# Patient Record
Sex: MALE | Race: WHITE | Employment: FULL TIME | URBAN - METROPOLITAN AREA
[De-identification: names, ages, dates, MRNs, and addresses within clinical notes are randomized per-mention and may not be internally consistent; named-entity substitution may affect disease eponyms.]

---

## 2017-08-28 PROBLEM — I10 ESSENTIAL HYPERTENSION: Status: ACTIVE | Noted: 2017-08-28

## 2018-10-29 ENCOUNTER — HOSPITAL ENCOUNTER (OUTPATIENT)
Dept: PHYSICAL THERAPY | Age: 58
Discharge: HOME | End: 2018-10-29
Payer: COMMERCIAL

## 2018-10-29 ENCOUNTER — HOSPITAL ENCOUNTER (OUTPATIENT)
Dept: SURGERY | Age: 58
Discharge: HOME | End: 2018-10-29
Payer: COMMERCIAL

## 2018-10-29 VITALS
OXYGEN SATURATION: 95 % | SYSTOLIC BLOOD PRESSURE: 150 MMHG | BODY MASS INDEX: 30.63 KG/M2 | TEMPERATURE: 96 F | RESPIRATION RATE: 14 BRPM | HEART RATE: 78 BPM | DIASTOLIC BLOOD PRESSURE: 86 MMHG | HEIGHT: 68 IN | WEIGHT: 202.13 LBS

## 2018-10-29 LAB
ANION GAP SERPL CALC-SCNC: 8 MMOL/L
APPEARANCE UR: CLEAR
APTT PPP: 26.9 SEC (ref 23.2–35.3)
ATRIAL RATE: 77 BPM
BACTERIA SPEC CULT: NORMAL
BACTERIA URNS QL MICRO: 0 /HPF
BASOPHILS # BLD: 0.1 K/UL (ref 0–0.2)
BASOPHILS NFR BLD: 1 % (ref 0–2)
BILIRUB UR QL: NEGATIVE
BUN SERPL-MCNC: 13 MG/DL (ref 6–23)
CALCIUM SERPL-MCNC: 9.6 MG/DL (ref 8.3–10.4)
CALCULATED P AXIS, ECG09: 54 DEGREES
CALCULATED R AXIS, ECG10: 0 DEGREES
CALCULATED T AXIS, ECG11: 62 DEGREES
CHLORIDE SERPL-SCNC: 102 MMOL/L (ref 98–107)
CO2 SERPL-SCNC: 27 MMOL/L (ref 21–32)
COLOR UR: YELLOW
CREAT SERPL-MCNC: 0.93 MG/DL (ref 0.8–1.5)
DIAGNOSIS, 93000: NORMAL
DIFFERENTIAL METHOD BLD: ABNORMAL
EOSINOPHIL # BLD: 0.2 K/UL (ref 0–0.8)
EOSINOPHIL NFR BLD: 4 % (ref 0.5–7.8)
ERYTHROCYTE [DISTWIDTH] IN BLOOD BY AUTOMATED COUNT: 12.6 %
GLUCOSE SERPL-MCNC: 191 MG/DL (ref 65–100)
GLUCOSE UR STRIP.AUTO-MCNC: 100 MG/DL
HCT VFR BLD AUTO: 45.9 % (ref 41.1–50.3)
HGB BLD-MCNC: 16.3 G/DL (ref 13.6–17.2)
HGB UR QL STRIP: NEGATIVE
IMM GRANULOCYTES # BLD: 0.1 K/UL (ref 0–0.5)
IMM GRANULOCYTES NFR BLD AUTO: 1 % (ref 0–5)
INR PPP: 0.9
KETONES UR QL STRIP.AUTO: ABNORMAL MG/DL
LEUKOCYTE ESTERASE UR QL STRIP.AUTO: NEGATIVE
LYMPHOCYTES # BLD: 1.3 K/UL (ref 0.5–4.6)
LYMPHOCYTES NFR BLD: 20 % (ref 13–44)
MCH RBC QN AUTO: 31.2 PG (ref 26.1–32.9)
MCHC RBC AUTO-ENTMCNC: 35.5 G/DL (ref 31.4–35)
MCV RBC AUTO: 87.9 FL (ref 79.6–97.8)
MONOCYTES # BLD: 0.5 K/UL (ref 0.1–1.3)
MONOCYTES NFR BLD: 8 % (ref 4–12)
NEUTS SEG # BLD: 4.4 K/UL (ref 1.7–8.2)
NEUTS SEG NFR BLD: 67 % (ref 43–78)
NITRITE UR QL STRIP.AUTO: NEGATIVE
NRBC # BLD: 0 K/UL (ref 0–0.2)
P-R INTERVAL, ECG05: 166 MS
PH UR STRIP: 6.5 [PH] (ref 5–9)
PLATELET # BLD AUTO: 203 K/UL (ref 150–450)
PMV BLD AUTO: 10.3 FL (ref 9.4–12.3)
POTASSIUM SERPL-SCNC: 3.9 MMOL/L (ref 3.5–5.1)
PROT UR STRIP-MCNC: NEGATIVE MG/DL
PROTHROMBIN TIME: 12.8 SEC (ref 11.5–14.5)
Q-T INTERVAL, ECG07: 368 MS
QRS DURATION, ECG06: 90 MS
QTC CALCULATION (BEZET), ECG08: 416 MS
RBC # BLD AUTO: 5.22 M/UL (ref 4.23–5.6)
SERVICE CMNT-IMP: NORMAL
SODIUM SERPL-SCNC: 137 MMOL/L (ref 136–145)
SP GR UR REFRACTOMETRY: 1.02 (ref 1–1.02)
UROBILINOGEN UR QL STRIP.AUTO: 0.2 EU/DL (ref 0.2–1)
VENTRICULAR RATE, ECG03: 77 BPM
WBC # BLD AUTO: 6.6 K/UL (ref 4.3–11.1)

## 2018-10-29 PROCEDURE — 85025 COMPLETE CBC W/AUTO DIFF WBC: CPT

## 2018-10-29 PROCEDURE — 93005 ELECTROCARDIOGRAM TRACING: CPT | Performed by: ANESTHESIOLOGY

## 2018-10-29 PROCEDURE — 80048 BASIC METABOLIC PNL TOTAL CA: CPT

## 2018-10-29 PROCEDURE — 85610 PROTHROMBIN TIME: CPT

## 2018-10-29 PROCEDURE — 36415 COLL VENOUS BLD VENIPUNCTURE: CPT

## 2018-10-29 PROCEDURE — 81003 URINALYSIS AUTO W/O SCOPE: CPT

## 2018-10-29 PROCEDURE — 85730 THROMBOPLASTIN TIME PARTIAL: CPT

## 2018-10-29 PROCEDURE — 97161 PT EVAL LOW COMPLEX 20 MIN: CPT

## 2018-10-29 PROCEDURE — 77030027138 HC INCENT SPIROMETER -A

## 2018-10-29 PROCEDURE — 87641 MR-STAPH DNA AMP PROBE: CPT

## 2018-10-29 RX ORDER — CHOLECALCIFEROL (VITAMIN D3) 125 MCG
CAPSULE ORAL
COMMUNITY
End: 2018-11-16

## 2018-10-29 RX ORDER — GLUCOSAMINE/CHONDR SU A SOD 750-600 MG
TABLET ORAL DAILY
COMMUNITY
End: 2018-11-16

## 2018-10-29 RX ORDER — LANOLIN ALCOHOL/MO/W.PET/CERES
1000 CREAM (GRAM) TOPICAL DAILY
COMMUNITY
End: 2018-11-16

## 2018-10-29 RX ORDER — MULTIVITAMIN
TABLET ORAL DAILY
COMMUNITY
End: 2018-11-16

## 2018-10-29 NOTE — PERIOP NOTES
[facility] clinic, spoke with triage RN for Primary care services. Informed that pt is scheduled for Total joint surgery and has been without lisinopril and other meds for 2-3 weeks due to pt's inability to get a response from the clinic. Pt reports numerous messages have been left re:need for refills without response. Also requested fax number so that lab results can be faxed to facility. [facility]- Fax # [phone]

## 2018-10-29 NOTE — ADVANCED PRACTICE NURSE
Total Joint Surgery Preoperative Chart Review Patient ID: 
Cristina Cardona [id number] 
62 y.o. 
1960 Surgeon: Dr. Enrike Arteaga Date of Surgery: 11/15/2018 Procedure: Total Left Knee Arthroplasty Primary Care Physician: Lucian Crowe [phone] Specialty Physician(s):   
 
Subjective:  
Cristina Cardona is a 62 y.o. WHITE OR  male who presents for preoperative evaluation for Total Left Knee arthroplasty. This is a preoperative chart review note based on data collected by the nurse at the surgical Pre-Assessment visit. Past Medical History:  
Diagnosis Date  Arthritis  CAD (coronary artery disease)   
 heart cath times 3 - 2 total stents  Diabetes ([address] 2018  
 type 2, adverage glucose 150-200, symptomatic below 100  GERD (gastroesophageal reflux disease) PRN prilosec  - controlls  Hx of colonic polyp 2016  
 adenoma  Hypercholesterolemia  Hypertension Past Surgical History:  
Procedure Laterality Date  HX COLONOSCOPY  last 4/25/16 Moe--trans TA--3 year recall due to prep quality  HX CORONARY STENT PLACEMENT    
 heart cath times 3 - 2 total stents  HX KNEE ARTHROSCOPY Left  HX TONSIL AND ADENOIDECTOMY  HX UVULOPALATOPHARYNGOPLASTY Family History Problem Relation Age of Onset  Lung Disease Mother  Asthma Mother  Cancer Father  Colon Cancer Father  Cancer Maternal Uncle   
     colon  Cancer Maternal Uncle   
     colon Social History Tobacco Use  Smoking status: Never Smoker  Smokeless tobacco: Never Used Substance Use Topics  Alcohol use: Yes Alcohol/week: 1.2 oz Types: 2 Cans of beer per week Prior to Admission medications Medication Sig Start Date End Date Taking? Authorizing Provider VITAMIN E, DL,TOCOPHERYL ACET, (VITAMIN E ACETATE) 400 unit cap capsule Take  by mouth daily. Yes Provider, Historical  
ascorbic acid (VITAMIN C) 500 mg tablet Take 500 mg by mouth daily. Stop seven days prior to surgery per anesthesia protocol. Yes Provider, Historical  
NOVOFINE 32 32 gauge x 1/4\" ndle USE TO INJECT INSULIN ONCE DAILY 1/16/18   Michelle Cota MD  
insulin degludec (TRESIBA FLEXTOUCH U-200) 200 unit/mL (3 mL) inpn 46 Units by SubCUTAneous route daily. 12/20/17   Michelle Cota MD  
glucose blood VI test strips (CONTOUR NEXT STRIPS) strip Check sugars tid for uncontrolled type 2 DM 9/22/17   Kei CHAVARRIA PA-C  
glucose blood VI test strips (ONETOUCH ULTRA TEST) strip Use to check blood sugars TID DX E11.9 9/22/17   Michelle Cota MD  
Blood-Glucose Meter [facility] [place]) monitoring kit Use to check blood sugars TID DX E11.9 9/22/17   Michelle Cota MD  
lancets (ONE TOUCH DELICA) 33 gauge misc by Does Not Apply route three (3) times daily. Use to check blood sugars TID DX E11.9 9/22/17   Michelle Cota MD  
simvastatin (ZOCOR) 40 mg tablet Take 1 Tab by mouth nightly. 8/28/17   Michelle Cota MD  
traZODone (DESYREL) 100 mg tablet Take 1 Tab by mouth nightly. 8/28/17   Michelle Cota MD  
lisinopril (PRINIVIL, ZESTRIL) 10 mg tablet Take 1 Tab by mouth daily. 8/28/17   Michelle Cota MD  
fenofibrate micronized (LOFIBRA) 134 mg capsule Take 1 Cap by mouth every morning. 8/28/17   Michelle Cota MD  
metFORMIN (GLUCOPHAGE) 1,000 mg tablet Take 1 Tab by mouth two (2) times a day. 8/28/17   Michelle Cota MD  
zinc 15 mg tab Take  by mouth. Provider, Historical  
HYDROcodone-acetaminophen (NORCO) 5-325 mg per tablet Take 1 Tab by mouth every four (4) hours as needed for Pain. Max Daily Amount: 6 Tabs.  Indications: PAIN 11/28/16   Michelle Cota MD  
 melatonin tab tablet Take 10 mg by mouth nightly. Stop seven days prior to surgery per anesthesia protocol. Provider, Historical  
sodium-potassium-mag sulfate (SUPREP BOWEL PREP KIT) 17.5-3.13-1.6 gram solr oral solution Take 177 mL by mouth See Admin Instructions. 3/17/16   Shailesh Bueno MD  
aspirin delayed-release 81 mg tablet Take 81 mg by mouth every morning. Take day of surgery per anesthesia protocol. 3/7/16   Renetta Cota MD  
 
No Known Allergies Objective:  
 
Physical Exam:  
No data found. ECG:   
EKG Results Procedure 720 Value Units Date/Time EKG, 12 LEAD, INITIAL [[id number]] Collected:  10/29/18 1004 Order Status:  Completed Updated:  10/29/18 1237 Ventricular Rate 77 BPM   
  Atrial Rate 77 BPM   
  P-R Interval 166 ms   
  QRS Duration 90 ms Q-T Interval 368 ms QTC Calculation (Bezet) 416 ms Calculated P Axis 54 degrees Calculated R Axis 0 degrees Calculated T Axis 62 degrees Diagnosis --  
  Normal sinus rhythm Possible Left atrial enlargement Borderline ECG When compared with ECG of 11-JUN-2010 09:41, No significant change was found Confirmed by VERÓNICA LOPEZ (), Terrence Comer ([id number]) on 10/29/2018 12:37:46 PM 
  
  
 
 
Data Review:  
Labs:  
Recent Results (from the past 24 hour(s)) CBC WITH AUTOMATED DIFF Collection Time: 10/29/18  9:15 AM  
Result Value Ref Range WBC 6.6 4.3 - 11.1 K/uL  
 RBC 5.22 4.23 - 5.6 M/uL  
 HGB 16.3 13.6 - 17.2 g/dL HCT 45.9 41.1 - 50.3 % MCV 87.9 79.6 - 97.8 FL  
 MCH 31.2 26.1 - 32.9 PG  
 MCHC 35.5 (H) 31.4 - 35.0 g/dL  
 RDW 12.6 % PLATELET 374 211 - 170 K/uL MPV 10.3 9.4 - 12.3 FL ABSOLUTE NRBC 0.00 0.0 - 0.2 K/uL  
 DF AUTOMATED NEUTROPHILS 67 43 - 78 % LYMPHOCYTES 20 13 - 44 % MONOCYTES 8 4.0 - 12.0 % EOSINOPHILS 4 0.5 - 7.8 % BASOPHILS 1 0.0 - 2.0 % IMMATURE GRANULOCYTES 1 0.0 - 5.0 %  
 ABS. NEUTROPHILS 4.4 1.7 - 8.2 K/UL ABS. LYMPHOCYTES 1.3 0.5 - 4.6 K/UL  
 ABS. MONOCYTES 0.5 0.1 - 1.3 K/UL  
 ABS. EOSINOPHILS 0.2 0.0 - 0.8 K/UL  
 ABS. BASOPHILS 0.1 0.0 - 0.2 K/UL  
 ABS. IMM. GRANS. 0.1 0.0 - 0.5 K/UL METABOLIC PANEL, BASIC Collection Time: 10/29/18  9:15 AM  
Result Value Ref Range Sodium 137 136 - 145 mmol/L Potassium 3.9 3.5 - 5.1 mmol/L Chloride 102 98 - 107 mmol/L  
 CO2 27 21 - 32 mmol/L Anion gap 8 mmol/L Glucose 191 (H) 65 - 100 mg/dL BUN 13 6 - 23 MG/DL Creatinine 0.93 0.8 - 1.5 MG/DL  
 GFR est AA >60 >60 ml/min/1.73m2 GFR est non-AA >60 ml/min/1.73m2 Calcium 9.6 8.3 - 10.4 MG/DL PROTHROMBIN TIME + INR Collection Time: 10/29/18  9:15 AM  
Result Value Ref Range Prothrombin time 12.8 11.5 - 14.5 sec INR 0.9 PTT Collection Time: 10/29/18  9:15 AM  
Result Value Ref Range aPTT 26.9 23.2 - 35.3 SEC URINALYSIS W/ RFLX MICROSCOPIC Collection Time: 10/29/18  9:15 AM  
Result Value Ref Range Color YELLOW Appearance CLEAR Specific gravity 1.024 (H) 1.001 - 1.023    
 pH (UA) 6.5 5.0 - 9.0 Protein NEGATIVE  NEG mg/dL Glucose 100 (A) NEG mg/dL Ketone TRACE (A) NEG mg/dL Bilirubin NEGATIVE  NEG Blood NEGATIVE  NEG Urobilinogen 0.2 0.2 - 1.0 EU/dL Nitrites NEGATIVE  NEG Leukocyte Esterase NEGATIVE  NEG Bacteria 0 0 /hpf  
EKG, 12 LEAD, INITIAL Collection Time: 10/29/18 10:04 AM  
Result Value Ref Range Ventricular Rate 77 BPM  
 Atrial Rate 77 BPM  
 P-R Interval 166 ms  
 QRS Duration 90 ms Q-T Interval 368 ms QTC Calculation (Bezet) 416 ms Calculated P Axis 54 degrees Calculated R Axis 0 degrees Calculated T Axis 62 degrees Diagnosis Normal sinus rhythm Possible Left atrial enlargement Borderline ECG When compared with ECG of 11-JUN-2010 09:41, No significant change was found Confirmed by VERÓNICA LOPEZ (), Terrence Comer ([id number]) on 10/29/2018 12:37:46 PM 
  
 
 
 
Problem List: 
) Patient Active Problem List  
Diagnosis Code  Gout M10.9  Hyperlipidemia E78.5  Insomnia G47.00  Fatigue R53.83  Benign colon polyp K63.5  Chronic neck pain M54.2, G89.29  Type 2 diabetes mellitus without complication, without long-term current use of insulin (HCC) E11.9  
 Essential hypertension I10 Total Joint Surgery Pre-Assessment Recommendations:          
Recommend continuous saturation monitoring hours of sleep, during hospitalization. Signed By: REED Clements October 29, 2018

## 2018-10-29 NOTE — PERIOP NOTES
Lab results within limits per anesthesia protocol; OK for surgery. Recent Results (from the past 12 hour(s)) CBC WITH AUTOMATED DIFF Collection Time: 10/29/18  9:15 AM  
Result Value Ref Range WBC 6.6 4.3 - 11.1 K/uL  
 RBC 5.22 4.23 - 5.6 M/uL  
 HGB 16.3 13.6 - 17.2 g/dL HCT 45.9 41.1 - 50.3 % MCV 87.9 79.6 - 97.8 FL  
 MCH 31.2 26.1 - 32.9 PG  
 MCHC 35.5 (H) 31.4 - 35.0 g/dL  
 RDW 12.6 % PLATELET 049 970 - 566 K/uL MPV 10.3 9.4 - 12.3 FL ABSOLUTE NRBC 0.00 0.0 - 0.2 K/uL  
 DF AUTOMATED NEUTROPHILS 67 43 - 78 % LYMPHOCYTES 20 13 - 44 % MONOCYTES 8 4.0 - 12.0 % EOSINOPHILS 4 0.5 - 7.8 % BASOPHILS 1 0.0 - 2.0 % IMMATURE GRANULOCYTES 1 0.0 - 5.0 %  
 ABS. NEUTROPHILS 4.4 1.7 - 8.2 K/UL  
 ABS. LYMPHOCYTES 1.3 0.5 - 4.6 K/UL  
 ABS. MONOCYTES 0.5 0.1 - 1.3 K/UL  
 ABS. EOSINOPHILS 0.2 0.0 - 0.8 K/UL  
 ABS. BASOPHILS 0.1 0.0 - 0.2 K/UL  
 ABS. IMM. GRANS. 0.1 0.0 - 0.5 K/UL METABOLIC PANEL, BASIC Collection Time: 10/29/18  9:15 AM  
Result Value Ref Range Sodium 137 136 - 145 mmol/L Potassium 3.9 3.5 - 5.1 mmol/L Chloride 102 98 - 107 mmol/L  
 CO2 27 21 - 32 mmol/L Anion gap 8 mmol/L Glucose 191 (H) 65 - 100 mg/dL BUN 13 6 - 23 MG/DL Creatinine 0.93 0.8 - 1.5 MG/DL  
 GFR est AA >60 >60 ml/min/1.73m2 GFR est non-AA >60 ml/min/1.73m2 Calcium 9.6 8.3 - 10.4 MG/DL PROTHROMBIN TIME + INR Collection Time: 10/29/18  9:15 AM  
Result Value Ref Range Prothrombin time 12.8 11.5 - 14.5 sec INR 0.9 PTT Collection Time: 10/29/18  9:15 AM  
Result Value Ref Range aPTT 26.9 23.2 - 35.3 SEC URINALYSIS W/ RFLX MICROSCOPIC Collection Time: 10/29/18  9:15 AM  
Result Value Ref Range Color YELLOW Appearance CLEAR Specific gravity 1.024 (H) 1.001 - 1.023    
 pH (UA) 6.5 5.0 - 9.0 Protein NEGATIVE  NEG mg/dL Glucose 100 (A) NEG mg/dL Ketone TRACE (A) NEG mg/dL  Bilirubin NEGATIVE  NEG    
 Blood NEGATIVE  NEG Urobilinogen 0.2 0.2 - 1.0 EU/dL Nitrites NEGATIVE  NEG Leukocyte Esterase NEGATIVE  NEG Bacteria 0 0 /hpf  
EKG, 12 LEAD, INITIAL Collection Time: 10/29/18 10:04 AM  
Result Value Ref Range Ventricular Rate 77 BPM  
 Atrial Rate 77 BPM  
 P-R Interval 166 ms  
 QRS Duration 90 ms Q-T Interval 368 ms QTC Calculation (Bezet) 416 ms Calculated P Axis 54 degrees Calculated R Axis 0 degrees Calculated T Axis 62 degrees Diagnosis Normal sinus rhythm Possible Left atrial enlargement Borderline ECG When compared with ECG of 11-JUN-2010 09:41, No significant change was found Confirmed by VERÓNICA LOPEZ (), Mc Barragan ([id number]) on 10/29/2018 12:37:46 PM

## 2018-10-29 NOTE — PERIOP NOTES
Patient verified name and . Order for consent  found in EHR and matches case posting; patient verified. Type III surgery, walk in assessment complete. Labs per surgeon: cbc,bmp,PT/PTT and UA ; results within limits per anesthesia protocol Labs per anesthesia protocol: type & screen DOS 
EKG:performed per anesthesia protocol, results within protocol. Copy placed on chart. Hibiclens and instructions to return bottle on DOS given per hospital policy. Patient provided with handouts including Guide to Surgery, Pain Management, Hand Hygiene, Blood Transfusion Education, and [place] [facility] Brochure. Patient answered medical/surgical history questions at their best of ability. All prior to admission medications documented in [facility]. Original medication prescription bottle not visualized during patient appointment. Patient instructed to hold all vitamins 7 days prior to surgery and NSAIDS 5 days prior to surgery. Medications to be held: aleve, supplements Patient instructed to continue previous medications as prescribed prior to surgery and to take the following medications the day of surgery according to anesthesia guidelines with a small sip of water: aspirin, prilosec. Patient teach back successful and patient demonstrates knowledge of instruction.

## 2018-10-29 NOTE — PROGRESS NOTES
Rebecca De León  : (4 y.o.) Joint Alison Sas at [address], [facility].  [phone]       Physical Therapy Prehab Plan of Treatment and Evaluation Summary:10/29/2018    ICD-10: Treatment Diagnosis:   · Pain in Left Knee (M25.562)  · Stiffness of Left Knee, Not elsewhere classified (L58.758)  Precautions/Allergies:   Patient has no known allergies. MEDICAL/REFERRING DIAGNOSIS:  Unilateral primary osteoarthritis, left knee [M17.12]  REFERRING PHYSICIAN: Marielle Danielson,*  DATE OF SURGERY: 11/15/18   Assessment:   Comments:  Patient is scheduled for L TKA. Reports L knee pain averages an 8. States it is best in the morning and worse as the day goes on. Also reports L knee instability. Plans to go home at discharge with support of wife. PROBLEM LIST (Impacting functional limitations):  Mr. Josie Dominguez presents with the following left lower extremity(s) problems:  1. Strength  2. Range of Motion  3. Home Exercise Program  4. Pain   INTERVENTIONS PLANNED:  1. Home Exercise Program  2. Educational Discussion     TREATMENT PLAN: Effective Dates: 10/29/2018 TO 10/29/2018. Frequency/Duration: Patient to continue to perform home exercise program at least twice per day up until his surgery. GOALS: (Goals have been discussed and agreed upon with patient.)  Discharge Goals: Time Frame: 1 Day  1. Patient will demonstrate independence with a home exercise program designed to increase functional technique and pain control to minimize functional deficits and optimize patient for total joint replacement. Rehabilitation Potential For Stated Goals: Good  Regarding Rebecca De León therapy, I certify that the treatment plan above will be carried out by a therapist or under their direction.   Thank you for this referral,  Roberta Wharton PT               HISTORY:   Present Symptoms:  Pain Intensity 1: 5  Pain Location 1: Knee  Pain Orientation 1: Left History of Present Injury/Illness (Reason for Referral):  Medical/Referring Diagnosis: Unilateral primary osteoarthritis, left knee [M17.12]   Past Medical History/Comorbidities:   Mr. Vy Ku  has a past medical history of Arthritis, CAD (coronary artery disease), Diabetes ([facility] [address] GERD (gastroesophageal reflux disease), colonic polyp, Hypercholesterolemia, and Hypertension. Mr. Vy Ku  has a past surgical history that includes hx coronary stent placement; hx tonsil and adenoidectomy; hx uvulopalatopharyngoplasty; hx knee arthroscopy (Left); hx colonoscopy (last 4/25/16); COLONOSCOPY/ 26 (N/A, 4/25/2016); ENDOSCOPIC POLYPECTOMY (N/A, 4/25/2016); ESOPHAGOGASTRODUODENOSCOPY (EGD) (N/A, 4/25/2016); and ESOPHAGOGASTRODUODENAL (EGD) BIOPSY (N/A, 4/25/2016). Social History/Living Environment:   Home Environment: Private residence  # Steps to Enter: 2  Rails to Enter: No  One/Two Story Residence: Two story  # of Interior Steps: 12  Interior Rails: Left  Living Alone: No  Support Systems: Spouse/Significant Other/Partner  Patient Expects to be Discharged to[de-identified] Private residence  Current DME Used/Available at Home: None  Tub or Shower Type: Shower  Work/Activity:  Job requires significant travel by car and air  Dominant Side:  RIGHT  Current Medications:  See Pre-assessment nursing note   Number of Personal Factors/Comorbidities that affect the Plan of Care: 0: LOW COMPLEXITY   EXAMINATION:   ADLs (Current Functional Status):   Ambulation:  [x] Independent  [] Walk Indoors Only  [] Walk Outdoors  [] Use Assistive Device  [] Use Wheelchair Only Dressing:  [x] Independent  Requires Assistance from Someone for:  [] Sock/Shoes  [] Pants  [] Everything   Bathing/Showering:   [x] Independent  [] Requires Assistance from Someone  [] [address]:  [x] Routine house and yard work  [] Light Housework Only  [] None   Observation/Orthostatic Postural Assessment:     Forward head, Rounded shoulders, Genu valgus left  ROM/Flexibility:   AROM: Within functional limits(for R LE)                LLE AROM  L Knee Flexion: 108  L Knee Extension: 0          Strength:   Strength: Within functional limits(for R LE)       LLE Strength  L Hip Flexion: 3+  L Knee Flexion: 4-  L Knee Extension: 4-          Functional Mobility:         Stand to Sit: Independent  Sit to Stand: Independent  Distance (ft): 450 Feet (ft)  Ambulation - Level of Assistance: Independent  Stance: Left decreased  Gait Abnormalities: Antalgic          Balance:    Sitting: Intact  Standing: Intact   Body Structures Involved:  1. Bones  2. Joints  3. Muscles Body Functions Affected:  1. Neuromusculoskeletal  2. Movement Related Activities and Participation Affected:  1. General Tasks and Demands  2. Mobility  3. Self Care   Number of elements that affect the Plan of Care: 3: MODERATE COMPLEXITY   CLINICAL PRESENTATION:   Presentation: Stable and uncomplicated: LOW COMPLEXITY   CLINICAL DECISION MAKING:   Outcome Measure: Tool Used: Lower Extremity Functional Scale (LEFS)  Score:  Initial: 33/80 Most Recent: X/80 (Date: -- )   Interpretation of Score: 20 questions each scored on a 5 point scale with 0 representing \"extreme difficulty or unable to perform\" and 4 representing \"no difficulty\". The lower the score, the greater the functional disability. 80/80 represents no disability. Minimal detectable change is 9 points. Score 80 79-65 64-49 48-33 32-17 16-1 0   Modifier CH CI CJ CK CL CM CN     ? Mobility - Walking and Moving Around:     - CURRENT STATUS: CK - 40%-59% impaired, limited or restricted    - GOAL STATUS: CK - 40%-59% impaired, limited or restricted    - D/C STATUS:  CK - 40%-59% impaired, limited or restricted  Medical Necessity:   · Mr. Anthony Driscoll is expected to optimize his lower extremity strength and ROM in preparation for joint replacement surgery.   Reason for Services/Other Comments:  · Achieve baseline assesment of musculoskeletal system, functional mobility and home environment. , educate in PT HEP in preparation for surgery, educate in hospital plan of care. Use of outcome tool(s) and clinical judgement create a POC that gives a: Clear prediction of patient's progress: LOW COMPLEXITY   TREATMENT:   Treatment/Session Assessment:  Patient was instructed in PT- HEP to increase strength and ROM in LEs. Answered all questions. · Post session pain:  5  · Compliance with Program/Exercises: anticipate compliance.   Total Treatment Duration:  PT Patient Time In/Time Out  Time In: 0930  Time Out: [address], PT

## 2018-10-29 NOTE — PROGRESS NOTES
10/29/18 0900 Oxygen Therapy O2 Sat (%) 97 % Pulse via Oximetry 84 beats per minute O2 Device Room air Pre-Treatment Breath Sounds Bilateral Clear Pre FEV1 (liters) 2.6 liters % Predicted 76 Incentive Spirometry Treatment Actual Volume (ml) 2500 ml Initial respiratory Assessment completed with pt. Pt was interviewed and evaluated in Joint camp prior to surgery. Patient ID: 
Farida Dalton [id number] 
62 y.o. 
1960 Surgeon: Dr. Renaldo Rivera Date of Surgery: 11/15/2018 Procedure: Total Left Knee Arthroplasty Primary Care Physician: Naty Aly [phone] Specialists:  
                    
          Pt instructed in the use of Incentive Spirometry. Pt instructed to bring Incentive Spirometer back on date of surgery & to start using Is upon return to pt room. Pt taught proper cough technique History of smoking:   NONE Quit date:        
 
Secondhand smoke:PARENTS Past procedures with Oxygen desaturation:DENIES Past Medical History:  
Diagnosis Date  Arthritis   
 osteo  CAD (coronary artery disease) 1999  
 s/p 3 heart cath . 2 stents . No cardiologist,follow[facility] by [facility]  Diabetes ([facility] [address] 2000  
 type 2, adverage glucose 150-200, symptomatic below 100  Elevated serum cholesterol  GERD (gastroesophageal reflux disease) diet controlled. prilosec prn OTC  Hx of colonic polyp 2016  
 adenoma  Hypercholesterolemia  Hypertension Respiratory history:DENIES SOB Respiratory meds: FAMILY PRESENT:            SPOUSE, PAST SLEEP STUDY:                         
HX OF SITA:                        YES                   - PT HAD UPP 
                                
 
SITA assessment: SLEEPS ON SIDE     ,        BACK     &           STOMACH 
                                          
 
 
PHYSICAL EXAM Body mass index is 30.73 kg/m². Visit Vitals /86 (BP 1 Location: Left arm, BP Patient Position: Sitting) Pulse 78 Temp 96 °F (35.6 °C) Resp 14 Ht 5' 8\" (1.727 m) Wt 91.7 kg (202 lb 2 oz) SpO2 95% BMI 30.73 kg/m² Neck circumference: 44     cm Loud snoring:       GURGLES ACCORDING TO WIFE- USED TO SNORE LOUDLY Witnessed apnea or wakening gasping or choking:,             DENIES, Awakens with headaches:                                                  DENIES Morning or daytime tiredness/ sleepiness:                    DENIES Dry mouth or sore throat in morning: SOME Flaherty stage:  4 SACS score:25 STOP/BAN 
 
                         
CPAP:                       NONE 
                              
 
 
 
 
     CONT SAT HS Referrals: 
 
Pt. Phone Number:

## 2018-11-09 NOTE — H&P
[employer] History and Physical Exam 
 
Patient ID: 
Domi Lee [id number] 
 
62 y.o. 
1960 Today: November 9, 2018 Vitals Signs: Reviewed as noted in medical record. Allergies: No Known Allergies CC: Left knee pain HPI:  Pt complains of left knee pain and with difficulty ambulating. Relevant PMH:  
Past Medical History:  
Diagnosis Date  Arthritis   
 osteo  CAD (coronary artery disease) 1999  
 s/p 3 heart cath . 2 stents . No cardiologist,followed by [facility]  Diabetes ([address] 2000  
 type 2, adverage glucose 150-200, symptomatic below 100  Elevated serum cholesterol  GERD (gastroesophageal reflux disease) diet controlled. prilosec prn OTC  Hx of colonic polyp 2016  
 adenoma  Hypercholesterolemia  Hypertension Objective:  
                 HEENT: NC/AT Lungs:  clear Heart:   rrr Abdomen: soft Extremities:  Pain with rom of the left knee joint Radiographs: reveal osteoarthritis with loss of joint space and bone spurs. Assessment: Unilateral primary osteoarthritis, left knee [M17.12] Plan:  Proceed with scheduled Procedure(s) (LRB): LEFT KNEE ARTHROPLASTY TOTAL / OSKAR (Left) . The patient has failed conservative treatment including NSAIDS, and injections. Due to the amount of pain the patient is experiencing we will proceed with scheduled procedure. It is also felt that the patient is high risk for postoperative complications due to history of multiple chronic medical problems. Signed By: POONAM Martinez November 9, 2018

## 2018-11-14 ENCOUNTER — ANESTHESIA EVENT (OUTPATIENT)
Dept: SURGERY | Age: 58
DRG: 470 | End: 2018-11-14
Payer: COMMERCIAL

## 2018-11-15 ENCOUNTER — ANESTHESIA (OUTPATIENT)
Dept: SURGERY | Age: 58
DRG: 470 | End: 2018-11-15
Payer: COMMERCIAL

## 2018-11-15 ENCOUNTER — HOSPITAL ENCOUNTER (INPATIENT)
Age: 58
LOS: 1 days | Discharge: HOME HEALTH | DRG: 470 | End: 2018-11-16
Attending: ORTHOPAEDIC SURGERY | Admitting: ORTHOPAEDIC SURGERY
Payer: COMMERCIAL

## 2018-11-15 ENCOUNTER — HOME HEALTH ADMISSION (OUTPATIENT)
Dept: HOME HEALTH SERVICES | Facility: HOME HEALTH | Age: 58
End: 2018-11-15
Payer: COMMERCIAL

## 2018-11-15 DIAGNOSIS — M17.12 ARTHRITIS OF LEFT KNEE: Primary | ICD-10-CM

## 2018-11-15 LAB
ABO + RH BLD: NORMAL
BLOOD GROUP ANTIBODIES SERPL: NORMAL
GLUCOSE BLD STRIP.AUTO-MCNC: 151 MG/DL (ref 65–100)
GLUCOSE BLD STRIP.AUTO-MCNC: 196 MG/DL (ref 65–100)
GLUCOSE BLD STRIP.AUTO-MCNC: 249 MG/DL (ref 65–100)
HGB BLD-MCNC: 12.8 G/DL (ref 13.6–17.2)
SPECIMEN EXP DATE BLD: NORMAL

## 2018-11-15 PROCEDURE — 74011250636 HC RX REV CODE- 250/636

## 2018-11-15 PROCEDURE — 77010033678 HC OXYGEN DAILY

## 2018-11-15 PROCEDURE — 77030003665 HC NDL SPN BBMI -A: Performed by: ANESTHESIOLOGY

## 2018-11-15 PROCEDURE — 97110 THERAPEUTIC EXERCISES: CPT

## 2018-11-15 PROCEDURE — 77030006720 HC BLD PAT RMR ZIMM -B: Performed by: ORTHOPAEDIC SURGERY

## 2018-11-15 PROCEDURE — 97161 PT EVAL LOW COMPLEX 20 MIN: CPT

## 2018-11-15 PROCEDURE — 85018 HEMOGLOBIN: CPT

## 2018-11-15 PROCEDURE — 77030018836 HC SOL IRR NACL ICUM -A: Performed by: ORTHOPAEDIC SURGERY

## 2018-11-15 PROCEDURE — 77030013727 HC IRR FAN PULSVC ZIMM -B: Performed by: ORTHOPAEDIC SURGERY

## 2018-11-15 PROCEDURE — 77030007880 HC KT SPN EPDRL BBMI -B: Performed by: ANESTHESIOLOGY

## 2018-11-15 PROCEDURE — 77030020256 HC SOL INJ NACL 0.9%  500ML: Performed by: ORTHOPAEDIC SURGERY

## 2018-11-15 PROCEDURE — 77030003602 HC NDL NRV BLK BBMI -B: Performed by: ANESTHESIOLOGY

## 2018-11-15 PROCEDURE — 74011250636 HC RX REV CODE- 250/636: Performed by: ORTHOPAEDIC SURGERY

## 2018-11-15 PROCEDURE — 77030002933 HC SUT MCRYL J&J -A: Performed by: ORTHOPAEDIC SURGERY

## 2018-11-15 PROCEDURE — 74011636637 HC RX REV CODE- 636/637: Performed by: ANESTHESIOLOGY

## 2018-11-15 PROCEDURE — 76060000034 HC ANESTHESIA 1.5 TO 2 HR: Performed by: ORTHOPAEDIC SURGERY

## 2018-11-15 PROCEDURE — 74011250637 HC RX REV CODE- 250/637: Performed by: PHYSICIAN ASSISTANT

## 2018-11-15 PROCEDURE — 74011000250 HC RX REV CODE- 250

## 2018-11-15 PROCEDURE — 74011250636 HC RX REV CODE- 250/636: Performed by: PHYSICIAN ASSISTANT

## 2018-11-15 PROCEDURE — 76010010054 HC POST OP PAIN BLOCK: Performed by: ORTHOPAEDIC SURGERY

## 2018-11-15 PROCEDURE — 74011000302 HC RX REV CODE- 302: Performed by: ORTHOPAEDIC SURGERY

## 2018-11-15 PROCEDURE — 94762 N-INVAS EAR/PLS OXIMTRY CONT: CPT

## 2018-11-15 PROCEDURE — 77030006835 HC BLD SAW SAG STRY -B: Performed by: ORTHOPAEDIC SURGERY

## 2018-11-15 PROCEDURE — 74011250637 HC RX REV CODE- 250/637: Performed by: ORTHOPAEDIC SURGERY

## 2018-11-15 PROCEDURE — 82962 GLUCOSE BLOOD TEST: CPT

## 2018-11-15 PROCEDURE — C1776 JOINT DEVICE (IMPLANTABLE): HCPCS | Performed by: ORTHOPAEDIC SURGERY

## 2018-11-15 PROCEDURE — 97165 OT EVAL LOW COMPLEX 30 MIN: CPT

## 2018-11-15 PROCEDURE — 77030032490 HC SLV COMPR SCD KNE COVD -B

## 2018-11-15 PROCEDURE — 77030020782 HC GWN BAIR PAWS FLX 3M -B: Performed by: ANESTHESIOLOGY

## 2018-11-15 PROCEDURE — 36415 COLL VENOUS BLD VENIPUNCTURE: CPT

## 2018-11-15 PROCEDURE — 65270000029 HC RM PRIVATE

## 2018-11-15 PROCEDURE — 74011250636 HC RX REV CODE- 250/636: Performed by: ANESTHESIOLOGY

## 2018-11-15 PROCEDURE — 77030019557 HC ELECTRD VES SEAL MEDT -F: Performed by: ORTHOPAEDIC SURGERY

## 2018-11-15 PROCEDURE — 77030031139 HC SUT VCRL2 J&J -A: Performed by: ORTHOPAEDIC SURGERY

## 2018-11-15 PROCEDURE — 76942 ECHO GUIDE FOR BIOPSY: CPT | Performed by: ORTHOPAEDIC SURGERY

## 2018-11-15 PROCEDURE — 94760 N-INVAS EAR/PLS OXIMETRY 1: CPT

## 2018-11-15 PROCEDURE — 74011000250 HC RX REV CODE- 250: Performed by: ORTHOPAEDIC SURGERY

## 2018-11-15 PROCEDURE — 86901 BLOOD TYPING SEROLOGIC RH(D): CPT

## 2018-11-15 PROCEDURE — 76210000016 HC OR PH I REC 1 TO 1.5 HR: Performed by: ORTHOPAEDIC SURGERY

## 2018-11-15 PROCEDURE — 76010000162 HC OR TIME 1.5 TO 2 HR INTENSV-TIER 1: Performed by: ORTHOPAEDIC SURGERY

## 2018-11-15 PROCEDURE — 77030019940 HC BLNKT HYPOTHRM STRY -B: Performed by: ANESTHESIOLOGY

## 2018-11-15 PROCEDURE — 74011636637 HC RX REV CODE- 636/637: Performed by: ORTHOPAEDIC SURGERY

## 2018-11-15 PROCEDURE — 77030008467 HC STPLR SKN COVD -B: Performed by: ORTHOPAEDIC SURGERY

## 2018-11-15 PROCEDURE — 74011000258 HC RX REV CODE- 258: Performed by: ORTHOPAEDIC SURGERY

## 2018-11-15 PROCEDURE — 77030034849: Performed by: ORTHOPAEDIC SURGERY

## 2018-11-15 PROCEDURE — 74011250637 HC RX REV CODE- 250/637: Performed by: ANESTHESIOLOGY

## 2018-11-15 PROCEDURE — 86580 TB INTRADERMAL TEST: CPT | Performed by: ORTHOPAEDIC SURGERY

## 2018-11-15 PROCEDURE — 77030002966 HC SUT PDS J&J -A: Performed by: ORTHOPAEDIC SURGERY

## 2018-11-15 PROCEDURE — 77030012935 HC DRSG AQUACEL BMS -B: Performed by: ORTHOPAEDIC SURGERY

## 2018-11-15 PROCEDURE — 0SRD0J9 REPLACEMENT OF LEFT KNEE JOINT WITH SYNTHETIC SUBSTITUTE, CEMENTED, OPEN APPROACH: ICD-10-PCS | Performed by: ORTHOPAEDIC SURGERY

## 2018-11-15 DEVICE — BASEPLATE TIB SZ 6 AP52MM ML77MM KNEE TRITANIUM 4 CRUCFRM: Type: IMPLANTABLE DEVICE | Site: KNEE | Status: FUNCTIONAL

## 2018-11-15 DEVICE — INSERT TIB SZ 6 THK11MM UNIV KNEE CONVENTIONAL POLYETH POST: Type: IMPLANTABLE DEVICE | Site: KNEE | Status: FUNCTIONAL

## 2018-11-15 DEVICE — IMPLANTABLE DEVICE: Type: IMPLANTABLE DEVICE | Site: KNEE | Status: FUNCTIONAL

## 2018-11-15 DEVICE — COMPNT FEM PS TRIATHLN 5 L PA --: Type: IMPLANTABLE DEVICE | Site: KNEE | Status: FUNCTIONAL

## 2018-11-15 RX ORDER — TRAZODONE HYDROCHLORIDE 50 MG/1
100 TABLET ORAL
Status: DISCONTINUED | OUTPATIENT
Start: 2018-11-15 | End: 2018-11-16 | Stop reason: HOSPADM

## 2018-11-15 RX ORDER — ONDANSETRON 8 MG/1
4 TABLET, ORALLY DISINTEGRATING ORAL
Status: DISCONTINUED | OUTPATIENT
Start: 2018-11-15 | End: 2018-11-16 | Stop reason: HOSPADM

## 2018-11-15 RX ORDER — DIPHENHYDRAMINE HCL 25 MG
25 CAPSULE ORAL
Status: DISCONTINUED | OUTPATIENT
Start: 2018-11-15 | End: 2018-11-16 | Stop reason: HOSPADM

## 2018-11-15 RX ORDER — LIDOCAINE HYDROCHLORIDE 10 MG/ML
0.3 INJECTION INFILTRATION; PERINEURAL ONCE
Status: COMPLETED | OUTPATIENT
Start: 2018-11-15 | End: 2018-11-15

## 2018-11-15 RX ORDER — FENTANYL CITRATE 50 UG/ML
100 INJECTION, SOLUTION INTRAMUSCULAR; INTRAVENOUS ONCE
Status: COMPLETED | OUTPATIENT
Start: 2018-11-15 | End: 2018-11-15

## 2018-11-15 RX ORDER — HYDROMORPHONE HYDROCHLORIDE 2 MG/ML
1 INJECTION, SOLUTION INTRAMUSCULAR; INTRAVENOUS; SUBCUTANEOUS
Status: DISCONTINUED | OUTPATIENT
Start: 2018-11-15 | End: 2018-11-16 | Stop reason: HOSPADM

## 2018-11-15 RX ORDER — INSULIN GLARGINE 100 [IU]/ML
72 INJECTION, SOLUTION SUBCUTANEOUS
COMMUNITY
End: 2019-04-26

## 2018-11-15 RX ORDER — CEFAZOLIN SODIUM/WATER 2 G/20 ML
2 SYRINGE (ML) INTRAVENOUS ONCE
Status: COMPLETED | OUTPATIENT
Start: 2018-11-15 | End: 2018-11-15

## 2018-11-15 RX ORDER — OXYCODONE HYDROCHLORIDE 5 MG/1
10 TABLET ORAL
Status: DISCONTINUED | OUTPATIENT
Start: 2018-11-15 | End: 2018-11-16

## 2018-11-15 RX ORDER — AMOXICILLIN 250 MG
2 CAPSULE ORAL DAILY
Status: DISCONTINUED | OUTPATIENT
Start: 2018-11-16 | End: 2018-11-16 | Stop reason: HOSPADM

## 2018-11-15 RX ORDER — NEOMYCIN AND POLYMYXIN B SULFATES 40; 200000 MG/ML; [USP'U]/ML
SOLUTION IRRIGATION AS NEEDED
Status: DISCONTINUED | OUTPATIENT
Start: 2018-11-15 | End: 2018-11-15 | Stop reason: HOSPADM

## 2018-11-15 RX ORDER — FENTANYL CITRATE 50 UG/ML
INJECTION, SOLUTION INTRAMUSCULAR; INTRAVENOUS AS NEEDED
Status: DISCONTINUED | OUTPATIENT
Start: 2018-11-15 | End: 2018-11-15 | Stop reason: HOSPADM

## 2018-11-15 RX ORDER — SODIUM CHLORIDE 0.9 % (FLUSH) 0.9 %
5-10 SYRINGE (ML) INJECTION AS NEEDED
Status: DISCONTINUED | OUTPATIENT
Start: 2018-11-15 | End: 2018-11-16 | Stop reason: HOSPADM

## 2018-11-15 RX ORDER — METFORMIN HYDROCHLORIDE 500 MG/1
1000 TABLET ORAL 2 TIMES DAILY
Status: DISCONTINUED | OUTPATIENT
Start: 2018-11-15 | End: 2018-11-16 | Stop reason: HOSPADM

## 2018-11-15 RX ORDER — NALOXONE HYDROCHLORIDE 0.4 MG/ML
.2-.4 INJECTION, SOLUTION INTRAMUSCULAR; INTRAVENOUS; SUBCUTANEOUS
Status: DISCONTINUED | OUTPATIENT
Start: 2018-11-15 | End: 2018-11-16 | Stop reason: HOSPADM

## 2018-11-15 RX ORDER — HYDROMORPHONE HYDROCHLORIDE 2 MG/ML
0.5 INJECTION, SOLUTION INTRAMUSCULAR; INTRAVENOUS; SUBCUTANEOUS
Status: DISCONTINUED | OUTPATIENT
Start: 2018-11-15 | End: 2018-11-15 | Stop reason: HOSPADM

## 2018-11-15 RX ORDER — SODIUM CHLORIDE 0.9 % (FLUSH) 0.9 %
5-10 SYRINGE (ML) INJECTION AS NEEDED
Status: DISCONTINUED | OUTPATIENT
Start: 2018-11-15 | End: 2018-11-15 | Stop reason: HOSPADM

## 2018-11-15 RX ORDER — ACETAMINOPHEN 500 MG
1000 TABLET ORAL EVERY 6 HOURS
Status: DISCONTINUED | OUTPATIENT
Start: 2018-11-16 | End: 2018-11-16

## 2018-11-15 RX ORDER — SODIUM CHLORIDE, SODIUM LACTATE, POTASSIUM CHLORIDE, CALCIUM CHLORIDE 600; 310; 30; 20 MG/100ML; MG/100ML; MG/100ML; MG/100ML
100 INJECTION, SOLUTION INTRAVENOUS CONTINUOUS
Status: DISCONTINUED | OUTPATIENT
Start: 2018-11-15 | End: 2018-11-15 | Stop reason: HOSPADM

## 2018-11-15 RX ORDER — ASPIRIN 81 MG/1
81 TABLET ORAL EVERY 12 HOURS
Status: DISCONTINUED | OUTPATIENT
Start: 2018-11-15 | End: 2018-11-16 | Stop reason: HOSPADM

## 2018-11-15 RX ORDER — KETOROLAC TROMETHAMINE 30 MG/ML
INJECTION, SOLUTION INTRAMUSCULAR; INTRAVENOUS AS NEEDED
Status: DISCONTINUED | OUTPATIENT
Start: 2018-11-15 | End: 2018-11-15 | Stop reason: HOSPADM

## 2018-11-15 RX ORDER — SODIUM CHLORIDE 9 MG/ML
100 INJECTION, SOLUTION INTRAVENOUS CONTINUOUS
Status: DISCONTINUED | OUTPATIENT
Start: 2018-11-15 | End: 2018-11-16 | Stop reason: HOSPADM

## 2018-11-15 RX ORDER — MIDAZOLAM HYDROCHLORIDE 1 MG/ML
2 INJECTION, SOLUTION INTRAMUSCULAR; INTRAVENOUS
Status: COMPLETED | OUTPATIENT
Start: 2018-11-15 | End: 2018-11-15

## 2018-11-15 RX ORDER — INSULIN GLARGINE 100 [IU]/ML
60 INJECTION, SOLUTION SUBCUTANEOUS
Status: DISCONTINUED | OUTPATIENT
Start: 2018-11-15 | End: 2018-11-16 | Stop reason: HOSPADM

## 2018-11-15 RX ORDER — ROPIVACAINE HYDROCHLORIDE 2 MG/ML
INJECTION, SOLUTION EPIDURAL; INFILTRATION; PERINEURAL
Status: COMPLETED | OUTPATIENT
Start: 2018-11-15 | End: 2018-11-15

## 2018-11-15 RX ORDER — SODIUM CHLORIDE 0.9 % (FLUSH) 0.9 %
5-10 SYRINGE (ML) INJECTION EVERY 8 HOURS
Status: DISCONTINUED | OUTPATIENT
Start: 2018-11-15 | End: 2018-11-15 | Stop reason: HOSPADM

## 2018-11-15 RX ORDER — TRANEXAMIC ACID 100 MG/ML
INJECTION, SOLUTION INTRAVENOUS AS NEEDED
Status: DISCONTINUED | OUTPATIENT
Start: 2018-11-15 | End: 2018-11-15 | Stop reason: HOSPADM

## 2018-11-15 RX ORDER — OXYCODONE HYDROCHLORIDE 5 MG/1
10 TABLET ORAL
Status: DISCONTINUED | OUTPATIENT
Start: 2018-11-15 | End: 2018-11-15 | Stop reason: HOSPADM

## 2018-11-15 RX ORDER — CELECOXIB 200 MG/1
200 CAPSULE ORAL ONCE
Status: COMPLETED | OUTPATIENT
Start: 2018-11-15 | End: 2018-11-15

## 2018-11-15 RX ORDER — SODIUM CHLORIDE 0.9 % (FLUSH) 0.9 %
5-10 SYRINGE (ML) INJECTION EVERY 8 HOURS
Status: CANCELLED | OUTPATIENT
Start: 2018-11-15

## 2018-11-15 RX ORDER — ACETAMINOPHEN 500 MG
1000 TABLET ORAL ONCE
Status: DISCONTINUED | OUTPATIENT
Start: 2018-11-15 | End: 2018-11-15 | Stop reason: HOSPADM

## 2018-11-15 RX ORDER — VANCOMYCIN HYDROCHLORIDE 1 G/20ML
INJECTION, POWDER, LYOPHILIZED, FOR SOLUTION INTRAVENOUS AS NEEDED
Status: DISCONTINUED | OUTPATIENT
Start: 2018-11-15 | End: 2018-11-15 | Stop reason: HOSPADM

## 2018-11-15 RX ORDER — CEFAZOLIN SODIUM/WATER 2 G/20 ML
2 SYRINGE (ML) INTRAVENOUS EVERY 8 HOURS
Status: COMPLETED | OUTPATIENT
Start: 2018-11-15 | End: 2018-11-15

## 2018-11-15 RX ORDER — DEXAMETHASONE SODIUM PHOSPHATE 100 MG/10ML
10 INJECTION INTRAMUSCULAR; INTRAVENOUS ONCE
Status: DISCONTINUED | OUTPATIENT
Start: 2018-11-16 | End: 2018-11-16 | Stop reason: HOSPADM

## 2018-11-15 RX ORDER — LISINOPRIL 5 MG/1
10 TABLET ORAL DAILY
Status: DISCONTINUED | OUTPATIENT
Start: 2018-11-16 | End: 2018-11-16 | Stop reason: HOSPADM

## 2018-11-15 RX ORDER — PROPOFOL 10 MG/ML
INJECTION, EMULSION INTRAVENOUS
Status: DISCONTINUED | OUTPATIENT
Start: 2018-11-15 | End: 2018-11-15 | Stop reason: HOSPADM

## 2018-11-15 RX ORDER — ONDANSETRON 2 MG/ML
INJECTION INTRAMUSCULAR; INTRAVENOUS AS NEEDED
Status: DISCONTINUED | OUTPATIENT
Start: 2018-11-15 | End: 2018-11-15 | Stop reason: HOSPADM

## 2018-11-15 RX ORDER — ROPIVACAINE HYDROCHLORIDE 2 MG/ML
INJECTION, SOLUTION EPIDURAL; INFILTRATION; PERINEURAL AS NEEDED
Status: DISCONTINUED | OUTPATIENT
Start: 2018-11-15 | End: 2018-11-15 | Stop reason: HOSPADM

## 2018-11-15 RX ORDER — INSULIN LISPRO 100 [IU]/ML
8 INJECTION, SOLUTION INTRAVENOUS; SUBCUTANEOUS ONCE
Status: COMPLETED | OUTPATIENT
Start: 2018-11-15 | End: 2018-11-15

## 2018-11-15 RX ORDER — ACETAMINOPHEN 10 MG/ML
1000 INJECTION, SOLUTION INTRAVENOUS ONCE
Status: COMPLETED | OUTPATIENT
Start: 2018-11-15 | End: 2018-11-15

## 2018-11-15 RX ORDER — MIDAZOLAM HYDROCHLORIDE 1 MG/ML
INJECTION, SOLUTION INTRAMUSCULAR; INTRAVENOUS AS NEEDED
Status: DISCONTINUED | OUTPATIENT
Start: 2018-11-15 | End: 2018-11-15 | Stop reason: HOSPADM

## 2018-11-15 RX ORDER — CELECOXIB 200 MG/1
200 CAPSULE ORAL EVERY 12 HOURS
Status: DISCONTINUED | OUTPATIENT
Start: 2018-11-15 | End: 2018-11-16 | Stop reason: HOSPADM

## 2018-11-15 RX ADMIN — Medication 2 G: at 08:26

## 2018-11-15 RX ADMIN — Medication 2 G: at 17:18

## 2018-11-15 RX ADMIN — LIDOCAINE HYDROCHLORIDE 0.3 ML: 10 INJECTION, SOLUTION INFILTRATION; PERINEURAL at 06:58

## 2018-11-15 RX ADMIN — ACETAMINOPHEN 1000 MG: 325 TABLET, FILM COATED ORAL at 23:37

## 2018-11-15 RX ADMIN — TRANEXAMIC ACID 1000 MG: 100 INJECTION, SOLUTION INTRAVENOUS at 08:35

## 2018-11-15 RX ADMIN — INSULIN LISPRO 8 UNITS: 100 INJECTION, SOLUTION INTRAVENOUS; SUBCUTANEOUS at 07:24

## 2018-11-15 RX ADMIN — ONDANSETRON 4 MG: 2 INJECTION INTRAMUSCULAR; INTRAVENOUS at 08:57

## 2018-11-15 RX ADMIN — TRAZODONE HYDROCHLORIDE 100 MG: 50 TABLET ORAL at 21:10

## 2018-11-15 RX ADMIN — SODIUM CHLORIDE 100 ML/HR: 900 INJECTION, SOLUTION INTRAVENOUS at 21:18

## 2018-11-15 RX ADMIN — MIDAZOLAM 2 MG: 1 INJECTION INTRAMUSCULAR; INTRAVENOUS at 08:05

## 2018-11-15 RX ADMIN — Medication 3 AMPULE: at 06:54

## 2018-11-15 RX ADMIN — ASPIRIN 81 MG: 81 TABLET, DELAYED RELEASE ORAL at 21:11

## 2018-11-15 RX ADMIN — ACETAMINOPHEN 1000 MG: 10 INJECTION, SOLUTION INTRAVENOUS at 17:19

## 2018-11-15 RX ADMIN — SODIUM CHLORIDE, SODIUM LACTATE, POTASSIUM CHLORIDE, AND CALCIUM CHLORIDE: 600; 310; 30; 20 INJECTION, SOLUTION INTRAVENOUS at 08:19

## 2018-11-15 RX ADMIN — SODIUM CHLORIDE, SODIUM LACTATE, POTASSIUM CHLORIDE, AND CALCIUM CHLORIDE 100 ML/HR: 600; 310; 30; 20 INJECTION, SOLUTION INTRAVENOUS at 06:54

## 2018-11-15 RX ADMIN — PROPOFOL 50 MCG/KG/MIN: 10 INJECTION, EMULSION INTRAVENOUS at 08:34

## 2018-11-15 RX ADMIN — HYDROMORPHONE HYDROCHLORIDE 0.5 MG: 2 INJECTION, SOLUTION INTRAMUSCULAR; INTRAVENOUS; SUBCUTANEOUS at 10:28

## 2018-11-15 RX ADMIN — FENTANYL CITRATE 50 MCG: 50 INJECTION, SOLUTION INTRAMUSCULAR; INTRAVENOUS at 08:26

## 2018-11-15 RX ADMIN — Medication 2 G: at 23:37

## 2018-11-15 RX ADMIN — CELECOXIB 200 MG: 200 CAPSULE ORAL at 21:11

## 2018-11-15 RX ADMIN — TUBERCULIN PURIFIED PROTEIN DERIVATIVE 5 UNITS: 5 INJECTION, SOLUTION INTRADERMAL at 06:54

## 2018-11-15 RX ADMIN — CELECOXIB 200 MG: 200 CAPSULE ORAL at 06:54

## 2018-11-15 RX ADMIN — SODIUM CHLORIDE, SODIUM LACTATE, POTASSIUM CHLORIDE, AND CALCIUM CHLORIDE: 600; 310; 30; 20 INJECTION, SOLUTION INTRAVENOUS at 08:46

## 2018-11-15 RX ADMIN — HYDROMORPHONE HYDROCHLORIDE 1 MG: 2 INJECTION, SOLUTION INTRAMUSCULAR; INTRAVENOUS; SUBCUTANEOUS at 21:25

## 2018-11-15 RX ADMIN — PROPOFOL: 10 INJECTION, EMULSION INTRAVENOUS at 09:09

## 2018-11-15 RX ADMIN — METFORMIN HYDROCHLORIDE 1000 MG: 500 TABLET, FILM COATED ORAL at 17:18

## 2018-11-15 RX ADMIN — MIDAZOLAM HYDROCHLORIDE 2 MG: 1 INJECTION, SOLUTION INTRAMUSCULAR; INTRAVENOUS at 08:21

## 2018-11-15 RX ADMIN — SODIUM CHLORIDE 100 ML/HR: 900 INJECTION, SOLUTION INTRAVENOUS at 11:31

## 2018-11-15 RX ADMIN — ROPIVACAINE HYDROCHLORIDE 40 MG: 2 INJECTION, SOLUTION EPIDURAL; INFILTRATION; PERINEURAL at 08:08

## 2018-11-15 RX ADMIN — HYDROMORPHONE HYDROCHLORIDE 1 MG: 2 INJECTION, SOLUTION INTRAMUSCULAR; INTRAVENOUS; SUBCUTANEOUS at 11:29

## 2018-11-15 RX ADMIN — Medication 1 AMPULE: at 21:11

## 2018-11-15 RX ADMIN — INSULIN GLARGINE 60 UNITS: 100 INJECTION, SOLUTION SUBCUTANEOUS at 21:26

## 2018-11-15 RX ADMIN — FENTANYL CITRATE 50 MCG: 50 INJECTION, SOLUTION INTRAMUSCULAR; INTRAVENOUS at 08:23

## 2018-11-15 RX ADMIN — FENTANYL CITRATE 100 MCG: 50 INJECTION INTRAMUSCULAR; INTRAVENOUS at 08:05

## 2018-11-15 NOTE — PERIOP NOTES
Teach back method used in review of Hibiclens usage preop/postop, TB screening, pain management goals, falls precautions and use of Nozin for prevention of staph infections. Incentive spirometer reviewed and pt reached  2500  in preop holding.

## 2018-11-15 NOTE — ANESTHESIA PROCEDURE NOTES
Adductor canal block with ultrasound Start time: 11/15/2018 8:05 AM 
End time: 11/15/2018 8:08 AM 
Performed by: Peter Vargas MD 
Authorized by: Peter Vargas MD  
 
 
Pre-procedure: Indications: at surgeon's request and post-op pain management Preanesthetic Checklist: patient identified, risks and benefits discussed, site marked, timeout performed, anesthesia consent given and patient being monitored Timeout Time: 08:05 Block Type:  
Block Type: Adductor canal 
Laterality:  Left Monitoring:  Continuous pulse ox, frequent vital sign checks, heart rate, oxygen and responsive to questions Injection Technique:  Single shot Procedures: ultrasound guided Patient Position: supine Prep: chlorhexidine Location:  Mid thigh Needle Gauge:  20 G Needle Localization:  Ultrasound guidance Assessment: 
Number of attempts:  1 Injection Assessment:  Incremental injection every 5 mL, local visualized surrounding nerve on ultrasound, negative aspiration for blood, no intravascular symptoms, no paresthesia and ultrasound image on chart Patient tolerance:  Patient tolerated the procedure well with no immediate complications

## 2018-11-15 NOTE — PROGRESS NOTES
TRANSFER - IN REPORT: 
 
Verbal report received from Carlos Patient on [address]  being received from PACU for routine post - op Report consisted of patients Situation, Background, Assessment and  
Recommendations(SBAR). Information from the following report(s) SBAR was reviewed with the receiving nurse. Opportunity for questions and clarification was provided. Assessment completed upon patients arrival to unit and care assumed.

## 2018-11-15 NOTE — PERIOP NOTES
TRANSFER - OUT REPORT: 
 
Verbal report given to Loki López RN (name) on George Burdick  being transferred to room 334 (unit) for routine post - op Report consisted of patients Situation, Background, Assessment and  
Recommendations(SBAR). Information from the following report(s) SBAR, Kardex, OR Summary, Intake/Output and MAR was reviewed with the receiving nurse. Lines:  
Peripheral IV 41/27/62 Left Cephalic (Active) Site Assessment Clean, dry, & intact 11/15/2018 10:13 AM  
Phlebitis Assessment 0 11/15/2018 10:13 AM  
Infiltration Assessment 0 11/15/2018 10:13 AM  
Dressing Status Clean, dry, & intact 11/15/2018 10:13 AM  
Dressing Type Tape;Transparent 11/15/2018 10:13 AM  
Hub Color/Line Status Infusing;Patent 11/15/2018 10:13 AM  
Action Taken Blood drawn 11/15/2018  6:44 AM  
  
 
Opportunity for questions and clarification was provided. Patient transported with: 
 [employer]

## 2018-11-15 NOTE — PROGRESS NOTES
Problem: Self Care Deficits Care Plan (Adult) Goal: *Acute Goals and Plan of Care (Insert Text) GOALS:  
DISCHARGE GOALS (in preparation for going home/rehab):  3 days 1. Mr. Kaycee Abraham will perform one lower body dressing activity with minimal assistance required to demonstrate improved functional mobility and safety. 2.  Mr. Kaycee Abraham will perform one lower body bathing activity with minimal assistance required to demonstrate improved functional mobility and safety. 3.  Mr. Kaycee Abraham will perform toileting/toilet transfer with contact guard assistance to demonstrate improved functional mobility and safety. 4.  Mr. Kaycee Abraham will perform shower transfer with contact guard assistance to demonstrate improved functional mobility and safety. JOINT CAMP OCCUPATIONAL THERAPY TKA: Initial Assessment 11/15/2018INPATIENT: Hospital Day: 1 Payor: [address] [place] [address] / [place] / Product Type: PPO /  
  
NAME/AGE/GENDER: Lilibeth Ramirez is a 62 y.o. male PRIMARY DIAGNOSIS:  Unilateral primary osteoarthritis, left knee [M17.12] Procedure(s) and Anesthesia Type: 
   * LEFT KNEE ARTHROPLASTY TOTAL / OSKAR - Spinal (Left) ICD-10: Treatment Diagnosis:  
 · Pain in Left Knee (M25.562) · Stiffness of Left Knee, Not elsewhere classified (M25.662) ASSESSMENT:  
Mr. Kaycee Abraham is s/p L TKA and presents with decreased weight bearing on L LE and decreased independence with functional mobility and activities of daily living as compared to baseline level of function and safety. Patient would benefit from skilled Occupational Therapy to maximize independence and safety with self-care task and functional mobility. Pt would also benefit from education on adaptive equipment and safety precautions in preparation for going home or for recommendations for post-hospital rehab program.  Patient plans for further rehab at home with home health services and good family support.   OT reviewed therapy schedule and plan of care with patient. Patient was able to transfer and preform self care skills as charted below. Patient instructed to call for assistance when needing to get up from the bed and all needs in reach. Patient verbalized understanding of call light. This section established at most recent assessment PROBLEM LIST (Impairments causing functional limitations): 1. Decreased Strength 2. Decreased ADL/Functional Activities 3. Decreased Transfer Abilities 4. Increased Pain 5. Increased Fatigue 6. Decreased Flexibility/Joint Mobility 7. Decreased Knowledge of Precautions INTERVENTIONS PLANNED: (Benefits and precautions of occupational therapy have been discussed with the patient.) 1. Activities of daily living training 2. Adaptive equipment training 3. Balance training 4. Clothing management 5. Donning&doffing training 6. Theraputic activity TREATMENT PLAN: Frequency/Duration: Follow patient qd to address above goals. Rehabilitation Potential For Stated Goals: Good RECOMMENDED REHABILITATION/EQUIPMENT: (at time of discharge pending progress): Continue Skilled Therapy and Home Health: Physical Therapy. OCCUPATIONAL PROFILE AND HISTORY:  
History of Present Injury/Illness (Reason for Referral): Pt presents this date s/p (L) TKA. Past Medical History/Comorbidities:  
Mr. Zondra Boxer  has a past medical history of Arthritis, CAD (coronary artery disease), Diabetes ([facility] [address] Elevated serum cholesterol, GERD (gastroesophageal reflux disease), colonic polyp, Hypercholesterolemia, and Hypertension.   Mr. Zondra Boxer  has a past surgical history that includes hx coronary stent placement (6771,2220); hx tonsil and adenoidectomy (1999); hx uvulopalatopharyngoplasty; hx knee arthroscopy (Left, 1985); hx colonoscopy (last 4/25/16); COLONOSCOPY/ 26 (N/A, 4/25/2016); ENDOSCOPIC POLYPECTOMY (N/A, 4/25/2016); ESOPHAGOGASTRODUODENOSCOPY (EGD) (N/A, 4/25/2016); and ESOPHAGOGASTRODUODENAL (EGD) BIOPSY (N/A, 4/25/2016). Social History/Living Environment:  
Patient Expects to be Discharged to[de-identified] Other (comment)(to OR) Prior Level of Function/Work/Activity: 
independent Number of Personal Factors/Comorbidities that affect the Plan of Care: Brief history (0):  LOW COMPLEXITY ASSESSMENT OF OCCUPATIONAL PERFORMANCE[de-identified]  
Most Recent Physical Functioning:  
Balance Sitting: Intact Standing: Pull to stand; With support Gross Assessment: Yes Gross Assessment AROM: Within functional limits(BUE) Strength: Within functional limits(BUE) Coordination: Within functional limits(BUE) Tone: Normal 
Sensation: Intact Vision Acuity: Within Defined Limits Mental Status Neurologic State: Alert Orientation Level: Oriented X4 Cognition: Follows commands Perception: Appears intact Perseveration: No perseveration noted Safety/Judgement: Awareness of environment; Fall prevention Basic ADLs (From Assessment) Complex ADLs (From Assessment) Basic ADL Feeding: Setup Oral Facial Hygiene/Grooming: Setup Bathing: Moderate assistance Upper Body Dressing: Setup Lower Body Dressing: Moderate assistance Toileting: Maximum assistance Grooming/Bathing/Dressing Activities of Daily Living Cognitive Retraining Safety/Judgement: Awareness of environment; Fall prevention Functional Transfers Bathroom Mobility: Minimum assistance(x 2 ) Toilet Transfer : Minimum assistance;Assist x2 Tub Transfer: Minimum assistance;Assist x2 Shower Transfer: Minimum assistance;Assist x2 Bed/Mat Mobility Supine to Sit: Contact guard assistance Sit to Stand: Minimum assistance;Assist x2 Bed to Chair: Minimum assistance;Assist x2 Physical Skills Involved: 
1. Balance 2. Strength 3.  Activity Tolerance Cognitive Skills Affected (resulting in the inability to perform in a timely and safe manner): 
 1. none Psychosocial Skills Affected: 1. none Number of elements that affect the Plan of Care: 1-3:  LOW COMPLEXITY CLINICAL DECISION MAKING:  
[facility] AM-PAC 6 Clicks Daily Activity Inpatient Short Form How much help from another person does the patient currently need. .. Total A Lot A Little None 1. Putting on and taking off regular lower body clothing? [] 1   [x] 2   [] 3   [] 4  
2. Bathing (including washing, rinsing, drying)? [] 1   [x] 2   [] 3   [] 4  
3. Toileting, which includes using toilet, bedpan or urinal?   [] 1   [x] 2   [] 3   [] 4  
4. Putting on and taking off regular upper body clothing? [] 1   [] 2   [] 3   [x] 4  
5. Taking care of personal grooming such as brushing teeth? [] 1   [] 2   [] 3   [x] 4  
6. Eating meals? [] 1   [] 2   [] 3   [x] 4  
© 2007, Trustees of [facility] [employer], under license to [employer]. All rights reserved Score:  Initial: 18 Most Recent: X (Date: -- ) Interpretation of Tool:  Represents activities that are increasingly more difficult (i.e. Bed mobility, Transfers, Gait). Score 24 23 22-20 19-15 14-10 9-7 6 Modifier CH CI CJ CK CL CM CN   
 
? Self Care:  
  - CURRENT STATUS: CK - 40%-59% impaired, limited or restricted  - GOAL STATUS: CJ - 20%-39% impaired, limited or restricted  - D/C STATUS:  ---------------To be determined--------------- Payor: [address] [place] [address] / [place] / Product Type: PPO /   
 
Medical Necessity:    
· Patient is expected to demonstrate progress in strength, balance, coordination and functional technique to increase independence with self care and mobility. Reason for Services/Other Comments: 
· Patient continues to require skilled intervention due to decrease self care and mobility.   
Use of outcome tool(s) and clinical judgement create a POC that gives a: LOW COMPLEXITY  
  
 
 
 
TREATMENT:  
 (In addition to Assessment/Re-Assessment sessions the following treatments were rendered) Pre-treatment Symptoms/Complaints: Tolerated sitting in chair Pain: Initial:  
Pain Intensity 1: (complained of nausea)  Post Session:  1 Assessment/Reassessment only, no treatment provided today Treatment/Session Assessment:   
 Response to Treatment:  Tolerated well. Education: 
[] Home Exercises [x] Fall Precautions [] Hip Precautions [] Going Home Video [x] Knee/Hip Prosthesis Review [x] Walker Management/Safety [x] Adaptive Equipment as Needed Interdisciplinary Collaboration:  
o Physical Therapist 
o Occupational Therapist 
o Registered Nurse After treatment position/precautions:  
o Up in chair 
o Bed/Chair-wheels locked 
o Caregiver at bedside 
o Call light within reach 
o RN notified 
o LEs reclined Compliance with Program/Exercises: Compliant all of the time. Recommendations/Intent for next treatment session:  Treatment next visit will focus on increasing Mr. Lake Reining independence with bed mobility, transfers, self care, functional mobility, modalities for pain, and patient education. Total Treatment Duration: OT Patient Time In/Time Out Time In: 1300 Time Out: 1310 Jacques Rodgers OT

## 2018-11-15 NOTE — PROGRESS NOTES
11/15/18 1409 Oxygen Therapy O2 Sat (%) 95 % Pulse via Oximetry 84 beats per minute O2 Device Room air O2 Flow Rate (L/min) 0 l/min Patient achieved   2000   Ml/sec on IS. Patient encouraged to do every hour while awake-patient agreed and demonstrated. No shortness of breath or distress noted.   
[facility] Camp notes reviewed- continuous sat # 8 ordered HS

## 2018-11-15 NOTE — H&P
The patient has end stage arthritis of the left knee. The patient was seen and examined and there are no changes to the patient's orthopedic condition. They have tried conservative treatment for this condition; including antiinflammatories and lifestyle modifications and have failed. The necessity for the joint replacement is still present, and the H&P from the office is still current. The patient will be admitted today forProcedure(s) (LRB): LEFT KNEE ARTHROPLASTY TOTAL / OSKAR (Left) .

## 2018-11-15 NOTE — PROGRESS NOTES
Admission assessment complete. Pt resting in bed. Call light within reach. Pt oriented to room and menu. Pt is is able to dorsi/plantar flex bilaterally with +2 pedal pulses. Pt has no complaints of pain at this time. IS at bedside. Pt verbally understands to call for pain medication.

## 2018-11-15 NOTE — ANESTHESIA PROCEDURE NOTES
Spinal Block Start time: 11/15/2018 8:23 AM 
End time: 11/15/2018 8:27 AM 
Performed by: Amaya Thakur MD 
Authorized by: Amaya Thakur MD  
 
Pre-procedure: Indications: primary anesthetic  Preanesthetic Checklist: patient identified, risks and benefits discussed, anesthesia consent, site marked, patient being monitored and timeout performed Timeout Time: 08:23 Spinal Block:  
Patient Position:  Seated Prep Region:  Lumbar Location:  L3-4 Technique:  Single shot Needle:  
Needle Type:  Quincke Needle Gauge:  25 G Attempts:  1 Events: CSF confirmed, no blood with aspiration and no paresthesia Assessment: 
Insertion:  Uncomplicated Patient tolerance:  Patient tolerated the procedure well with no immediate complications

## 2018-11-15 NOTE — OP NOTES
[id number] [facility]  Total Knee Arthroplasty  Patient:Joe Conroy   : 1960  Medical Record [id number]      Pre-operative Diagnosis:  Unilateral primary osteoarthritis, left knee [M17.12]  Post-operative Diagnosis: Unilateral primary osteoarthritis, left knee [M17.12]  Location: [place] [address]    Date of Procedure: 11/15/2018  Surgeon: Yue Landno MD  Assistant: POONAM Rodriguez    Anesthesia: Spinal and nerve block    Procedure:  Left Posterior Stabilized Cementless Total Knee Arthroplasty     Tourniquet Time: none    EBL:  250 cc     The complexity of the total joint surgery requires the use of a first assistant for positioning, retraction and assistance in closure. Sharon Padilla was brought to the operating room, positioned on the operating room table, and after appropriate identification  was anethestized. IV antibiotics were administered, along with IV tranexamic acid. The limb was prepped and draped in the usual sterile fashion. Prior to the incision being made a timeout was called identifying the patient, procedure ,operative side and surgeon. An anterior longitudinal incision was accomplished just medial to the tibial tubercle and extending approximal 6 centimeters proximal to the superior pole of the patella. A medial parapatellar capsular incision was performed. The medial capsular flap was elevated around to the insertion of the semimembranous tendon. The patella was everted and the knee flexed and externally rotated. The articular surface revealed cartilage loss with exposed bone and bone spurs throughout all three compartments. The medial and lateral menisci were excised. The lateral half of the fat pad excised and the patella femoral ligament was released. The anterior cruciate ligament and the posterior cruciate ligament were resected.   Using extramedullary instrumentation, the tibial cut was accomplished with appropriate posterior slope. Approximately 6 mm of bone was removed from the high side of the tibia. The distal femur was addressed next. A drill hole was made above the intracondylar notch. Using appropriate intramedullary instrumentation, an appropriate valgus distal cut was accomplished. The femur was sized to a 5 component. The anterior and posterior cuts and anterior and posterior chamfer cuts were then made about the distal femur. Osteophytes were removed from the tibial and femoral surfaces. The appropriate cutting blocks was then utilized to perform the notch cut, with appropriate lateral translation accomplished for the patellofemoral groove. The tibia was sized to a 6 component. The tibial base plate was pinned into place with  appropriate external rotation and the stem site prepared. A preliminary range of motion was accomplished with the above size trial components. A 11 millimeter polyethylene insert allowed the patient to obtain full extension as well as appropriate flexion. Additional surgical releases were none. The patient's ligaments were stable in flexion and extension to medial and lateral stressing and alignment was through the appropriate mechanical axis. The patella was then everted. The bone was resected to accomodate a size 33 patella button. A trial reduction revealed appropriate tracking through the patellofemoral groove with no lateral retinacular release accomplished. All trial components were removed and the surfaces were prepared for implantation with irrigation and debridement of the bone interstices. 10 cc of tranexamic acid was place in the femoral canal and the site sealed with a bone plug. The posterior capsule, periosteum and soft tissues were injected for postop pain management. The femoral and tibial components were pressurized in full extension as well as 70 degrees of flexion. The patella component was pressurized using the patella clamp.       A lavage of diluted betadine solution of 17.5 ml Betadine in 500 of 0.9% Normal Saline was allowed to soak in the wound for 3 minutes after implanting of the prosthesis. The wound was irrigated with Saline again before closureThe joint and skin areas were sprinkled with 1 gm of vancomycin powder. . Prior to the final skin closure, full strength betadine was applied to the skin surrounding the skin incision. Lang Ali knee was placed through a range of motion and noted to be stable as mentioned above with the trial components. The operative knee was injected prior to closure for post op pain management. The capsular layer was closed using a #1 PDS suture, while the subcutaneous layers were closed using a 2-0 Monocryl interrupted suture. The skin was closed using staples and a sterile bandage was applied. A cryo pad was applied on the operative leg. The sponge count and needle counts were correct. Implants:   Implant Name Type Inv.  Item Serial No.  Lot No. LRB No. Used   PAT SYMM MTL-BK 9MM SZ 33MM -- TRIATHLON - [id number]  PAT SYMM MTL-BK 9MM SZ 33MM -- TRIATHLON [id number] [employer] [facility] [id number] Left 1   COMPNT FEM PS TRIATHLN 5 L PA --  - [id number]  COMPNT FEM PS TRIATHLN 5 L PA --  [id number] [employer] [facility] [id number] Left 1   BASEPLT TIB PC TRITNM SZ 6 -- TRIATHLON - [id number]  BASEPLT TIB PC TRITNM SZ 6 -- TRIATHLON [id number] [employer] [facility] [id number] Left 1   INSERT TIB PS TRIATHLN 6 11MM --  - [id number]  INSERT TIB PS TRIATHLN 6 11MM --  [id number] [employer] [facility] [id number] Left 1     Signed By: Steve Bowling MD

## 2018-11-15 NOTE — PROGRESS NOTES
Problem: Mobility Impaired (Adult and Pediatric) Goal: *Acute Goals and Plan of Care (Insert Text) GOALS (1-4 days): 
(1.)Mr. Josie Dominguez will move from supine to sit and sit to supine  in bed with SUPERVISION. (2.)Mr. Josie Dominguez will transfer from bed to chair and chair to bed with STAND BY ASSIST using the least restrictive device. (3.)Mr. Josie Dominguez will ambulate with STAND BY ASSIST for 300 feet with the least restrictive device. (4.)Mr. Josie Dominguez will ambulate up/down 3 steps with bilateral  railing with CONTACT GUARD ASSIST with no device. (5.)Mr. Josie Dominguez will increase left knee ROM to 5°-80°. 
________________________________________________________________________________________________ PHYSICAL THERAPY [facility] tKa: Initial Assessment, Treatment Day: Day of Assessment, PM 11/15/2018INPATIENT: Hospital Day: 1 Payor: [address] [place] [address] / Plan: [facility] STATE / Product Type: PPO /  
  
NAME/AGE/GENDER: Rebecca De León is a 62 y.o. male PRIMARY DIAGNOSIS:  Unilateral primary osteoarthritis, left knee [M17.12] Procedure(s) and Anesthesia Type: 
   * LEFT KNEE ARTHROPLASTY TOTAL / OSKAR - Spinal (Left) ICD-10: Treatment Diagnosis:  
 · Pain in Left Knee (M25.562) · Stiffness of Left Knee, Not elsewhere classified (M25.662) · Difficulty in walking, Not elsewhere classified (R26.2) ASSESSMENT:  
Mr. Josie Dominguez presents with decreased strength and range of motion left lower extremity and with decreased independence with functional mobility s/p left TKA. Pt will benefit from skilled PT interventions to maximize independence with functional mobility and TKA exercises. Pt did well with assessment and walked around the bed. In chair worked on TKA exercises with verbal cues. Pt stayed up in chair with ice in place and needs in reach. Hope to progress at next therapy session. This section established at most recent assessment PROBLEM LIST (Impairments causing functional limitations): 
 1. Decreased Strength 2. Decreased ADL/Functional Activities 3. Decreased Transfer Abilities 4. Decreased Ambulation Ability/Technique 5. Decreased Flexibility/Joint Mobility 6. Decreased [place] with Home Exercise Program 
 INTERVENTIONS PLANNED: (Benefits and precautions of physical therapy have been discussed with the patient.) 1. Bed Mobility 2. Cold 3. Gait Training 4. Home Exercise Program (HEP) 5. Therapeutic Exercise/Strengthening 6. Transfer Training 7. Range of Motion: active/assisted/passive 8. Therapeutic Activities 9. Group Therapy TREATMENT PLAN: Frequency/Duration: Follow patient BID for duration of hospital stay to address above goals. Rehabilitation Potential For Stated Goals: Good RECOMMENDED REHABILITATION/EQUIPMENT: (at time of discharge pending progress): Continue Skilled Therapy, Home Health: Physical Therapy and Outpatient: Physical Therapy. HISTORY:  
History of Present Injury/Illness (Reason for Referral): 
Pt s/p left TKA on 11/15/18 Past Medical History/Comorbidities:  
Mr. Magdalena Estrada  has a past medical history of Arthritis, CAD (coronary artery disease), Diabetes ([facility] [address] Elevated serum cholesterol, GERD (gastroesophageal reflux disease), colonic polyp, Hypercholesterolemia, and Hypertension. Mr. Magdalena Estrada  has a past surgical history that includes hx coronary stent placement (0132,2986); hx tonsil and adenoidectomy (1999); hx uvulopalatopharyngoplasty; hx knee arthroscopy (Left, 1985); hx colonoscopy (last 4/25/16); COLONOSCOPY/ 26 (N/A, 4/25/2016); ENDOSCOPIC POLYPECTOMY (N/A, 4/25/2016); ESOPHAGOGASTRODUODENOSCOPY (EGD) (N/A, 4/25/2016); and ESOPHAGOGASTRODUODENAL (EGD) BIOPSY (N/A, 4/25/2016). Social History/Living Environment:  
Home Environment: Private residence # Steps to Enter: 2 Rails to Enter: No 
One/Two Story Residence: Two story # of Interior Steps: 12 Interior Rails: Left Living Alone: No 
 Support Systems: Spouse/Significant Other/Partner Patient Expects to be Discharged to[de-identified] Private residence Current DME Used/Available at Home: None Tub or Shower Type: Shower Prior Level of Function/Work/Activity: 
Pt living at home, independent with gait and ADLs without assistive devices Number of Personal Factors/Comorbidities that affect the Plan of Care: 0: LOW COMPLEXITY EXAMINATION:  
Most Recent Physical Functioning:  
Gross Assessment: Yes Gross Assessment AROM: Within functional limits(except left lower extremity, s/p TKA) Strength: Within functional limits(except left lower extremity, s/p TKA) Coordination: Within functional limits(BUE) Tone: Normal 
Sensation: Intact Bed Mobility Supine to Sit: Contact guard assistance Transfers Sit to Stand: Minimum assistance;Assist x2 Stand to Sit: Minimum assistance;Assist x2 Bed to Chair: Minimum assistance;Assist x2 Balance Sitting: Intact Standing: Pull to stand; With support    Posture Posture (WDL): Within defined limits Weight Bearing Status Left Side Weight Bearing: As tolerated Distance (ft): 15 Feet (ft) Ambulation - Level of Assistance: Minimal assistance;Assist x2 Assistive Device: Walker, rolling Speed/Grace: Pace decreased (<100 feet/min) Step Length: Right shortened Stance: Left decreased Gait Abnormalities: Antalgic;Decreased step clearance Braces/Orthotics: none Left Knee Cold Type: Cryocuff Patient Position: Sitting Body Structures Involved: 1. Joints 2. Muscles Body Functions Affected: 1. Movement Related Activities and Participation Affected: 1. Mobility 2. Self Care Number of elements that affect the Plan of Care: 4+: HIGH COMPLEXITY CLINICAL PRESENTATION:  
Presentation: Stable and uncomplicated: LOW COMPLEXITY CLINICAL DECISION [facility] [place] [address] [address] AM-PAC 6 Clicks Basic Mobility Inpatient Short Form How much difficulty does the patient currently have. .. Unable A Lot A Little None 1. Turning over in bed (including adjusting bedclothes, sheets and blankets)? [] 1   [] 2   [] 3   [x] 4  
2. Sitting down on and standing up from a chair with arms ( e.g., wheelchair, bedside commode, etc.)   [] 1   [] 2   [x] 3   [] 4  
3. Moving from lying on back to sitting on the side of the bed? [] 1   [] 2   [x] 3   [] 4 How much help from another person does the patient currently need. .. Total A Lot A Little None 4. Moving to and from a bed to a chair (including a wheelchair)? [] 1   [] 2   [x] 3   [] 4  
5. Need to walk in hospital room? [] 1   [] 2   [x] 3   [] 4  
6. Climbing 3-5 steps with a railing? [] 1   [] 2   [x] 3   [] 4  
© 2007, Trustees of [facility] [employer], under license to [employer]. All rights reserved Score:  Initial: 19 Most Recent: X (Date: -- ) Interpretation of Tool:  Represents activities that are increasingly more difficult (i.e. Bed mobility, Transfers, Gait). Score 24 23 22-20 19-15 14-10 9-7 6 Modifier CH CI CJ CK CL CM CN   
 
? Mobility - Walking and Moving Around:  
  - CURRENT STATUS: CK - 40%-59% impaired, limited or restricted  - GOAL STATUS: CI - 1%-19% impaired, limited or restricted  - D/C STATUS:  ---------------To be determined--------------- Payor: [address] [place] [address] / Plan: [place] [employer] STATE / Product Type: PPO /   
 
Medical Necessity:    
· Patient is expected to demonstrate progress in strength, range of motion and functional technique to decrease assistance required with functional mobility and TKA Exercises. Reason for Services/Other Comments: 
· Patient continues to require skilled intervention due to inability to complete functional mobility and TKA exercises independently. Use of outcome tool(s) and clinical judgement create a POC that gives a: Clear prediction of patient's progress: LOW COMPLEXITY TREATMENT:  
(In addition to Assessment/Re-Assessment sessions the following treatments were rendered) Pre-treatment Symptoms/Complaints: nausea Pain Initial: no reports of pain Post Session:  No reports of pain Therapeutic Exercise: (10 Minutes):  Exercises per grid below to improve mobility and strength. Required minimal verbal cues to promote proper body alignment and promote proper body posture. Progressed repetitions as indicated. Date: 
11/15/18 Date: 
 Date: 
  
ACTIVITY/EXERCISE AM PM AM PM AM PM  
GROUP THERAPY  []  []  []  []  []  [] Ankle Pumps  10 Quad Sets  10 Gluteal Sets  10 Hip ABd/ADduction  10 Straight Leg Raises  10 Knee Slides  10 Short Arc The [address] Chair Slides B = bilateral; AA = active assistive; A = active; P = passive Treatment/Session Assessment:   
 Response to Treatment:  Tolerated well. Education: 
[] Home Exercises [x] Fall Precautions [x] no pillow under knee [] D/C Instruction Review 
[] Knee Prosthesis Review 
[] Walker Management/Safety [] Adaptive Equipment as Needed Interdisciplinary Collaboration:  
o Occupational Therapist 
o Registered Nurse After treatment position/precautions:  
o Up in chair 
o Bed/Chair-wheels locked 
o Call light within reach 
o Family at bedside Compliance with Program/Exercises: Compliant all of the time. Recommendations/Intent for next treatment session:  Treatment next visit will focus on increasing Mr. Leyla Choi independence with bed mobility, transfers, gait training, strength/ROM exercises, modalities for pain, and patient education. Total Treatment Duration: PT Patient Time In/Time Out Time In: 5510 Time Out: 0840 Nikolay Wood PT

## 2018-11-15 NOTE — ANESTHESIA PREPROCEDURE EVALUATION
Anesthetic History No history of anesthetic complications Review of Systems / Medical History Patient summary reviewed and pertinent labs reviewed Pulmonary Sleep apnea (s/p UPPP and weight loss with resolution of his symptoms) Neuro/Psych Within defined limits Cardiovascular Hypertension CAD and cardiac stents (2010 most recent, 3 stents) Exercise tolerance: >4 METS Comments: Denies active cardiac conditions GI/Hepatic/Renal 
  
 
 
 
 
 
 Endo/Other Diabetes (a bit labile, usually well controlled): type 2, using insulin Other Findings Physical Exam 
 
Airway Mallampati: II 
TM Distance: 4 - 6 cm Neck ROM: normal range of motion Mouth opening: Normal 
 
 Cardiovascular Rhythm: regular Rate: normal 
 
 
 
 Dental 
No notable dental hx Pulmonary Breath sounds clear to auscultation Abdominal 
 
 
 
 Other Findings Anesthetic Plan ASA: 3 Anesthesia type: spinal 
 
 
Post-op pain plan if not by surgeon: peripheral nerve block single Anesthetic plan and risks discussed with: Patient

## 2018-11-15 NOTE — PERIOP NOTES
TRANSFER - OUT REPORT: 
 
Verbal report given to [address] on [facility]  being transferred to [facility] [place] [facility] for routine progression of care Report consisted of patients Situation, Background, Assessment and  
Recommendations(SBAR). Information from the following report(s) SBAR, MAR and Recent Results was reviewed with the receiving nurse. Lines:  
Peripheral IV 69/70/72 Left Cephalic (Active) Site Assessment Clean, dry, & intact 11/15/2018  6:44 AM  
Phlebitis Assessment 0 11/15/2018  6:44 AM  
Infiltration Assessment 0 11/15/2018  6:44 AM  
Dressing Status Clean, dry, & intact 11/15/2018  6:44 AM  
Dressing Type Transparent;Tape 11/15/2018  6:44 AM  
Hub Color/Line Status Green; Infusing 11/15/2018  6:44 AM  
Action Taken Blood drawn 11/15/2018  6:44 AM  
  
 
Opportunity for questions and clarification was provided. Patient transported with: 
 Pansieve Orders received per Dr Blanca Adorno for Lispro to cover BS of 249. Will give prior to transport to [facility].

## 2018-11-15 NOTE — ANESTHESIA POSTPROCEDURE EVALUATION
Procedure(s): LEFT KNEE ARTHROPLASTY TOTAL / OSKAR. Anesthesia Post Evaluation Multimodal analgesia: multimodal analgesia used between 6 hours prior to anesthesia start to PACU discharge Patient location during evaluation: PACU Patient participation: complete - patient participated Level of consciousness: awake and alert Pain management: adequate Airway patency: patent Anesthetic complications: no 
Cardiovascular status: acceptable Respiratory status: acceptable Hydration status: acceptable Comments: Patient with significant apnea with sedation in the operating room. Caution with postop respiratory depressant medications Post anesthesia nausea and vomiting:  none Visit Vitals /90 (BP 1 Location: Right arm, BP Patient Position: Sitting) Pulse 78 Temp 36.3 °C (97.4 °F) Resp 16 Ht 5' 8\" (1.727 m) Wt 91.7 kg (202 lb 2 oz) SpO2 95% BMI 30.73 kg/m²

## 2018-11-15 NOTE — PERIOP NOTES
TRANSFER - IN REPORT: 
 
Verbal report received from Lizette Meckel, [facility] (name) on Chris Doran  being received from [facility] (unit) for routine progression of care Report consisted of patients Situation, Background, Assessment and  
Recommendations(SBAR). Information from the following report(s) SBAR, Kardex, Intake/Output, MAR, Recent Results and Med Rec Status was reviewed with the receiving nurse. Opportunity for questions and clarification was provided.

## 2018-11-15 NOTE — PERIOP NOTES
Betadine lavage:  17.5cc of betadine lot #[id number]  , exp. Date 01/20  , 
in 500cc of . 9NS Lot # [id number] , exp. Date : 09/21

## 2018-11-16 VITALS
HEIGHT: 68 IN | HEART RATE: 81 BPM | BODY MASS INDEX: 30.63 KG/M2 | DIASTOLIC BLOOD PRESSURE: 90 MMHG | SYSTOLIC BLOOD PRESSURE: 179 MMHG | TEMPERATURE: 97.7 F | RESPIRATION RATE: 16 BRPM | OXYGEN SATURATION: 95 % | WEIGHT: 202.13 LBS

## 2018-11-16 LAB
EST. AVERAGE GLUCOSE BLD GHB EST-MCNC: 186 MG/DL
HBA1C MFR BLD: 8.1 %
HGB BLD-MCNC: 11.6 G/DL (ref 13.6–17.2)

## 2018-11-16 PROCEDURE — 36415 COLL VENOUS BLD VENIPUNCTURE: CPT

## 2018-11-16 PROCEDURE — 94760 N-INVAS EAR/PLS OXIMETRY 1: CPT

## 2018-11-16 PROCEDURE — 97150 GROUP THERAPEUTIC PROCEDURES: CPT

## 2018-11-16 PROCEDURE — 97116 GAIT TRAINING THERAPY: CPT

## 2018-11-16 PROCEDURE — 97110 THERAPEUTIC EXERCISES: CPT

## 2018-11-16 PROCEDURE — 74011250637 HC RX REV CODE- 250/637: Performed by: PHYSICIAN ASSISTANT

## 2018-11-16 PROCEDURE — 74011250637 HC RX REV CODE- 250/637: Performed by: ORTHOPAEDIC SURGERY

## 2018-11-16 PROCEDURE — 97535 SELF CARE MNGMENT TRAINING: CPT

## 2018-11-16 PROCEDURE — 83036 HEMOGLOBIN GLYCOSYLATED A1C: CPT

## 2018-11-16 PROCEDURE — 85018 HEMOGLOBIN: CPT

## 2018-11-16 RX ORDER — ASPIRIN 81 MG/1
81 TABLET ORAL EVERY 12 HOURS
Qty: 60 TAB | Refills: 0 | Status: SHIPPED | OUTPATIENT
Start: 2018-11-16

## 2018-11-16 RX ORDER — OXYCODONE HYDROCHLORIDE 10 MG/1
10 TABLET ORAL
Qty: 50 TAB | Refills: 0 | Status: SHIPPED | OUTPATIENT
Start: 2018-11-16 | End: 2019-04-26

## 2018-11-16 RX ORDER — HYDROCODONE BITARTRATE AND ACETAMINOPHEN 7.5; 325 MG/1; MG/1
2 TABLET ORAL
Status: DISCONTINUED | OUTPATIENT
Start: 2018-11-16 | End: 2018-11-16 | Stop reason: HOSPADM

## 2018-11-16 RX ADMIN — CELECOXIB 200 MG: 200 CAPSULE ORAL at 08:08

## 2018-11-16 RX ADMIN — Medication 10 ML: at 06:16

## 2018-11-16 RX ADMIN — Medication 1 AMPULE: at 08:06

## 2018-11-16 RX ADMIN — OXYCODONE HYDROCHLORIDE 10 MG: 5 TABLET ORAL at 08:54

## 2018-11-16 RX ADMIN — SENNOSIDES AND DOCUSATE SODIUM 2 TABLET: 8.6; 5 TABLET ORAL at 08:08

## 2018-11-16 RX ADMIN — OXYCODONE HYDROCHLORIDE 10 MG: 5 TABLET ORAL at 06:14

## 2018-11-16 RX ADMIN — HYDROCODONE BITARTRATE AND ACETAMINOPHEN 2 TABLET: 7.5; 325 TABLET ORAL at 11:46

## 2018-11-16 RX ADMIN — LISINOPRIL 10 MG: 5 TABLET ORAL at 08:07

## 2018-11-16 RX ADMIN — METFORMIN HYDROCHLORIDE 1000 MG: 500 TABLET, FILM COATED ORAL at 08:08

## 2018-11-16 RX ADMIN — ACETAMINOPHEN 1000 MG: 325 TABLET, FILM COATED ORAL at 06:11

## 2018-11-16 RX ADMIN — ASPIRIN 81 MG: 81 TABLET, DELAYED RELEASE ORAL at 08:07

## 2018-11-16 NOTE — PROGRESS NOTES
2018 Post Op day: 1 Day Post-Op Admit Diagnosis: Unilateral primary osteoarthritis, left knee [M17.12] LAB:   
Recent Results (from the past 24 hour(s)) GLUCOSE, POC Collection Time: 11/15/18  8:15 AM  
Result Value Ref Range Glucose (POC) 196 (H) 65 - 100 mg/dL GLUCOSE, POC Collection Time: 11/15/18 10:15 AM  
Result Value Ref Range Glucose (POC) 151 (H) 65 - 100 mg/dL HEMOGLOBIN Collection Time: 11/15/18  7:49 PM  
Result Value Ref Range HGB 12.8 (L) 13.6 - 17.2 g/dL HEMOGLOBIN Collection Time: 18  4:46 AM  
Result Value Ref Range HGB 11.6 (L) 13.6 - 17.2 g/dL HEMOGLOBIN A1C WITH EAG Collection Time: 18  4:46 AM  
Result Value Ref Range Hemoglobin A1c 8.1 % Est. average glucose 186 mg/dL Vital Signs:   
Patient Vitals for the past 8 hrs: 
 BP Temp Pulse Resp SpO2  
18 0754 179/90 97.7 °F (36.5 °C) 81 16 95 % 18 0325 146/75 97.5 °F (36.4 °C) 76 16 98 % Temp (24hrs), Av.5 °F (36.4 °C), Min:97.4 °F (36.3 °C), Max:97.8 °F (36.6 °C) Pain Control:  
Pain Assessment Pain Scale 1: Numeric (0 - 10) Pain Intensity 1: 7 Pain Onset 1: post op Pain Location 1: Knee Pain Orientation 1: Left Pain Description 1: Aching, Constant, Sharp, Dull Pain Intervention(s) 1: Medication (see MAR) Subjective: Doing well, pain is well controlled, no complaints Objective:  No Acute Distress, Alert and Oriented, left knee dressing is C/D/I. Calves are soft, NT. Neurovascular exam is normal 
  
 
Assessment:  
Patient Active Problem List  
Diagnosis Code  Gout M10.9  Hyperlipidemia E78.5  Insomnia G47.00  Fatigue R53.83  Benign colon polyp K63.5  Chronic neck pain M54.2, G89.29  Type 2 diabetes mellitus without complication, without long-term current use of insulin (HCC) E11.9  
 Essential hypertension I10  
 Arthritis of left knee M17.12 Status Post Procedure(s) (LRB): 
 LEFT KNEE ARTHROPLASTY TOTAL / Arron Kirby (Left) Plan: Continue Physical Therapy, Monitor Hgb. ASA/SCDs for DVT prophylaxis. Plan for d/c to home today vs tomorrow.   
Signed By: POONAM Davenport

## 2018-11-16 NOTE — PROGRESS NOTES
Problem: Self Care Deficits Care Plan (Adult) Goal: *Acute Goals and Plan of Care (Insert Text) GOALS:  
DISCHARGE GOALS (in preparation for going home/rehab):  3 days 1. Mr. Rick Cook will perform one lower body dressing activity with minimal assistance required to demonstrate improved functional mobility and safety. GOAL MET 11/16/2018 2. Mr. Rick Cook will perform one lower body bathing activity with minimal assistance required to demonstrate improved functional mobility and safety. GOAL MET 11/16/2018 3. Mr. Rick Cook will perform toileting/toilet transfer with contact guard assistance to demonstrate improved functional mobility and safety. GOAL MET 11/16/2018 4. Mr. Rick Cook will perform shower transfer with contact guard assistance to demonstrate improved functional mobility and safety. GOAL MET 11/16/2018 JOINT CAMP OCCUPATIONAL THERAPY TKA: Daily Note, Discharge and AM 11/16/2018INPATIENT: Hospital Day: 2 Payor: [address] [place] [address] / Plan: [facility] CROSS STATE / Product Type: PPO /  
  
NAME/AGE/GENDER: Chris Doran is a 62 y.o. male PRIMARY DIAGNOSIS:  Unilateral primary osteoarthritis, left knee [M17.12] Procedure(s) and Anesthesia Type: 
   * LEFT KNEE ARTHROPLASTY TOTAL / OSKAR - Spinal (Left) ICD-10: Treatment Diagnosis:  
 · Pain in Left Knee (M25.562) · Stiffness of Left Knee, Not elsewhere classified (M25.662) ASSESSMENT:  
  Mr. Rick oCok is s/p left TKA and presents with decreased weight bearing on left LE and decreased independence with functional mobility and activities of daily living. Patient completed shower and dressing as charter below in ADL grid and is ambulating with rolling walker and stand by assist.  Patient has met 4/4 goals and plans to return home with good family support. Family able to provide patient with appropriate level of assistance at this time.   OT reviewed safety precautions throughout session and therapy schedule for the remainder of today. Patient instructed to call for assistance when needing to get up from recliner and all needs in reach. Patient verbalized understanding of call light. This section established at most recent assessment PROBLEM LIST (Impairments causing functional limitations): 1. Decreased Strength 2. Decreased ADL/Functional Activities 3. Decreased Transfer Abilities 4. Increased Pain 5. Increased Fatigue 6. Decreased Flexibility/Joint Mobility 7. Decreased Knowledge of Precautions INTERVENTIONS PLANNED: (Benefits and precautions of occupational therapy have been discussed with the patient.) 1. Activities of daily living training 2. Adaptive equipment training 3. Balance training 4. Clothing management 5. Donning&doffing training 6. Theraputic activity TREATMENT PLAN: Frequency/Duration: Follow patient qd to address above goals. Rehabilitation Potential For Stated Goals: Good RECOMMENDED REHABILITATION/EQUIPMENT: (at time of discharge pending progress): Continue Skilled Therapy and Home Health: Physical Therapy. OCCUPATIONAL PROFILE AND HISTORY:  
History of Present Injury/Illness (Reason for Referral): Pt presents this date s/p (L) TKA. Past Medical History/Comorbidities:  
Mr. Karen Shipman  has a past medical history of Arthritis, CAD (coronary artery disease), Diabetes ([facility] [address] Elevated serum cholesterol, GERD (gastroesophageal reflux disease), colonic polyp, Hypercholesterolemia, and Hypertension. Mr. Karen Shipman  has a past surgical history that includes hx coronary stent placement (3549,8326); hx tonsil and adenoidectomy (1999); hx uvulopalatopharyngoplasty; hx knee arthroscopy (Left, 1985); hx colonoscopy (last 4/25/16); COLONOSCOPY/ 26 (N/A, 4/25/2016); ENDOSCOPIC POLYPECTOMY (N/A, 4/25/2016); ESOPHAGOGASTRODUODENOSCOPY (EGD) (N/A, 4/25/2016); and ESOPHAGOGASTRODUODENAL (EGD) BIOPSY (N/A, 4/25/2016). Social History/Living Environment:  
Home Environment: Private residence # Steps to Enter: 2 Rails to Enter: No 
One/Two Story Residence: Two story # of Interior Steps: 12 Interior Rails: Left Living Alone: No 
Support Systems: Spouse/Significant Other/Partner Patient Expects to be Discharged to[de-identified] Private residence Current DME Used/Available at Home: None Tub or Shower Type: Shower Prior Level of Function/Work/Activity: 
independent Number of Personal Factors/Comorbidities that affect the Plan of Care: Brief history (0):  LOW COMPLEXITY ASSESSMENT OF OCCUPATIONAL PERFORMANCE[de-identified]  
Most Recent Physical Functioning:  
Balance Sitting: Intact Standing: Without support Mental Status Neurologic State: Alert; Appropriate for age Orientation Level: Appropriate for age Cognition: Appropriate decision making; Appropriate for age attention/concentration; Appropriate safety awareness; Follows commands Perception: Appears intact Perseveration: No perseveration noted Safety/Judgement: Awareness of environment; Fall prevention Basic ADLs (From Assessment) Complex ADLs (From Assessment) Basic ADL Feeding: Setup Oral Facial Hygiene/Grooming: Setup Bathing: Moderate assistance Type of Bath: Chlorhexidine (CHG), Shower Upper Body Dressing: Setup Lower Body Dressing: Moderate assistance Toileting: Maximum assistance Grooming/Bathing/Dressing Activities of Daily Living Grooming Grooming Assistance: Supervision/set up Washing Face: Supervision/set-up Washing Hands: Supervision/set-up Brushing Teeth: Supervision/set-up Brushing/Combing Hair: Supervision/set-up Cognitive Retraining Safety/Judgement: Awareness of environment; Fall prevention Upper Body Bathing Bathing Assistance: Supervision/set-up Position Performed: Seated in chair;Standing Adaptive Equipment: Grab bar;Tub bench Lower Body Bathing Bathing Assistance: Stand-by assistance Perineal  : Stand-by assistance Position Performed: Standing Lower Body : Stand-by assistance Position Performed: Seated in chair;Standing Adaptive Equipment: Grab bar;Tub bench Upper Body Dressing Assistance Dressing Assistance: Supervision/set-up Hospital Gown: Supervision/ set-up Pullover Shirt: Supervision/set-up Functional Transfers Bathroom Mobility: Stand-by assistance Toilet Transfer : Stand-by assistance Shower Transfer: Stand-by assistance Adaptive Equipment: Tub transfer bench Lower Body Dressing Assistance Dressing Assistance: Minimum assistance Underpants: Stand-by assistance Pants With Elastic Waist: Stand-by assistance Socks: Minimum assistance Bed/Mat Mobility Supine to Sit: Stand-by assistance Sit to Supine: (stayed up in chair) Sit to Stand: Stand-by assistance;Contact guard assistance Physical Skills Involved: 
1. Balance 2. Strength 3. Activity Tolerance Cognitive Skills Affected (resulting in the inability to perform in a timely and safe manner): 1. none Psychosocial Skills Affected: 1. none Number of elements that affect the Plan of Care: 1-3:  LOW COMPLEXITY CLINICAL DECISION MAKIN[address] [place] [id number] AM-PAC 6 Clicks Daily Activity Inpatient Short Form How much help from another person does the patient currently need. .. Total A Lot A Little None 1. Putting on and taking off regular lower body clothing? [] 1   [] 2   [x] 3   [] 4  
2. Bathing (including washing, rinsing, drying)? [] 1   [x] 2   [] 3   [] 4  
3. Toileting, which includes using toilet, bedpan or urinal?   [] 1   [] 2   [x] 3   [] 4  
4. Putting on and taking off regular upper body clothing? [] 1   [] 2   [] 3   [x] 4  
5. Taking care of personal grooming such as brushing teeth? [] 1   [] 2   [] 3   [x] 4  
6. Eating meals? [] 1   [] 2   [] 3   [x] 4  
© , Trustees of [address] [place] [id number], under license to [employer]. All rights reserved Score:  Initial: 18 Most [id number] discharge 11/16/18 Interpretation of Tool:  Represents activities that are increasingly more difficult (i.e. Bed mobility, Transfers, Gait). Score 24 23 22-20 19-15 14-10 9-7 6 Modifier CH CI CJ CK CL CM CN   
 
? Self Care:  
  - CURRENT STATUS: CK - 40%-59% impaired, limited or restricted  - GOAL STATUS: CJ - 20%-39% impaired, limited or restricted  - D/C STATUS:  CJ - 20%-39% impaired, limited or restricted Payor: [address] [place] [address] / Plan[place] / Product Type: PPO /   
 
· Use of outcome tool(s) and clinical judgement create a POC that gives a: LOW COMPLEXITY  
  
 
 
 
TREATMENT:  
(In addition to Assessment/Re-Assessment sessions the following treatments were rendered) Pre-treatment Symptoms/Complaints: Tolerated sitting in chair Pain: Initial:  
Pain Intensity 1: 9 Pain Location 1: Knee Pain Orientation 1: Left Pain Intervention(s) 1: Shower  Post Session:  9/10 iceman in place, nursing notified Self Care: (25): Procedure(s) (per grid) utilized to improve and/or restore self-care/home management as related to dressing, bathing, toileting and grooming. Required minimal visual, verbal and tactile cueing to facilitate activities of daily living skills and compensatory activities. Treatment/Session Assessment:   
 Response to Treatment:  Tolerated well, plans to go home today Education: 
[] Home Exercises [x] Fall Precautions [] Hip Precautions [] Going Home Video [x] Knee/Hip Prosthesis Review [x] Walker Management/Safety [x] Adaptive Equipment as Needed Interdisciplinary Collaboration:  
o Occupational Therapist 
o Registered Nurse After treatment position/precautions:  
o Up in chair 
o Bed/Chair-wheels locked 
o Call light within reach 
o RN notified Compliance with Program/Exercises: Compliant all of the time.   
 Recommendations/Intent for next treatment session:   Pt doing well all goals met and will do well at home with support from spouse. Patient will be discharged home with home health PT. No further Occupation Therapy warranted, will discharge Occupational Therapy services. Total Treatment Duration:25 OT Patient Time In/Time Out Time In: 0935 Time Out: 1000 Khang Vega, OT

## 2018-11-16 NOTE — PROGRESS NOTES
11/15/18 215 Oxygen Therapy O2 Sat (%) 95 % Pulse via Oximetry 67 beats per minute O2 Device Room air O2 Flow Rate (L/min) 0 l/min Patient placed on continuous sat monitor # 8. Monitor history deleted prior to placing on patient. Patient working on IS.

## 2018-11-16 NOTE — PROGRESS NOTES
Spiritual Care Visit, initial visit. Visited with patient at bedside. Prayed for patient's healing and health. Visit by Joel Humphreys, Staff .  Jorge Alberto., Josué.B., B.A.

## 2018-11-16 NOTE — PROGRESS NOTES
[facility] - INPATIENT Face to Face Encounter Patients Name: Lissette Aguilar    YOB: 1960 Ordering Physician:  Jaz Capps Primary Diagnosis: Unilateral primary osteoarthritis, left knee [M17.12]  S/p left tKA Date of Face to Face:   11-15-18 Face to Face Encounter findings are related to primary reason for home care:   yes. 1. I certify that the patient needs intermittent care as follows: physical therapy: gait/stair training 2. I certify that this patient is homebound, that is: 1) patient requires the use of a walker device, special transportation, or assistance of another to leave the home; or 2) patient's condition makes leaving the home medically contraindicated; and 3) patient has a normal inability to leave the home and leaving the home requires considerable and taxing effort. Patient may leave the home for infrequent and short duration for medical reasons, and occasional absences for non-medical reasons. Homebound status is due to the following functional limitations: Patient's ambulation limited secondary to severe pain and requires the use of an assistive device and the assistance of a caregiver for safe completion. Patient with strength and ROM deficits limiting ambulation endurance requiring the use of an assistive device and the assistance of a caregiver. Patient deemed temporarily homebound secondary to increased risk for infection when leaving home and going out into the community. 3. I certify that this patient is under my care and that I, or a nurse practitioner or  [id number], or clinical nurse specialist, or certified nurse midwife, working with me, had a Face-to-Face Encounter that meets the physician Face-to-Face Encounter requirements. The following are the clinical findings from the [address] [place] [address] encounter that support the need for skilled services and is a summary of the encounter: see hospital chart Aditi Hunter, CHRISTY 
11/16/2018 THE FOLLOWING TO BE COMPLETED BY THE COMMUNITY PHYSICIAN: 
 
I concur with the findings described above from the F2F encounter that this patient is homebound and in need of a skilled service. Certifying Physician: _____________________________________ Printed Certifying Physician Name: _____________________________________ Date: _________________

## 2018-11-16 NOTE — PROGRESS NOTES
Problem: Mobility Impaired (Adult and Pediatric) Goal: *Acute Goals and Plan of Care (Insert Text) GOALS (1-4 days): 
(1.)Mr. Hieu Gomez will move from supine to sit and sit to supine  in bed with SUPERVISION. (2.)Mr. Hieu Gomez will transfer from bed to chair and chair to bed with STAND BY ASSIST using the least restrictive device. (3.)Mr. Hieu Gomez will ambulate with STAND BY ASSIST for 300 feet with the least restrictive device. (4.)Mr. Hieu Gomez will ambulate up/down 3 steps with bilateral  railing with CONTACT GUARD ASSIST with no device. (5.)Mr. Hieu Gomez will increase left knee ROM to 5°-80°. 
________________________________________________________________________________________________ PHYSICAL THERAPY Joint camp tKa: Daily Note, Treatment Day: 1st, AM 11/16/2018INPATIENT: Hospital Day: 2 Payor: [address] [place] [address] / Plan: [facility] STATE / Product Type: PPO /  
  
NAME/AGE/GENDER: Dev Moreno is a 62 y.o. male PRIMARY DIAGNOSIS:  Unilateral primary osteoarthritis, left knee [M17.12] Procedure(s) and Anesthesia Type: 
   * LEFT KNEE ARTHROPLASTY TOTAL / OSKAR - Spinal (Left) ICD-10: Treatment Diagnosis:  
 · Pain in Left Knee (M25.562) · Stiffness of Left Knee, Not elsewhere classified (M25.662) · Difficulty in walking, Not elsewhere classified (R26.2) ASSESSMENT:  
Mr. Hieu Gomez presents supine on contact and agreeable to therapy this morning. Did not sleep well last night but nausea better than yesterday. Worked on TKA exercises with verbal cues progressing with repetitions. Then supine to sit to stand and then gait training in the pan with verbal cues progressing with distance from yesterday. Pt stayed up in chair with ice in place and needs in reach. Hope to further progress therapy this afternoon. This section established at most recent assessment PROBLEM LIST (Impairments causing functional limitations): 1. Decreased Strength 2. Decreased ADL/Functional Activities 3. Decreased Transfer Abilities 4. Decreased Ambulation Ability/Technique 5. Decreased Flexibility/Joint Mobility 6. Decreased Gratz with Home Exercise Program 
 INTERVENTIONS PLANNED: (Benefits and precautions of physical therapy have been discussed with the patient.) 1. Bed Mobility 2. Cold 3. Gait Training 4. Home Exercise Program (HEP) 5. Therapeutic Exercise/Strengthening 6. Transfer Training 7. Range of Motion: active/assisted/passive 8. Therapeutic Activities 9. Group Therapy TREATMENT PLAN: Frequency/Duration: Follow patient BID for duration of hospital stay to address above goals. Rehabilitation Potential For Stated Goals: Good RECOMMENDED REHABILITATION/EQUIPMENT: (at time of discharge pending progress): Continue Skilled Therapy, Home Health: Physical Therapy and Outpatient: Physical Therapy. HISTORY:  
History of Present Injury/Illness (Reason for Referral): 
Pt s/p left TKA on 11/15/18 Past Medical History/Comorbidities:  
Mr. Marjorie Michele  has a past medical history of Arthritis, CAD (coronary artery disease), Diabetes ([address] Elevated serum cholesterol, GERD (gastroesophageal reflux disease), colonic polyp, Hypercholesterolemia, and Hypertension. Mr. Marjorie Michele  has a past surgical history that includes hx coronary stent placement (0532,5158); hx tonsil and adenoidectomy (1999); hx uvulopalatopharyngoplasty; hx knee arthroscopy (Left, 1985); hx colonoscopy (last 4/25/16); COLONOSCOPY/ 26 (N/A, 4/25/2016); ENDOSCOPIC POLYPECTOMY (N/A, 4/25/2016); ESOPHAGOGASTRODUODENOSCOPY (EGD) (N/A, 4/25/2016); and ESOPHAGOGASTRODUODENAL (EGD) BIOPSY (N/A, 4/25/2016). Social History/Living Environment:  
Home Environment: Private residence # Steps to Enter: 2 Rails to Enter: No 
One/Two Story Residence: Two story # of Interior Steps: 12 Interior Rails: Left Living Alone: No 
Support Systems: Spouse/Significant Other/Partner Patient Expects to be Discharged to[de-identified] Private residence Current DME Used/Available at Home: None Tub or Shower Type: Shower Prior Level of Function/Work/Activity: 
Pt living at home, independent with gait and ADLs without assistive devices Number of Personal Factors/Comorbidities that affect the Plan of Care: 0: LOW COMPLEXITY EXAMINATION:  
Most Recent Physical Functioning:  
  
  
 
         
 
  
 
Bed Mobility Supine to Sit: Stand-by assistance Sit to Supine: (stayed up in chair) Transfers Sit to Stand: Stand-by assistance;Contact guard assistance Stand to Sit: Stand-by assistance;Contact guard assistance Balance Sitting: Intact Standing: Intact; With support Gait Training: Yes 
 
Weight Bearing Status Left Side Weight Bearing: As tolerated Distance (ft): 100 Feet (ft) Ambulation - Level of Assistance: Stand-by assistance;Contact guard assistance Assistive Device: Walker, rolling Speed/Grace: Delayed Step Length: Right shortened Stance: Left decreased Gait Abnormalities: Antalgic Interventions: Safety awareness training;Verbal cues Braces/Orthotics: none Left Knee Cold Type: Cryocuff Patient Position: Sitting Body Structures Involved: 1. Joints 2. Muscles Body Functions Affected: 1. Movement Related Activities and Participation Affected: 1. Mobility 2. Self Care Number of elements that affect the Plan of Care: 4+: HIGH COMPLEXITY CLINICAL PRESENTATION:  
Presentation: Stable and uncomplicated: LOW COMPLEXITY CLINICAL DECISION MAKING:  
[employer] AM-PAC 6 Clicks Basic Mobility Inpatient Short Form How much difficulty does the patient currently have. .. Unable A Lot A Little None 1. Turning over in bed (including adjusting bedclothes, sheets and blankets)? [] 1   [] 2   [] 3   [x] 4  
2. Sitting down on and standing up from a chair with arms ( e.g., wheelchair, bedside commode, etc.)   [] 1   [] 2   [x] 3   [] 4  
3. Moving from lying on back to sitting on the side of the bed?    [] 1 [] 2   [x] 3   [] 4 How much help from another person does the patient currently need. .. Total A Lot A Little None 4. Moving to and from a bed to a chair (including a wheelchair)? [] 1   [] 2   [x] 3   [] 4  
5. Need to walk in hospital room? [] 1   [] 2   [x] 3   [] 4  
6. Climbing 3-5 steps with a railing? [] 1   [] 2   [x] 3   [] 4  
© 2007, Trustees of [facility] [employer], under license to [employer]. All rights reserved Score:  Initial: 19 Most Recent: X (Date: -- ) Interpretation of Tool:  Represents activities that are increasingly more difficult (i.e. Bed mobility, Transfers, Gait). Score 24 23 22-20 19-15 14-10 9-7 6 Modifier CH CI CJ CK CL CM CN   
 
? Mobility - Walking and Moving Around:  
  - CURRENT STATUS: CK - 40%-59% impaired, limited or restricted  - GOAL STATUS: CI - 1%-19% impaired, limited or restricted  - D/C STATUS:  ---------------To be determined--------------- Payor: [address] [place] [address] / Plan[place] / Product Type: PPO /   
 
Medical Necessity:    
· Patient is expected to demonstrate progress in strength, range of motion and functional technique to decrease assistance required with functional mobility and TKA Exercises. Reason for Services/Other Comments: 
· Patient continues to require skilled intervention due to inability to complete functional mobility and TKA exercises independently. Use of outcome tool(s) and clinical judgement create a POC that gives a: Clear prediction of patient's progress: LOW COMPLEXITY  
  
 
 
 
TREATMENT:  
(In addition to Assessment/Re-Assessment sessions the following treatments were rendered) Pre-treatment Symptoms/Complaints: nausea Pain Initial: no reports of pain Pain Intensity 1: 4  Post Session:  No reports of pain Therapeutic Exercise: (15 Minutes):  Exercises per grid below to improve mobility and strength.   Required minimal verbal cues to promote proper body alignment and promote proper body posture. Progressed repetitions as indicated. Gait Training (10 Minutes):  Gait training to improve and/or restore physical functioning as related to mobility and strength. Ambulated 100 Feet (ft) with Stand-by assistance;Contact guard assistance using a Walker, rolling and minimal Safety awareness training;Verbal cues related to their stance phase and stride length to promote proper body alignment and promote proper body posture. Instruction in performance of walker use and gait sequencing to correct stance phase and stride length. Date: 
11/15/18 Date: 
11/16/18 Date: 
  
ACTIVITY/EXERCISE AM PM AM PM AM PM  
GROUP THERAPY  []  []  []  []  []  [] Ankle Pumps  10 12 Quad Sets  10 12 Gluteal Sets  10 12 Hip ABd/ADduction  10 12 Straight Leg Raises  10 12 Knee Slides  10 12 Short Arc The [address] Chair Slides B = bilateral; AA = active assistive; A = active; P = passive Treatment/Session Assessment:   
 Response to Treatment:  Tolerated well, progressing nicely Education: 
[x] Home Exercises [x] Fall Precautions [x] no pillow under knee [] D/C Instruction Review 
[] Knee Prosthesis Review 
[] Walker Management/Safety [] Adaptive Equipment as Needed Interdisciplinary Collaboration:  
o Registered Nurse After treatment position/precautions:  
o Up in chair 
o Bed/Chair-wheels locked 
o Call light within reach 
o Family at bedside Compliance with Program/Exercises: Compliant all of the time. Recommendations/Intent for next treatment session:  Treatment next visit will focus on increasing Mr. Veena Vásquez independence with bed mobility, transfers, gait training, strength/ROM exercises, modalities for pain, and patient education. Total Treatment Duration: PT Patient Time In/Time Out Time In: 0825 Time Out: 9304 Princess Núñez PT

## 2018-11-16 NOTE — PROGRESS NOTES
Problem: Mobility Impaired (Adult and Pediatric) Goal: *Acute Goals and Plan of Care (Insert Text) GOALS (1-4 days): 
(1.)Mr. Korey Gupta will move from supine to sit and sit to supine  in bed with SUPERVISION. (2.)Mr. Korey Gupta will transfer from bed to chair and chair to bed with STAND BY ASSIST using the least restrictive device. (3.)Mr. Korey Gupta will ambulate with STAND BY ASSIST for 300 feet with the least restrictive device. (4.)Mr. Korey Gupta will ambulate up/down 3 steps with bilateral  railing with CONTACT GUARD ASSIST with no device. MET 11/16/18 
(5.)Mr. Korey Gupta will increase left knee ROM to 5°-80°. 
________________________________________________________________________________________________ PHYSICAL THERAPY [facility]: Daily Note, Treatment Day: 1st, PM 11/16/2018INPATIENT: Hospital Day: 2 Payor: [address] [place] [address] / [place] / Product Type: PPO /  
  
NAME/AGE/GENDER: Michael [place] is a 62 y.o. male PRIMARY DIAGNOSIS:  Unilateral primary osteoarthritis, left knee [M17.12] Procedure(s) and Anesthesia Type: 
   * LEFT KNEE ARTHROPLASTY TOTAL / OSKAR - Spinal (Left) ICD-10: Treatment Diagnosis:  
 · Pain in Left Knee (M25.562) · Stiffness of Left Knee, Not elsewhere classified (M25.662) · Difficulty in walking, Not elsewhere classified (R26.2) ASSESSMENT:  
Mr. Korey Gupta presents up in chair on contact and agreeable to therapy. Worked on gait training in the pan with verbal cues. In gym worked on TKA exercises with verbal cues progressing with repetitions. Reviewed HEP along with frequency and use of ice. Practiced stair training with left rail up and down 10 steps. Pt plans to go home today with HHPT to follow up. Mr. Barb Wesley functional progress occurred more rapidly than expected as evidenced by goal attainment. He will be discharged to his home environment with a PT HEP, assistive device(s), and Home Health PT services. This section established at most recent assessment PROBLEM LIST (Impairments causing functional limitations): 1. Decreased Strength 2. Decreased ADL/Functional Activities 3. Decreased Transfer Abilities 4. Decreased Ambulation Ability/Technique 5. Decreased Flexibility/Joint Mobility 6. Decreased [place] with Home Exercise Program 
 INTERVENTIONS PLANNED: (Benefits and precautions of physical therapy have been discussed with the patient.) 1. Bed Mobility 2. Cold 3. Gait Training 4. Home Exercise Program (HEP) 5. Therapeutic Exercise/Strengthening 6. Transfer Training 7. Range of Motion: active/assisted/passive 8. Therapeutic Activities 9. Group Therapy TREATMENT PLAN: Frequency/Duration: Follow patient BID for duration of hospital stay to address above goals. Rehabilitation Potential For Stated Goals: Good RECOMMENDED REHABILITATION/EQUIPMENT: (at time of discharge pending progress): Continue Skilled Therapy, Home Health: Physical Therapy and Outpatient: Physical Therapy. HISTORY:  
History of Present Injury/Illness (Reason for Referral): 
Pt s/p left TKA on 11/15/18 Past Medical History/Comorbidities:  
Mr. Theodore Muse  has a past medical history of Arthritis, CAD (coronary artery disease), Diabetes ([facility] [address] Elevated serum cholesterol, GERD (gastroesophageal reflux disease), colonic polyp, Hypercholesterolemia, and Hypertension. Mr. Theodore Muse  has a past surgical history that includes hx coronary stent placement (1195,6087); hx tonsil and adenoidectomy (1999); hx uvulopalatopharyngoplasty; hx knee arthroscopy (Left, 1985); hx colonoscopy (last 4/25/16); COLONOSCOPY/ 26 (N/A, 4/25/2016); ENDOSCOPIC POLYPECTOMY (N/A, 4/25/2016); ESOPHAGOGASTRODUODENOSCOPY (EGD) (N/A, 4/25/2016); and ESOPHAGOGASTRODUODENAL (EGD) BIOPSY (N/A, 4/25/2016). Social History/Living Environment:  
Home Environment: Private residence # Steps to Enter: 2 Rails to Enter: No 
 One/Two Story Residence: Two story # of Interior Steps: 12 Interior Rails: Left Living Alone: No 
Support Systems: Spouse/Significant Other/Partner Patient Expects to be Discharged to[de-identified] Private residence Current DME Used/Available at Home: None Tub or Shower Type: Shower Prior Level of Function/Work/Activity: 
Pt living at home, independent with gait and ADLs without assistive devices Number of Personal Factors/Comorbidities that affect the Plan of Care: 0: LOW COMPLEXITY EXAMINATION:  
Most Recent Physical Functioning: LLE AROM 
L Knee Flexion: 90 L Knee Extension: 8 Bed Mobility Supine to Sit: Stand-by assistance Sit to Supine: Stand-by assistance Transfers Sit to Stand: Stand-by assistance Stand to Sit: Stand-by assistance Balance Sitting: Intact Standing: Intact; With support Gait Training: Yes 
 
Weight Bearing Status Left Side Weight Bearing: As tolerated Distance (ft): 108 Feet (ft)(and another 108 feet) Ambulation - Level of Assistance: Supervision;Stand-by assistance Assistive Device: Walker, rolling Speed/Grace: Delayed Step Length: Right shortened Stance: Left decreased Gait Abnormalities: Antalgic Number of Stairs Trained: 10 Stairs - Level of Assistance: Contact guard assistance Rail Use: Left Interventions: Safety awareness training;Verbal cues Braces/Orthotics: none Left Knee Cold Type: Cryocuff Patient Position: Supine Body Structures Involved: 1. Joints 2. Muscles Body Functions Affected: 1. Movement Related Activities and Participation Affected: 1. Mobility 2. Self Care Number of elements that affect the Plan of Care: 4+: HIGH COMPLEXITY CLINICAL PRESENTATION:  
Presentation: Stable and uncomplicated: LOW COMPLEXITY CLINICAL DECISION MAKIN \A Chronology of [place] [facility]\"" Box [address] AM-PAC 6 Clicks Basic Mobility Inpatient Short Form How much difficulty does the patient currently have. .. Unable A Lot A Little None 1.  Turning over in bed (including adjusting bedclothes, sheets and blankets)? [] 1   [] 2   [] 3   [x] 4  
2. Sitting down on and standing up from a chair with arms ( e.g., wheelchair, bedside commode, etc.)   [] 1   [] 2   [x] 3   [] 4  
3. Moving from lying on back to sitting on the side of the bed? [] 1   [] 2   [x] 3   [] 4 How much help from another person does the patient currently need. .. Total A Lot A Little None 4. Moving to and from a bed to a chair (including a wheelchair)? [] 1   [] 2   [x] 3   [] 4  
5. Need to walk in hospital room? [] 1   [] 2   [x] 3   [] 4  
6. Climbing 3-5 steps with a railing? [] 1   [] 2   [x] 3   [] 4  
© 2007, Trustees of [facility] [employer], under license to [employer]. All rights reserved Score:  Initial: 19 Most Recent: X (Date: -- ) Interpretation of Tool:  Represents activities that are increasingly more difficult (i.e. Bed mobility, Transfers, Gait). Score 24 23 22-20 19-15 14-10 9-7 6 Modifier CH CI CJ CK CL CM CN   
 
? Mobility - Walking and Moving Around:  
  - CURRENT STATUS: CK - 40%-59% impaired, limited or restricted  - GOAL STATUS: CI - 1%-19% impaired, limited or restricted  - D/C STATUS:  ---------------To be determined--------------- Payor: [address] [place] [address] / [place] / Product Type: PPO /   
 
Medical Necessity:    
· Patient is expected to demonstrate progress in strength, range of motion and functional technique to decrease assistance required with functional mobility and TKA Exercises. Reason for Services/Other Comments: 
· Patient continues to require skilled intervention due to inability to complete functional mobility and TKA exercises independently.   
Use of outcome tool(s) and clinical judgement create a POC that gives a: Clear prediction of patient's progress: LOW COMPLEXITY  
  
 
 
 
TREATMENT:  
(In addition to Assessment/Re-Assessment sessions the following treatments were rendered) Pre-treatment Symptoms/Complaints: none Pain Initial: no reports of pain Pain Intensity 1: 4  Post Session:  Sore after therapy Therapeutic Exercise: (45 Minutes(group)):  Exercises per grid below to improve mobility and strength. Required minimal verbal cues to promote proper body alignment and promote proper body posture. Progressed repetitions as indicated. Gait Training (15 Minutes):  Gait training to improve and/or restore physical functioning as related to mobility and strength. Ambulated 108 Feet (ft)(and another 108 feet) with Supervision;Stand-by assistance using a Walker, rolling and minimal Safety awareness training;Verbal cues related to their stance phase and stride length to promote proper body alignment and promote proper body posture. Instruction in performance of walker use and gait sequencing to correct stance phase and stride length. Date: 
11/15/18 Date: 
11/16/18 Date: 
  
ACTIVITY/EXERCISE AM PM AM PM AM PM  
GROUP THERAPY  []  []  []  [x]  []  [] Ankle Pumps  10 12 15 Quad Sets  10 12 15 Gluteal Sets  10 12 15 Hip ABd/ADduction  10 12 15 Straight Leg Raises  10 12 15 Knee Slides  10 12 15 Short Arc Quads    15 [facility] Chair Slides    15    
        
B = bilateral; AA = active assistive; A = active; P = passive Treatment/Session Assessment:   
 Response to Treatment:  Tolerated well, continues with nice progress Education: 
[x] Home Exercises [x] Fall Precautions [x] no pillow under knee [x] D/C Instruction Review [x] Knee Prosthesis Review [x] Walker Management/Safety [] Adaptive Equipment as Needed Interdisciplinary Collaboration:  
o Physical Therapy Assistant 
o Registered Nurse 
o Rehabilitation Attendant After treatment position/precautions:  
o Supine in bed 
o Bed/Chair-wheels locked 
o Bed in low position 
o Call light within reach 
o Family at bedside Compliance with Program/Exercises: Compliant all of the time. Recommendations/Intent for next treatment session:  Pt plans to discharge home today with HHPT to follow up Total Treatment Duration: PT Patient Time In/Time Out Time In: 1300 Time Out: 1400 Nikolay Wood, PT

## 2018-11-16 NOTE — PROGRESS NOTES
Shift Assessment Complete. Pt is post op day 1 from [address]. Pt is A&Ox 3.  +2 pedal pulses with purposeful movement in all four extremities. Dressing is clean, dry and intact. PIV capped. Bed low and locked. Side rails x3. Call light with in reach. Pt verbalizes understanding of call light.

## 2018-11-16 NOTE — PROGRESS NOTES
Care Management Interventions Mode of Transport at Discharge: Self Transition of Care Consult (CM Consult): Home Health [address]: Yes Discharge Durable Medical Equipment: Yes Physical Therapy Consult: Yes Occupational Therapy Consult: Yes Current Support Network: Lives with Spouse Confirm Follow Up Transport: Family Plan discussed with Pt/Family/Caregiver: Yes Freedom of Choice Offered: Yes Discharge Location Discharge Placement: Home with home health Patient is a 62y.o. year old male admitted for Left TKA . Patient lives with His spouse and plans to return home on discharge. Order received to arrange home health. Patient without preference towards agency. Referral sent to [facility]. Patient requesting we arrange a walker and bedside commode. Referral sent to [facility] who will deliver to the hospital room prior to discharge. Will follow until discharge.

## 2018-11-17 ENCOUNTER — HOME CARE VISIT (OUTPATIENT)
Dept: SCHEDULING | Facility: HOME HEALTH | Age: 58
End: 2018-11-17
Payer: COMMERCIAL

## 2018-11-17 PROCEDURE — G0151 HHCP-SERV OF PT,EA 15 MIN: HCPCS

## 2018-11-17 PROCEDURE — 400013 HH SOC

## 2018-11-18 VITALS
BODY MASS INDEX: 30.16 KG/M2 | HEIGHT: 68 IN | DIASTOLIC BLOOD PRESSURE: 88 MMHG | HEART RATE: 96 BPM | RESPIRATION RATE: 18 BRPM | SYSTOLIC BLOOD PRESSURE: 158 MMHG | WEIGHT: 199 LBS | TEMPERATURE: 98.6 F

## 2018-11-19 ENCOUNTER — HOME CARE VISIT (OUTPATIENT)
Dept: SCHEDULING | Facility: HOME HEALTH | Age: 58
End: 2018-11-19
Payer: COMMERCIAL

## 2018-11-19 VITALS — RESPIRATION RATE: 17 BRPM | DIASTOLIC BLOOD PRESSURE: 88 MMHG | SYSTOLIC BLOOD PRESSURE: 144 MMHG | HEART RATE: 78 BPM

## 2018-11-19 PROCEDURE — G0151 HHCP-SERV OF PT,EA 15 MIN: HCPCS

## 2018-11-21 ENCOUNTER — HOME CARE VISIT (OUTPATIENT)
Dept: SCHEDULING | Facility: HOME HEALTH | Age: 58
End: 2018-11-21
Payer: COMMERCIAL

## 2018-11-21 VITALS
SYSTOLIC BLOOD PRESSURE: 142 MMHG | TEMPERATURE: 97.8 F | HEART RATE: 106 BPM | RESPIRATION RATE: 16 BRPM | DIASTOLIC BLOOD PRESSURE: 78 MMHG

## 2018-11-21 PROCEDURE — G0157 HHC PT ASSISTANT EA 15: HCPCS

## 2018-11-21 PROCEDURE — A6199 ALGINATE DRSG WOUND FILLER: HCPCS

## 2018-11-23 ENCOUNTER — HOME CARE VISIT (OUTPATIENT)
Dept: SCHEDULING | Facility: HOME HEALTH | Age: 58
End: 2018-11-23
Payer: COMMERCIAL

## 2018-11-23 VITALS
HEART RATE: 80 BPM | TEMPERATURE: 97.8 F | DIASTOLIC BLOOD PRESSURE: 92 MMHG | SYSTOLIC BLOOD PRESSURE: 170 MMHG | RESPIRATION RATE: 16 BRPM

## 2018-11-23 PROCEDURE — G0157 HHC PT ASSISTANT EA 15: HCPCS

## 2018-11-26 ENCOUNTER — HOME CARE VISIT (OUTPATIENT)
Dept: SCHEDULING | Facility: HOME HEALTH | Age: 58
End: 2018-11-26
Payer: COMMERCIAL

## 2018-11-26 VITALS
RESPIRATION RATE: 16 BRPM | SYSTOLIC BLOOD PRESSURE: 134 MMHG | DIASTOLIC BLOOD PRESSURE: 82 MMHG | HEART RATE: 67 BPM | TEMPERATURE: 97.4 F

## 2018-11-26 PROCEDURE — G0157 HHC PT ASSISTANT EA 15: HCPCS

## 2018-11-27 ENCOUNTER — HOME CARE VISIT (OUTPATIENT)
Dept: HOME HEALTH SERVICES | Facility: HOME HEALTH | Age: 58
End: 2018-11-27
Payer: COMMERCIAL

## 2018-11-28 ENCOUNTER — HOME CARE VISIT (OUTPATIENT)
Dept: HOME HEALTH SERVICES | Facility: HOME HEALTH | Age: 58
End: 2018-11-28
Payer: COMMERCIAL

## 2018-12-03 PROCEDURE — A4649 SURGICAL SUPPLIES: HCPCS

## 2018-12-04 ENCOUNTER — HOME CARE VISIT (OUTPATIENT)
Dept: SCHEDULING | Facility: HOME HEALTH | Age: 58
End: 2018-12-04
Payer: COMMERCIAL

## 2018-12-04 VITALS
DIASTOLIC BLOOD PRESSURE: 94 MMHG | RESPIRATION RATE: 16 BRPM | SYSTOLIC BLOOD PRESSURE: 146 MMHG | HEART RATE: 110 BPM | TEMPERATURE: 97.7 F

## 2018-12-04 PROCEDURE — G0157 HHC PT ASSISTANT EA 15: HCPCS

## 2018-12-05 ENCOUNTER — HOME CARE VISIT (OUTPATIENT)
Dept: SCHEDULING | Facility: HOME HEALTH | Age: 58
End: 2018-12-05
Payer: COMMERCIAL

## 2018-12-06 ENCOUNTER — HOME CARE VISIT (OUTPATIENT)
Dept: SCHEDULING | Facility: HOME HEALTH | Age: 58
End: 2018-12-06
Payer: COMMERCIAL

## 2018-12-06 VITALS
HEART RATE: 87 BPM | RESPIRATION RATE: 17 BRPM | SYSTOLIC BLOOD PRESSURE: 136 MMHG | TEMPERATURE: 98.8 F | DIASTOLIC BLOOD PRESSURE: 84 MMHG

## 2018-12-06 PROCEDURE — G0151 HHCP-SERV OF PT,EA 15 MIN: HCPCS

## 2018-12-10 ENCOUNTER — APPOINTMENT (OUTPATIENT)
Dept: CT IMAGING | Age: 58
End: 2018-12-10
Attending: EMERGENCY MEDICINE
Payer: COMMERCIAL

## 2018-12-10 ENCOUNTER — HOSPITAL ENCOUNTER (EMERGENCY)
Age: 58
Discharge: HOME | End: 2018-12-10
Attending: EMERGENCY MEDICINE
Payer: COMMERCIAL

## 2018-12-10 ENCOUNTER — HOSPITAL ENCOUNTER (OUTPATIENT)
Dept: PHYSICAL THERAPY | Age: 58
End: 2018-12-10

## 2018-12-10 VITALS
OXYGEN SATURATION: 99 % | DIASTOLIC BLOOD PRESSURE: 60 MMHG | RESPIRATION RATE: 18 BRPM | BODY MASS INDEX: 30.16 KG/M2 | HEART RATE: 84 BPM | TEMPERATURE: 98.2 F | HEIGHT: 68 IN | SYSTOLIC BLOOD PRESSURE: 120 MMHG | WEIGHT: 199 LBS

## 2018-12-10 DIAGNOSIS — R51.9 ACUTE NONINTRACTABLE HEADACHE, UNSPECIFIED HEADACHE TYPE: Primary | ICD-10-CM

## 2018-12-10 LAB
ANION GAP SERPL CALC-SCNC: 11 MMOL/L
APPEARANCE FLD: CLEAR
APPEARANCE FLD: CLEAR
BASOPHILS # BLD: 0.1 K/UL (ref 0–0.2)
BASOPHILS NFR BLD: 1 % (ref 0–2)
BUN SERPL-MCNC: 13 MG/DL (ref 6–23)
CALCIUM SERPL-MCNC: 9.4 MG/DL (ref 8.3–10.4)
CHLORIDE SERPL-SCNC: 103 MMOL/L (ref 98–107)
CO2 SERPL-SCNC: 25 MMOL/L (ref 21–32)
COLOR FLD: COLORLESS
COLOR FLD: COLORLESS
CREAT SERPL-MCNC: 1.03 MG/DL (ref 0.8–1.5)
DIFFERENTIAL METHOD BLD: ABNORMAL
EOSINOPHIL # BLD: 0.2 K/UL (ref 0–0.8)
EOSINOPHIL NFR BLD: 3 % (ref 0.5–7.8)
ERYTHROCYTE [DISTWIDTH] IN BLOOD BY AUTOMATED COUNT: 12.8 % (ref 11.9–14.6)
GLUCOSE BLD-MCNC: 256 MG/DL (ref 65–100)
GLUCOSE CSF-MCNC: 98 MG/DL (ref 40–70)
GLUCOSE SERPL-MCNC: 260 MG/DL (ref 65–100)
HCT VFR BLD AUTO: 37.9 % (ref 41.1–50.3)
HGB BLD-MCNC: 12.9 G/DL (ref 13.6–17.2)
IMM GRANULOCYTES # BLD: 0.1 K/UL (ref 0–0.5)
IMM GRANULOCYTES NFR BLD AUTO: 1 % (ref 0–5)
INR PPP: 1.1
LACTATE BLD-SCNC: 1.92 MMOL/L (ref 0.5–1.9)
LYMPHOCYTES # BLD: 1.2 K/UL (ref 0.5–4.6)
LYMPHOCYTES NFR BLD: 14 % (ref 13–44)
LYMPHOCYTES NFR FLD: 39 %
MAGNESIUM SERPL-MCNC: 1.6 MG/DL (ref 1.8–2.4)
MCH RBC QN AUTO: 29.6 PG (ref 26.1–32.9)
MCHC RBC AUTO-ENTMCNC: 34 G/DL (ref 31.4–35)
MCV RBC AUTO: 86.9 FL (ref 79.6–97.8)
MONOCYTES # BLD: 0.6 K/UL (ref 0.1–1.3)
MONOCYTES NFR BLD: 8 % (ref 4–12)
MONOS+MACROS NFR FLD: 52 %
NEUTROPHILS NFR FLD: 9 %
NEUTS SEG # BLD: 6.3 K/UL (ref 1.7–8.2)
NEUTS SEG NFR BLD: 74 % (ref 43–78)
NRBC # BLD: 0 K/UL (ref 0–0.2)
NUC CELL # FLD: 18 /CU MM
NUC CELL # FLD: 3 /CU MM
PLATELET # BLD AUTO: 356 K/UL (ref 150–450)
PMV BLD AUTO: 9.1 FL (ref 9.4–12.3)
POTASSIUM SERPL-SCNC: 3.9 MMOL/L (ref 3.5–5.1)
PROT CSF-MCNC: 50 MG/DL
PROTHROMBIN TIME: 13.9 SEC (ref 11.7–14.5)
RBC # BLD AUTO: 4.36 M/UL (ref 4.23–5.6)
RBC # FLD: 0 /CU MM
RBC # FLD: 81 /CU MM
SODIUM SERPL-SCNC: 139 MMOL/L (ref 136–145)
SPECIMEN SOURCE FLD: NORMAL
SPECIMEN SOURCE FLD: NORMAL
TUBE # CSF: 2
TUBE # CSF: 2
WBC # BLD AUTO: 8.4 K/UL (ref 4.3–11.1)

## 2018-12-10 PROCEDURE — 70450 CT HEAD/BRAIN W/O DYE: CPT

## 2018-12-10 PROCEDURE — 74011250636 HC RX REV CODE- 250/636: Performed by: EMERGENCY MEDICINE

## 2018-12-10 PROCEDURE — 75810000133 HC LUMBAR PUNCTURE: Performed by: EMERGENCY MEDICINE

## 2018-12-10 PROCEDURE — 99285 EMERGENCY DEPT VISIT HI MDM: CPT | Performed by: EMERGENCY MEDICINE

## 2018-12-10 PROCEDURE — 77030014143 HC TY PUNC LUMBR BD -A

## 2018-12-10 PROCEDURE — 85025 COMPLETE CBC W/AUTO DIFF WBC: CPT

## 2018-12-10 PROCEDURE — 83735 ASSAY OF MAGNESIUM: CPT

## 2018-12-10 PROCEDURE — 85610 PROTHROMBIN TIME: CPT

## 2018-12-10 PROCEDURE — 80048 BASIC METABOLIC PNL TOTAL CA: CPT

## 2018-12-10 PROCEDURE — 96375 TX/PRO/DX INJ NEW DRUG ADDON: CPT | Performed by: EMERGENCY MEDICINE

## 2018-12-10 PROCEDURE — 84157 ASSAY OF PROTEIN OTHER: CPT

## 2018-12-10 PROCEDURE — 82945 GLUCOSE OTHER FLUID: CPT

## 2018-12-10 PROCEDURE — 83605 ASSAY OF LACTIC ACID: CPT

## 2018-12-10 PROCEDURE — 89050 BODY FLUID CELL COUNT: CPT

## 2018-12-10 PROCEDURE — 93005 ELECTROCARDIOGRAM TRACING: CPT | Performed by: EMERGENCY MEDICINE

## 2018-12-10 PROCEDURE — 96374 THER/PROPH/DIAG INJ IV PUSH: CPT | Performed by: EMERGENCY MEDICINE

## 2018-12-10 PROCEDURE — 87205 SMEAR GRAM STAIN: CPT

## 2018-12-10 PROCEDURE — 82962 GLUCOSE BLOOD TEST: CPT

## 2018-12-10 RX ORDER — LIDOCAINE HYDROCHLORIDE 10 MG/ML
10 INJECTION INFILTRATION; PERINEURAL
Status: DISCONTINUED | OUTPATIENT
Start: 2018-12-10 | End: 2018-12-11 | Stop reason: HOSPADM

## 2018-12-10 RX ORDER — PROCHLORPERAZINE MALEATE 10 MG
10 TABLET ORAL
Qty: 8 TAB | Refills: 1 | Status: SHIPPED | OUTPATIENT
Start: 2018-12-10 | End: 2019-04-26

## 2018-12-10 RX ORDER — LORAZEPAM 2 MG/ML
1 INJECTION INTRAMUSCULAR
Status: DISCONTINUED | OUTPATIENT
Start: 2018-12-10 | End: 2018-12-11 | Stop reason: HOSPADM

## 2018-12-10 RX ORDER — BUTALBITAL, ACETAMINOPHEN AND CAFFEINE 300; 40; 50 MG/1; MG/1; MG/1
1-2 CAPSULE ORAL
Qty: 20 CAP | Refills: 0 | Status: SHIPPED | OUTPATIENT
Start: 2018-12-10 | End: 2019-04-26

## 2018-12-10 RX ORDER — PROCHLORPERAZINE EDISYLATE 5 MG/ML
10 INJECTION INTRAMUSCULAR; INTRAVENOUS
Status: COMPLETED | OUTPATIENT
Start: 2018-12-10 | End: 2018-12-10

## 2018-12-10 RX ORDER — HYDROMORPHONE HYDROCHLORIDE 2 MG/ML
1 INJECTION, SOLUTION INTRAMUSCULAR; INTRAVENOUS; SUBCUTANEOUS
Status: COMPLETED | OUTPATIENT
Start: 2018-12-10 | End: 2018-12-10

## 2018-12-10 RX ORDER — LIDOCAINE HYDROCHLORIDE 10 MG/ML
INJECTION INFILTRATION; PERINEURAL
Status: DISCONTINUED
Start: 2018-12-10 | End: 2018-12-11 | Stop reason: HOSPADM

## 2018-12-10 RX ADMIN — SODIUM CHLORIDE 1000 ML: 900 INJECTION, SOLUTION INTRAVENOUS at 17:33

## 2018-12-10 RX ADMIN — LORAZEPAM 1 MG: 2 INJECTION, SOLUTION INTRAMUSCULAR; INTRAVENOUS at 18:48

## 2018-12-10 RX ADMIN — PROCHLORPERAZINE EDISYLATE 10 MG: 5 INJECTION INTRAMUSCULAR; INTRAVENOUS at 17:33

## 2018-12-10 RX ADMIN — HYDROMORPHONE HYDROCHLORIDE 1 MG: 2 INJECTION, SOLUTION INTRAMUSCULAR; INTRAVENOUS; SUBCUTANEOUS at 17:34

## 2018-12-11 LAB
ATRIAL RATE: 92 BPM
CALCULATED P AXIS, ECG09: 70 DEGREES
CALCULATED R AXIS, ECG10: 29 DEGREES
CALCULATED T AXIS, ECG11: 73 DEGREES
DIAGNOSIS, 93000: NORMAL
P-R INTERVAL, ECG05: 168 MS
Q-T INTERVAL, ECG07: 336 MS
QRS DURATION, ECG06: 88 MS
QTC CALCULATION (BEZET), ECG08: 415 MS
VENTRICULAR RATE, ECG03: 92 BPM

## 2018-12-11 NOTE — ED PROVIDER NOTES
HPI  
 
Past Medical History:  
Diagnosis Date  Arthritis   
 osteo  CAD (coronary artery disease) 1999  
 s/p 3 heart cath . 2 stents . No cardiologist,followed [address]  Diabetes ([facility] [address] 2000  
 type 2, adverage glucose 150-200, symptomatic below 100  Elevated serum cholesterol  GERD (gastroesophageal reflux disease) diet controlled. prilosec prn OTC  Hx of colonic polyp 2016  
 adenoma  Hypercholesterolemia  Hypertension Past Surgical History:  
Procedure Laterality Date  HX COLONOSCOPY  last 4/25/16 Moe--trans TA--3 year recall due to prep quality  HX CORONARY STENT PLACEMENT  [id number]  
 heart cath times 3 - 2 total stents  HX KNEE ARTHROSCOPY Left 1985 [address]  HX UVULOPALATOPHARYNGOPLASTY Family History:  
Problem Relation Age of Onset  Lung Disease Mother  Asthma Mother  Cancer Father  Colon Cancer Father  Cancer Maternal Uncle   
     colon  Cancer Maternal Uncle   
     colon Social History Socioeconomic History  Marital status:  Spouse name: Not on file  Number of children: Not on file  Years of education: Not on file  Highest education level: Not on file Social Needs  Financial resource strain: Not on file  Food insecurity - worry: Not on file  Food insecurity - inability: Not on file  Transportation needs - medical: Not on file  Transportation needs - non-medical: Not on file Occupational History  Not on file Tobacco Use  Smoking status: Never Smoker  Smokeless tobacco: Never Used Substance and Sexual Activity  Alcohol use: Yes Alcohol/week: 1.2 oz Types: 2 Cans of beer per week  Drug use:  No  
  Sexual activity: Yes  
  Partners: Female Other Topics Concern  Not on file Social History Narrative  Not on file ALLERGIES: Patient has no known allergies. Review of Systems Vitals:  
 12/10/18 1613 12/10/18 1642 BP: 177/82 119/69 Pulse: (!) 101 (!) 104 Resp: 20 Temp: 98.1 °F (36.7 °C) SpO2: 100% 100% Weight: 90.3 kg (199 lb) Height: 5' 8\" (1.727 m) Physical Exam  
 
MDM Lumbar Puncture Date/Time: 12/10/2018 7:18 PM 
Performed by: Kane Soni MD 
Authorized by: Kane Snoi MD  
 
Consent:  
  Consent obtained:  Written Consent given by:  Patient and spouse Risks discussed:  Bleeding, headache, infection and pain Alternatives discussed:  No treatment Pre-procedure details:  
  Procedure purpose:  Diagnostic Preparation: Patient was prepped and draped in usual sterile fashion Sedation:  
  Sedation type: Anxiolysis Anesthesia (see MAR for exact dosages): Anesthesia method:  Local infiltration Local anesthetic:  Lidocaine 1% w/o epi Procedure details:  
  Lumbar space:  L4-L5 interspace Patient position:  Sitting Needle gauge:  20 Needle length (in):  3.5 Ultrasound guidance: no   
  Number of attempts:  2 Fluid appearance:  Clear Tubes of fluid:  4 Total volume (ml):  4 Post-procedure:  
  Puncture site:  Adhesive bandage applied Patient tolerance of procedure: Tolerated well, no immediate complications

## 2018-12-11 NOTE — ED NOTES
I have reviewed discharge instructions with the patient. The patient verbalized understanding. Patient left ED via Discharge Method: ambulatory to Home with (wife). Opportunity for questions and clarification provided. Patient given 2 scripts. To continue your aftercare when you leave the hospital, you may receive an automated call from our care team to check in on how you are doing. This is a free service and part of our promise to provide the best care and service to meet your aftercare needs.  If you have questions, or wish to unsubscribe from this service please call [phone]. Thank you for Choosing our [facility] Emergency Department.

## 2018-12-11 NOTE — DISCHARGE INSTRUCTIONS
Try and reduce oxycodone and hydrocodone as discussed  Follow-up with Dr. Dolores Adam as needed for headache, Compazine as needed for nausea  Return to the ER if worse in any way         Headache: Care Instructions  Your Care Instructions    Headaches have many possible causes. Most headaches aren't a sign of a more serious problem, and they will get better on their own. Home treatment may help you feel better faster. The doctor has checked you carefully, but problems can develop later. If you notice any problems or new symptoms, get medical treatment right away. Follow-up care is a key part of your treatment and safety. Be sure to make and go to all appointments, and call your doctor if you are having problems. It's also a good idea to know your test results and keep a list of the medicines you take. How can you care for yourself at home? · Do not drive if you have taken a prescription pain medicine. · Rest in a quiet, dark room until your headache is gone. Close your eyes and try to relax or go to sleep. Don't watch TV or read. · Put a cold, moist cloth or cold pack on the painful area for 10 to 20 minutes at a time. Put a thin cloth between the cold pack and your skin. · Use a warm, moist towel or a heating pad set on low to relax tight shoulder and neck muscles. · Have someone gently massage your neck and shoulders. · Take pain medicines exactly as directed. ? If the doctor gave you a prescription medicine for pain, take it as prescribed. ? If you are not taking a prescription pain medicine, ask your doctor if you can take an over-the-counter medicine. · Be careful not to take pain medicine more often than the instructions allow, because you may get worse or more frequent headaches when the medicine wears off. · Do not ignore new symptoms that occur with a headache, such as a fever, weakness or numbness, vision changes, or confusion. These may be signs of a more serious problem.   To prevent headaches  · Keep a headache diary so you can figure out what triggers your headaches. Avoiding triggers may help you prevent headaches. Record when each headache began, how long it lasted, and what the pain was like (throbbing, aching, stabbing, or dull). Write down any other symptoms you had with the headache, such as nausea, flashing lights or dark spots, or sensitivity to bright light or loud noise. Note if the headache occurred near your period. List anything that might have triggered the headache, such as certain foods (chocolate, cheese, wine) or odors, smoke, bright light, stress, or lack of sleep. · Find healthy ways to deal with stress. Headaches are most common during or right after stressful times. Take time to relax before and after you do something that has caused a headache in the past.  · Try to keep your muscles relaxed by keeping good posture. Check your jaw, face, neck, and shoulder muscles for tension, and try relaxing them. When sitting at a desk, change positions often, and stretch for 30 seconds each hour. · Get plenty of sleep and exercise. · Eat regularly and well. Long periods without food can trigger a headache. · Treat yourself to a massage. Some people find that regular massages are very helpful in relieving tension. · Limit caffeine by not drinking too much coffee, tea, or soda. But don't quit caffeine suddenly, because that can also give you headaches. · Reduce eyestrain from computers by blinking frequently and looking away from the computer screen every so often. Make sure you have proper eyewear and that your monitor is set up properly, about an arm's length away. · Seek help if you have depression or anxiety. Your headaches may be linked to these conditions. Treatment can both prevent headaches and help with symptoms of anxiety or depression. When should you call for help? Call 911 anytime you think you may need emergency care.  For example, call if:    · You have signs of a stroke. These may include:  ? Sudden numbness, paralysis, or weakness in your face, arm, or leg, especially on only one side of your body. ? Sudden vision changes. ? Sudden trouble speaking. ? Sudden confusion or trouble understanding simple statements. ? Sudden problems with walking or balance. ? A sudden, severe headache that is different from past headaches.    Call your doctor now or seek immediate medical care if:    · You have a new or worse headache.     · Your headache gets much worse. Where can you learn more? Go to http://césar-jeffrey.info/. Enter M271 in the search box to learn more about \"Headache: Care Instructions. \"  Current as of: June 4, 2018  Content Version: 11.8  © 8619-2330 [employer]. Care instructions adapted under license by [employer] (which disclaims liability or warranty for this information). If you have questions about a medical condition or this instruction, always ask your healthcare professional. [place] [address] any warranty or liability for your use of this information.

## 2018-12-13 ENCOUNTER — HOSPITAL ENCOUNTER (OUTPATIENT)
Dept: PHYSICAL THERAPY | Age: 58
Discharge: HOME | End: 2018-12-13
Payer: COMMERCIAL

## 2018-12-13 PROCEDURE — 97161 PT EVAL LOW COMPLEX 20 MIN: CPT

## 2018-12-13 PROCEDURE — 97110 THERAPEUTIC EXERCISES: CPT

## 2018-12-13 NOTE — THERAPY EVALUATION
Lissette Aguilar  : 1960  Primary: [facility]  Secondary:  Therapy Center at [address], [address] [place] [address] Street  [phone]   [phone]         OUTPATIENT PHYSICAL THERAPY:Initial Assessment 2018    ICD-10: Treatment Diagnosis 1: left knee osteoarthritis (M17.12)  Treatment Diagnosis 2: left knee pain (M25.562)  Treatment Diagnosis 3: gait dysfunction (R26.89)  Precautions: Type II Diabetes  Allergies:   Patient has no known allergies. MD Orders: Evaluate and Treat MEDICAL/REFERRING DIAGNOSIS:  Presence of left artificial knee joint [Z96.652]  Encounter for follow-up examination after completed treatment for conditions other than malignant neoplasm [Z09]  Unilateral primary osteoarthritis, left knee [M17.12]  DATE OF ONSET: left knee osteoarthritis, s/p left total knee arthroplasty 11/15/2018   REFERRING PHYSICIAN: Kathryn Lal,*  RETURN PHYSICIAN APPOINTMENT: 2018     INITIAL ASSESSMENT:  Mr. Theodore Muse is a 62 y.o. male presenting s/p left total knee arthroplasty performed on 11/15/2018 after many years of left knee osteoarthritis and pain. He reported no complications with surgery, was inpatient for 1 night, and has undergone home therapy since. He has been performing his HEP daily and has reported significant pain and swelling in the knee. He admits to overdoing activity and his exercises at times, and has been trying to work at a more appropriate intensity. 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 He is a good candidate for skilled intervention with services to include manual therapy, modalities as needed, therapeutic exercises, gait/stair/transfer/balance training, and activity modification. PROBLEM LIST (Impacting functional limitations):  1. Decreased Strength  2.  Decreased ADL/Functional Activities  3. Decreased Ambulation Ability/Technique  4. Decreased Balance  5. Increased Pain  6. Decreased Activity Tolerance  7. Increased Fatigue  8. Increased Shortness of Breath  9. Decreased Flexibility/Joint Mobility  10. Edema/Girth INTERVENTIONS PLANNED:  1. Balance Exercise  2. Cold  3. Cryotherapy  4. Electrical Stimulation  5. Gait Training  6. Heat  7. Home Exercise Program (HEP)  8. Manual Therapy  9. Neuromuscular Re-education/Strengthening  10. Range of Motion (ROM)  11. Therapeutic Exercise/Strengthening  12. Transfer Training   TREATMENT PLAN:  Effective Dates: 12/13/2018 TO 1/24/2019. Frequency/Duration: 2 times a week for 6 weeks  GOALS: (Goals have been discussed and agreed upon with patient.)  Short-Term Functional Goals: Time Frame: 12/13/2018 to 1/3/2018  1. Patient demonstrates independence with home exercise program without verbal cueing provided by therapist.  2. Improve knee extension to under 5 degree knee flexion contracture, and knee flexion to at least 120.  3. Improve flexibility of gastrocnemius and hamstrings to at least 80 degree SLR/  Discharge Goals: Time Frame: 12/13/2018 to 1/24/2019  1. Improve pain to 2/10 at the most with standing, walking, stairs, and transfers. 2. Improve strength to at least 4+/5 in order for patient to tolerate returning to work full time and travelling most weeks of the year. 3. Improve ROM to 0-120 degrees in order to normalize gait, transfers, and stairs. 4. Improve stairs with reciprocal ascend and descend with use of hand rail. 5. Improve transfers with use of hands into and out of standard height chair less than 50% of the time. 6. Improve Lower Extremity Functional Index score to 40/80 from 21/80. Rehabilitation Potential For Stated Goals: Good  Regarding [id number] [place] [address], I certify that the treatment plan above will be carried out by a therapist or under their direction.   Thank you for this referral,  Sandra Hernandez, PT, DPT, OCS, SCS  Referring Physician Signature: Berna Cornelius,*              Date                    The information in this section was collected on 12/13/2018 (except where otherwise noted). HISTORY:   History of Present Injury/Illness (Reason for Referral):  Mr. Zhang Richards is a 62 y.o. male presenting s/p left total knee arthroplasty performed on 11/15/2018 after many years of left knee osteoarthritis and pain. He reported no complications with surgery, was inpatient for 1 night, and has undergone home therapy since. He has been performing his HEP daily and has reported significant pain and swelling in the knee. He admits to overdoing activity and his exercises at times, and has been trying to work at a more appropriate intensity. 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 Past Medical History/Comorbidities:   Mr. Zhang Richards  has a past medical history of Arthritis, CAD (coronary artery disease), Diabetes ([facility] [address] Elevated serum cholesterol, GERD (gastroesophageal reflux disease), colonic polyp, Hypercholesterolemia, and Hypertension. Mr. Zhang Richards  has a past surgical history that includes hx coronary stent placement ([id number],6214); hx tonsil and adenoidectomy (1999); hx uvulopalatopharyngoplasty; hx knee arthroscopy (Left, 1985); hx colonoscopy (last 4/25/16); LEFT KNEE ARTHROPLASTY TOTAL / OSKAR (Left, 11/15/2018); COLONOSCOPY/ 26 (N/A, 4/25/2016); ENDOSCOPIC POLYPECTOMY (N/A, 4/25/2016); ESOPHAGOGASTRODUODENOSCOPY (EGD) (N/A, 4/25/2016); and ESOPHAGOGASTRODUODENAL (EGD) BIOPSY (N/A, 4/25/2016). Social History/Living Environment:    Patient lives at home with spouse in a two story home and reports assistance from her with any painful activities. Prior Level of Function/Work/Activity:  Independent without dysfunction. 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   Patient is very active with exercising for health and household chores and has been unable to due to post-operative status. Dominant Side:         RIGHT     Ambulatory/Rehab Services H2 Model Falls Risk Assessment    Risk Factors:       (1)  Gender [Male]       (1)  Any administered benzodiazepines Ability to Rise from Chair:       (1)  Pushes up, successful in one attempt    Falls Prevention Plan:       No modifications necessary   Total: (5 or greater = High Risk): 3    ©2010 [facility]. All Rights Reserved. [facility] Patent #[id number]. Federal Law prohibits the replication, distribution or use without written permission from [facility]     Current Medications:       Current Outpatient Medications:     butalbital-acetaminophen-caff (FIORICET) -40 mg per capsule, Take 1-2 Caps by mouth every six (6) hours as needed for Pain., Disp: 20 Cap, Rfl: 0    Xanax for sleep as needed    motrin and ibuprofen as needed for pain and inflammation    simvastatin (ZOCOR) 40 mg tablet, Take 1 Tab by mouth nightly., Disp: 30 Tab, Rfl: 5    lisinopril (PRINIVIL, ZESTRIL) 10 mg tablet, Take 1 Tab by mouth daily. , Disp: 30 Tab, Rfl: 5    metFORMIN (GLUCOPHAGE) 1,000 mg tablet, Take 1 Tab by mouth two (2) times a day., Disp: 60 Tab, Rfl: 5    ascorbic acid (VITAMIN C) 500 mg tablet, Take 500 mg by mouth daily. , Disp: , Rfl:     melatonin tab tablet, Take 10 mg by mouth nightly., Disp: , Rfl:    Date Last Reviewed:  12/13/2018   Number of Personal Factors/Comorbidities that affect the Plan of Care: 0: LOW COMPLEXITY   EXAMINATION:     Observation/Postural and Gait Assessment: Patient ambulates with significant antalgia, bent knee with stance on the left, antalgia, and use of straight cane safely and independently. Visual inspection of the knee reveals swelling but incision is completely approximated with scar tissue and no scabbing.   No signs or symptoms of infection or deep vein thrombosis are noted at this time. Anthropometric measurements (cm) Left Right   Knee joint line 38 35     Palpation:  Gross tenderness to palpation of the knee. Flexibility severely limited of gastrocnemius and hamstrings with SLR to 60 degrees. ROM:     AROM(PROM) Left Right   Knee flexion 113° 145°   Knee extension 6° knee flexion contracture 10° hyperextension     Passive Accessory Mobility Testing:  Gross limitations in all planes of patellofemoral and tibiofemoral joint. Strength:     Manual Muscle Test (out of 5) Left Right   Knee extension 3, 0-5 degree lag with SLR, good quad set 5   Knee flexion 4 5   Hip flexion 4 5   Hip extension 3 4   Hip abduction 3 4   Ankle DF 4 5   Ankle PF 4 5     Special Tests:  No special tests at this time due to acuity of surgery. No signs or symptoms of infection or deep vein thrombosis. Neurological Screen:  Myotomes: Key muscle strength testing for bilateral LE is WNL. Dermatomes: Sensation testing through bilateral LE for light touch is WNL. Functional Mobility:  Patient requires assistance with heavy lifting and carrying but is grossly safe and independent with gait with use of straight cane with some dysfunction. Transfers with use of hands 100% of the time into and out of standard height chair. Stairs performed non-reciprocally per patient report with right leg as dominant leg and use of hand rail. Body Structures Involved:  1. Joints  2. Muscles  3. Ligaments Body Functions Affected:  1. Sensory/Pain  2. Neuromusculoskeletal Activities and Participation Affected:  1. General Tasks and Demands  2. Self Care  3. Domestic Life  4. Community, Social and [place]  5. Ability to work and travel for work   Number of elements (examined above) that affect the Plan of Care: 3: MODERATE COMPLEXITY   CLINICAL PRESENTATION:   Presentation: Stable and uncomplicated: LOW COMPLEXITY   CLINICAL DECISION MAKING:   Outcome Measure:    Tool Used: Lower Extremity Functional Scale (LEFS)  Score  Initial: 21/80 Most Recent: /80   Interpretation of Score: 20 questions each scored on a 5 point scale with 0 representing \"extreme difficulty or unable to perform\" and 4 representing \"no difficulty\". The lower the score, the greater the functional disability. 80/80 represents no disability. Minimal detectable change is 9 points. Medical Necessity:   · Patient is expected to demonstrate progress in strength, range of motion, balance and coordination to improve tolerance to standing, walking stairs, transfers, and work. · Skilled intervention continues to be required due to weakness, decreased ROM, decreased flexibility, pain, swelling, and functional limitations. Reason for Services/Other Comments:  · Patient continues to require skilled intervention due to increasing complexity of exercises. Use of outcome tool(s) and clinical judgement create a POC that gives a: Clear prediction of patient's progress: LOW COMPLEXITY            TREATMENT:   (In addition to Assessment/Re-Assessment sessions the following treatments were rendered)    Pre-treatment Symptoms/Complaints:  Patient reported severe pain and is eager to reduce his symptoms in order to sleep. Patient was advised multiple times throughout session regarding proper exercise intensity and icing of the knee at home. Pain: Initial:   Pain Intensity 1: 7  Pain Location 1: Knee  Pain Orientation 1: Left  Post Session:  2/10      Therapeutic Exercise: (30 Minutes):  Exercises per grid below to improve mobility, strength, balance and coordination. Required minimal visual, verbal and manual cues to promote proper body alignment, promote proper body posture and promote proper body mechanics. Progressed resistance, range, repetitions and complexity of movement as indicated.     (used abbreviations: BET-back education training) Date:  12/13/2018 Date:   Date:     Activity/Exercise Parameters Parameters Parameters   Heel prop 8 minutes     Calf stretch 3 x 30 sec strap     Hamstring stretch 3 x 30 sec strap     Short arc quad 2 x 10     Straight leg raise 2 x 10     Long arc quad 2 x 10     Standing heel raise 2 x 10     Heel slides 10 sec x 10                           Manual Therapy (     ): improve joint and soft tissue mobility  · None today  (Used abbreviations: MET - muscle energy technique; PNF - proprioceptive neuromuscular facilitation; NMR - neuromuscular re-education; a/p - anterior to posterior; p/a - posterior to anterior; NT - not tested)    Therapeutic Modalities: for edema and pain                            Left Knee Cold  Type: Cold/ice pack  Duration : 10 minutes  Patient Position: Supine                                                                  HEP: As above; handouts given to patient for all exercises. Treatment/Session Assessment:    · Response to Treatment:  Patient tolerated exercises well and reported some pain. He was advised to watch for proper exercise intensity at home. He reported no severe complaints with exercises and reported a good understanding of HEP. · Compliance with Program/Exercises: compliant most of the time. · Recommendations/Intent for next treatment session: \"Next visit will focus on advancements to more challenging activities\".     Total Treatment Duration: 70 minutes; 30 minutes evaluation, 30 minutes exercise, 10 ice pack  PT Patient Time In/Time Out  Time In: 1305  Time Out: 86 Sarah Cunha, PT

## 2018-12-16 LAB
BACTERIA SPEC CULT: NORMAL
GRAM STN SPEC: NORMAL
GRAM STN SPEC: NORMAL
SERVICE CMNT-IMP: NORMAL

## 2018-12-19 ENCOUNTER — HOSPITAL ENCOUNTER (OUTPATIENT)
Dept: PHYSICAL THERAPY | Age: 58
Discharge: HOME | End: 2018-12-19
Payer: COMMERCIAL

## 2018-12-19 PROCEDURE — 97140 MANUAL THERAPY 1/> REGIONS: CPT

## 2018-12-19 PROCEDURE — 97110 THERAPEUTIC EXERCISES: CPT

## 2018-12-19 NOTE — PROGRESS NOTES
Bonnie Youngblood  : 1960  Primary: Linda Abdullahi [facility]  Secondary:  Therapy Center at [address], [place], [address] [place] [address]  [phone]   [phone]         OUTPATIENT PHYSICAL THERAPY:Daily Note 2018    ICD-10: Treatment Diagnosis 1: left knee osteoarthritis (M17.12)  Treatment Diagnosis 2: left knee pain (M25.562)  Treatment Diagnosis 3: gait dysfunction (R26.89)  Precautions: Type II Diabetes  Allergies:   Patient has no known allergies. MD Orders: Evaluate and Treat MEDICAL/REFERRING DIAGNOSIS:  Presence of left artificial knee joint [Z96.652]  Encounter for follow-up examination after completed treatment for conditions other than malignant neoplasm [Z09]  Unilateral primary osteoarthritis, left knee [M17.12]  DATE OF ONSET: left knee osteoarthritis, s/p left total knee arthroplasty 11/15/2018   REFERRING PHYSICIAN: Lit Hoyt,*  RETURN PHYSICIAN APPOINTMENT: 2018     INITIAL ASSESSMENT:  Mr. Brenda Hassan is a 62 y.o. male presenting s/p left total knee arthroplasty performed on 11/15/2018 after many years of left knee osteoarthritis and pain. He reported no complications with surgery, was inpatient for 1 night, and has undergone home therapy since. He has been performing his HEP daily and has reported significant pain and swelling in the knee. He admits to overdoing activity and his exercises at times, and has been trying to work at a more appropriate intensity. 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 He is a good candidate for skilled intervention with services to include manual therapy, modalities as needed, therapeutic exercises, gait/stair/transfer/balance training, and activity modification. PROBLEM LIST (Impacting functional limitations):  1. Decreased Strength  2.  Decreased ADL/Functional Activities  3. Decreased Ambulation Ability/Technique  4. Decreased Balance  5. Increased Pain  6. Decreased Activity Tolerance  7. Increased Fatigue  8. Increased Shortness of Breath  9. Decreased Flexibility/Joint Mobility  10. Edema/Girth INTERVENTIONS PLANNED:  1. Balance Exercise  2. Cold  3. Cryotherapy  4. Electrical Stimulation  5. Gait Training  6. Heat  7. Home Exercise Program (HEP)  8. Manual Therapy  9. Neuromuscular Re-education/Strengthening  10. Range of Motion (ROM)  11. Therapeutic Exercise/Strengthening  12. Transfer Training   TREATMENT PLAN:  Effective Dates: 12/13/2018 TO 1/24/2019. Frequency/Duration: 2 times a week for 6 weeks  GOALS: (Goals have been discussed and agreed upon with patient.)  Short-Term Functional Goals: Time Frame: 12/13/2018 to 1/3/2018  1. Patient demonstrates independence with home exercise program without verbal cueing provided by therapist.  2. Improve knee extension to under 5 degree knee flexion contracture, and knee flexion to at least 120.  3. Improve flexibility of gastrocnemius and hamstrings to at least 80 degree SLR/  Discharge Goals: Time Frame: 12/13/2018 to 1/24/2019  1. Improve pain to 2/10 at the most with standing, walking, stairs, and transfers. 2. Improve strength to at least 4+/5 in order for patient to tolerate returning to work full time and travelling most weeks of the year. 3. Improve ROM to 0-120 degrees in order to normalize gait, transfers, and stairs. 4. Improve stairs with reciprocal ascend and descend with use of hand rail. 5. Improve transfers with use of hands into and out of standard height chair less than 50% of the time. 6. Improve Lower Extremity Functional Index score to 40/80 from 21/80. Rehabilitation Potential For Stated Goals: Good  Regarding [id number] [place] [address], I certify that the treatment plan above will be carried out by a therapist or under their direction.   Thank you for this referral,  Hernan Ying, PT, DPT, OCS, SCS              The information in this section was collected on 12/13/2018 (except where otherwise noted). HISTORY:   History of Present Injury/Illness (Reason for Referral):  Mr. Marina Mccollum is a 62 y.o. male presenting s/p left total knee arthroplasty performed on 11/15/2018 after many years of left knee osteoarthritis and pain. He reported no complications with surgery, was inpatient for 1 night, and has undergone home therapy since. He has been performing his HEP daily and has reported significant pain and swelling in the knee. He admits to overdoing activity and his exercises at times, and has been trying to work at a more appropriate intensity. 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 Past Medical History/Comorbidities:   Mr. Marina Mccollum  has a past medical history of Arthritis, CAD (coronary artery disease), Diabetes ([address] Elevated serum cholesterol, GERD (gastroesophageal reflux disease), colonic polyp, Hypercholesterolemia, and Hypertension. Mr. Marina Mccollum  has a past surgical history that includes hx coronary stent placement ([id number],[id number]); hx tonsil and adenoidectomy (1999); hx uvulopalatopharyngoplasty; hx knee arthroscopy (Left, 1985); hx colonoscopy (last 4/25/16); LEFT KNEE ARTHROPLASTY TOTAL / OSKAR (Left, 11/15/2018); COLONOSCOPY/ 26 (N/A, 4/25/2016); ENDOSCOPIC POLYPECTOMY (N/A, 4/25/2016); ESOPHAGOGASTRODUODENOSCOPY (EGD) (N/A, 4/25/2016); and ESOPHAGOGASTRODUODENAL (EGD) BIOPSY (N/A, 4/25/2016). Social History/Living Environment:    Patient lives at home with spouse in a two story home and reports assistance from her with any painful activities. Prior Level of Function/Work/Activity:  Independent without dysfunction. 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   Patient is very active with exercising for health and household chores and has been unable to due to post-operative status. Dominant Side:         RIGHT     Ambulatory/Rehab Services H2 Model Falls Risk Assessment    Risk Factors:       (1)  Gender [Male]       (1)  Any administered benzodiazepines Ability to Rise from Chair:       (1)  Pushes up, successful in one attempt    Falls Prevention Plan:       No modifications necessary   Total: (5 or greater = High Risk): 3    ©2010 [facility]. All Rights Reserved. [facility] Patent #[id number]. Federal Law prohibits the replication, distribution or use without written permission from [facility]     Current Medications:       Current Outpatient Medications:     butalbital-acetaminophen-caff (FIORICET) -40 mg per capsule, Take 1-2 Caps by mouth every six (6) hours as needed for Pain., Disp: 20 Cap, Rfl: 0    Xanax for sleep as needed    motrin and ibuprofen as needed for pain and inflammation    simvastatin (ZOCOR) 40 mg tablet, Take 1 Tab by mouth nightly., Disp: 30 Tab, Rfl: 5    lisinopril (PRINIVIL, ZESTRIL) 10 mg tablet, Take 1 Tab by mouth daily. , Disp: 30 Tab, Rfl: 5    metFORMIN (GLUCOPHAGE) 1,000 mg tablet, Take 1 Tab by mouth two (2) times a day., Disp: 60 Tab, Rfl: 5    ascorbic acid (VITAMIN C) 500 mg tablet, Take 500 mg by mouth daily. , Disp: , Rfl:     melatonin tab tablet, Take 10 mg by mouth nightly., Disp: , Rfl:    Date Last Reviewed:  12/19/2018   Number of Personal Factors/Comorbidities that affect the Plan of Care: 0: LOW COMPLEXITY   EXAMINATION:     Observation/Postural and Gait Assessment: Patient ambulates with significant antalgia, bent knee with stance on the left, antalgia, and use of straight cane safely and independently. Visual inspection of the knee reveals swelling but incision is completely approximated with scar tissue and no scabbing. No signs or symptoms of infection or deep vein thrombosis are noted at this time.     Anthropometric measurements (cm) Left Right   Knee joint line 38 35     Palpation:  Gross tenderness to palpation of the knee. Flexibility severely limited of gastrocnemius and hamstrings with SLR to 60 degrees. ROM:     AROM(PROM) Left Right   Knee flexion 123 (from 113°) 145°   Knee extension 6° knee flexion contracture 10° hyperextension     Passive Accessory Mobility Testing:  Gross limitations in all planes of patellofemoral and tibiofemoral joint. Strength:     Manual Muscle Test (out of 5) Left Right   Knee extension 3, 0-5 degree lag with SLR, good quad set 5   Knee flexion 4 5   Hip flexion 4 5   Hip extension 3 4   Hip abduction 3 4   Ankle DF 4 5   Ankle PF 4 5     Special Tests:  No special tests at this time due to acuity of surgery. No signs or symptoms of infection or deep vein thrombosis. Neurological Screen:  Myotomes: Key muscle strength testing for bilateral LE is WNL. Dermatomes: Sensation testing through bilateral LE for light touch is WNL. Functional Mobility:  Patient requires assistance with heavy lifting and carrying but is grossly safe and independent with gait with use of straight cane with some dysfunction. Transfers with use of hands 100% of the time into and out of standard height chair. Stairs performed non-reciprocally per patient report with right leg as dominant leg and use of hand rail. Body Structures Involved:  1. Joints  2. Muscles  3. Ligaments Body Functions Affected:  1. Sensory/Pain  2. Neuromusculoskeletal Activities and Participation Affected:  1. General Tasks and Demands  2. Self Care  3. Domestic Life  4. Community, Social and [place]  5. Ability to work and travel for work   Number of elements (examined above) that affect the Plan of Care: 3: MODERATE COMPLEXITY   CLINICAL PRESENTATION:   Presentation: Stable and uncomplicated: LOW COMPLEXITY   CLINICAL DECISION MAKING:   Outcome Measure:    Tool Used: Lower Extremity Functional Scale (LEFS)  Score  Initial: 21/80 Most Recent: /80   Interpretation of Score: 20 questions each scored on a 5 point scale with 0 representing \"extreme difficulty or unable to perform\" and 4 representing \"no difficulty\". The lower the score, the greater the functional disability. 80/80 represents no disability. Minimal detectable change is 9 points. Medical Necessity:   · Patient is expected to demonstrate progress in strength, range of motion, balance and coordination to improve tolerance to standing, walking stairs, transfers, and work. · Skilled intervention continues to be required due to weakness, decreased ROM, decreased flexibility, pain, swelling, and functional limitations. Reason for Services/Other Comments:  · Patient continues to require skilled intervention due to increasing complexity of exercises. Use of outcome tool(s) and clinical judgement create a POC that gives a: Clear prediction of patient's progress: LOW COMPLEXITY            TREATMENT:   (In addition to Assessment/Re-Assessment sessions the following treatments were rendered)    Pre-treatment Symptoms/Complaints:  Patient reported over doing it over the weekend and then 3 days of no exercise. Pain: Initial:   Pain Intensity 1: 6  Pain Location 1: Knee  Pain Orientation 1: Left  Post Session:  2/10      Therapeutic Exercise: (40 Minutes):  Exercises per grid below to improve mobility, strength, balance and coordination. Required minimal visual, verbal and manual cues to promote proper body alignment, promote proper body posture and promote proper body mechanics. Progressed resistance, range, repetitions and complexity of movement as indicated.     (used abbreviations: BET-back education training) Date:  12/13/2018 Date:  12/19/2018 Date:     Activity/Exercise Parameters Parameters Parameters   Nustep --- 5 minutes    Calf stretch --- 3 x 30 sec angle board    Heel prop 8 minutes 6 minutes    Angle board --- Angle board 3 x 30 sec    Calf stretch 3 x 30 sec strap 3 x 30 sec strap    Hamstring stretch 3 x 30 sec strap 3 x 30 sec strap    Short arc quad 2 x 10 2 lbs 2 x 10    Straight leg raise 2 x 10 2 lbs 2 x 10    Long arc quad 2 x 10 2 lbs 2 x 10    Standing heel raise 2 x 10 3 x 10    Heel slides 10 sec x 10 10 sec x 20                                Manual Therapy (    Soft Tissue Mobilization Duration  Duration: 15 Minutes): improve joint and soft tissue mobility  · Supine deep tissue mobilization of anterior and posterior knee as well as incision  · Supine stretching of knee into extension, stretching of gastrocnemius and hamstrings  (Used abbreviations: MET - muscle energy technique; PNF - proprioceptive neuromuscular facilitation; NMR - neuromuscular re-education; a/p - anterior to posterior; p/a - posterior to anterior; NT - not tested)    Therapeutic Modalities: for edema and pain             Left Knee Heat  Type: Moist pack  Duration : 15 minutes  Patient Position: Supine                                                                                 HEP: As above; handouts given to patient for all exercises. Treatment/Session Assessment:    · Response to Treatment:  Patient tolerated exercises well and reported some pain. He was advised again to watch for proper exercise intensity. · Compliance with Program/Exercises: compliant most of the time. · Recommendations/Intent for next treatment session: \"Next visit will focus on advancements to more challenging activities\".     Total Treatment Duration: 75 minutes  PT Patient Time In/Time Out  Time In: 4015  Time Out: Andrés [address], PT

## 2018-12-21 ENCOUNTER — HOSPITAL ENCOUNTER (OUTPATIENT)
Dept: PHYSICAL THERAPY | Age: 58
Discharge: HOME | End: 2018-12-21
Payer: COMMERCIAL

## 2018-12-27 ENCOUNTER — HOSPITAL ENCOUNTER (OUTPATIENT)
Dept: PHYSICAL THERAPY | Age: 58
Discharge: HOME | End: 2018-12-27
Payer: COMMERCIAL

## 2018-12-31 ENCOUNTER — HOSPITAL ENCOUNTER (OUTPATIENT)
Dept: PHYSICAL THERAPY | Age: 58
Discharge: HOME | End: 2018-12-31
Payer: COMMERCIAL

## 2018-12-31 PROCEDURE — 97140 MANUAL THERAPY 1/> REGIONS: CPT

## 2018-12-31 PROCEDURE — 97110 THERAPEUTIC EXERCISES: CPT

## 2018-12-31 NOTE — PROGRESS NOTES
Ashia Navarrete : 1960 Primary: [address] Secondary:  Therapy Center at [address], [place], [address] [place] [address] Street [phone]   [phone] OUTPATIENT PHYSICAL THERAPY:Daily Note 2018 ICD-10: Treatment Diagnosis 1: left knee osteoarthritis (M17.12) Treatment Diagnosis 2: left knee pain (M25.562) Treatment Diagnosis 3: gait dysfunction (R26.89) Precautions: Type II Diabetes Allergies:  
Patient has no known allergies. MD Orders: Evaluate and Treat MEDICAL/REFERRING DIAGNOSIS: 
Presence of left artificial knee joint [Z96.652] Encounter for follow-up examination after completed treatment for conditions other than malignant neoplasm [Z09] Unilateral primary osteoarthritis, left knee [M17.12] DATE OF ONSET: left knee osteoarthritis, s/p left total knee arthroplasty 11/15/2018 REFERRING PHYSICIAN: Imelda Abebe,* 
RETURN PHYSICIAN APPOINTMENT: 2018 INITIAL ASSESSMENT:  Mr. Corby Perez is a 62 y.o. male presenting s/p left total knee arthroplasty performed on 11/15/2018 after many years of left knee osteoarthritis and pain. He reported no complications with surgery, was inpatient for 1 night, and has undergone home therapy since. He has been performing his HEP daily and has reported significant pain and swelling in the knee. He admits to overdoing activity and his exercises at times, and has been trying to work at a more appropriate intensity. 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 He is a good candidate for skilled intervention with services to include manual therapy, modalities as needed, therapeutic exercises, gait/stair/transfer/balance training, and activity modification. PROBLEM LIST (Impacting functional limitations): 1. Decreased Strength 2. Decreased ADL/Functional Activities 3. Decreased Ambulation Ability/Technique 4. Decreased Balance 5. Increased Pain 6. Decreased Activity Tolerance 7. Increased Fatigue 8. Increased Shortness of Breath 9. Decreased Flexibility/Joint Mobility 10. Edema/Girth INTERVENTIONS PLANNED: 
1. Balance Exercise 2. Cold 3. Cryotherapy 4. Electrical Stimulation 5. Gait Training 6. Heat 7. Home Exercise Program (HEP) 8. Manual Therapy 9. Neuromuscular Re-education/Strengthening 10. Range of Motion (ROM) 11. Therapeutic Exercise/Strengthening 12. Transfer Training TREATMENT PLAN: 
Effective Dates: 12/13/2018 TO 1/24/2019. Frequency/Duration: 2 times a week for 6 weeks GOALS: (Goals have been discussed and agreed upon with patient.) Short-Term Functional Goals: Time Frame: 12/13/2018 to 1/3/2018 1. Patient demonstrates independence with home exercise program without verbal cueing provided by therapist. 
2. Improve knee extension to under 5 degree knee flexion contracture, and knee flexion to at least 120. 
3. Improve flexibility of gastrocnemius and hamstrings to at least 80 degree SLR/ Discharge Goals: Time Frame: 12/13/2018 to 1/24/2019 1. Improve pain to 2/10 at the most with standing, walking, stairs, and transfers. 2. Improve strength to at least 4+/5 in order for patient to tolerate returning to work full time and travelling most weeks of the year. 3. Improve ROM to 0-120 degrees in order to normalize gait, transfers, and stairs. 4. Improve stairs with reciprocal ascend and descend with use of hand rail. 5. Improve transfers with use of hands into and out of standard height chair less than 50% of the time. 6. Improve Lower Extremity Functional Index score to 40/80 from 21/80. Rehabilitation Potential For Stated Goals: Good Regarding [facility]  [facility]el's therapy, I certify that the treatment plan above will be carried out by a therapist or under their direction. Thank you for this referral, Haylee Paeg, PT, DPT, OCS, SCS 
  
 The information in this section was collected on 12/13/2018 (except where otherwise noted). HISTORY:  
History of Present Injury/Illness (Reason for Referral):  Mr. Enriqueta Liao is a 62 y.o. male presenting s/p left total knee arthroplasty performed on 11/15/2018 after many years of left knee osteoarthritis and pain. He reported no complications with surgery, was inpatient for 1 night, and has undergone home therapy since. He has been performing his HEP daily and has reported significant pain and swelling in the knee. He admits to overdoing activity and his exercises at times, and has been trying to work at a more appropriate intensity. 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 Past Medical History/Comorbidities:  
Mr. Enriqueta Liao  has a past medical history of Arthritis, CAD (coronary artery disease), Diabetes ([facility] [address] Elevated serum cholesterol, GERD (gastroesophageal reflux disease), colonic polyp, Hypercholesterolemia, and Hypertension. Mr. Enriqueta Liao  has a past surgical history that includes hx coronary stent placement (3185,6708); hx tonsil and adenoidectomy (1999); hx uvulopalatopharyngoplasty; hx knee arthroscopy (Left, 1985); hx colonoscopy (last 4/25/16); LEFT KNEE ARTHROPLASTY TOTAL / OSKAR (Left, 11/15/2018); COLONOSCOPY/ 26 (N/A, 4/25/2016); ENDOSCOPIC POLYPECTOMY (N/A, 4/25/2016); ESOPHAGOGASTRODUODENOSCOPY (EGD) (N/A, 4/25/2016); and ESOPHAGOGASTRODUODENAL (EGD) BIOPSY (N/A, 4/25/2016). Social History/Living Environment:  
 Patient lives at home with spouse in a two story home and reports assistance from her with any painful activities. Prior Level of Function/Work/Activity: 
Independent without dysfunction. 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   Patient is very active with exercising for health and household chores and has been unable to due to post-operative status. Dominant Side:  
      RIGHT Ambulatory/Rehab Services H2 Model Falls Risk Assessment Risk Factors: 
     (1)  Gender [Male] (1)  Any administered benzodiazepines Ability to Rise from Chair: 
     (1)  Pushes up, successful in one attempt Falls Prevention Plan: No modifications necessary Total: (5 or greater = High Risk): 3  
 ©2010 [facility] [address] [place] [address] Patent #[id number]. Federal Law prohibits the replication, distribution or use without written permission from [facility] [email or web address] Current Medications:   
  
Current Outpatient Medications:  
  butalbital-acetaminophen-caff (FIORICET) -40 mg per capsule, Take 1-2 Caps by mouth every six (6) hours as needed for Pain., Disp: 20 Cap, Rfl: 0 
  Xanax for sleep as needed   motrin and ibuprofen as needed for pain and inflammation   simvastatin (ZOCOR) 40 mg tablet, Take 1 Tab by mouth nightly., Disp: 30 Tab, Rfl: 5 
  lisinopril (PRINIVIL, ZESTRIL) 10 mg tablet, Take 1 Tab by mouth daily. , Disp: 30 Tab, Rfl: 5 
  metFORMIN (GLUCOPHAGE) 1,000 mg tablet, Take 1 Tab by mouth two (2) times a day., Disp: 60 Tab, Rfl: 5 
  ascorbic acid (VITAMIN C) 500 mg tablet, Take 500 mg by mouth daily. , Disp: , Rfl:  
  melatonin tab tablet, Take 10 mg by mouth nightly., Disp: , Rfl:   
Date Last Reviewed:  12/31/2018 Number of Personal Factors/Comorbidities that affect the Plan of Care: 0: LOW COMPLEXITY EXAMINATION:  
 
Observation/Postural and Gait Assessment: Patient ambulates with significant antalgia, bent knee with stance on the left, antalgia, and use of straight cane safely and independently. Visual inspection of the knee reveals swelling but incision is completely approximated with scar tissue and no scabbing. No signs or symptoms of infection or deep vein thrombosis are noted at this time. Anthropometric measurements (cm) Left Right Knee joint line 38 35 Palpation:  Gross tenderness to palpation of the knee. Flexibility severely limited of gastrocnemius and hamstrings with SLR to 60 degrees. ROM:    
AROM(PROM) Left Right Knee flexion 123 (from 113°) 145° Knee extension 6° knee flexion contracture 10° hyperextension Passive Accessory Mobility Testing:  Gross limitations in all planes of patellofemoral and tibiofemoral joint. Strength:    
Manual Muscle Test (out of 5) Left Right Knee extension 3, 0-5 degree lag with SLR, good quad set 5 Knee flexion 4 5 Hip flexion 4 5 Hip extension 3 4 Hip abduction 3 4 Ankle DF 4 5 Ankle PF 4 5 Special Tests:  No special tests at this time due to acuity of surgery. No signs or symptoms of infection or deep vein thrombosis. Neurological Screen: Myotomes: Key muscle strength testing for bilateral LE is WNL. Dermatomes: Sensation testing through bilateral LE for light touch is WNL. Functional Mobility:  Patient requires assistance with heavy lifting and carrying but is grossly safe and independent with gait with use of straight cane with some dysfunction. Transfers with use of hands 100% of the time into and out of standard height chair. Stairs performed non-reciprocally per patient report with right leg as dominant leg and use of hand rail. Body Structures Involved: 1. Joints 2. Muscles 3. Ligaments Body Functions Affected: 1. Sensory/Pain 2. Neuromusculoskeletal Activities and Participation Affected: 1. General Tasks and Demands 2. Self Care 3. Domestic Life 4. Community, Social and [place] 5. Ability to work and travel for work Number of elements (examined above) that affect the Plan of Care: 3: MODERATE COMPLEXITY CLINICAL PRESENTATION:  
Presentation: Stable and uncomplicated: LOW COMPLEXITY CLINICAL DECISION MAKING:  
Outcome Measure: Tool Used: Lower Extremity Functional Scale (LEFS) Score  Initial: 21/80 Most Recent: /80 Interpretation of Score: 20 questions each scored on a 5 point scale with 0 representing \"extreme difficulty or unable to perform\" and 4 representing \"no difficulty\". The lower the score, the greater the functional disability. 80/80 represents no disability. Minimal detectable change is 9 points. Medical Necessity:  
· Patient is expected to demonstrate progress in strength, range of motion, balance and coordination to improve tolerance to standing, walking stairs, transfers, and work. · Skilled intervention continues to be required due to weakness, decreased ROM, decreased flexibility, pain, swelling, and functional limitations. Reason for Services/Other Comments: 
· Patient continues to require skilled intervention due to increasing complexity of exercises. Use of outcome tool(s) and clinical judgement create a POC that gives a: Clear prediction of patient's progress: LOW COMPLEXITY  
  
 
 
 
TREATMENT:  
(In addition to Assessment/Re-Assessment sessions the following treatments were rendered) Pre-treatment Symptoms/Complaints:  Patient reported some soreness and swelling today in his left knee. Pain: Initial:  
Pain Intensity 1: 5 Pain Location 1: Knee Pain Orientation 1: Left  Post Session:  2/10 decreased pain after session. Therapeutic Exercise: (40 Minutes):  Exercises per grid below to improve mobility, strength, balance and coordination. Required minimal visual, verbal and manual cues to promote proper body alignment, promote proper body posture and promote proper body mechanics. Progressed resistance, range, repetitions and complexity of movement as indicated. (used abbreviations: BET-back education training) Date: 
12/13/2018 Date: 
12/19/2018 Date: 
12/31/18 Activity/Exercise Parameters Parameters Parameters Nustep --- 5 minutes X 10 mins level 4 Calf stretch --- 3 x 30 sec angle board 4x30 sec hold on incline Heel prop 8 minutes 6 minutes X 8 mins Angle board --- Angle board 3 x 30 sec 4x30 sec hold Calf stretch 3 x 30 sec strap 3 x 30 sec strap 4x30 sec hold strap Hamstring stretch 3 x 30 sec strap 3 x 30 sec strap 4x30 sec hold strap Short arc quad 2 x 10 2 lbs 2 x 10 2 lb wt.s 2x10 Straight leg raise 2 x 10 2 lbs 2 x 10 2 lb wts 2x10 reps Long arc quad 2 x 10 2 lbs 2 x 10 2 lb wt. s x 20 reps Standing heel raise 2 x 10 3 x 10 3x10 BLE's x 20 LLE only Heel slides 10 sec x 10 10 sec x 20 X 20 reps x 10 sec hold Mini squats   X 10 reps at doorway Chair slides    X 10 reps x 10 sec hold each Manual Therapy (    Soft Tissue Mobilization Duration Duration: 15 Minutes): improve joint and soft tissue mobility · Supine deep tissue mobilization of anterior and posterior knee as well as incision using small suction cup on scar. Cross friction massage to scar to desensitize. · Supine stretching of knee into extension, stretching of gastrocnemius and hamstrings 
(Used abbreviations: MET - muscle energy technique; PNF - proprioceptive neuromuscular facilitation; NMR - neuromuscular re-education; a/p - anterior to posterior; p/a - posterior to anterior; NT - not tested) Therapeutic Modalities: for edema and pain  ( no charge for vaso pneumatic compression) Left Knee Cold Type: (vaso pneumatic compression device) Duration : 10 minutes Patient Position: Supine HEP: As above; handouts given to patient for all exercises. Treatment/Session Assessment:   
· Response to Treatment:  Patient tolerated exercises well and reported some pain. He was advised again to watch for proper exercise intensity. · Compliance with Program/Exercises: compliant most of the time. · Recommendations/Intent for next treatment session: \"Next visit will focus on advancements to more challenging activities\". Total Treatment Duration: 65 minutes PT Patient Time In/Time Out Time In: 1000 Time Out: 1105 Jordan Reece, PTA

## 2019-01-04 ENCOUNTER — HOSPITAL ENCOUNTER (OUTPATIENT)
Dept: PHYSICAL THERAPY | Age: 59
Discharge: HOME | End: 2019-01-04
Payer: COMMERCIAL

## 2019-01-04 NOTE — PROGRESS NOTES
Only one visit scheduled this week due to [facility] holiday.  
 
Kenyatta Kelly DPT

## 2019-01-07 ENCOUNTER — HOSPITAL ENCOUNTER (OUTPATIENT)
Dept: PHYSICAL THERAPY | Age: 59
Discharge: HOME | End: 2019-01-07
Payer: COMMERCIAL

## 2019-01-07 PROCEDURE — 97140 MANUAL THERAPY 1/> REGIONS: CPT

## 2019-01-07 PROCEDURE — 97110 THERAPEUTIC EXERCISES: CPT

## 2019-01-07 NOTE — PROGRESS NOTES
Celso Weaver : 1960 Primary: [address] Secondary:  Therapy Center at [address], [place], [address] [place] [address] [phone]   [phone] OUTPATIENT PHYSICAL THERAPY:Daily Note 2019 ICD-10: Treatment Diagnosis 1: left knee osteoarthritis (M17.12) Treatment Diagnosis 2: left knee pain (M25.562) Treatment Diagnosis 3: gait dysfunction (R26.89) Precautions: Type II Diabetes Allergies:  
Patient has no known allergies. MD Orders: Evaluate and Treat MEDICAL/REFERRING DIAGNOSIS: 
Presence of left artificial knee joint [Z96.652] Encounter for follow-up examination after completed treatment for conditions other than malignant neoplasm [Z09] Unilateral primary osteoarthritis, left knee [M17.12] DATE OF ONSET: left knee osteoarthritis, s/p left total knee arthroplasty 11/15/2018 REFERRING PHYSICIAN: Ganesh Stephens,* 
RETURN PHYSICIAN APPOINTMENT: 2018 INITIAL ASSESSMENT:  Mr. Deepthi George is a 62 y.o. male presenting s/p left total knee arthroplasty performed on 11/15/2018 after many years of left knee osteoarthritis and pain. He reported no complications with surgery, was inpatient for 1 night, and has undergone home therapy since. He has been performing his HEP daily and has reported significant pain and swelling in the knee. He admits to overdoing activity and his exercises at times, and has been trying to work at a more appropriate intensity. 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 He is a good candidate for skilled intervention with services to include manual therapy, modalities as needed, therapeutic exercises, gait/stair/transfer/balance training, and activity modification. PROBLEM LIST (Impacting functional limitations): 1. Decreased Strength 2. Decreased ADL/Functional Activities 3. Decreased Ambulation Ability/Technique 4. Decreased Balance 5. Increased Pain 6. Decreased Activity Tolerance 7. Increased Fatigue 8. Increased Shortness of Breath 9. Decreased Flexibility/Joint Mobility 10. Edema/Girth INTERVENTIONS PLANNED: 
1. Balance Exercise 2. Cold 3. Cryotherapy 4. Electrical Stimulation 5. Gait Training 6. Heat 7. Home Exercise Program (HEP) 8. Manual Therapy 9. Neuromuscular Re-education/Strengthening 10. Range of Motion (ROM) 11. Therapeutic Exercise/Strengthening 12. Transfer Training TREATMENT PLAN: 
Effective Dates: 12/13/2018 TO 1/24/2019. Frequency/Duration: 2 times a week for 6 weeks GOALS: (Goals have been discussed and agreed upon with patient.) Short-Term Functional Goals: Time Frame: 12/13/2018 to 1/3/2018 1. Patient demonstrates independence with home exercise program without verbal cueing provided by therapist. 
2. Improve knee extension to under 5 degree knee flexion contracture, and knee flexion to at least 120. 
3. Improve flexibility of gastrocnemius and hamstrings to at least 80 degree SLR/ Discharge Goals: Time Frame: 12/13/2018 to 1/24/2019 1. Improve pain to 2/10 at the most with standing, walking, stairs, and transfers. 2. Improve strength to at least 4+/5 in order for patient to tolerate returning to work full time and travelling most weeks of the year. 3. Improve ROM to 0-120 degrees in order to normalize gait, transfers, and stairs. 4. Improve stairs with reciprocal ascend and descend with use of hand rail. 5. Improve transfers with use of hands into and out of standard height chair less than 50% of the time. 6. Improve Lower Extremity Functional Index score to 40/80 from 21/80. Rehabilitation Potential For Stated Goals: Good Regarding Dewayne Marreroar's therapy, I certify that the treatment plan above will be carried out by a therapist or under their direction. Thank you for this referral, Nitish Samayoa, PT, DPT, OCS, SCS 
  
 The information in this section was collected on 12/13/2018 (except where otherwise noted). HISTORY:  
History of Present Injury/Illness (Reason for Referral):  Mr. Elsa Isbell is a 62 y.o. male presenting s/p left total knee arthroplasty performed on 11/15/2018 after many years of left knee osteoarthritis and pain. He reported no complications with surgery, was inpatient for 1 night, and has undergone home therapy since. He has been performing his HEP daily and has reported significant pain and swelling in the knee. He admits to overdoing activity and his exercises at times, and has been trying to work at a more appropriate intensity. 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 Past Medical History/Comorbidities:  
Mr. Elsa Isbell  has a past medical history of Arthritis, CAD (coronary artery disease), Diabetes ([address] Elevated serum cholesterol, GERD (gastroesophageal reflux disease), colonic polyp, Hypercholesterolemia, and Hypertension. Mr. Elsa Isbell  has a past surgical history that includes hx coronary stent placement ([id number]); hx tonsil and adenoidectomy (1999); hx uvulopalatopharyngoplasty; hx knee arthroscopy (Left, 1985); hx colonoscopy (last 4/25/16); LEFT KNEE ARTHROPLASTY TOTAL / OSKAR (Left, 11/15/2018); COLONOSCOPY/ 26 (N/A, 4/25/2016); ENDOSCOPIC POLYPECTOMY (N/A, 4/25/2016); ESOPHAGOGASTRODUODENOSCOPY (EGD) (N/A, 4/25/2016); and ESOPHAGOGASTRODUODENAL (EGD) BIOPSY (N/A, 4/25/2016). Social History/Living Environment:  
 Patient lives at home with spouse in a two story home and reports assistance from her with any painful activities. Prior Level of Function/Work/Activity: 
Independent without dysfunction. 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   Patient is very active with exercising for health and household chores and has been unable to due to post-operative status. Dominant Side:  
      RIGHT Amb[facility]/Rehab Services H2 Model Falls Risk Assessment Risk Factors: 
     (1)  Gender [Male] (1)  Any administered benzodiazepines Ability to Rise from Chair: 
     (1)  Pushes up, successful in one attempt Falls Prevention Plan: No modifications necessary Total: (5 or greater = High Risk): 3  
 ©2010 [facility] [address] [place] [address] Patent #[id number]. Federal Law prohibits the replication, distribution or use without written permission from [facility] [employer] Current Medications:   
  
Current Outpatient Medications:  
  butalbital-acetaminophen-caff (FIORICET) -40 mg per capsule, Take 1-2 Caps by mouth every six (6) hours as needed for Pain., Disp: 20 Cap, Rfl: 0 
  Xanax for sleep as needed   motrin and ibuprofen as needed for pain and inflammation   simvastatin (ZOCOR) 40 mg tablet, Take 1 Tab by mouth nightly., Disp: 30 Tab, Rfl: 5 
  lisinopril (PRINIVIL, ZESTRIL) 10 mg tablet, Take 1 Tab by mouth daily. , Disp: 30 Tab, Rfl: 5 
  metFORMIN (GLUCOPHAGE) 1,000 mg tablet, Take 1 Tab by mouth two (2) times a day., Disp: 60 Tab, Rfl: 5 
  ascorbic acid (VITAMIN C) 500 mg tablet, Take 500 mg by mouth daily. , Disp: , Rfl:  
  melatonin tab tablet, Take 10 mg by mouth nightly., Disp: , Rfl:   
Date Last Reviewed:  1/7/2019 Number of Personal Factors/Comorbidities that affect the Plan of Care: 0: LOW COMPLEXITY EXAMINATION:  
 
Observation/Postural and Gait Assessment: Patient ambulates with significant antalgia, bent knee with stance on the left, antalgia, and use of straight cane safely and independently. Visual inspection of the knee reveals swelling but incision is completely approximated with scar tissue and no scabbing. No signs or symptoms of infection or deep vein thrombosis are noted at this time. Anthropometric measurements (cm) Left Right Knee joint line 38 35 Palpation:  Gross tenderness to palpation of the knee. Flexibility severely limited of gastrocnemius and hamstrings with SLR to 60 degrees. ROM:    
AROM(PROM) Left Right Knee flexion 123 (from 113°) 145° Knee extension 6° knee flexion contracture 10° hyperextension Passive Accessory Mobility Testing:  Gross limitations in all planes of patellofemoral and tibiofemoral joint. Strength:    
Manual Muscle Test (out of 5) Left Right Knee extension 3, 0-5 degree lag with SLR, good quad set 5 Knee flexion 4 5 Hip flexion 4 5 Hip extension 3 4 Hip abduction 3 4 Ankle DF 4 5 Ankle PF 4 5 Special Tests:  No special tests at this time due to acuity of surgery. No signs or symptoms of infection or deep vein thrombosis. Neurological Screen: Myotomes: Key muscle strength testing for bilateral LE is WNL. Dermatomes: Sensation testing through bilateral LE for light touch is WNL. Functional Mobility:  Patient requires assistance with heavy lifting and carrying but is grossly safe and independent with gait with use of straight cane with some dysfunction. Transfers with use of hands 100% of the time into and out of standard height chair. Stairs performed non-reciprocally per patient report with right leg as dominant leg and use of hand rail. Body Structures Involved: 1. Joints 2. Muscles 3. Ligaments Body Functions Affected: 1. Sensory/Pain 2. Neuromusculoskeletal Activities and Participation Affected: 1. General Tasks and Demands 2. Self Care 3. Domestic Life 4. Community, Social and [place] 5. Ability to work and travel for work Number of elements (examined above) that affect the Plan of Care: 3: MODERATE COMPLEXITY CLINICAL PRESENTATION:  
Presentation: Stable and uncomplicated: LOW COMPLEXITY CLINICAL DECISION MAKING:  
Outcome Measure: Tool Used: Lower Extremity Functional Scale (LEFS) Score  Initial: 21/80 Most Recent: /80 Interpretation of Score: 20 questions each scored on a 5 point scale with 0 representing \"extreme difficulty or unable to perform\" and 4 representing \"no difficulty\". The lower the score, the greater the functional disability. 80/80 represents no disability. Minimal detectable change is 9 points. Medical Necessity:  
· Patient is expected to demonstrate progress in strength, range of motion, balance and coordination to improve tolerance to standing, walking stairs, transfers, and work. · Skilled intervention continues to be required due to weakness, decreased ROM, decreased flexibility, pain, swelling, and functional limitations. Reason for Services/Other Comments: 
· Patient continues to require skilled intervention due to increasing complexity of exercises. Use of outcome tool(s) and clinical judgement create a POC that gives a: Clear prediction of patient's progress: LOW COMPLEXITY  
  
 
 
 
TREATMENT:  
(In addition to Assessment/Re-Assessment sessions the following treatments were rendered) Pre-treatment Symptoms/Complaints:  \"I feel brianne it's getting worse not better. \" Patient reports significant pain and tenderness medial and lateral knee. Also C/O popping. Pain: Initial:  
Pain Intensity 1: 8 Pain Location 1: Knee Pain Orientation 1: Left  Post Session:  4/10 decreased pain after session. Therapeutic Exercise: (40 Minutes):  Exercises per grid below to improve mobility, strength, balance and coordination. Required minimal visual, verbal and manual cues to promote proper body alignment, promote proper body posture and promote proper body mechanics. Progressed resistance, range, repetitions and complexity of movement as indicated. (used abbreviations: BET-back education training) Date: 
1-7-19 Date: 
12/19/2018 Date: 
12/31/18 Activity/Exercise Parameters Parameters Parameters Nustep 10 minutes level 4 5 minutes X 10 mins level 4 Calf stretch Incline 4 x 30 sec 3 x 30 sec angle board 4x30 sec hold on incline Heel prop 5 minutes 6 minutes X 8 mins Hamstring stretch Strap 4 x 30 sec 3 x 30 sec strap 4x30 sec hold strap Short arc quad  2 lbs 2 x 10 2 lb wt.s 2x10 Straight leg raise 3 x 10 2 lbs 2 x 10 2 lb wts 2x10 reps Long arc quad 2.5 lbs 2 x 10 2 lbs 2 x 10 2 lb wt. s x 20 reps Standing heel raise 2 x 10 3 x 10 3x10 BLE's x 20 LLE only Heel slides Active 10 sec x 10 Strap 1 x 5 10 sec x 20 X 20 reps x 10 sec hold Mini squats   X 10 reps at doorway Chair slides  3 x 20 sec  X 10 reps x 10 sec hold each Sit to stand 2 x 10 airex Hamstring  curls Green 2 x 10 Manual Therapy (    Soft Tissue Mobilization Duration Duration: 15 Minutes): improve joint and soft tissue mobility · Supine deep tissue mobilization of anterior and posterior knee as well as incision using small suction cup on scar. Cross friction massage to scar to desensitize. · Supine stretching of knee into extension, 
(Used abbreviations: MET - muscle energy technique; PNF - proprioceptive neuromuscular facilitation; NMR - neuromuscular re-education; a/p - anterior to posterior; p/a - posterior to anterior; NT - not tested) Therapeutic Modalities: for edema and pain  ( no charge for vaso pneumatic compression) Left Knee Heat Type: Moist pack Duration : 10 minutes Patient Position: Supine HEP: As above; handouts given to patient for all exercises. Treatment/Session Assessment:   
· Response to Treatment:  Decreased pain and improved ROM after session. Knee flexion 116 deg and knee extension 0 deg · Compliance with Program/Exercises: compliant most of the time. · Recommendations/Intent for next treatment session: \"Next visit will focus on advancements to more challenging activities\". Total Treatment Duration: 65 minutes PT Patient Time In/Time Out Time In: 0450 Time Out: 0900 Marzena Corea PTA

## 2019-01-11 ENCOUNTER — HOSPITAL ENCOUNTER (OUTPATIENT)
Dept: PHYSICAL THERAPY | Age: 59
Discharge: HOME | End: 2019-01-11
Payer: COMMERCIAL

## 2019-01-11 NOTE — PROGRESS NOTES
Patient called and cancelled appointment. Going to see his MD as he is concerned about his knee replacement.  
 
 
 
 
Amarilis Kirk, PTA

## 2019-01-14 ENCOUNTER — HOSPITAL ENCOUNTER (OUTPATIENT)
Dept: PHYSICAL THERAPY | Age: 59
Discharge: HOME | End: 2019-01-14
Payer: COMMERCIAL

## 2019-01-18 ENCOUNTER — HOSPITAL ENCOUNTER (OUTPATIENT)
Dept: PHYSICAL THERAPY | Age: 59
Discharge: HOME | End: 2019-01-18
Payer: COMMERCIAL

## 2019-01-18 NOTE — PROGRESS NOTES
Patient reported he needed to cancel all of his appointments due to work. He was called and a message was left regarding discontinuing therapy and whether patient would like to be discharged.  
 
ARMANDO BrennanT

## 2019-01-21 ENCOUNTER — APPOINTMENT (OUTPATIENT)
Dept: PHYSICAL THERAPY | Age: 59
End: 2019-01-21
Payer: COMMERCIAL

## 2019-01-25 ENCOUNTER — APPOINTMENT (OUTPATIENT)
Dept: PHYSICAL THERAPY | Age: 59
End: 2019-01-25
Payer: COMMERCIAL

## 2019-02-12 NOTE — PROGRESS NOTES
Candace Radha : 1960 Primary: [address] Secondary:  Therapy Center at [facility], [address] [phone]   [phone] OUTPATIENT PHYSICAL THERAPY:Discontinuation Summary 2019 ICD-10: Treatment Diagnosis 1: left knee osteoarthritis (M17.12) Treatment Diagnosis 2: left knee pain (M25.562) Treatment Diagnosis 3: gait dysfunction (R26.89) Precautions: Type II Diabetes Allergies:  
Patient has no known allergies. MD Orders: Evaluate and Treat MEDICAL/REFERRING DIAGNOSIS: 
Presence of left artificial knee joint [Z96.652] Encounter for follow-up examination after completed treatment for conditions other than malignant neoplasm [Z09] Unilateral primary osteoarthritis, left knee [M17.12] DATE OF ONSET: left knee osteoarthritis, s/p left total knee arthroplasty 11/15/2018 REFERRING PHYSICIAN: Kvng Yanez,* 
RETURN PHYSICIAN APPOINTMENT: 2018 INITIAL ASSESSMENT:  Mr. Tanner Limon is a 62 y.o. male presenting s/p left total knee arthroplasty performed on 11/15/2018 after many years of left knee osteoarthritis and pain. He reported no complications with surgery, was inpatient for 1 night, and has undergone home therapy since. He has been performing his HEP daily and has reported significant pain and swelling in the knee. He admits to overdoing activity and his exercises at times, and has been trying to work at a more appropriate intensity. 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 Patient was seen for 4 sessions of therapy from 2019 to 2019 with some success. He reported improvements overall during last appointment but cancelled his scheduled visits from 2019 to 2019 due to work and other issues. He has not met preset goals and is discharged at this time. PROBLEM LIST (Impacting functional limitations): 1. Decreased Strength 2. Decreased ADL/Functional Activities 3. Decreased Ambulation Ability/Technique 4. Decreased Balance 5. Increased Pain 6. Decreased Activity Tolerance 7. Increased Fatigue 8. Increased Shortness of Breath 9. Decreased Flexibility/Joint Mobility 10. Edema/Girth INTERVENTIONS PLANNED: 
1. Balance Exercise 2. Cold 3. Cryotherapy 4. Electrical Stimulation 5. Gait Training 6. Heat 7. Home Exercise Program (HEP) 8. Manual Therapy 9. Neuromuscular Re-education/Strengthening 10. Range of Motion (ROM) 11. Therapeutic Exercise/Strengthening 12. Transfer Training TREATMENT PLAN: 
Effective Dates: 12/13/2018 TO 1/24/2019. Frequency/Duration: Patient was seen for 4 sessions of therapy from 12/13/2019 to 1/7/2019 with some success. He reported improvements overall during last appointment but cancelled his scheduled visits from 1/11/2019 to 1/18/2019 due to work and other issues. He has not met preset goals and is discharged at this time. GOALS: (Goals have been discussed and agreed upon with patient.) Short-Term Functional Goals: Time Frame: 12/13/2018 to 1/3/2018 1. Patient demonstrates independence with home exercise program without verbal cueing provided by therapist. - GOAL MET 2. Improve knee extension to under 5 degree knee flexion contracture, and knee flexion to at least 120. - GOAL MET 3. Improve flexibility of gastrocnemius and hamstrings to at least 80 degree SLR. - GOAL MET Discharge Goals: Time Frame: 12/13/2018 to 1/24/2019 1. Improve pain to 2/10 at the most with standing, walking, stairs, and transfers. -  NOT MET 2. Improve strength to at least 4+/5 in order for patient to tolerate returning to work full time and travelling most weeks of the year. - NOT MET 3.  Improve ROM to 0-120 degrees in order to normalize gait, transfers, and stairs. - GOAL MET 
 4. Improve stairs with reciprocal ascend and descend with use of hand rail. - GOAL MET 5. Improve transfers with use of hands into and out of standard height chair less than 50% of the time. - NOT MET 6. Improve Lower Extremity Functional Index score to 40/80 from 21/80. - NOT MET Rehabilitation Potential For Stated Goals: Good Regarding Isabelle Tam's therapy, I certify that the treatment plan above will be carried out by a therapist or under their direction. Thank you for this referral, Zuleika Springer, PT, DPT, OCS, SCS

## 2019-04-29 ENCOUNTER — HOSPITAL ENCOUNTER (OUTPATIENT)
Dept: GENERAL RADIOLOGY | Age: 59
Discharge: HOME | End: 2019-04-29
Payer: COMMERCIAL

## 2019-04-29 DIAGNOSIS — M25.511 ACUTE PAIN OF RIGHT SHOULDER: ICD-10-CM

## 2019-04-29 DIAGNOSIS — M54.12 CERVICAL RADICULOPATHY: ICD-10-CM

## 2019-04-29 PROCEDURE — 73030 X-RAY EXAM OF SHOULDER: CPT

## 2019-04-29 PROCEDURE — 72040 X-RAY EXAM NECK SPINE 2-3 VW: CPT

## 2019-04-30 NOTE — PROGRESS NOTES
Please let him know his shoulder and neck Xrays are neg. When he gets back from traveling, have him call and I will schedule an MRI of his shoulder, that is what I really need. I had to get the Xray first for insurance purposes.

## 2019-06-24 ENCOUNTER — HOSPITAL ENCOUNTER (OUTPATIENT)
Dept: MRI IMAGING | Age: 59
Discharge: HOME | End: 2019-06-24
Attending: FAMILY MEDICINE
Payer: COMMERCIAL

## 2019-06-24 DIAGNOSIS — M54.12 CERVICAL RADICULOPATHY: ICD-10-CM

## 2019-06-24 DIAGNOSIS — G89.29 CHRONIC RIGHT SHOULDER PAIN: ICD-10-CM

## 2019-06-24 DIAGNOSIS — M25.511 CHRONIC RIGHT SHOULDER PAIN: ICD-10-CM

## 2019-06-24 PROCEDURE — 72141 MRI NECK SPINE W/O DYE: CPT

## 2019-06-24 PROCEDURE — 73221 MRI JOINT UPR EXTREM W/O DYE: CPT

## 2019-06-26 NOTE — PROGRESS NOTES
Patient notified. He said he still don't understand why he has the headaches? Do you think that the tear in the bicep tendon and labrum is causing them? He said he needs more explanation.

## 2019-06-26 NOTE — PROGRESS NOTES
His biceps tendon and biceps labrum is torn. I am sending him to Ortho. He will get a call from them soon.

## 2019-10-14 ENCOUNTER — HOSPITAL ENCOUNTER (OUTPATIENT)
Dept: LAB | Age: 59
Discharge: HOME | End: 2019-10-14
Payer: COMMERCIAL

## 2019-10-14 DIAGNOSIS — I10 ESSENTIAL HYPERTENSION: ICD-10-CM

## 2019-10-14 DIAGNOSIS — E78.5 HYPERLIPIDEMIA, UNSPECIFIED HYPERLIPIDEMIA TYPE: ICD-10-CM

## 2019-10-14 DIAGNOSIS — I20.0 UNSTABLE ANGINA PECTORIS (HCC): ICD-10-CM

## 2019-10-14 LAB
ALBUMIN SERPL-MCNC: 4.1 G/DL (ref 3.5–5)
ALBUMIN/GLOB SERPL: 1.3 {RATIO} (ref 1.2–3.5)
ALP SERPL-CCNC: 57 U/L (ref 50–136)
ALT SERPL-CCNC: 29 U/L (ref 12–65)
ANION GAP SERPL CALC-SCNC: 9 MMOL/L (ref 7–16)
AST SERPL-CCNC: 14 U/L (ref 15–37)
BASOPHILS # BLD: 0.1 K/UL (ref 0–0.2)
BASOPHILS NFR BLD: 1 % (ref 0–2)
BILIRUB SERPL-MCNC: 1 MG/DL (ref 0.2–1.1)
BUN SERPL-MCNC: 10 MG/DL (ref 6–23)
CALCIUM SERPL-MCNC: 9.2 MG/DL (ref 8.3–10.4)
CHLORIDE SERPL-SCNC: 104 MMOL/L (ref 98–107)
CHOLEST SERPL-MCNC: 115 MG/DL
CO2 SERPL-SCNC: 28 MMOL/L (ref 21–32)
CREAT SERPL-MCNC: 0.9 MG/DL (ref 0.8–1.5)
DIFFERENTIAL METHOD BLD: ABNORMAL
EOSINOPHIL # BLD: 0.1 K/UL (ref 0–0.8)
EOSINOPHIL NFR BLD: 1 % (ref 0.5–7.8)
ERYTHROCYTE [DISTWIDTH] IN BLOOD BY AUTOMATED COUNT: 13.1 % (ref 11.9–14.6)
EST. AVERAGE GLUCOSE BLD GHB EST-MCNC: 171 MG/DL
GLOBULIN SER CALC-MCNC: 3.1 G/DL (ref 2.3–3.5)
GLUCOSE SERPL-MCNC: 125 MG/DL (ref 65–100)
HBA1C MFR BLD: 7.6 % (ref 4.8–6)
HCT VFR BLD AUTO: 43.4 % (ref 41.1–50.3)
HDLC SERPL-MCNC: 38 MG/DL (ref 40–60)
HDLC SERPL: 3 {RATIO}
HGB BLD-MCNC: 15.5 G/DL (ref 13.6–17.2)
IMM GRANULOCYTES # BLD AUTO: 0 K/UL (ref 0–0.5)
IMM GRANULOCYTES NFR BLD AUTO: 0 % (ref 0–5)
LDLC SERPL CALC-MCNC: 50.4 MG/DL
LIPID PROFILE,FLP: ABNORMAL
LYMPHOCYTES # BLD: 0.8 K/UL (ref 0.5–4.6)
LYMPHOCYTES NFR BLD: 10 % (ref 13–44)
MCH RBC QN AUTO: 31.3 PG (ref 26.1–32.9)
MCHC RBC AUTO-ENTMCNC: 35.7 G/DL (ref 31.4–35)
MCV RBC AUTO: 87.7 FL (ref 79.6–97.8)
MONOCYTES # BLD: 0.6 K/UL (ref 0.1–1.3)
MONOCYTES NFR BLD: 7 % (ref 4–12)
NEUTS SEG # BLD: 6.3 K/UL (ref 1.7–8.2)
NEUTS SEG NFR BLD: 80 % (ref 43–78)
NRBC # BLD: 0 K/UL (ref 0–0.2)
PLATELET # BLD AUTO: 198 K/UL (ref 150–450)
PMV BLD AUTO: 9.9 FL (ref 9.4–12.3)
POTASSIUM SERPL-SCNC: 4.2 MMOL/L (ref 3.5–5.1)
PROT SERPL-MCNC: 7.2 G/DL (ref 6.3–8.2)
RBC # BLD AUTO: 4.95 M/UL (ref 4.23–5.6)
SODIUM SERPL-SCNC: 141 MMOL/L (ref 136–145)
TRIGL SERPL-MCNC: 133 MG/DL (ref 35–150)
VLDLC SERPL CALC-MCNC: 26.6 MG/DL (ref 6–23)
WBC # BLD AUTO: 7.9 K/UL (ref 4.3–11.1)

## 2019-10-14 PROCEDURE — 36415 COLL VENOUS BLD VENIPUNCTURE: CPT

## 2019-10-14 PROCEDURE — 83036 HEMOGLOBIN GLYCOSYLATED A1C: CPT

## 2019-10-14 PROCEDURE — 80053 COMPREHEN METABOLIC PANEL: CPT

## 2019-10-14 PROCEDURE — 85025 COMPLETE CBC W/AUTO DIFF WBC: CPT

## 2019-10-14 PROCEDURE — 80061 LIPID PANEL: CPT

## 2019-10-14 NOTE — PROGRESS NOTES
Patient pre-assessment complete for [facility] scheduled for [address], arrival time 1030 am. Patient verified using . Patient instructed to bring all home medications in labeled bottles on the day of procedure. NPO status reinforced. Patient informed to take a full dose aspirin 325mg  or 81 mg x 4 on the day of procedure. Patient instructed to HOLD metformin. Instructed they can take all other medications excluding vitamins & supplements. Patient verbalizes understanding of all instructions & denies any questions at this time.

## 2019-10-15 ENCOUNTER — HOSPITAL ENCOUNTER (OUTPATIENT)
Dept: CARDIAC CATH/INVASIVE PROCEDURES | Age: 59
Discharge: HOME | End: 2019-10-15
Attending: INTERNAL MEDICINE | Admitting: INTERNAL MEDICINE
Payer: COMMERCIAL

## 2019-10-15 VITALS
OXYGEN SATURATION: 96 % | SYSTOLIC BLOOD PRESSURE: 119 MMHG | HEART RATE: 67 BPM | HEIGHT: 68 IN | RESPIRATION RATE: 22 BRPM | DIASTOLIC BLOOD PRESSURE: 74 MMHG | WEIGHT: 199 LBS | BODY MASS INDEX: 30.16 KG/M2

## 2019-10-15 LAB
ACT BLD: 576 SECS (ref 70–128)
GLUCOSE BLD STRIP.AUTO-MCNC: 189 MG/DL (ref 65–100)

## 2019-10-15 PROCEDURE — 74011000258 HC RX REV CODE- 258: Performed by: INTERNAL MEDICINE

## 2019-10-15 PROCEDURE — 93458 L HRT ARTERY/VENTRICLE ANGIO: CPT

## 2019-10-15 PROCEDURE — C1894 INTRO/SHEATH, NON-LASER: HCPCS

## 2019-10-15 PROCEDURE — 77030019569 HC BND COMPR RAD TERU -B

## 2019-10-15 PROCEDURE — C1769 GUIDE WIRE: HCPCS

## 2019-10-15 PROCEDURE — 74011250636 HC RX REV CODE- 250/636: Performed by: INTERNAL MEDICINE

## 2019-10-15 PROCEDURE — 74011636320 HC RX REV CODE- 636/320: Performed by: INTERNAL MEDICINE

## 2019-10-15 PROCEDURE — 93005 ELECTROCARDIOGRAM TRACING: CPT | Performed by: INTERNAL MEDICINE

## 2019-10-15 PROCEDURE — 99152 MOD SED SAME PHYS/QHP 5/>YRS: CPT

## 2019-10-15 PROCEDURE — C1874 STENT, COATED/COV W/DEL SYS: HCPCS

## 2019-10-15 PROCEDURE — 92928 PRQ TCAT PLMT NTRAC ST 1 LES: CPT

## 2019-10-15 PROCEDURE — 93571 IV DOP VEL&/PRESS C FLO 1ST: CPT

## 2019-10-15 PROCEDURE — 82962 GLUCOSE BLOOD TEST: CPT

## 2019-10-15 PROCEDURE — 77030004534 HC CATH ANGI DX INFN CARD -A

## 2019-10-15 PROCEDURE — 74011000250 HC RX REV CODE- 250: Performed by: INTERNAL MEDICINE

## 2019-10-15 PROCEDURE — C1725 CATH, TRANSLUMIN NON-LASER: HCPCS

## 2019-10-15 PROCEDURE — 85347 COAGULATION TIME ACTIVATED: CPT

## 2019-10-15 PROCEDURE — 99153 MOD SED SAME PHYS/QHP EA: CPT

## 2019-10-15 PROCEDURE — C1887 CATHETER, GUIDING: HCPCS

## 2019-10-15 PROCEDURE — 74011250637 HC RX REV CODE- 250/637: Performed by: INTERNAL MEDICINE

## 2019-10-15 PROCEDURE — 77030015766

## 2019-10-15 RX ORDER — METOPROLOL SUCCINATE 25 MG/1
25 TABLET, EXTENDED RELEASE ORAL DAILY
Qty: 30 TAB | Refills: 11 | Status: SHIPPED | OUTPATIENT
Start: 2019-10-15 | End: 2021-02-18

## 2019-10-15 RX ORDER — PRASUGREL 10 MG/1
60 TABLET, FILM COATED ORAL ONCE
Status: COMPLETED | OUTPATIENT
Start: 2019-10-15 | End: 2019-10-15

## 2019-10-15 RX ORDER — NITROGLYCERIN 0.4 MG/1
0.4 TABLET SUBLINGUAL
Qty: 1 BOTTLE | Refills: 5 | Status: SHIPPED | OUTPATIENT
Start: 2019-10-15

## 2019-10-15 RX ORDER — SODIUM CHLORIDE 0.9 % (FLUSH) 0.9 %
5 SYRINGE (ML) INJECTION AS NEEDED
Status: DISCONTINUED | OUTPATIENT
Start: 2019-10-15 | End: 2019-10-15 | Stop reason: HOSPADM

## 2019-10-15 RX ORDER — HEPARIN SODIUM 200 [USP'U]/100ML
3 INJECTION, SOLUTION INTRAVENOUS CONTINUOUS
Status: DISCONTINUED | OUTPATIENT
Start: 2019-10-15 | End: 2019-10-15 | Stop reason: HOSPADM

## 2019-10-15 RX ORDER — SODIUM CHLORIDE 9 MG/ML
75 INJECTION, SOLUTION INTRAVENOUS CONTINUOUS
Status: DISCONTINUED | OUTPATIENT
Start: 2019-10-15 | End: 2019-10-15 | Stop reason: HOSPADM

## 2019-10-15 RX ORDER — LIDOCAINE HYDROCHLORIDE 10 MG/ML
5-40 INJECTION INFILTRATION; PERINEURAL
Status: DISCONTINUED | OUTPATIENT
Start: 2019-10-15 | End: 2019-10-15 | Stop reason: HOSPADM

## 2019-10-15 RX ORDER — PRASUGREL 10 MG/1
10 TABLET, FILM COATED ORAL DAILY
Qty: 30 TAB | Refills: 11 | Status: SHIPPED | OUTPATIENT
Start: 2019-10-15 | End: 2019-11-05

## 2019-10-15 RX ORDER — GUAIFENESIN 100 MG/5ML
324 LIQUID (ML) ORAL ONCE
Status: DISCONTINUED | OUTPATIENT
Start: 2019-10-15 | End: 2019-10-15 | Stop reason: HOSPADM

## 2019-10-15 RX ORDER — MIDAZOLAM HYDROCHLORIDE 1 MG/ML
.5-5 INJECTION, SOLUTION INTRAMUSCULAR; INTRAVENOUS
Status: DISCONTINUED | OUTPATIENT
Start: 2019-10-15 | End: 2019-10-15 | Stop reason: HOSPADM

## 2019-10-15 RX ORDER — FENTANYL CITRATE 50 UG/ML
25-100 INJECTION, SOLUTION INTRAMUSCULAR; INTRAVENOUS
Status: DISCONTINUED | OUTPATIENT
Start: 2019-10-15 | End: 2019-10-15 | Stop reason: HOSPADM

## 2019-10-15 RX ADMIN — PRASUGREL HYDROCHLORIDE 60 MG: 10 TABLET, FILM COATED ORAL at 12:29

## 2019-10-15 RX ADMIN — MIDAZOLAM 2 MG: 1 INJECTION INTRAMUSCULAR; INTRAVENOUS at 11:58

## 2019-10-15 RX ADMIN — HEPARIN SODIUM 3 ML/HR: 200 INJECTION, SOLUTION INTRAVENOUS at 12:16

## 2019-10-15 RX ADMIN — FENTANYL CITRATE 50 MCG: 50 INJECTION INTRAMUSCULAR; INTRAVENOUS at 11:55

## 2019-10-15 RX ADMIN — MIDAZOLAM 2 MG: 1 INJECTION INTRAMUSCULAR; INTRAVENOUS at 11:55

## 2019-10-15 RX ADMIN — LIDOCAINE HYDROCHLORIDE 3 ML: 10 INJECTION, SOLUTION INFILTRATION; PERINEURAL at 11:00

## 2019-10-15 RX ADMIN — IOPAMIDOL 150 ML: 755 INJECTION, SOLUTION INTRAVENOUS at 12:30

## 2019-10-15 RX ADMIN — BIVALIRUDIN 1.75 MG/KG/HR: 250 INJECTION, POWDER, LYOPHILIZED, FOR SOLUTION INTRAVENOUS at 12:05

## 2019-10-15 RX ADMIN — VERAPAMIL HYDROCHLORIDE 2 ML: 2.5 INJECTION INTRAVENOUS at 12:15

## 2019-10-15 RX ADMIN — SODIUM CHLORIDE 75 ML/HR: 900 INJECTION, SOLUTION INTRAVENOUS at 10:37

## 2019-10-15 NOTE — PROCEDURES
[facility]  CARDIAC CATH    Name:  Kirby Monzon  MR#:  [id number]  :  1960  ACCOUNT #:  [de-identified]  DATE OF SERVICE:  10/15/2019    PROCEDURES PERFORMED:  1. Left heart catheterization, selective coronary arteriography, and left ventriculogram via the right radial approach. 2.  iFR of left circumflex. 3.  PCI and stenting of proximal LAD. PREOPERATIVE DIAGNOSES:  New-onset angina in a patient with known coronary artery disease. The patient with prior percutaneous coronary intervention and stenting of left anterior descending and left circumflex. POSTOPERATIVE DIAGNOSES:  Obstructive coronary artery disease requiring percutaneous coronary intervention and stenting of left anterior descending. SURGEON:  eYsy Sanz MD    ASSISTANT:  None. ESTIMATED BLOOD LOSS:  Less than 5 mL. SPECIMENS REMOVED:  None. COMPLICATIONS:  None    IMPLANTS:  Resolute Dm drug-eluting stent. ANESTHESIA:  The patient received moderate supervised conscious sedation with a total of 4 mg Versed and 50 mcg fentanyl. Sedation start time was 11:51 with a procedure completion time of 12:35. Sedation was administered by April Marino RN, under my supervision. FINDINGS:  As below. TECHNICAL FACTORS:  After informed consent was obtained, the patient was brought to the cardiac catheterization lab. The right radial artery was prepped and draped in usual sterile fashion. Utilizing modified Seldinger technique and micropuncture needle, the right radial artery was entered. A 6-Serbian Terumo slender sheath was placed without difficulty. A radial cocktail consisting of 2000 units of heparin, 2 mg of verapamil, and 200 mcg of nitroglycerin was administered. A 5-Serbian Tiger 4.0 catheter was used to select and engage the ostium of the left main coronary artery and right coronary artery, respectively. Selective injection verification performed.   A pigtail catheter was used to cross the aortic valve and the left ventricle. Hemodynamic measurements and left ventriculogram were obtained. Left ventricular aortic pressure gradient was obtained by pullback technique. At the conclusion of the diagnostic procedure, the patient was referred for hemodynamic assessment of the left circumflex and later PCI of the LAD. Please see procedure note for details. CONTRAST:  Isovue 150. HEMODYNAMIC RESULTS:  1. Aortic pressure 124/84 with a mean of 103.  2.  Left ventricular end-diastolic pressure 22.  3.  No significant gradient across the aortic valve. ANGIOGRAPHIC RESULTS:  1. Left main coronary artery:  Large-caliber vessel. Angiographically normal.  2.  LAD:  Contains a previous stent in the proximal segment with high-grade 80% in-stent restenosis. Diffuse 60% stenosis in the remainder of the stent. The distal vessel contains 30% stenosis. There is an intramyocardial segment. 3.  First diagonal artery:  Small-caliber vessel. Patent. 4.  Second diagonal artery:  Small-caliber vessel. A 30% ostial stenosis. 5.  Left circumflex:  Medium-caliber, codominant vessel. Contains 50% proximal stenosis. There is a stent in the mid segment which is widely patent. 6.  First obtuse marginal artery: It has been jailed by the previously implanted stent. There is 30% ostial stenosis. 7.  Second obtuse marginal.  Medium-caliber vessel. Patent. 8.  Third obtuse marginal artery:  Contains a 40% to 50% eccentric stenosis at the ostium but is a small-caliber vessel. 9.  Right coronary artery is a medium-caliber vessel proximally but becomes small caliber in the distal segment. It is a dominant vessel with a small PDA and a small right posterolateral branch. There is diffuse 50% to 60% mid stenosis. Distal vessel contains an 80% stenosis but this is a very small-caliber vessel approximately 1.5-2 mm. 10.  Right PDA:  Small-caliber 1-1.5-mm vessel. Patent.   11.  Right posterolateral branch: Small-caliber 1.5-2-mm vessel. Patent. 12.  Left ventriculogram performed in the OBRIEN projection shows normal left ventricular systolic function. EF 60% to 65%. No focal segmental wall motion abnormalities. Aortic root is nondilated. CONCLUSION:  1. Obstructive multivessel coronary artery disease. 2.  Normal left ventricular systolic function. PLAN:  I will proceed with iFR assessment of left circumflex and PCI of the LAD if this is not significant. PERCUTANEOUS CORONARY INTERVENTION NOTE:  Lesion:  Proximal circumflex. PROCEDURE:  iFR. Pre and post stenosis 50%. TECHNICAL FACTORS:  The patient was given intravenous Angiomax. ACT was greater than 500. An XB3.0 guide was used to select and engage the ostium of the left main coronary artery. A Verrata wire was placed in the ostium of the left main coronary artery and appropriately normalized. A 200 mcg of intracoronary nitroglycerin was given. An iFR wire was placed in the distal circumflex. iFR was measured 0.94 indicating hemodynamically insignificant stenosis. Based on this and a small-caliber distal PDA, this was not felt to be a target for revascularization with bypass surgery. It is felt the patient has single-vessel disease that would require revascularization of the LAD and PCI would be appropriate. PERCUTANEOUS CORONARY INTERVENTION NOTE:  Lesion:  Proximal LAD. Pre-stenosis 80%, post stenosis 0%. Of note, this is in-stent. TECHNICAL FACTORS:  A Prowater wire was positioned distal to the stenosis. The stenosis was predilated with a 2.25 x 15 NC Euphora balloon up to 20 atmospheres. Following this, a Resolute [place] 2.5 x 30-mm drug-eluting stent was positioned and deployed to 16 atmospheres. The stent was then post dilated with a 2.75 x 12-mm noncompliant balloon up to 24 atmospheres. Following post dilatation, there is excellent angiographic result with no residual stenosis.   The wire was removed and final orthogonal projections were obtained. CONCLUSION:  Successful PCI and stenting of the proximal LAD with a Resolute Thornton 2.5 x 30-mm drug-eluting stent. This was post dilated to 2.75 mm with a noncompliant balloon. PLAN:  Monitor the patient closely in the postprocedure setting.       Mary Tilley MD      [id number]  D:  10/15/2019 12:45  T:  10/15/2019 13:21  JOB #:  [id number]  CC:  Hannah Baldwin MD       [facility] Cardiology

## 2019-10-15 NOTE — PROCEDURES
Brief Cardiac Procedure Note    Patient: Emerald Bales. MRN: [id number]  SSN: [id number]    YOB: 1960  Age: 61 y.o. Sex: male      Date of Procedure: 10/15/2019     Pre-procedure Diagnosis: Typical Angina    Post-procedure Diagnosis: Coronary Artery Disease    Procedure: Left Heart Catheterization with Percutaneous Coronary Intervention    Brief Description of Procedure: As above    Performed By: Odilon Nicole MD     Assistants: None    Anesthesia: Moderate Sedation    Estimated Blood Loss: Less than 10 mL      Specimens: None    Implants: None    Findings: 50% pLCx. IFR 0.94.  80% pLAD treated with Mathews 2.5 x 30 mm NICOLASA. Post dilated to 2.75. Small RCA with 80% distal stenosis. 1.5 to 2 mm vessel. Complications: None    Recommendations: Continue medical therapy.     Signed By: Odilon Nicole MD     October 15, 2019

## 2019-10-15 NOTE — PROGRESS NOTES
Discharge Same Day as PCI Teaching    Discharge teaching completed. Patient instructed on right radial site care, including symptoms to report to MD, medication changes & Follow up Care to include:    1- Patient is being treated with 2 separate anti-platelet medications including aspirin 81mg & Effient 10mg. It is very important that these medications are filled today started tonight. Patient instructed on the importance of these medications & that these medications must not be stopped for any reason or without talking to the doctor first.  2-  Patient is also being discharged on a medication for cholesterol management (ie-statin) Lipitor and instructed on the importance of continuing this medication as well. 3- Patient received a referral for Cardiac Rehab II, contact information was faxed to Cardiac rehab & patient given telephone number to contact ([phone]) Cardiac Rehab if they have not heard from them within 10 days.

## 2019-10-15 NOTE — PROGRESS NOTES
Patient received to [address] [place] [address] room # 10  Ambulatory from [facility]. Patient scheduled for [facility] today with Dr Salvador Baumann. Procedure reviewed & questions answered, voiced good understanding consent obtained & placed on chart. All medications and medical history reviewed. Will prep patient per orders. Patient & family updated on plan of care. The patient has a fraility score of 3-MANAGING WELL, based on ability to perform ADLS by self.

## 2019-10-15 NOTE — DISCHARGE INSTRUCTIONS
POST PCI/STENT DISCHARGE INSTRUCTIONS    1. Check puncture site frequently for swelling or bleeding. If there is any bleeding, lie down and apply pressure over the area with a clean towel or washcloth and call 911. Notify your doctor for any redness, swelling, drainage, or oozing from the puncture site. Notify your doctor for any fever or chills. 2. If the extremity becomes cold, numb, or painful call [facility] Cardiology at [phone].     3. Activity should be limited for the next 48-72 hours. No heavy lifting (anything over 10 pounds) for 3 days. No pushing or pulling with right arm for the next three days. 4.  You may resume your usual diet. Drink more fluids than usual.    5.  Have a responsible person drive you home and stay with you for at least 24 hours after your heart catheterization/angiography. No driving for 24 hours. 6. You may remove bandage from your right arm in 24 hours. You may shower in 24 hours. No tub baths, hot tubs, or swimming for 1 week. Do not place any lotions, creams, powders, or ointments over puncture site for 1 week. You may place a clean band-aid over the puncture site each day for 5 days. Change daily. YOU ARE BEING DISCHARGED ON TWO ANTI-PLATELET MEDICATIONS    1- Aspirin 81mg    2- Effient 10mg    THESE MEDICATIONS  ARE VERY IMPORTANT TO YOUR RECOVERY AND  MUST BE TAKEN EXACTLY AS PRESCRIBED & NOT STOPPED FOR ANY REASON. THESE MAY ONLY BE STOPPED WITH AN ORDER FROM YOUR CARDIOLOGIST. PLEASE HAVE THESE FILLED IMMEDIATELY AND START TAKING Effient TONIGHT and Aspirin in the morning. No Metformin until Thursday. YOU ARE ALSO BEING DISCHARGED ON A MEDICATION FOR CHOLESTEROL MANAGEMENT. 1- Lipitor      You have also been referred to [facility]. Someone from Cardiac Rehab will be calling you to set up a follow up appointment. If they have not called you within a week,  please call [phone].               Percutaneous Coronary Intervention: What to Expect at [facility]     Percutaneous coronary intervention (PCI) is the name for procedures that are used to open a narrowed or blocked coronary artery. The two most common PCI procedures are coronary angioplasty and coronary stent placement. Your groin or arm may have a bruise and feel sore for a day or two after a percutaneous coronary intervention (PCI). You can do light activities around the house, but nothing strenuous for several days. This care sheet gives you a general idea about how long it will take for you to recover. But each person recovers at a different pace. Follow the steps below to get better as quickly as possible. How can you care for yourself at home? Activity  · Do not do strenuous exercise and do not lift, pull, or push anything heavy until your doctor says it is okay. This may be for a day or two. You can walk around the house and do light activity, such as cooking. · You may shower 24 to 48 hours after the procedure, if your doctor okays it. Pat the incision dry. Do not take a bath for 1 week, or until your doctor tells you it is okay. · If the catheter was placed in your groin, try not to walk up stairs for the first couple of days. · If the catheter was placed in your arm near your wrist, do not bend your wrist deeply for the first couple of days. Be careful using your hand to get into and out of a chair or bed. · If your doctor recommends it, get more exercise. Walking is a good choice. Bit by bit, increase the amount you walk every day. Try for at least 30 minutes on most days of the week. Diet  · Drink plenty of fluids to help your body flush out the dye. If you have kidney, heart, or liver disease and have to limit fluids, talk with your doctor before you increase the amount of fluids you drink. · Keep eating a heart-healthy diet that has lots of fruits, vegetables, and whole grains. If you have not been eating this way, talk to your doctor. You also may want to talk to a dietitian. This expert can help you to learn about healthy foods and plan meals. Medicines  · Your doctor will tell you if and when you can restart your medicines. He or she will also give you instructions about taking any new medicines. · If you take blood thinners, such as warfarin (Coumadin), clopidogrel (Plavix), or aspirin, be sure to talk to your doctor. He or she will tell you if and when to start taking those medicines again. Make sure that you understand exactly what your doctor wants you to do. · Your doctor will prescribe blood-thinning medicines. You will likely take aspirin plus another antiplatelet, such as clopidogrel (Plavix). It is very important that you take these medicines exactly as directed. These medicines help keep the coronary artery open and reduce your risk of a heart attack. · Call your doctor if you think you are having a problem with your medicine. Care of the catheter site  · For 1 or 2 days, keep a bandage over the spot where the catheter was inserted. The bandage probably will fall off in this time. · Put ice or a cold pack on the area for 10 to 20 minutes at a time to help with soreness or swelling. Put a thin cloth between the ice and your skin. Follow-up care is a key part of your treatment and safety. Be sure to make and go to all appointments, and call your doctor if you are having problems. It's also a good idea to know your test results and keep a list of the medicines you take. When should you call for help? Call 911 anytime you think you may need emergency care. For example, call if:  · You passed out (lost consciousness). · You have severe trouble breathing. · You have sudden chest pain and shortness of breath, or you cough up blood. · You have symptoms of a heart attack, such as:  ¨ Chest pain or pressure. ¨ Sweating. ¨ Shortness of breath. ¨ Nausea or vomiting.   ¨ Pain that spreads from the chest to the neck, jaw, or one or both shoulders or arms. ¨ Dizziness or lightheadedness. ¨ A fast or uneven pulse. After calling 911, chew 1 adult-strength aspirin. Wait for an ambulance. Do not try to drive yourself. · You have been diagnosed with angina, and you have angina symptoms that do not go away with rest or are not getting better within 5 minutes after you take one dose of nitroglycerin. Call your doctor now or seek immediate medical care if:  · You are bleeding from the area where the catheter was put in your artery. · You have a fast-growing, painful lump at the catheter site. · You have signs of infection, such as:  ¨ Increased pain, swelling, warmth, or redness. ¨ Red streaks leading from the catheter site. ¨ Pus draining from the catheter site. ¨ A fever. · Your leg or arm looks blue or feels cold, numb, or tingly. Watch closely for changes in your health, and be sure to contact your doctor if you have any problems. Where can you learn more? Go to Vino Volo.be  Enter V711 in the search box to learn more about \"Percutaneous Coronary Intervention: What to Expect at Home. \"   © 8261-0184 Healthwise, Incorporated. Care instructions adapted under license by [employer] (which disclaims liability or warranty for this information). This care instruction is for use with your licensed healthcare professional. If you have questions about a medical condition or this instruction, always ask your healthcare professional. [place] [address] any warranty or liability for your use of this information. Content Version: 34.6.106550; Current as of: May 22, 2015         Cardiac Rehabilitation: Care Instructions  Your Care Instructions    Cardiac rehabilitation is a program for people who have a heart problem, such as a heart attack, heart failure, or a heart valve disease. The program includes exercise, lifestyle changes, education, and emotional support.  Cardiac rehab can help you improve the quality of your life through better overall health. It can help you lose weight and feel better about yourself. On your cardiac rehab team, you may have your doctor, a nurse specialist, a dietitian, and a physical therapist. They will design your cardiac rehab program specifically for you. You will learn how to reduce your risk for heart problems, how to manage stress, and how to eat a heart-healthy diet. By the end of the program, you will be ready to maintain a healthier lifestyle on your own. Follow-up care is a key part of your treatment and safety. Be sure to make and go to all appointments, and call your doctor if you are having problems. It's also a good idea to know your test results and keep a list of the medicines you take. How can you care for yourself at home? · Take your medicines exactly as prescribed. Call your doctor if you think you are having a problem with your medicine. You will get more details on the specific medicines your doctor prescribes. · Weigh yourself every day if your doctor tells you to. Watch for sudden weight gain. Weigh yourself on the same scale with the same amount of clothing at the same time of day. · Plan your meals so that you are eating heart-healthy foods. ¨ Eat a variety of foods daily. Fresh fruits and vegetables and whole-grains are good choices. ¨ Limit your fat intake, especially saturated and trans fat. ¨ Limit salt (sodium). ¨ Increase fiber in your diet. ¨ Limit alcohol. · Learn how to take your pulse so that you can track your heart rate during exercise. · Always check with your doctor before you begin a new exercise program.  · Warm up before you exercise and cool down afterward for at least 15 minutes each. This will help your heart gradually prepare for and recover from exercise and avoid pushing your heart too hard. · Stop exercising if you have any unusual discomfort, such as chest pain. · Do not smoke. Smoking can make heart problems worse.  If you need help quitting, talk to your doctor about stop-smoking programs and medicines. These can increase your chances of quitting for good. When should you call for help? Call 911 anytime you think you may need emergency care. For example, call if:  · You have severe trouble breathing. · You cough up pink, foamy mucus and you have trouble breathing. · You have symptoms of a heart attack. These may include:  ¨ Chest pain or pressure, or a strange feeling in the chest.  ¨ Sweating. ¨ Shortness of breath. ¨ Nausea or vomiting. ¨ Pain, pressure, or a strange feeling in the back, neck, jaw, or upper belly or in one or both shoulders or arms. ¨ Lightheadedness or sudden weakness. ¨ A fast or irregular heartbeat. After you call 911, the  may tell you to chew 1 adult-strength or 2 to 4 low-dose aspirin. Wait for an ambulance. Do not try to drive yourself. · You have angina symptoms (such as chest pain or pressure) that do not go away with rest or are not getting better within 5 minutes after you take a dose of nitroglycerin. · You have symptoms of a stroke. These may include:  ¨ Sudden numbness, tingling, weakness, or loss of movement in your face, arm, or leg, especially on only one side of your body. ¨ Sudden vision changes. ¨ Sudden trouble speaking. ¨ Sudden confusion or trouble understanding simple statements. ¨ Sudden problems with walking or balance. ¨ A sudden, severe headache that is different from past headaches. · You passed out (lost consciousness). Call your doctor now or seek immediate medical care if:  · You have new or increased shortness of breath. · You are dizzy or lightheaded, or you feel like you may faint. · You gain weight suddenly, such as 3 pounds or more in 2 to 3 days. (Your doctor may suggest a different range of weight gain.)  · You have increased swelling in your legs, ankles, or feet.   Watch closely for changes in your health, and be sure to contact your doctor if you have any problems. Where can you learn more? Go to http://césar-jeffrey.info/. Enter X556 in the search box to learn more about \"Cardiac Rehabilitation: Care Instructions. \"  Current as of: May 5, 2016  Content Version: 11.1  © 5010-8784 [employer]. Care instructions adapted under license by [employer] (which disclaims liability or warranty for this information). If you have questions about a medical condition or this instruction, always ask your healthcare professional. [address] any warranty or liability for your use of this information. AetherPal Activation    Thank you for requesting access to AetherPal. Please follow the instructions below to securely access and download your online medical record. AetherPal allows you to send messages to your doctor, view your test results, renew your prescriptions, schedule appointments, and more. How Do I Sign Up? 1. In your internet browser, go to [email or web address]. 2. Click on the First Time User? Click Here link in the Sign In box. You will see the New Member Sign Up page. 3. Enter your AetherPal Access Code exactly as it appears below. You will not need to use this code after youve completed the sign-up process. If you do not sign up before the expiration date, you must request a new code. AetherPal Access Code: [email or web address]  Expires: 2019  2:47 PM (This is the date your AetherPal access code will )    4. Enter the last four digits of your Social Security Number (xxxx) and Date of Birth (mm/dd/yyyy) as indicated and click Submit. You will be taken to the next sign-up page. 5. Create a AetherPal ID. This will be your AetherPal login ID and cannot be changed, so think of one that is secure and easy to remember. 6. Create a AetherPal password. You can change your password at any time. 7. Enter your Password Reset Question and Answer.  This can be used at a later time if you forget your password. 8. Enter your e-mail address. You will receive e-mail notification when new information is available in [address] 9. Click Sign Up. You can now view and download portions of your medical record. 10. Click the Download Summary menu link to download a portable copy of your medical information. Additional Information    If you have questions, please visit the Frequently Asked Questions section of the [employer] website at [email or web address] Remember, [employer] is NOT to be used for urgent needs. For medical emergencies, dial 911.

## 2019-10-15 NOTE — PROGRESS NOTES
TRANSFER - OUT REPORT:    Verbal report given to Dae Pearson RN(name[facility]  being transferred to cp[facility](unit) for routine progression of care       Report consisted of patients Situation, Background, Assessment and   Recommendations(SBAR). Information from the following report(s) SBAR was reviewed with the receiving nurse.    has No Known Allergies. Opportunity for questions and clarification was provided. Procedure Summary:Pt had LHC with 1 stent placed in LAD via R wrist. Site sealed with R band using 11 ml at 1231 hrs. Med Administration    Versed:  4 mg  Fentanyl: 50 mcg    Angiomax Stop Time: 8670    Visit Vitals  /68 (BP 1 Location: Left arm, BP Patient Position: Supine)   Pulse 72   Resp 15   Ht 5' 8\" (1.727 m)   Wt 90.3 kg (199 lb)   SpO2 97%   BMI 30.26 kg/m²     Past Medical History:   Diagnosis Date    Arthritis     osteo    CAD (coronary artery disease) 1999    s/p 3 heart cath . 2 stents . No cardiologist,[facility]    Diabetes ([facility] [address] 2000    type 2, adverage glucose 150-200, symptomatic below 100    Elevated serum cholesterol     GERD (gastroesophageal reflux disease)     diet controlled.  prilosec prn OTC    Hx of colonic polyp 2016    adenoma    Hypercholesterolemia     Hypertension            Peripheral IV 10/15/19 Right Antecubital (Active)       Peripheral IV 10/15/19 Left Antecubital (Active)

## 2019-10-16 LAB
ATRIAL RATE: 69 BPM
CALCULATED P AXIS, ECG09: 39 DEGREES
CALCULATED R AXIS, ECG10: 1 DEGREES
CALCULATED T AXIS, ECG11: 61 DEGREES
DIAGNOSIS, 93000: NORMAL
P-R INTERVAL, ECG05: 192 MS
Q-T INTERVAL, ECG07: 388 MS
QRS DURATION, ECG06: 92 MS
QTC CALCULATION (BEZET), ECG08: 415 MS
VENTRICULAR RATE, ECG03: 69 BPM

## 2019-10-17 NOTE — PROGRESS NOTES
SAME DAY DISCHARGE FOLLOW UP PHONE CALL           NAME : Emily Cruz           : 1960                    DATE/TIME:   10/15/19                           MRN# [id number]        1. Ensure that the patient has had their new prescriptions filled & ask if they have taken their anti-platelet & aspirin that day. Pt has taken asa & effient today as ordered, Reinforced the importance of these 2 medications & to not stop for any reason without talking to cardiologist first, Voice good understanding    2. Ask about access site. Have the patient assess for any signs of a hematoma. Ask about any pain at access site. Right radial without bleeding hematoma, bruising, swelling, pain, numbness or tingling. 3. Ask the patient if they had experienced any chest pain or shortness of breath. Reports has felt good today, denies any chest pain or shortness of breath.

## 2019-10-28 PROBLEM — G56.03 BILATERAL CARPAL TUNNEL SYNDROME: Status: ACTIVE | Noted: 2019-10-28

## 2019-10-28 PROBLEM — G56.23 ENTRAPMENT OF BOTH ULNAR NERVES AT ELBOW: Status: ACTIVE | Noted: 2019-10-28

## 2020-01-24 PROBLEM — G58.9: Status: ACTIVE | Noted: 2020-01-24

## 2020-01-24 PROBLEM — Z79.899 ENCOUNTER FOR MEDICATION MANAGEMENT: Status: ACTIVE | Noted: 2020-01-24

## 2020-01-24 PROBLEM — E11.42 DIABETIC POLYNEUROPATHY ASSOCIATED WITH TYPE 2 DIABETES MELLITUS (HCC): Status: ACTIVE | Noted: 2020-01-24

## 2020-01-24 PROBLEM — G62.9 POLYNEUROPATHY: Status: ACTIVE | Noted: 2020-01-24

## 2020-03-02 ENCOUNTER — HOSPITAL ENCOUNTER (OUTPATIENT)
Dept: LAB | Age: 60
Discharge: HOME | End: 2020-03-02
Payer: COMMERCIAL

## 2020-03-02 LAB
APPEARANCE FLD: NORMAL
COLOR FLD: NORMAL
EOSINOPHIL NFR BRONCH MANUAL: 2 %
LYMPHOCYTES NFR BRONCH MANUAL: 43 %
MACROPHAGES NFR BRONCH MANUAL: 19 %
NEUTROPHILS NFR BRONCH MANUAL: 36 %
NUC CELL # FLD: NORMAL /CU MM
SPECIMEN SOURCE FLD: NORMAL

## 2020-03-02 PROCEDURE — 87205 SMEAR GRAM STAIN: CPT

## 2020-03-02 PROCEDURE — 89060 EXAM SYNOVIAL FLUID CRYSTALS: CPT

## 2020-03-02 PROCEDURE — 89050 BODY FLUID CELL COUNT: CPT

## 2020-03-02 PROCEDURE — 87075 CULTR BACTERIA EXCEPT BLOOD: CPT

## 2020-03-04 LAB
BODY FLD TYPE: NORMAL
CRYSTALS FLD MICRO: NORMAL

## 2020-03-05 LAB
BACTERIA SPEC CULT: NORMAL
GRAM STN SPEC: NORMAL
GRAM STN SPEC: NORMAL
SERVICE CMNT-IMP: NORMAL

## 2020-03-09 LAB
BACTERIA SPEC CULT: NORMAL
SERVICE CMNT-IMP: NORMAL

## 2020-04-20 PROBLEM — G44.83 PRIMARY COUGH HEADACHE: Status: ACTIVE | Noted: 2020-04-20

## 2020-04-20 PROBLEM — I25.10 CORONARY ARTERY DISEASE INVOLVING NATIVE CORONARY ARTERY OF NATIVE HEART WITHOUT ANGINA PECTORIS: Status: ACTIVE | Noted: 2020-04-20

## 2020-05-20 ENCOUNTER — HOSPITAL ENCOUNTER (OUTPATIENT)
Dept: MRI IMAGING | Age: 60
Discharge: HOME | End: 2020-05-20
Attending: FAMILY MEDICINE
Payer: COMMERCIAL

## 2020-05-20 DIAGNOSIS — G44.83 PRIMARY COUGH HEADACHE: ICD-10-CM

## 2020-05-20 DIAGNOSIS — G44.83 COUGH HEADACHE: ICD-10-CM

## 2020-05-20 DIAGNOSIS — H93.A9 PULSATILE TINNITUS: ICD-10-CM

## 2020-05-20 PROCEDURE — 70551 MRI BRAIN STEM W/O DYE: CPT

## 2020-05-20 NOTE — PROGRESS NOTES
Please let Mr. Pebbles Selby know that his MRI was normal, but they are recommending MRA to rule out aneurysm now. Said that they couldn't fully assess it with this image. I will order that for him.

## 2020-06-11 ENCOUNTER — HOSPITAL ENCOUNTER (OUTPATIENT)
Dept: MRI IMAGING | Age: 60
Discharge: HOME | End: 2020-06-11
Attending: FAMILY MEDICINE
Payer: COMMERCIAL

## 2020-06-11 DIAGNOSIS — G44.83 PRIMARY COUGH HEADACHE: ICD-10-CM

## 2020-06-11 PROCEDURE — 70544 MR ANGIOGRAPHY HEAD W/O DYE: CPT

## 2020-06-11 NOTE — PROGRESS NOTES
Please let Mr. Abbie Akhtar know that his MRA was completely normal.  He does not have any aneurysms or masses. He may need to speak with neurology about how to manage his headaches.

## 2020-06-15 NOTE — PROGRESS NOTES
1)educated about diagnosis and treatment and watch closely and if not improved can start azithromycin  2)educated about reasons to see doctor earlier  3)follow-up with Dr. Rowe if not improved/resolved   Pt notified

## 2020-06-22 ENCOUNTER — HOSPITAL ENCOUNTER (OUTPATIENT)
Dept: SURGERY | Age: 60
Discharge: HOME | End: 2020-06-22

## 2021-08-30 PROBLEM — E11.42 DIABETIC POLYNEUROPATHY ASSOCIATED WITH TYPE 2 DIABETES MELLITUS (HCC): Status: RESOLVED | Noted: 2020-01-24 | Resolved: 2021-08-30

## 2022-02-14 PROBLEM — E11.40 TYPE 2 DIABETES MELLITUS WITH DIABETIC NEUROPATHY (HCC): Status: ACTIVE | Noted: 2022-02-14

## 2022-03-18 PROBLEM — Z79.899 ENCOUNTER FOR MEDICATION MANAGEMENT: Status: ACTIVE | Noted: 2020-01-24

## 2022-03-18 PROBLEM — E11.40 TYPE 2 DIABETES MELLITUS WITH DIABETIC NEUROPATHY (HCC): Status: ACTIVE | Noted: 2022-02-14

## 2022-03-18 PROBLEM — G56.23 ENTRAPMENT OF BOTH ULNAR NERVES AT ELBOW: Status: ACTIVE | Noted: 2019-10-28

## 2022-03-18 PROBLEM — G62.9 POLYNEUROPATHY: Status: ACTIVE | Noted: 2020-01-24

## 2022-03-19 PROBLEM — G44.83 PRIMARY COUGH HEADACHE: Status: ACTIVE | Noted: 2020-04-20

## 2022-03-19 PROBLEM — M17.12 ARTHRITIS OF LEFT KNEE: Status: ACTIVE | Noted: 2018-11-15

## 2022-03-19 PROBLEM — I25.10 CORONARY ARTERY DISEASE INVOLVING NATIVE CORONARY ARTERY OF NATIVE HEART WITHOUT ANGINA PECTORIS: Status: ACTIVE | Noted: 2020-04-20

## 2022-03-19 PROBLEM — G56.03 BILATERAL CARPAL TUNNEL SYNDROME: Status: ACTIVE | Noted: 2019-10-28

## 2022-03-19 PROBLEM — G58.9: Status: ACTIVE | Noted: 2020-01-24

## 2022-03-19 PROBLEM — I10 ESSENTIAL HYPERTENSION: Status: ACTIVE | Noted: 2017-08-28

## 2022-05-13 ENCOUNTER — HOSPITAL ENCOUNTER (OUTPATIENT)
Dept: ULTRASOUND IMAGING | Age: 62
Discharge: HOME | End: 2022-05-13
Attending: STUDENT IN AN ORGANIZED HEALTH CARE EDUCATION/TRAINING PROGRAM

## 2022-05-13 DIAGNOSIS — R19.09 LUMP IN THE GROIN: ICD-10-CM

## 2022-05-18 ENCOUNTER — HOSPITAL ENCOUNTER (OUTPATIENT)
Dept: GENERAL RADIOLOGY | Age: 62
Discharge: HOME | End: 2022-05-18

## 2022-05-18 DIAGNOSIS — R59.0 INGUINAL LYMPHADENOPATHY: ICD-10-CM

## 2022-05-20 DIAGNOSIS — R59.0 INGUINAL LYMPHADENOPATHY: Primary | ICD-10-CM

## 2022-05-23 ENCOUNTER — TELEPHONE (OUTPATIENT)
Dept: FAMILY MEDICINE CLINIC | Facility: CLINIC | Age: 62
End: 2022-05-23

## 2022-05-23 NOTE — TELEPHONE ENCOUNTER
Chacorta Newell (Quiñonez: [id number]) - [id number]  Dexcom G6 Transmitter       Status: PA Response - Approved    Created: May 19th, 2022 [address] [facility]    Sent: May 23rd, 2022

## 2022-06-02 ENCOUNTER — OFFICE VISIT (OUTPATIENT)
Dept: ONCOLOGY | Age: 62
End: 2022-06-02
Payer: COMMERCIAL

## 2022-06-02 VITALS
HEIGHT: 68 IN | HEART RATE: 77 BPM | TEMPERATURE: 97.7 F | BODY MASS INDEX: 29.3 KG/M2 | OXYGEN SATURATION: 95 % | WEIGHT: 193.3 LBS | RESPIRATION RATE: 16 BRPM | SYSTOLIC BLOOD PRESSURE: 132 MMHG | DIASTOLIC BLOOD PRESSURE: 75 MMHG

## 2022-06-02 DIAGNOSIS — R59.0 INGUINAL LYMPHADENOPATHY: Primary | ICD-10-CM

## 2022-06-02 PROCEDURE — 99204 OFFICE O/P NEW MOD 45 MIN: CPT | Performed by: INTERNAL MEDICINE

## 2022-06-02 RX ORDER — INSULIN DEGLUDEC 200 U/ML
68 INJECTION, SOLUTION SUBCUTANEOUS DAILY
COMMUNITY
Start: 2022-05-19 | End: 2022-08-19 | Stop reason: SDUPTHER

## 2022-06-02 ASSESSMENT — PATIENT HEALTH QUESTIONNAIRE - PHQ9
SUM OF ALL RESPONSES TO PHQ9 QUESTIONS 1 & 2: 0
SUM OF ALL RESPONSES TO PHQ QUESTIONS 1-9: 0
2. FEELING DOWN, DEPRESSED OR HOPELESS: 0
1. LITTLE INTEREST OR PLEASURE IN DOING THINGS: 0

## 2022-06-02 NOTE — PROGRESS NOTES
New Patient Abstract    Reason for Referral: Inguinal lymphadenopathy    Referring Provider: Mohini Patino MD    Primary Care Provider: Mohini Patino MD    Family History of Cancer/Hematologic Disorders: Family history is significant for father and 7 maternal uncles with colon cancer. Presenting Symptoms: Enlarging lump in his left groin x 2 months    Narrative with recent with Results/Procedures/Biopsies and Dates completed: Mr. Sara Quinteros is a 80-year-old white male with a history of HTN, hypercholesterolemia, colon polyps, GERD, DM2, CAD s/p heart cath x 2 with 3 cardiac stents, arthritis, uvulopalatopharyngoplasty, tonsillectomy, adenoidectomy, and several orthopedic surgeries. He presented to his PCP on 4/29/22 reporting an enlarging lump in his left groin x 2 months. Physical exam confirmed a mildly tender lump in the patients left inguinal region. The right inguinal region was normal, and no inguinal hernia was noted. Ultrasound of the left groin was performed on 5/13/22 revealing enlarged, abnormal appearing left inguinal lymph nodes measuring up to 3.0 x 3.2 x 1.9 cm. Ultrasound directed FNA biopsy was suggested for further assessment as metastatic disease is possible. Labs drawn on 5/16/22 were significant for abs eosinophils 0.9. CRP and PSA were within normal limits and HIV and RPR were negative. Chest x-ray on 5/18/22 was negative for acute abnormality. Patient is now referred to [facility], per primary care, for heme/onc evaluation and treatment of inguinal lymphadenopathy. US EXT NONVAS LT LTD 5/13/2022   FINDINGS: Ultrasound evaluation in the area of patient's palpable left groin lump is performed. Palpable abnormality corresponds to abnormal appearing left inguinal lymph nodes measuring up to 3.0 x 3.2 x 1.9 cm. Right inguinal region shows no evidence of enlarged or abnormal appearing lymph nodes. IMPRESSION: Enlarged abnormal appearing left inguinal lymph nodes.  Ultrasound directed FNA biopsy suggested for further assessment. Metastatic disease is possible. CHEST X-RAY, 2 VIEWS 5/18/22  FINDINGS:   Lungs: are clear. Costophrenic angles: are sharp. Heart size: is normal.   Pulmonary vasculature: is unremarkable. Aorta: Arch calcifications. Included portion of the upper abdomen: is unremarkable. Bones: Thoracic scoliosis with convexity to the right. Other: None. IMPRESSION: Negative for acute abnormality.  Aortic atherosclerosis and scoliosis       3/2/2020 16:07 2/2/2021 11:47 8/4/2021 09:12 2/14/2022 11:09 5/16/2022 14:05   WBC  7.1 7.0 6.4 8.1   RBC  5.19 4.95 5.07 4.68   Hemoglobin Quant  16.0 15.7 15.8 14.8   Hematocrit  45.2 47.0 45.8 42.7   MCV  87 95 90 91   MCH  30.8 31.7 31.2 31.6   MCHC  35.4 33.4 34.5 34.7   RDW  12.7 12.8 12.5 13.3   Platelet Count  501 789 210 194   Neutrophils %  67 66 68 63   Lymphocyte %  18 19 18 15   Monocytes %  9 9 9 8   Eosinophils %  4 4 3 12   Basophils %  1 1 1 1   Neutrophils Absolute  4.8 4.7 4.4 5.2   Lymphocytes Absolute  1.3 1.3 1.1 1.2   Monocytes Absolute  0.6 0.6 0.6 0.6   Eosinophils Absolute  0.3 0.3 0.2 0.9 (H)   Basophils Absolute  0.1 0.1 0.1 0.1   GRANULOCYTE ABSOLUTE COUNT  0.0 0.1 0.0 0.0   Lymphocytes 43       Immature Granulocytes  1 1 1 1      10/15/2019 10:40 2/2/2021 11:47 8/4/2021 09:12 2/14/2022 11:09 5/16/2022 14:05   Sodium  140 137 139    Potassium  4.1 4.7 4.9    Chloride  99 98 98    CO2  25 26 23    BUN,BUNPL  10 14 11    Creatinine  0.86 0.92 0.91    Bun/Cre Ratio  12 15 12    EGFR IF NonAfrican American  94 90 91    GFR   109 104 105    GLUCOSE, FASTING,GF  212 (H) 205 (H) 214 (H)    POC Glucose 189 (H)       CALCIUM, SERUM, 913685  9.8 9.6 9.8    Total Protein  7.0 6.6 6.6    CRP     1   CHOLESTEROL, TOTAL, 795631  125 136 126    HDL Cholesterol  39 (L) 35 (L) 35 (L)    LDL Calculated  63 56 64    Triglycerides  128 286 (H) 159 (H)    VLDL  23 45 (H) 27    Albumin  4.6 4.7 4.5    Albumin/Globulin Ratio 1.9 2.5 (H) 2.1    Alk Phos  77 74 71    ALT  22 24 29    AST  14 17 20    Bilirubin  0.9 0.6 0.7    Hemoglobin A1C  8.8 (H) 8.6 (H) 8.8 (H) 7.5 (H)   eAG (mg/dL)  206 200 206 169   TSH  1.520 2.800 3.040       5/16/2022 14:05   HIV Screen 4th Generation wRfx Non Reactive     Notes from Referring Provider: Patient with left inguinal bulge, ultrasound showed enlarged abnormal appearing left inguinal lymph nodes.     Other Pertinent Information:   11/26/2021 (COVID-19, Radha Ok, Primary or Immunocompromised, PF, 100mcg/0.5mL)  12/24/2021 (COVID-19, Moderna, Primary or Immunocompromised, PF, 100mcg/0.5mL)    Presented at Tumor Board:  N/A

## 2022-06-02 NOTE — PROGRESS NOTES
Negative. Psychiatric/Behavioral: Negative. All other systems reviewed and are negative. Not on File  Past Medical History:   Diagnosis Date    Arthritis     osteo    Bilateral carpal tunnel syndrome 10/28/2019    CAD (coronary artery disease) 1999    s/p 3 heart cath . 2 stents . 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  prilosec prn OTC    Hx of colonic polyp 2016    adenoma    Hypercholesterolemia     Hypertension      Past Surgical History:   Procedure Laterality Date    COLONOSCOPY  last 4/25/16    Leandra--trans TA--3 year recall due to prep quality    CORONARY ANGIOPLASTY WITH STENT PLACEMENT  3001,5840    heart cath times 3 - 2 total stents     KNEE ARTHROSCOPY Left 1985    LA CARDIAC SURG PROCEDURE UNLIST  2019    3 rd stent -- cardiac placed    TONSILLECTOMY AND ADENOIDECTOMY  1999    TOTAL KNEE ARTHROPLASTY Left 11/15/2018    UVULOPALATOPHARYGOPLASTY       Family History   Problem Relation Age of Onset    Coronary Art Dis Neg Hx     Lung Disease Mother     Cancer Maternal Uncle         colon    Cancer Maternal Uncle         colon    Cancer Maternal Uncle         colon    Cancer Maternal Uncle         colon    Cancer Maternal Uncle         colon    Cancer Maternal Uncle         colon    Cancer Maternal Uncle         colon    Colon Cancer Father     Cancer Father     Asthma Mother      Social History     Socioeconomic History    Marital status:      Spouse name: Not on file    Number of children: Not on file    Years of education: Not on file    Highest education level: Not on file   Occupational History    Not on file   Tobacco Use    Smoking status: Never Smoker    Smokeless tobacco: Never Used   Substance and Sexual Activity  Alcohol use: Yes     Alcohol/week: 2.0 standard drinks    Drug use: No    Sexual activity: Not on file   Other Topics Concern    Not on file   Social History Narrative    Not on file     Social Determinants of Health     Financial Resource Strain:     Difficulty of Paying Living Expenses: Not on file   Food Insecurity:     Worried About Running Out of Food in the Last Year: Not on file    Hortensia of Food in the Last Year: Not on file   Transportation Needs:     Lack of Transportation (Medical): Not on file    Lack of Transportation (Non-Medical): Not on file   Physical Activity:     Days of Exercise per Week: Not on file    Minutes of Exercise per Session: Not on file   Stress:     Feeling of Stress : Not on file   Social Connections:     Frequency of Communication with Friends and Family: Not on file    Frequency of Social Gatherings with Friends and Family: Not on file    Attends Gnosticism Services: Not on file    Active Member of [address] [place] [address] or Organizations: Not on file    Attends Club or Organization Meetings: Not on file    Marital Status: Not on file   Intimate Partner Violence:     Fear of Current or Ex-Partner: Not on file    Emotionally Abused: Not on file    Physically Abused: Not on file    Sexually Abused: Not on file   Housing Stability:     Unable to Pay for Housing in the Last Year: Not on file    Number of [place] in the Last Year: Not on file    Unstable Housing in the Last Year: Not on file     Current Outpatient Medications   Medication Sig Dispense Refill    BIOTIN PO Take by mouth daily      ZINC PO Take by mouth daily      ascorbic acid (VITAMIN C) 500 MG tablet Take 500 mg by mouth daily      aspirin 81 MG EC tablet Take 81 mg by mouth every 12 hours      atorvastatin (LIPITOR) 40 MG tablet Take 20 mg by mouth daily      eszopiclone (LUNESTA) 3 MG TABS Take 3 mg by mouth.       ibuprofen (ADVIL;MOTRIN) 800 MG tablet TAKE ONE TABLET BY MOUTH EVERY 8 HOURS AS NEEDED FOR PAIN      lisinopril (PRINIVIL;ZESTRIL) 10 MG tablet Take 10 mg by mouth daily      melatonin 5 MG TABS tablet Take 10 mg by mouth      metFORMIN (GLUCOPHAGE) 1000 MG tablet Take 1,000 mg by mouth 2 times daily      nitroGLYCERIN (NITROSTAT) 0.4 MG SL tablet Place 0.4 mg under the tongue      Semaglutide,0.25 or 0.5MG/DOS, (OZEMPIC, 0.25 OR 0.5 MG/DOSE,) 2 MG/1.5ML SOPN Inject 0.25 mg into the skin every 7 days      traZODone (DESYREL) 100 MG tablet Take 100 mg by mouth       No current facility-administered medications for this visit. OBJECTIVE:  Ht 5' 8\" (1.727 m)   Wt 193 lb 4.8 oz (87.7 kg)   BMI 29.39 kg/m²     Physical Exam:  Constitutional: Well developed, well nourished male in no acute distress, sitting comfortably on the examination table. HEENT: Normocephalic and atraumatic. Sclerae anicteric. Neck supple without JVD. No thyromegaly present. Lymph node   No palpable submandibular, cervical, supraclavicular, axillary lymph nodes. Palpable 3 cm left inguinal node. Skin Warm and dry. No bruising and no rash noted. No erythema. No pallor. Respiratory Lungs are clear to auscultation bilaterally without wheezes, rales or rhonchi, normal air exchange without accessory muscle use. CVS Normal rate, regular rhythm and normal S1 and S2. No murmurs, gallops, or rubs. Abdomen Soft, nontender and nondistended, normoactive bowel sounds. No palpable mass. No hepatosplenomegaly. Neuro Grossly nonfocal with no obvious sensory or motor deficits. MSK Normal range of motion in general.  No edema and no tenderness. Psych Appropriate mood and affect. Labs:  No results found for this or any previous visit (from the past 24 hour(s)). Imaging:  XR CHEST (2 VW)    Result Date: 5/18/2022  Negative for acute abnormality.  Aortic atherosclerosis and scoliosis     US EXTREMITY LEFT NON VASC LIMITED    Result Date: 5/13/2022  Enlarged abnormal appearing left inguinal lymph nodes. Ultrasound directed FNA biopsy suggested for further assessment. Metastatic disease is possible. ASSESSMENT:   Diagnosis Orders   1. Inguinal lymphadenopathy  [facility]           PLAN:  Lab studies were personally reviewed. Pertinent old records were reviewed from PCP    Lymphadenopathy: left groin, 3.2 cm on ultrasound, gradually enlarging, no cross-sectional imaging available. Ultrasound raised suspicion for possible malignancy - infectious or inflammatory also in the differential.  Biopsy has been recommended, but because this is palpable and lymphoproliferative disorder is in the differential, I would prefer excisional biopsy rather than needle biopsy. Further recommendations including imaging would be based on biopsy results. They understand and agree to proceed. All questions were asked and answered to the best of my ability. F/u after biopsy for plan of care.             Ade Hernandez MD, MD  [facility]  [address] [place] [address]  Office : [phone]  Fax : [phone]

## 2022-06-02 NOTE — PATIENT INSTRUCTIONS
Patient Instructions from Today's Visit    Reason for Visit:  New Patient Visit - Inguinal Lymphadenopathy      Plan:  - Your Ultrasound indicated need for biopsy of the lymph node. Follow Up:  - about 2 weeks after biopsy      Treatment Summary has been discussed and given to patient: n/a        -------------------------------------------------------------------------------------------------------------------  Please call our office at [phone] if you have any  of the following symptoms:   · Fever of 100.5 or greater  · Chills  · Shortness of breath  · Swelling or pain in one leg    After office hours an answering service is available and will contact a provider for emergencies or if you are experiencing any of the above symptoms.  Patient did express an interest in My Chart. My Chart log in information explained on the after visit summary printout at the [facility] [address] [address] desk.     Suzanne Alfredo RN      [id number]  Expires: 7/1/2022  3:37 PM

## 2022-06-07 ENCOUNTER — OFFICE VISIT (OUTPATIENT)
Dept: SURGERY | Age: 62
End: 2022-06-07
Payer: COMMERCIAL

## 2022-06-07 VITALS
WEIGHT: 191 LBS | HEART RATE: 86 BPM | DIASTOLIC BLOOD PRESSURE: 95 MMHG | SYSTOLIC BLOOD PRESSURE: 148 MMHG | HEIGHT: 68 IN | BODY MASS INDEX: 28.95 KG/M2

## 2022-06-07 DIAGNOSIS — R59.0 INGUINAL LYMPHADENOPATHY: Primary | ICD-10-CM

## 2022-06-07 PROCEDURE — 99203 OFFICE O/P NEW LOW 30 MIN: CPT | Performed by: SURGERY

## 2022-06-07 ASSESSMENT — ENCOUNTER SYMPTOMS
ALLERGIC/IMMUNOLOGIC NEGATIVE: 1
GASTROINTESTINAL NEGATIVE: 1
RESPIRATORY NEGATIVE: 1

## 2022-06-07 NOTE — PROGRESS NOTES
Sussy Alcazar MD, [facility], [address] [place] [address]  Farshad Mclaughlin  Phone [phone]   Fax [phone]      Date of visit: 6/21/2022     Primary/Requesting provider: Carlin Lal MD    Chief Complaint   Patient presents with    New Patient     inguinal lymphadenopathy        Patient is a 64 y.o. male who presents for  evaluation regarding inguinal lymphadenopathy, with his wife. He presented to his PCP several weeks ago due to a lump in his left groin for the past 2 months. He confirmed a mildly tender lump in the patients left inguinal region not c/w hernia thus he sent for imaging. He denies any other active symptoms, no recent weight loss, no fevers or chills, no pruritis, night sweats, abd pain and no other notable persistent masses. Of note, he had a knee replacement two years ago on the left and has had difficulty with the hardware ever since but has no active infection on that side. In addition, he denies any worrisome/suspicious skin lesions on the left leg or any recent skin/nail infection. Medications:   Current Outpatient Medications on File Prior to Visit   Medication Sig Dispense Refill    Insulin Degludec (TRESIBA FLEXTOUCH) 200 UNIT/ML SOPN Inject 68 Units into the skin daily      ZINC PO Take by mouth daily      ascorbic acid (VITAMIN C) 500 MG tablet Take 500 mg by mouth daily      aspirin 81 MG EC tablet Take 81 mg by mouth daily       atorvastatin (LIPITOR) 40 MG tablet Take 20 mg by mouth daily      ibuprofen (ADVIL;MOTRIN) 800 MG tablet TAKE ONE TABLET BY MOUTH EVERY 8 HOURS AS NEEDED FOR PAIN      melatonin 5 MG TABS tablet Take 10 mg by mouth      metFORMIN (GLUCOPHAGE) 1000 MG tablet Take 1,000 mg by mouth 2 times daily      traZODone (DESYREL) 100 MG tablet Take 100 mg by mouth        No current facility-administered medications on file prior to visit.         Allergies: No Known Allergies     Past History:  Past Medical History:   Diagnosis Date    Arthritis     osteo    Bilateral carpal tunnel syndrome 10/28/2019    CAD (coronary artery disease) 1999    3 stents--last one placed 2019, pt on daily 81 mg ASA.  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    GERD (gastroesophageal reflux disease)     diet controlled    Hx of colonic polyp 2016    adenoma    Hypercholesterolemia     Hypertension     managed with medication      Past Surgical History:   Procedure Laterality Date    COLONOSCOPY  last 4/25/16    Leandra--trans TA--3 year recall due to prep quality    CORONARY ANGIOPLASTY WITH STENT PLACEMENT  2913,6864, 2019    heart cath times 3 - 3 total stents    KNEE ARTHROSCOPY Left 1985    LYMPH NODE BIOPSY Left 6/10/2022    LEFT INGUINAL BIOPSY performed by Dulce Hastings MD at [facility]  2019    3 rd stent -- cardiac placed    SKIN BIOPSY N/A 6/10/2022    EXCISION BACK NEVUS WL PER ERICK/REQUEST TO FOLLOW performed by Dulce Hastings MD at [address] [place] [address] Left 11/15/2018   [facility]          Family and Social History:  Family History   Problem Relation Age of Onset    Coronary Art Dis Neg Hx     Lung Disease Mother     Cancer Maternal Uncle         colon    Cancer Maternal Uncle         colon    Cancer Maternal Uncle         colon    Cancer Maternal Uncle         colon    Cancer Maternal Uncle         colon    Cancer Maternal Uncle         colon    Cancer Maternal Uncle         colon    Colon Cancer Father     Cancer Father     Asthma Mother      Social History     Socioeconomic History    Marital status:      Spouse name: Not on file    Number of children: Not on file    Years of education: Not on file    Highest education level: Not on file   Occupational History    Not on file   Tobacco Use    Smoking status: Never Smoker    Smokeless tobacco: Never Used   Substance and Sexual Activity    Alcohol use: Yes     Alcohol/week: 2.0 standard drinks    Drug use: No    Sexual activity: Not on file   Other Topics Concern    Not on file   Social History Narrative    Not on file     Social Determinants of Health     Financial Resource Strain:     Difficulty of Paying Living Expenses: Not on file   Food Insecurity:     Worried About Running Out of Food in the Last Year: Not on file    Hortensia of Food in the Last Year: Not on file   Transportation Needs:     Lack of Transportation (Medical): Not on file    Lack of Transportation (Non-Medical): Not on file   Physical Activity:     Days of Exercise per Week: Not on file    Minutes of Exercise per Session: Not on file   Stress:     Feeling of Stress : Not on file   Social Connections:     Frequency of Communication with Friends and Family: Not on file    Frequency of Social Gatherings with Friends and Family: Not on file    Attends Church Services: Not on file    Active Member of [address] [place] [address] or Organizations: Not on file    Attends Club or Organization Meetings: Not on file    Marital Status: Not on file   Intimate Partner Violence:     Fear of Current or Ex-Partner: Not on file    Emotionally Abused: Not on file    Physically Abused: Not on file    Sexually Abused: Not on file   Housing Stability:     Unable to Pay for Housing in the Last Year: Not on file    Number of [place] in the Last Year: Not on file    Unstable Housing in the Last Year: Not on file             Review of Systems   Constitutional: Negative. HENT: Negative. Eyes:        Glasses   Respiratory: Negative. Cardiovascular: Negative. Gastrointestinal: Negative. Endocrine: Negative. Genitourinary: Negative. Musculoskeletal: Negative. Allergic/Immunologic: Negative. Neurological: Negative. Hematological: Negative. Psychiatric/Behavioral: Negative. Physical Exam  Vitals and nursing note reviewed. HENT:      Head: Normocephalic and atraumatic. Eyes:      General: No scleral icterus. Pupils: Pupils are equal, round, and reactive to light. Cardiovascular:      Rate and Rhythm: Normal rate. Pulmonary:      Effort: Pulmonary effort is normal. No respiratory distress. Breath sounds: No stridor. Chest:   Breasts:      Right: No axillary adenopathy. Left: No axillary adenopathy. Lymphadenopathy:      Cervical: No cervical adenopathy. Upper Body:      Right upper body: No axillary adenopathy. Left upper body: No axillary adenopathy. Lower Body: No right inguinal adenopathy. Left inguinal adenopathy (moderately enlarged, readily palpable, mildly tender nodes) present. Neurological:      General: No focal deficit present. Mental Status: He is alert and oriented to person, place, and time. Cranial Nerves: No cranial nerve deficit. Psychiatric:         Mood and Affect: Mood normal.         Behavior: Behavior normal.         Thought Content: Thought content normal.         Judgment: Judgment normal.       Lab Results   Component Value Date    WBC 8.1 05/16/2022    HGB 14.8 05/16/2022    HCT 42.7 05/16/2022    MCV 91 05/16/2022     05/16/2022       Xray Result (most recent):  XR CHEST STANDARD TWO VW 05/18/2022    Narrative  CHEST X-RAY, 2 views. INDICATION:  Cough and chest tightness for one year. July 2019    TECHNIQUE: PA and lateral views. COMPARISON: None. FINDINGS:  Lungs: are clear. Costophrenic angles: are sharp. Heart size: is normal.  Pulmonary vasculature: is unremarkable. Aorta: Arch calcifications. Included portion of the upper abdomen: is unremarkable. Bones: Thoracic scoliosis with convexity to the right. Other: None.     Impression  Negative for acute abnormality. Aortic atherosclerosis and scoliosis       1. Inguinal lymphadenopathy           Previous visit with Dr Jie Marrero referenced. Excisional biopsy recommended/requested to allow adequate tissue for subtyping should lymphoma be diagnosed. Also in differential would be chronic hardware or acute soft tissue infection, neither of which are clinically present, or neoplasm of the leg such as melanoma, which is not apparent. Will arrange excisional biopsy of the left inguinal node(s). Procedure reviewed. Risks reviewed to include bleeding, infection, scarring, recurrence, and low risk of lymphedema. Pt/wife counseled regarding my limited ability to assist them with information regarding implications and treatment of various lymphoma subtypes. Trisha Pal

## 2022-06-10 ENCOUNTER — ANESTHESIA (OUTPATIENT)
Dept: SURGERY | Age: 62
End: 2022-06-10
Payer: COMMERCIAL

## 2022-06-10 ENCOUNTER — HOSPITAL ENCOUNTER (OUTPATIENT)
Age: 62
Setting detail: OUTPATIENT SURGERY
Discharge: HOME | End: 2022-06-10
Attending: SURGERY | Admitting: SURGERY
Payer: COMMERCIAL

## 2022-06-10 ENCOUNTER — ANESTHESIA EVENT (OUTPATIENT)
Dept: SURGERY | Age: 62
End: 2022-06-10
Payer: COMMERCIAL

## 2022-06-10 VITALS
TEMPERATURE: 98.6 F | WEIGHT: 193 LBS | RESPIRATION RATE: 16 BRPM | SYSTOLIC BLOOD PRESSURE: 150 MMHG | BODY MASS INDEX: 29.25 KG/M2 | OXYGEN SATURATION: 97 % | HEART RATE: 78 BPM | DIASTOLIC BLOOD PRESSURE: 80 MMHG | HEIGHT: 68 IN

## 2022-06-10 DIAGNOSIS — R59.0 LYMPHADENOPATHY, INGUINAL: Primary | ICD-10-CM

## 2022-06-10 LAB
GLUCOSE BLD STRIP.AUTO-MCNC: 192 MG/DL (ref 65–100)
SERVICE CMNT-IMP: ABNORMAL

## 2022-06-10 PROCEDURE — 7100000001 HC PACU RECOVERY - ADDTL 15 MIN: Performed by: SURGERY

## 2022-06-10 PROCEDURE — 88305 TISSUE EXAM BY PATHOLOGIST: CPT

## 2022-06-10 PROCEDURE — 2709999900 HC NON-CHARGEABLE SUPPLY: Performed by: SURGERY

## 2022-06-10 PROCEDURE — 3600000002 HC SURGERY LEVEL 2 BASE: Performed by: SURGERY

## 2022-06-10 PROCEDURE — 3700000001 HC ADD 15 MINUTES (ANESTHESIA): Performed by: SURGERY

## 2022-06-10 PROCEDURE — 6360000002 HC RX W HCPCS: Performed by: ANESTHESIOLOGY

## 2022-06-10 PROCEDURE — 2500000003 HC RX 250 WO HCPCS: Performed by: NURSE ANESTHETIST, CERTIFIED REGISTERED

## 2022-06-10 PROCEDURE — 3700000000 HC ANESTHESIA ATTENDED CARE: Performed by: SURGERY

## 2022-06-10 PROCEDURE — 38531 OPEN BX/EXC INGUINOFEM NODES: CPT | Performed by: SURGERY

## 2022-06-10 PROCEDURE — 2580000003 HC RX 258: Performed by: ANESTHESIOLOGY

## 2022-06-10 PROCEDURE — 11301 SHAVE SKIN LESION 0.6-1.0 CM: CPT | Performed by: SURGERY

## 2022-06-10 PROCEDURE — 6360000002 HC RX W HCPCS: Performed by: NURSE ANESTHETIST, CERTIFIED REGISTERED

## 2022-06-10 PROCEDURE — 82962 GLUCOSE BLOOD TEST: CPT

## 2022-06-10 PROCEDURE — 7100000000 HC PACU RECOVERY - FIRST 15 MIN: Performed by: SURGERY

## 2022-06-10 PROCEDURE — 2500000003 HC RX 250 WO HCPCS: Performed by: SURGERY

## 2022-06-10 PROCEDURE — 6370000000 HC RX 637 (ALT 250 FOR IP): Performed by: ANESTHESIOLOGY

## 2022-06-10 PROCEDURE — 3600000012 HC SURGERY LEVEL 2 ADDTL 15MIN: Performed by: SURGERY

## 2022-06-10 RX ORDER — SODIUM CHLORIDE 0.9 % (FLUSH) 0.9 %
5-40 SYRINGE (ML) INJECTION EVERY 12 HOURS SCHEDULED
Status: DISCONTINUED | OUTPATIENT
Start: 2022-06-10 | End: 2022-06-10 | Stop reason: HOSPADM

## 2022-06-10 RX ORDER — ONDANSETRON 2 MG/ML
4 INJECTION INTRAMUSCULAR; INTRAVENOUS
Status: DISCONTINUED | OUTPATIENT
Start: 2022-06-10 | End: 2022-06-10 | Stop reason: HOSPADM

## 2022-06-10 RX ORDER — LABETALOL HYDROCHLORIDE 5 MG/ML
10 INJECTION, SOLUTION INTRAVENOUS
Status: DISCONTINUED | OUTPATIENT
Start: 2022-06-10 | End: 2022-06-10 | Stop reason: HOSPADM

## 2022-06-10 RX ORDER — SODIUM CHLORIDE 0.9 % (FLUSH) 0.9 %
5-40 SYRINGE (ML) INJECTION PRN
Status: DISCONTINUED | OUTPATIENT
Start: 2022-06-10 | End: 2022-06-10 | Stop reason: HOSPADM

## 2022-06-10 RX ORDER — HYDROMORPHONE HYDROCHLORIDE 1 MG/ML
0.5 INJECTION, SOLUTION INTRAMUSCULAR; INTRAVENOUS; SUBCUTANEOUS EVERY 5 MIN PRN
Status: DISCONTINUED | OUTPATIENT
Start: 2022-06-10 | End: 2022-06-10 | Stop reason: HOSPADM

## 2022-06-10 RX ORDER — DEXTROSE MONOHYDRATE 50 MG/ML
100 INJECTION, SOLUTION INTRAVENOUS PRN
Status: DISCONTINUED | OUTPATIENT
Start: 2022-06-10 | End: 2022-06-10 | Stop reason: HOSPADM

## 2022-06-10 RX ORDER — FENTANYL CITRATE 50 UG/ML
INJECTION, SOLUTION INTRAMUSCULAR; INTRAVENOUS PRN
Status: DISCONTINUED | OUTPATIENT
Start: 2022-06-10 | End: 2022-06-10 | Stop reason: SDUPTHER

## 2022-06-10 RX ORDER — ACETAMINOPHEN 500 MG
1000 TABLET ORAL ONCE
Status: DISCONTINUED | OUTPATIENT
Start: 2022-06-10 | End: 2022-06-10 | Stop reason: HOSPADM

## 2022-06-10 RX ORDER — GLUCAGON 1 MG/ML
1 KIT INJECTION PRN
Status: DISCONTINUED | OUTPATIENT
Start: 2022-06-10 | End: 2022-06-10 | Stop reason: HOSPADM

## 2022-06-10 RX ORDER — MIDAZOLAM HYDROCHLORIDE 2 MG/2ML
2 INJECTION, SOLUTION INTRAMUSCULAR; INTRAVENOUS
Status: COMPLETED | OUTPATIENT
Start: 2022-06-10 | End: 2022-06-10

## 2022-06-10 RX ORDER — HYDROCODONE BITARTRATE AND ACETAMINOPHEN 5; 325 MG/1; MG/1
1 TABLET ORAL EVERY 4 HOURS PRN
Qty: 10 TABLET | Refills: 0 | Status: SHIPPED | OUTPATIENT
Start: 2022-06-10 | End: 2022-06-15

## 2022-06-10 RX ORDER — HYDROMORPHONE HYDROCHLORIDE 1 MG/ML
0.25 INJECTION, SOLUTION INTRAMUSCULAR; INTRAVENOUS; SUBCUTANEOUS EVERY 5 MIN PRN
Status: DISCONTINUED | OUTPATIENT
Start: 2022-06-10 | End: 2022-06-10 | Stop reason: HOSPADM

## 2022-06-10 RX ORDER — HYDROMORPHONE HYDROCHLORIDE 1 MG/ML
0.25 INJECTION, SOLUTION INTRAMUSCULAR; INTRAVENOUS; SUBCUTANEOUS
Status: DISCONTINUED | OUTPATIENT
Start: 2022-06-10 | End: 2022-06-10 | Stop reason: HOSPADM

## 2022-06-10 RX ORDER — OXYCODONE HYDROCHLORIDE 5 MG/1
5 TABLET ORAL PRN
Status: COMPLETED | OUTPATIENT
Start: 2022-06-10 | End: 2022-06-10

## 2022-06-10 RX ORDER — BUPIVACAINE HYDROCHLORIDE AND EPINEPHRINE 5; 5 MG/ML; UG/ML
INJECTION, SOLUTION EPIDURAL; INTRACAUDAL; PERINEURAL PRN
Status: DISCONTINUED | OUTPATIENT
Start: 2022-06-10 | End: 2022-06-10 | Stop reason: ALTCHOICE

## 2022-06-10 RX ORDER — HYDROMORPHONE HYDROCHLORIDE 1 MG/ML
0.5 INJECTION, SOLUTION INTRAMUSCULAR; INTRAVENOUS; SUBCUTANEOUS
Status: DISCONTINUED | OUTPATIENT
Start: 2022-06-10 | End: 2022-06-10 | Stop reason: HOSPADM

## 2022-06-10 RX ORDER — PROPOFOL 10 MG/ML
INJECTION, EMULSION INTRAVENOUS PRN
Status: DISCONTINUED | OUTPATIENT
Start: 2022-06-10 | End: 2022-06-10 | Stop reason: SDUPTHER

## 2022-06-10 RX ORDER — MIDAZOLAM HYDROCHLORIDE 1 MG/ML
INJECTION INTRAMUSCULAR; INTRAVENOUS PRN
Status: DISCONTINUED | OUTPATIENT
Start: 2022-06-10 | End: 2022-06-10 | Stop reason: SDUPTHER

## 2022-06-10 RX ORDER — SODIUM CHLORIDE, SODIUM LACTATE, POTASSIUM CHLORIDE, CALCIUM CHLORIDE 600; 310; 30; 20 MG/100ML; MG/100ML; MG/100ML; MG/100ML
INJECTION, SOLUTION INTRAVENOUS CONTINUOUS
Status: DISCONTINUED | OUTPATIENT
Start: 2022-06-10 | End: 2022-06-10 | Stop reason: HOSPADM

## 2022-06-10 RX ORDER — LIDOCAINE HYDROCHLORIDE 10 MG/ML
1 INJECTION, SOLUTION INFILTRATION; PERINEURAL
Status: DISCONTINUED | OUTPATIENT
Start: 2022-06-10 | End: 2022-06-10 | Stop reason: HOSPADM

## 2022-06-10 RX ORDER — OXYCODONE HYDROCHLORIDE 5 MG/1
10 TABLET ORAL PRN
Status: COMPLETED | OUTPATIENT
Start: 2022-06-10 | End: 2022-06-10

## 2022-06-10 RX ORDER — ONDANSETRON 2 MG/ML
INJECTION INTRAMUSCULAR; INTRAVENOUS PRN
Status: DISCONTINUED | OUTPATIENT
Start: 2022-06-10 | End: 2022-06-10 | Stop reason: SDUPTHER

## 2022-06-10 RX ORDER — LIDOCAINE HYDROCHLORIDE 20 MG/ML
INJECTION, SOLUTION EPIDURAL; INFILTRATION; INTRACAUDAL; PERINEURAL PRN
Status: DISCONTINUED | OUTPATIENT
Start: 2022-06-10 | End: 2022-06-10 | Stop reason: SDUPTHER

## 2022-06-10 RX ORDER — SODIUM CHLORIDE 9 MG/ML
INJECTION, SOLUTION INTRAVENOUS PRN
Status: DISCONTINUED | OUTPATIENT
Start: 2022-06-10 | End: 2022-06-10 | Stop reason: HOSPADM

## 2022-06-10 RX ADMIN — LIDOCAINE HYDROCHLORIDE 100 MG: 20 INJECTION, SOLUTION EPIDURAL; INFILTRATION; INTRACAUDAL; PERINEURAL at 16:29

## 2022-06-10 RX ADMIN — PHENYLEPHRINE HYDROCHLORIDE 100 MCG: 0.1 INJECTION, SOLUTION INTRAVENOUS at 16:55

## 2022-06-10 RX ADMIN — MIDAZOLAM HYDROCHLORIDE 2 MG: 2 INJECTION, SOLUTION INTRAMUSCULAR; INTRAVENOUS at 14:46

## 2022-06-10 RX ADMIN — ONDANSETRON 4 MG: 2 INJECTION INTRAMUSCULAR; INTRAVENOUS at 17:11

## 2022-06-10 RX ADMIN — SODIUM CHLORIDE, SODIUM LACTATE, POTASSIUM CHLORIDE, AND CALCIUM CHLORIDE: 600; 310; 30; 20 INJECTION, SOLUTION INTRAVENOUS at 16:38

## 2022-06-10 RX ADMIN — PROPOFOL 200 MG: 10 INJECTION, EMULSION INTRAVENOUS at 16:29

## 2022-06-10 RX ADMIN — MIDAZOLAM 2 MG: 1 INJECTION INTRAMUSCULAR; INTRAVENOUS at 16:22

## 2022-06-10 RX ADMIN — FENTANYL CITRATE 100 MCG: 50 INJECTION INTRAMUSCULAR; INTRAVENOUS at 16:29

## 2022-06-10 RX ADMIN — SODIUM CHLORIDE, SODIUM LACTATE, POTASSIUM CHLORIDE, AND CALCIUM CHLORIDE: 600; 310; 30; 20 INJECTION, SOLUTION INTRAVENOUS at 12:49

## 2022-06-10 RX ADMIN — OXYCODONE 10 MG: 5 TABLET ORAL at 17:29

## 2022-06-10 ASSESSMENT — PAIN SCALES - GENERAL
PAINLEVEL_OUTOF10: 0

## 2022-06-10 ASSESSMENT — PAIN - FUNCTIONAL ASSESSMENT: PAIN_FUNCTIONAL_ASSESSMENT: NONE - DENIES PAIN

## 2022-06-10 NOTE — H&P
Years of education: None    Highest education level: None   Occupational History    None   Tobacco Use    Smoking status: Never Smoker    Smokeless tobacco: Never Used   Substance and Sexual Activity    Alcohol use: Yes     Alcohol/week: 2.0 standard drinks    Drug use: No    Sexual activity: None   Other Topics Concern    None   Social History Narrative    None     Social Determinants of Health     Financial Resource Strain:     Difficulty of Paying Living Expenses: Not on file   Food Insecurity:     Worried About Running Out of Food in the Last Year: Not on file    Hortensia of Food in the Last Year: Not on file   Transportation Needs:     Lack of Transportation (Medical): Not on file    Lack of Transportation (Non-Medical):  Not on file   Physical Activity:     Days of Exercise per Week: Not on file    Minutes of Exercise per Session: Not on file   Stress:     Feeling of Stress : Not on file   Social Connections:     Frequency of Communication with Friends and Family: Not on file    Frequency of Social Gatherings with Friends and Family: Not on file    Attends Hoahaoism Services: Not on file    Active Member of [address] [place] [address] or Organizations: Not on file    Attends Club or Organization Meetings: Not on file    Marital Status: Not on file   Intimate Partner Violence:     Fear of Current or Ex-Partner: Not on file    Emotionally Abused: Not on file    Physically Abused: Not on file    Sexually Abused: Not on file   Housing Stability:     Unable to Pay for Housing in the Last Year: Not on file    Number of [place] in the Last Year: Not on file    Unstable Housing in the Last Year: Not on file        Current Facility-Administered Medications   Medication Dose Route Frequency    lidocaine 1 % injection 1 mL  1 mL IntraDERmal Once PRN    acetaminophen (TYLENOL) tablet 1,000 mg  1,000 mg Oral Once    HYDROmorphone HCl PF (DILAUDID) injection 0.25 mg  0.25 mg IntraVENous Once PRN    Or    HYDROmorphone HCl PF (DILAUDID) injection 0.5 mg  0.5 mg IntraVENous Once PRN    lactated ringers infusion   IntraVENous Continuous    sodium chloride flush 0.9 % injection 5-40 mL  5-40 mL IntraVENous 2 times per day    sodium chloride flush 0.9 % injection 5-40 mL  5-40 mL IntraVENous PRN    0.9 % sodium chloride infusion   IntraVENous PRN    midazolam PF (VERSED) injection 2 mg  2 mg IntraVENous Once PRN        No Known Allergies    Review of Systems:  n/a    Objective:        Patient Vitals for the past 8 hrs:   BP Temp Temp src Pulse Resp SpO2 Height Weight   06/10/22 1142 (!) 140/77 97.2 °F (36.2 °C) Temporal 77 18 97 % 5' 8\" (1.727 m) 193 lb (87.5 kg)       Temp (24hrs), Av.2 °F (36.2 °C), Min:97.2 °F (36.2 °C), Max:97.2 °F (36.2 °C)      Physical Exam:  Lung CTA  CV-RRR  abd-soft  (+)left inguinal enlarged nodes      Labs:   Recent Results (from the past 24 hour(s))   POCT Glucose    Collection Time: 06/10/22 12:55 PM   Result Value Ref Range    POC Glucose 192 (H) 65 - 100 mg/dL    Performed by: Brandan Holloway        Data Review     Assessment:     Left inguinal lymphadenopathy    Plan:     Excisional bx.     Signed By: Jose Melendrez MD     Ana Maria 10, 2022

## 2022-06-10 NOTE — ANESTHESIA PRE PROCEDURE
HYDROmorphone HCl PF (DILAUDID) injection 0.5 mg  0.5 mg IntraVENous Once PRN Michael Osorio MD        lactated ringers infusion   IntraVENous Continuous Michael Osorio  mL/hr at 06/10/22 1249 New Bag at 06/10/22 1249    sodium chloride flush 0.9 % injection 5-40 mL  5-40 mL IntraVENous 2 times per day Michael Osorio MD        sodium chloride flush 0.9 % injection 5-40 mL  5-40 mL IntraVENous PRN Michael Osorio MD        0.9 % sodium chloride infusion   IntraVENous PRN Michael Osorio MD           Allergies:  No Known Allergies    Problem List:    Patient Active Problem List   Diagnosis Code    Polyneuropathy G62.9    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    Type 2 diabetes mellitus with diabetic neuropathy ([facility] E11.40    Encounter for medication management Z79.899    Primary cough headache G44.83    Type 2 diabetes mellitus without complication, without long-term current use of insulin ([facility] E11.9    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 10/28/2019    GERD (gastroesophageal reflux disease)     diet controlled    Hx of colonic polyp 2016    adenoma    Hypercholesterolemia  Hypertension     managed with medication       Past Surgical History:        Procedure Laterality Date    COLONOSCOPY  last 4/25/16    Leandra--trans TA--3 year recall due to prep quality    CORONARY ANGIOPLASTY WITH STENT PLACEMENT  7329,2711, 2019    heart cath times 3 - 3 total stents    KNEE ARTHROSCOPY Left 1985    CA CARDIAC SURG PROCEDURE UNLIST  2019    3 rd stent -- cardiac placed   [place]    TOTAL KNEE ARTHROPLASTY Left 11/15/2018    UVULOPALATOPHARYGOPLASTY         Social History:    Social History     Tobacco Use    Smoking status: Never Smoker    Smokeless tobacco: Never Used   Substance Use Topics    Alcohol use: Yes     Alcohol/week: 2.0 standard drinks                                Counseling given: Not Answered      Vital Signs (Current):   Vitals:    06/09/22 0826 06/10/22 1142   BP:  (!) 140/77   Pulse:  77   Resp:  18   Temp:  97.2 °F (36.2 °C)   TempSrc:  Temporal   SpO2:  97%   Weight: 191 lb (86.6 kg) 193 lb (87.5 kg)   Height: 5' 8\" (1.727 m) 5' 8\" (1.727 m)                                              BP Readings from Last 3 Encounters:   06/10/22 (!) 140/77   06/07/22 (!) 148/95   06/02/22 132/75       NPO Status: Time of last liquid consumption: 2100                        Time of last solid consumption: 2100                        Date of last liquid consumption: 06/09/22                        Date of last solid food consumption: 06/09/22    BMI:   Wt Readings from Last 3 Encounters:   06/10/22 193 lb (87.5 kg)   06/07/22 191 lb (86.6 kg)   06/02/22 193 lb 4.8 oz (87.7 kg)     Body mass index is 29.35 kg/m².     CBC:   Lab Results   Component Value Date    WBC 8.1 05/16/2022    RBC 4.68 05/16/2022    HGB 14.8 05/16/2022    HCT 42.7 05/16/2022    MCV 91 05/16/2022    RDW 13.3 05/16/2022     05/16/2022       CMP:   Lab Results   Component Value Date     02/14/2022    K 4.9 02/14/2022    CL 98 02/14/2022    CO2 23 02/14/2022    BUN 11 02/14/2022    CREATININE 0.91 02/14/2022    GFRAA 105 02/14/2022    AGRATIO 2.1 02/14/2022    GLUCOSE 214 02/14/2022    PROT 6.6 02/14/2022    CALCIUM 9.8 02/14/2022    BILITOT 0.7 02/14/2022    ALKPHOS 71 02/14/2022    AST 20 02/14/2022    ALT 29 02/14/2022       POC Tests:   Recent Labs     06/10/22  1255   POCGLU 192*       Coags: No results found for: PROTIME, INR, APTT    HCG (If Applicable): No results found for: PREGTESTUR, PREGSERUM, HCG, HCGQUANT     ABGs: No results found for: PHART, PO2ART, MBU2ICH, BNB6LMS, BEART, F5XQBZJN     Type & Screen (If Applicable):  No results found for: LABABO, LABRH    Drug/Infectious Status (If Applicable):  No results found for: HIV, HEPCAB    COVID-19 Screening (If Applicable): No results found for: COVID19        Anesthesia Evaluation  Patient summary reviewed and Nursing notes reviewed  Airway: Mallampati: II  TM distance: >3 FB   Neck ROM: full     Dental:          Pulmonary:Negative Pulmonary ROS and normal exam                               Cardiovascular:  Exercise tolerance: good (>4 METS),   (+) hypertension:,         Rhythm: regular  Rate: normal                    Neuro/Psych:   Negative Neuro/Psych ROS              GI/Hepatic/Renal: Neg GI/Hepatic/Renal ROS  (+) GERD: well controlled,           Endo/Other: Negative Endo/Other ROS   (+) DiabetesType II DM, , .          Pt had no PAT visit       Abdominal:             Vascular: negative vascular ROS. Other Findings:           Anesthesia Plan      general     ASA 2       Induction: intravenous. Anesthetic plan and risks discussed with patient.       Plan discussed with surgical team.                    Kim James MD   6/10/2022

## 2022-06-10 NOTE — OP NOTE
Operative Report    Patient: Orin Pete MRN: [id number]     YOB: 1960  Age: 64 y.o. Sex: male       Date of Surgery: 6/10/2022     Preoperative Diagnosis: Inguinal lymphadenopathy [R59.0]  Nevus [D22.9]     Postoperative Diagnosis: same    Procedure: left inguinal lymph node excisional biopsy  Shave nevus of back    Surgeon(s) and Role:     * Bharati Lay MD - Primary    Complications:  none    EBL: minimal      Procedure Details   Informed consent was obtained previously, and the site of surgery was properly noted/marked. The patient was placed supine and  anesthetized; a Time Out was held. The left groin was prepped and draped in standard fashion. An incision was made over the area of interest and cautery was used to dissect through the subcutaneous tissue and through the deep subcutaneous fascia. Sharp and blunt dissection was performed with removal of the enlarged lymph node from the surrounding adipose tissue. Small  lymphatics and vessels were clipped or cauterized and divided. The specimen was submitted to pathology. The wound was irrigated and the incision was closed in layers with 3-0 vicryl to reapproximate the deep subcutaneous fascia and 4-0 Vicryl suture used for the subcuticular closure. Steri-Strips, telfa, and tegaderm were applied. He was then rolled and held securely in a right lateral position. The nevus of the right paramedian upper back was pepped with chloraprep and anesthetized. Deep dermal shave excision was done. The base was cauterized and a bandaid applied. The patient tolerated the procedure well and was taken to recovery in satisfactory condition    Instrument, sponge, and needle counts were correct at closure and at the conclusion of the case.      Specimens:   ID Type Source Tests Collected by Time Destination   A : left inguinal lymph node Tissue Lymph Node SURGICAL PATHOLOGY Bharati Lay MD 6/10/2022 8413    B : nevus of back Tissue Lesion SURGICAL PATHOLOGY Marcie Pineda MD 6/10/2022 170              Signed By:  Marcell Arias MD     Ana Maria 10, 2022

## 2022-06-11 NOTE — ANESTHESIA POSTPROCEDURE EVALUATION
Department of Anesthesiology  Postprocedure Note    Patient: Elke Corrigan MRN: [id number]  YOB: 1960  Date of evaluation: 6/10/2022  Time:  8:55 PM     Procedure Summary     Date: 06/10/22 Room / Location: [facility] MAIN OR  / [facility] MAIN OR    Anesthesia Start: 7636 Anesthesia Stop: 8320    Procedures:       LEFT INGUINAL BIOPSY (Left )      EXCISION BACK NEVUS WL PER ERICK/REQUEST TO FOLLOW (N/A ) Diagnosis:       Inguinal lymphadenopathy      Nevus      (Inguinal lymphadenopathy [R59.0])      (Nevus [D22.9])    Surgeons: Jeramie Flores MD Responsible Provider: Fátima Berry MD    Anesthesia Type: general ASA Status: 2          Anesthesia Type: No value filed. Freddie Phase I: Freddie Score: 5    Freddie Phase II: Freddie Score: 10    Last vitals: Reviewed and per EMR flowsheets. Anesthesia Post Evaluation    Patient location during evaluation: PACU  Airway patency: patent  Nausea & Vomiting: no nausea and no vomiting  Complications: no  Cardiovascular status: hemodynamically stable  Respiratory status: nonlabored ventilation and spontaneous ventilation  Hydration status: euvolemic  Comments: BP (!) 150/80   Pulse 78   Temp 98.6 °F (37 °C)   Resp 16   Ht 5' 8\" (1.727 m)   Wt 193 lb (87.5 kg)   SpO2 97%   BMI 29.35 kg/m²   Based on chart review. Not called for sign out.   Multimodal analgesia pain management approach

## 2022-06-13 RX ORDER — LISINOPRIL 10 MG/1
10 TABLET ORAL DAILY
Qty: 90 TABLET | Refills: 0 | Status: SHIPPED | OUTPATIENT
Start: 2022-06-13 | End: 2022-08-19 | Stop reason: SDUPTHER

## 2022-06-14 ENCOUNTER — TELEPHONE (OUTPATIENT)
Dept: ONCOLOGY | Age: 62
End: 2022-06-14

## 2022-06-14 RX ORDER — FLUTICASONE PROPIONATE 50 MCG
SPRAY, SUSPENSION (ML) NASAL
Qty: 1 EACH | Refills: 5 | Status: SHIPPED | OUTPATIENT
Start: 2022-06-14 | End: 2022-06-29 | Stop reason: ALTCHOICE

## 2022-06-14 NOTE — TELEPHONE ENCOUNTER
PT wife calling on behalf of PT/wanting to know if biopsy results are ready to go over/sates PT is leaving for 5 days to go out of town and would like to get the results before he leaves/

## 2022-06-14 NOTE — TELEPHONE ENCOUNTER
Call to Meng Tomas who is on the [facility] and the bx take 7-10 days. She asked that they get called next Tuesday after the patient returns from [facility]. Then she states just call when it is available.  She Verbalizes understanding of instructions

## 2022-06-20 ENCOUNTER — TELEPHONE (OUTPATIENT)
Dept: ONCOLOGY | Age: 62
End: 2022-06-20

## 2022-06-20 NOTE — TELEPHONE ENCOUNTER
Pt calling to get biopsy results from 6/10 pt is very upset labs are taking so long would like a call

## 2022-06-20 NOTE — TELEPHONE ENCOUNTER
Pt called in regards to biopsy from 6/10/22//following up if results are available and if so would like to review them

## 2022-06-20 NOTE — TELEPHONE ENCOUNTER
PT wife called on behalf of the PT/state has some questions regarding the previous call with the PT/wanting to know when hafsa was wanting to see PT/confirmed appt would be to review biopsy results/gave her [facility] phone number

## 2022-06-21 ENCOUNTER — TELEPHONE (OUTPATIENT)
Dept: FAMILY MEDICINE CLINIC | Facility: CLINIC | Age: 62
End: 2022-06-21

## 2022-06-21 ENCOUNTER — TELEPHONE (OUTPATIENT)
Dept: ONCOLOGY | Age: 62
End: 2022-06-21

## 2022-06-21 NOTE — TELEPHONE ENCOUNTER
Pt calling for biopsy results. He states that no one will tell him his results. He is scheduled to see oncology 6-.

## 2022-06-21 NOTE — TELEPHONE ENCOUNTER
PT wife called on behalf of PT regarding an appt for PT to review biopsy results/per Tj Cano, offered pt 3/22/22 @ 345 for an appt/pt wife accepted appt time

## 2022-06-22 ENCOUNTER — TELEPHONE (OUTPATIENT)
Dept: ONCOLOGY | Facility: CLINIC | Age: 62
End: 2022-06-22

## 2022-06-22 ENCOUNTER — HOSPITAL ENCOUNTER (OUTPATIENT)
Dept: LAB | Age: 62
Discharge: HOME | End: 2022-06-25
Payer: COMMERCIAL

## 2022-06-22 ENCOUNTER — OFFICE VISIT (OUTPATIENT)
Dept: ONCOLOGY | Age: 62
End: 2022-06-22
Payer: COMMERCIAL

## 2022-06-22 ENCOUNTER — CLINICAL DOCUMENTATION (OUTPATIENT)
Dept: CASE MANAGEMENT | Age: 62
End: 2022-06-22

## 2022-06-22 VITALS
DIASTOLIC BLOOD PRESSURE: 70 MMHG | WEIGHT: 193.7 LBS | OXYGEN SATURATION: 97 % | HEIGHT: 68 IN | RESPIRATION RATE: 18 BRPM | HEART RATE: 99 BPM | TEMPERATURE: 98.1 F | SYSTOLIC BLOOD PRESSURE: 127 MMHG | BODY MASS INDEX: 29.36 KG/M2

## 2022-06-22 DIAGNOSIS — C83.35 DIFFUSE LARGE B-CELL LYMPHOMA OF LYMPH NODES OF INGUINAL REGION (HCC): Primary | ICD-10-CM

## 2022-06-22 DIAGNOSIS — R59.0 INGUINAL LYMPHADENOPATHY: ICD-10-CM

## 2022-06-22 DIAGNOSIS — C83.35 DIFFUSE LARGE B-CELL LYMPHOMA OF LYMPH NODES OF INGUINAL REGION: Primary | ICD-10-CM

## 2022-06-22 LAB
ALBUMIN SERPL-MCNC: 4.1 G/DL (ref 3.2–4.6)
ALBUMIN/GLOB SERPL: 1.5 {RATIO} (ref 1.2–3.5)
ALP SERPL-CCNC: 77 U/L (ref 50–136)
ALT SERPL-CCNC: 32 U/L (ref 12–65)
ANION GAP SERPL CALC-SCNC: 7 MMOL/L (ref 7–16)
AST SERPL-CCNC: 14 U/L (ref 15–37)
BASOPHILS # BLD: 0.1 K/UL (ref 0–0.2)
BASOPHILS NFR BLD: 1 % (ref 0–2)
BILIRUB SERPL-MCNC: 0.6 MG/DL (ref 0.2–1.1)
BUN SERPL-MCNC: 12 MG/DL (ref 8–23)
CALCIUM SERPL-MCNC: 9.4 MG/DL (ref 8.3–10.4)
CHLORIDE SERPL-SCNC: 102 MMOL/L (ref 98–107)
CO2 SERPL-SCNC: 28 MMOL/L (ref 21–32)
CREAT SERPL-MCNC: 1 MG/DL (ref 0.8–1.5)
DIFFERENTIAL METHOD BLD: ABNORMAL
EOSINOPHIL # BLD: 0.4 K/UL (ref 0–0.8)
EOSINOPHIL NFR BLD: 5 % (ref 0.5–7.8)
ERYTHROCYTE [DISTWIDTH] IN BLOOD BY AUTOMATED COUNT: 12.9 % (ref 11.9–14.6)
GLOBULIN SER CALC-MCNC: 2.8 G/DL (ref 2.3–3.5)
GLUCOSE SERPL-MCNC: 208 MG/DL (ref 65–100)
HBV SURFACE AG SER QL: NONREACTIVE
HCT VFR BLD AUTO: 42.3 %
HCV AB SER QL: NONREACTIVE
HGB BLD-MCNC: 14.9 G/DL (ref 13.6–17.2)
IMM GRANULOCYTES # BLD AUTO: 0 K/UL (ref 0–0.5)
IMM GRANULOCYTES NFR BLD AUTO: 1 % (ref 0–5)
LDH SERPL L TO P-CCNC: 150 U/L (ref 110–210)
LYMPHOCYTES # BLD: 0.9 K/UL (ref 0.5–4.6)
LYMPHOCYTES NFR BLD: 13 % (ref 13–44)
MAGNESIUM SERPL-MCNC: 1.2 MG/DL (ref 1.8–2.4)
MCH RBC QN AUTO: 31 PG (ref 26.1–32.9)
MCHC RBC AUTO-ENTMCNC: 35.2 G/DL (ref 31.4–35)
MCV RBC AUTO: 87.9 FL (ref 79.6–97.8)
MONOCYTES # BLD: 0.6 K/UL (ref 0.1–1.3)
MONOCYTES NFR BLD: 9 % (ref 4–12)
NEUTS SEG # BLD: 5.1 K/UL (ref 1.7–8.2)
NEUTS SEG NFR BLD: 71 % (ref 43–78)
NRBC # BLD: 0 K/UL (ref 0–0.2)
PLATELET # BLD AUTO: 222 K/UL (ref 150–450)
PMV BLD AUTO: 10.1 FL (ref 9.4–12.3)
POTASSIUM SERPL-SCNC: 4 MMOL/L (ref 3.5–5.1)
PROT SERPL-MCNC: 6.9 G/DL (ref 6.3–8.2)
RBC # BLD AUTO: 4.81 M/UL (ref 4.23–5.6)
SODIUM SERPL-SCNC: 137 MMOL/L (ref 136–145)
URATE SERPL-MCNC: 6.5 MG/DL (ref 2.6–6)
WBC # BLD AUTO: 7 K/UL (ref 4.3–11.1)

## 2022-06-22 PROCEDURE — 84550 ASSAY OF BLOOD/URIC ACID: CPT

## 2022-06-22 PROCEDURE — 87340 HEPATITIS B SURFACE AG IA: CPT

## 2022-06-22 PROCEDURE — 82784 ASSAY IGA/IGD/IGG/IGM EACH: CPT

## 2022-06-22 PROCEDURE — 83615 LACTATE (LD) (LDH) ENZYME: CPT

## 2022-06-22 PROCEDURE — 87389 HIV-1 AG W/HIV-1&-2 AB AG IA: CPT

## 2022-06-22 PROCEDURE — 36415 COLL VENOUS BLD VENIPUNCTURE: CPT

## 2022-06-22 PROCEDURE — 86803 HEPATITIS C AB TEST: CPT

## 2022-06-22 PROCEDURE — 99215 OFFICE O/P EST HI 40 MIN: CPT | Performed by: INTERNAL MEDICINE

## 2022-06-22 PROCEDURE — 85025 COMPLETE CBC W/AUTO DIFF WBC: CPT

## 2022-06-22 PROCEDURE — 80074 ACUTE HEPATITIS PANEL: CPT

## 2022-06-22 PROCEDURE — 86706 HEP B SURFACE ANTIBODY: CPT

## 2022-06-22 PROCEDURE — 83735 ASSAY OF MAGNESIUM: CPT

## 2022-06-22 PROCEDURE — 86704 HEP B CORE ANTIBODY TOTAL: CPT

## 2022-06-22 ASSESSMENT — PATIENT HEALTH QUESTIONNAIRE - PHQ9
1. LITTLE INTEREST OR PLEASURE IN DOING THINGS: 0
2. FEELING DOWN, DEPRESSED OR HOPELESS: 0
SUM OF ALL RESPONSES TO PHQ QUESTIONS 1-9: 0
SUM OF ALL RESPONSES TO PHQ9 QUESTIONS 1 & 2: 0

## 2022-06-22 NOTE — PROGRESS NOTES
Per Dr. Sumner, patient wishing to come here for evaluation and treatment as he has family in the area.  Orders entered for PET, bone marrow and port placement.

## 2022-06-22 NOTE — PATIENT INSTRUCTIONS
Patient Instructions from Today's Visit    Reason for Visit:  Follow up visit    Diagnosis Information:  https://www.[employer]/. net/about-us/asco-answers-patient-education-materials/jyec-nodhgtp-svjs-sheets  Patient was educated and given handouts published by ASCO entitled ASCO Answers Fact Sheets about their diagnosis of ASCO ANSWERS is a collection of oncologist-approved patient education materials developed by the [facility] of Clinical Oncology (ASCO) for people with cancer and their caregivers. Non-Hodgkin Lymphoma    What is non-Hodgkin lymphoma? Non-Hodgkin lymphoma (NHL) is a group of cancers of the lymphatic system in which B cells, T cells, or NK cells in the lymphatic system change and grow out of control, sometimes forming a tumor. B-cell lymphoma is the most common type of NHL. T-cell lymphoma is less common, and NK-cell lymphoma is relatively rare. Because of the many types and subtypes of NHL, it is important to know the exact diagnosis. What is the function of the lymphatic system? The lymphatic system is made up of thin tubes and groups of tiny, bean-shaped organs called lymph nodes that are located throughout the body. The lymphatic system carries lymph, a clear fluid containing a type of white blood cell called lymphocytes that help fight infection. Because lymphatic tissue is found in most parts of the body, NHL can start almost anywhere. What does stage mean? Staging is a way of describing where the cancer is located, if or where it has spread, and whether it is affecting other parts of the body. There are 4 stages for NHL: stages I through IV (1 through 4). NHL is also described by how quickly the cancer is growing: indolent (slower growing) or aggressive (faster growing). A scale called the International Prognostic Index (IPI) is used to help predict the success of treatment for aggressive lymphomas. Find more information about NHL at www.cancer. net/nhl.      How is non-Hodgkin lymphoma treated? The treatment of NHL depends on the type, subtype, and stage of NHL; possible side effects; and the persons overall health. Watchful waiting may be an option for some patients with slow-growing or indolent lymphoma who are otherwise healthy and do not have any symptoms. Chemotherapy is the main treatment for NHL. Radiation therapy is sometimes given after or during chemotherapy, depending on the subtype. Treatment may include immunotherapy/targeted therapy, such as monoclonal antibodies with or without chemotherapy, antibody drug conjugates, or radioimmunotherapy. Stem cell/bone marrow transplantation and surgery may also be considered. Talk with your doctor about all treatment options. The side effects of NHL treatment can often be prevented or managed with the help of your health care team. This is called palliative care and is an important part of the overall treatment plan. How can I cope with non-Hodgkin lymphoma? Absorbing the news of a cancer diagnosis and communicating with your health care team are key parts of the coping process. Seeking support, organizing your health information, making sure all of your questions are answered, and participating in the decision-making process are other steps. Talk with your health care team about any concerns. Understanding your emotions and those of people close to you can be helpful in managing the diagnosis, treatment, and healing process. [address] [facility],[address]. © 2004 AMERICAN SOCIETY OF CLINICAL ONCOLOGY. MADE AVAILABLE [facility] OF CLINICAL ONCOLOGY   [place], [address] [facility], [address] [facility]  Toll Free: [phone]  Phone: [phone] [email or web address]  [email or web address] © 2017 American Society of Clinical Oncology.  For permissions information, contact [email or web address]     Questions to ask the doctor   Regular communication is important in making informed decisions about your health care. Consider asking the following questions of your health care team:    What type and subtype of NHL do I have? Can you explain my pathology report (laboratory test results) to me? What stage is the lymphoma? What does this mean? Would you explain my treatment options? What clinical trials are open to me? Where are they located, and how do I find                  out more about them? What treatment plan do you recommend? Why? What is the goal of each treatment? Is it to eliminate the lymphoma, help me feel             better, or both? Who will be part of my treatment team, and what does each member do? Do I need to begin treatment right away? How will this treatment affect my daily life? Will I be able to work, exercise, and                perform my usual activities? Will this treatment affect my ability to become pregnant or have children? What other long-term side effects may be associated with my cancer treatment? What follow-up tests will I need, and how often will I need them? If Im worried about managing the costs of cancer care, who can help me? Where can I find emotional support for me and my family? Whom should I call with questions or problems? Additional questions to ask the doctor can be found at [address] [facility]. net/nhl. The ideas and opinions expressed here do not necessarily reflect the opinions of the American Society of Clinical Oncology (ASCO) or [employer]. The information in this fact sheet is not intended as medical or legal advice, or as a substitute for consultation with a physician or other licensed health care provider. Patients with health care-related questions should call or see their physician or other health care provider promptly and should not disregard professional medical advice, or delay seeking it, because of information encountered here.  The mention of any product, service, or treatment in this fact sheet should not be construed as an ASCO endorsement. ASCO is not responsible for any injury or damage to persons or property arising out of or related to any use of ASCOs patient education materials, or to any errors or omissions. To order more printed copies, please call [phone] or visit www.cancer. net/estore. TERMS TO KNOW     B cell: A white blood cell that makes antibodies to fight disease     Biopsy: Removal of a tissue sample that is then examined under a microscope to check for cancer cells     Chemotherapy: The use of drugs to destroy cancer cells     Hematologist: A doctor who specializes in treating blood disorders     Immunotherapy: Treatment designed to boost the bodys natural defenses to fight the cancer     Lymph node: A tiny, bean-shaped organ that fights infection     NK cell: A white blood cell that destroys viruses, infected cells, and some cancer cells     Oncologist: A doctor who specializes in treating cancer     Radiation therapy: The use of high-energy x-rays to destroy cancer cells     Stem cell transplant: Procedure in which highly specialized cells, called hematopoietic stem cells, are used to treat cancer     T cell: A white blood cell that directly fights disease   [id number]    during todays office visit.      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  - We want to get PET scan and a bone marrow biopsy   - We will also go ahead and get an ECHO and a port-a-cath as well  - We have a couple of options for treatment depending on your scan and bone marrow biopsy  - We will get some lab work today as well    Follow Up:  As scheduled    Treatment Summary has been discussed and given to patient: n/a    -------------------------------------------------------------------------------------------------------------------  Please call our office at [phone] if you have any  of the following symptoms: · Fever of 100.5 or greater  · Chills  · Shortness of breath  · Swelling or pain in one leg    After office hours an answering service is available and will contact a provider for emergencies or if you are experiencing any of the above symptoms.  Patient did express an interest in My Chart. My Chart log in information explained on the after visit summary printout at the [facility] [address] desk.     Florine Lombard, BSN, RN  Hematology Navigator

## 2022-06-22 NOTE — PROGRESS NOTES
6/22 Pt came in for lymph node biopsy results. It showed DLBCL. We are going to get a PET scan, bone marrow, ECHO, Port and labs. Gave pt information book on NHL. We will see him back next week to go over PET results and talk about treatment options.

## 2022-06-22 NOTE — TELEPHONE ENCOUNTER
Caller: YAZMIN    Relationship:  ORDERS    Best call back number: [phone]      What was the call regarding:  CALLED STATED THE ORDER FOR PATIENTS PET SCAN HAS NOT BEEN SIGNED BY DR SPARKS, APPOINTMENT IS TOMORROW

## 2022-06-22 NOTE — PROGRESS NOTES
[facility]: Office Visit Established Patient    Chief Complaint:    Chief Complaint   Patient presents with    Follow-up         History of Present Illness:  Mr. Lydia Franklin is a 64 y.o. male who returns today for management of diffuse large B cell lymphoma. He presented to his PCP on 4/29/22 reporting an enlarging lump in his left groin for the past 2 months. Physical exam confirmed a mildly tender lump in the patients left inguinal region. The right inguinal region was normal, and no inguinal hernia was noted. Ultrasound of the left groin was performed on 5/13/22 revealing enlarged, abnormal appearing left inguinal lymph nodes measuring up to 3.0 x 3.2 x 1.9 cm. Ultrasound directed biopsy was suggested for further assessment. We saw him for consultation and recommended excisional biopsy, he returns to discuss results. No physical complaints, but he has been quite anxious about the results, especially since [facility] days prior to our appointment. Review of Systems:  Constitutional: Negative. HENT: Negative. Eyes: Negative. Respiratory: Negative. Cardiovascular: Negative. Gastrointestinal: Negative. Genitourinary: Negative. Musculoskeletal: Negative. Skin: Negative. Neurological: Negative. Endo/Heme/Allergies: Negative. Psychiatric/Behavioral: Negative. All other systems reviewed and are negative. No Known Allergies  Past Medical History:   Diagnosis Date    Arthritis     osteo    Bilateral carpal tunnel syndrome 10/28/2019    CAD (coronary artery disease) 1999    3 stents--last one placed 2019, pt on daily 81 mg ASA.  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 [facility] UNLIST  2019    3 rd stent -- cardiac placed    SKIN BIOPSY N/A 6/10/2022    EXCISION BACK NEVUS WL PER ERICK/REQUEST TO FOLLOW performed by Aroldo Anderson MD at [address] TOTAL KNEE ARTHROPLASTY Left 11/15/2018    UVULOPALATOPHARYGOPLASTY       Family History   Problem Relation Age of Onset    Coronary Art Dis Neg Hx     Lung Disease Mother     Cancer Maternal Uncle         colon    Cancer Maternal Uncle         colon    Cancer Maternal Uncle         colon    Cancer Maternal Uncle         colon    Cancer Maternal Uncle         colon    Cancer Maternal Uncle         colon    Cancer Maternal Uncle         colon    Colon Cancer Father     Cancer Father     Asthma Mother      Social History     Socioeconomic History    Marital status:      Spouse name: Not on file    Number of children: Not on file    Years of education: Not on file    Highest education level: Not on file   Occupational History    Not on file   Tobacco Use    Smoking status: Never Smoker    Smokeless tobacco: Never Used   Substance and Sexual Activity    Alcohol use:  Yes     Alcohol/week: 2.0 standard drinks    Drug use: No    Sexual activity: Not on file   Other Topics Concern    Not on file   Social History Narrative    Not on file     Social Determinants of Health     Financial Resource Strain:     Difficulty of Paying Living Expenses: Not on file Food Insecurity:     Worried About Running Out of Food in the Last Year: Not on file    Hortensia of Food in the Last Year: Not on file   Transportation Needs:     Lack of Transportation (Medical): Not on file    Lack of Transportation (Non-Medical):  Not on file   Physical Activity:     Days of Exercise per Week: Not on file    Minutes of Exercise per Session: Not on file   Stress:     Feeling of Stress : Not on file   Social Connections:     Frequency of Communication with Friends and Family: Not on file    Frequency of Social Gatherings with Friends and Family: Not on file    Attends Bahai Services: Not on file    Active Member of [address] [place] [address] [employer] or Organizations: Not on file    Attends Club or Organization Meetings: Not on file    Marital Status: Not on file   Intimate Partner Violence:     Fear of Current or Ex-Partner: Not on file    Emotionally Abused: Not on file    Physically Abused: Not on file    Sexually Abused: Not on file   Housing Stability:     Unable to Pay for Housing in the Last Year: Not on file    Number of [place] in the Last Year: Not on file    Unstable Housing in the Last Year: Not on file     Current Outpatient Medications   Medication Sig Dispense Refill    lisinopril (PRINIVIL;ZESTRIL) 10 MG tablet Take 1 tablet by mouth daily 90 tablet 0    Insulin Degludec (TRESIBA FLEXTOUCH) 200 UNIT/ML SOPN Inject 68 Units into the skin daily      ZINC PO Take by mouth daily      ascorbic acid (VITAMIN C) 500 MG tablet Take 500 mg by mouth daily      aspirin 81 MG EC tablet Take 81 mg by mouth daily       atorvastatin (LIPITOR) 40 MG tablet Take 20 mg by mouth daily      metFORMIN (GLUCOPHAGE) 1000 MG tablet Take 1,000 mg by mouth 2 times daily      traZODone (DESYREL) 100 MG tablet Take 100 mg by mouth       fluticasone (FLONASE) 50 MCG/ACT nasal spray USE TWO SPRAYS IN EACH NOSTRIL EVERY DAY (Patient not taking: Reported on 6/22/2022) 1 each 5    ibuprofen (ADVIL;MOTRIN) 800 MG tablet TAKE ONE TABLET BY MOUTH EVERY 8 HOURS AS NEEDED FOR PAIN (Patient not taking: Reported on 6/22/2022)      melatonin 5 MG TABS tablet Take 10 mg by mouth (Patient not taking: Reported on 6/22/2022)       No current facility-administered medications for this visit. OBJECTIVE:  /70 (Site: Left Upper Arm, Position: Standing)   Pulse 99   Temp 98.1 °F (36.7 °C) (Oral)   Resp 18   Ht 5' 8\" (1.727 m)   Wt 193 lb 11.2 oz (87.9 kg)   SpO2 97%   BMI 29.45 kg/m²     Physical Exam:  Constitutional: Well developed, well nourished male in no acute distress, sitting comfortably on the examination table. HEENT: Normocephalic and atraumatic. Sclerae anicteric. Skin Warm and dry. No bruising and no rash noted. No erythema. No pallor. Cardiopulmonary Deferred. Neuro Grossly nonfocal with no obvious sensory or motor deficits. MSK Normal range of motion in general.  No edema and no tenderness. Psych Appropriate mood and affect.          Labs:  Recent Results (from the past 96 hour(s))   CBC with Auto Differential    Collection Time: 06/22/22  4:51 PM   Result Value Ref Range    WBC 7.0 4.3 - 11.1 K/uL    RBC 4.81 4.23 - 5.6 M/uL    Hemoglobin 14.9 13.6 - 17.2 g/dL    Hematocrit 42.3 %    MCV 87.9 79.6 - 97.8 FL    MCH 31.0 26.1 - 32.9 PG    MCHC 35.2 (H) 31.4 - 35.0 g/dL    RDW 12.9 11.9 - 14.6 %    Platelets 293 502 - 618 K/uL    MPV 10.1 9.4 - 12.3 FL    nRBC 0.00 0.0 - 0.2 K/uL    Seg Neutrophils 71 43 - 78 %    Lymphocytes 13 13 - 44 %    Monocytes 9 4.0 - 12.0 %    Eosinophils % 5 0.5 - 7.8 %    Basophils 1 0.0 - 2.0 %    Immature Granulocytes 1 0.0 - 5.0 %    Segs Absolute 5.1 1.7 - 8.2 K/UL    Absolute Lymph # 0.9 0.5 - 4.6 K/UL    Absolute Mono # 0.6 0.1 - 1.3 K/UL    Absolute Eos # 0.4 0.0 - 0.8 K/UL    Basophils Absolute 0.1 0.0 - 0.2 K/UL    Absolute Immature Granulocyte 0.0 0.0 - 0.5 K/UL    Differential Type AUTOMATED     Comprehensive Metabolic Panel    Collection Time: 06/22/22  4:51 PM   Result Value Ref Range    Sodium 137 136 - 145 mmol/L    Potassium 4.0 3.5 - 5.1 mmol/L    Chloride 102 98 - 107 mmol/L    CO2 28 21 - 32 mmol/L    Anion Gap 7 7 - 16 mmol/L    Glucose 208 (H) 65 - 100 mg/dL    BUN 12 8 - 23 MG/DL    CREATININE 1.00 0.8 - 1.5 MG/DL    GFR African American >60 >60 ml/min/1.73m2    GFR Non- >60 >60 ml/min/1.73m2    Calcium 9.4 8.3 - 10.4 MG/DL    Total Bilirubin 0.6 0.2 - 1.1 MG/DL    ALT 32 12 - 65 U/L    AST 14 (L) 15 - 37 U/L    Alk Phosphatase 77 50 - 136 U/L    Total Protein 6.9 6.3 - 8.2 g/dL    Albumin 4.1 3.2 - 4.6 g/dL    Globulin 2.8 2.3 - 3.5 g/dL    Albumin/Globulin Ratio 1.5 1.2 - 3.5     Magnesium    Collection Time: 06/22/22  4:51 PM   Result Value Ref Range    Magnesium 1.2 (L) 1.8 - 2.4 mg/dL   Lactate Dehydrogenase    Collection Time: 06/22/22  4:51 PM   Result Value Ref Range     110 - 210 U/L   Uric Acid    Collection Time: 06/22/22  4:51 PM   Result Value Ref Range    Uric Acid 6.5 (H) 2.6 - 6.0 MG/DL       Imaging:  No results found. Pathology:  DIAGNOSIS        A:  \"LEFT INGUINAL LYMPH NODE\":             DIFFUSE LARGE B-CELL LYMPHOMA.             SEE COMMENT.          B:  \"NEVUS OF BACK\":             PENDING DERMATOPATHOLOGY CONSULT. Comment        A:  Histologic sections show a diffuse serpiginous infiltrate of   large atypical lymphocytes with indented nuclei, inconspicuous nucleoli   and moderate amounts of cytoplasm.  Immunohistochemical stains show that   the lymphoma cells are positive for CD20, [place]-5, CD10 and Bcl-6 and   negative for Bcl-2, MUM-1, CD5, Cyclin D1, CD30 (0% positivity) and c-MYC   (less than 40% positivity).  CD3 stain highlights background small   T-lymphocytes.  Ki-67 shows an increased proliferation index in the   serpiginous areas of more than 90%.  The morphologic and immunophenotypic   appearance supports a diagnosis of germinal center subtype of diffuse   large B-cell lymphoma.  FISH studies have been requested and will be   reported separately. ASSESSMENT:   Diagnosis Orders   1. Inguinal lymphadenopathy  PET CT SKULL BASE TO MID THIGH    CBC with Auto Differential    Comprehensive Metabolic Panel    Magnesium    Lactate Dehydrogenase    HIV 1/2 Ag/Ab, 4TH Generation,W Rflx Confirm    Uric Acid    Hepatitis Panel, Acute    Transthoracic echocardiogram (TTE) complete with contrast, bubble, strain, and 3D PRN    IR PORT PLACEMENT > 5 YEARS    MIRIAM and PE, Serum    Hepatitis B Core Antibody, Total    Hepatitis B Surface Antigen    Hepatitis B Surface Antibody    Hepatitis C Antibody         PLAN:  Lab studies were personally reviewed. DLBCL: high grade with Ki-67 90%, GC subtype, diagnosed with excisional biopsy of lymphadenopathy in left groin, 3.2 cm on ultrasound, enlarging over 3 months time. I discussed the pathophysiology and natural history of DLBCL with Mr. Lyric Angel and his wife. We need additional staging and risk stratification for determination of prognosis and treatment, we will check PET/CT and bone marrow biopsy, and await FISH from the node biopsy before making final recommendations. However, they are aware that he will need chemotherapy as part of the treatment protocol at a minimum, we will go ahead and order TTE and port placement in anticipation of IV therapy which will include doxorubicin. They understand and agree to proceed. All questions were asked and answered to the best of my ability. F/u after additional studies for plan of care. In all, I spent 50 minutes in the care of Mr. Lyric Angel today, over 50% of which was in direct counseling and coordination of care.               Lamont Marques MD, MD  [facility]  [address] [place] [address]  Office : [phone]  Fax : [phone]

## 2022-06-23 ENCOUNTER — OFFICE VISIT (OUTPATIENT)
Dept: SURGERY | Age: 62
End: 2022-06-23

## 2022-06-23 ENCOUNTER — TELEPHONE (OUTPATIENT)
Dept: ONCOLOGY | Facility: HOSPITAL | Age: 62
End: 2022-06-23

## 2022-06-23 DIAGNOSIS — C83.35 DIFFUSE LARGE B-CELL LYMPHOMA OF LYMPH NODES OF INGUINAL REGION (HCC): Primary | ICD-10-CM

## 2022-06-23 LAB
HAV IGM SER QL: NONREACTIVE
HBV CORE IGM SER QL: NONREACTIVE
HBV SURFACE AB SERPL IA-ACNC: <3.1 MIU/ML
HBV SURFACE AG SER QL: NONREACTIVE
HCV AB SER QL: NONREACTIVE
HIV 1+2 AB+HIV1 P24 AG SERPL QL IA: NONREACTIVE
HIV 1/2 RESULT COMMENT: NORMAL

## 2022-06-23 PROCEDURE — 99024 POSTOP FOLLOW-UP VISIT: CPT | Performed by: SURGERY

## 2022-06-23 NOTE — TELEPHONE ENCOUNTER
Called patient to confirm that they would be keeping appointments for tomorrow for PET and bone marrow.  The patient's wife stated that after their visit with the local oncologist yesterday, they have decided to remain local for treatment.  They were thankful of our assistance.  Advised to contact us in the future if needed.

## 2022-06-23 NOTE — PROGRESS NOTES
S/p left inguinal node biopsy    C/o groin swelling, resolving scrotal bruising  Saw Dr Jeremiah Chung for fu and has appt for PET, bone marrow, and IR port    Left groin without seroma/lymphocele, appropriate induration of subQ  Incision intact    Reviewed rationale for extent of disease workup ordered by oncology  No further surgical needs  Unrestricted activity  Follow-up and Dispositions    · Return for as needed.

## 2022-06-24 ENCOUNTER — TELEPHONE (OUTPATIENT)
Dept: ORTHOPEDIC SURGERY | Age: 62
End: 2022-06-24

## 2022-06-24 LAB — HBV CORE AB SERPL QL IA: NEGATIVE

## 2022-06-24 NOTE — TELEPHONE ENCOUNTER
I called pt about Np referral he asked me who sent the referral I told him it was from [place] he asked me when did they send the referral I stated on 6/23/22 he said that he had been got in contact with the [place] three months ago and he just now hearing from us he stated if it takes this long to schedule he dosent want it then he stated to me that he was a patient of Dr Grossman Dillon he had a knee replacement and all the  did was send him to PT he said he dosent need Pt his disc is to big in his knee so he asked me what does Dr Ancelmo Mederos do I stated that he does shoulders and meniscus he dosnt do knee replacement so he he asked me was he going to send him to pt I stated that he would have to discuss that with Dr Ancelmo Mederos he just flat out declined the appt .

## 2022-06-27 ENCOUNTER — HOSPITAL ENCOUNTER (OUTPATIENT)
Dept: INTERVENTIONAL RADIOLOGY/VASCULAR | Age: 62
Discharge: HOME | End: 2022-06-30
Payer: COMMERCIAL

## 2022-06-27 VITALS
BODY MASS INDEX: 29.25 KG/M2 | HEART RATE: 70 BPM | TEMPERATURE: 97.8 F | RESPIRATION RATE: 16 BRPM | WEIGHT: 193 LBS | SYSTOLIC BLOOD PRESSURE: 130 MMHG | DIASTOLIC BLOOD PRESSURE: 70 MMHG | HEIGHT: 68 IN | OXYGEN SATURATION: 97 %

## 2022-06-27 DIAGNOSIS — C83.35 DIFFUSE LARGE B-CELL LYMPHOMA OF LYMPH NODES OF INGUINAL REGION (HCC): Primary | ICD-10-CM

## 2022-06-27 DIAGNOSIS — F41.9 ANXIETY DUE TO INVASIVE PROCEDURE: ICD-10-CM

## 2022-06-27 DIAGNOSIS — C83.35 DIFFUSE LARGE B-CELL LYMPHOMA OF LYMPH NODES OF INGUINAL REGION (HCC): ICD-10-CM

## 2022-06-27 LAB
ALBUMIN SERPL ELPH-MCNC: 3.7 G/DL (ref 2.9–4.4)
ALBUMIN/GLOB SERPL: 1.5 {RATIO} (ref 0.7–1.7)
ALPHA1 GLOB SERPL ELPH-MCNC: 0.2 G/DL (ref 0–0.4)
ALPHA2 GLOB SERPL ELPH-MCNC: 0.7 G/DL (ref 0.4–1)
B-GLOBULIN SERPL ELPH-MCNC: 1 G/DL (ref 0.7–1.3)
GAMMA GLOB SERPL ELPH-MCNC: 0.6 G/DL (ref 0.4–1.8)
GLOBULIN SER-MCNC: 2.6 G/DL (ref 2.2–3.9)
GLUCOSE BLD STRIP.AUTO-MCNC: 183 MG/DL (ref 65–100)
IGA SERPL-MCNC: 225 MG/DL (ref 61–437)
IGG SERPL-MCNC: 634 MG/DL (ref 603–1613)
IGM SERPL-MCNC: 34 MG/DL (ref 20–172)
INTERPRETATION SERPL IEP-IMP: NORMAL
M PROTEIN SERPL ELPH-MCNC: NORMAL G/DL
PROT SERPL-MCNC: 6.3 G/DL (ref 6–8.5)
SERVICE CMNT-IMP: ABNORMAL

## 2022-06-27 PROCEDURE — 6360000002 HC RX W HCPCS: Performed by: RADIOLOGY

## 2022-06-27 PROCEDURE — 76937 US GUIDE VASCULAR ACCESS: CPT

## 2022-06-27 PROCEDURE — 2500000003 HC RX 250 WO HCPCS: Performed by: RADIOLOGY

## 2022-06-27 PROCEDURE — 2580000003 HC RX 258: Performed by: RADIOLOGY

## 2022-06-27 PROCEDURE — 82962 GLUCOSE BLOOD TEST: CPT

## 2022-06-27 RX ORDER — LORAZEPAM 1 MG/1
TABLET ORAL
Qty: 2 TABLET | Refills: 0 | Status: SHIPPED | OUTPATIENT
Start: 2022-06-27 | End: 2022-07-04

## 2022-06-27 RX ORDER — DIPHENHYDRAMINE HYDROCHLORIDE 50 MG/ML
INJECTION INTRAMUSCULAR; INTRAVENOUS
Status: COMPLETED | OUTPATIENT
Start: 2022-06-27 | End: 2022-06-27

## 2022-06-27 RX ORDER — FENTANYL CITRATE 50 UG/ML
INJECTION, SOLUTION INTRAMUSCULAR; INTRAVENOUS
Status: COMPLETED | OUTPATIENT
Start: 2022-06-27 | End: 2022-06-27

## 2022-06-27 RX ORDER — HEPARIN SODIUM 5000 [USP'U]/ML
INJECTION, SOLUTION INTRAVENOUS; SUBCUTANEOUS
Status: COMPLETED | OUTPATIENT
Start: 2022-06-27 | End: 2022-06-27

## 2022-06-27 RX ORDER — MIDAZOLAM HYDROCHLORIDE 2 MG/2ML
INJECTION, SOLUTION INTRAMUSCULAR; INTRAVENOUS
Status: COMPLETED | OUTPATIENT
Start: 2022-06-27 | End: 2022-06-27

## 2022-06-27 RX ORDER — SODIUM CHLORIDE 9 MG/ML
INJECTION, SOLUTION INTRAVENOUS CONTINUOUS PRN
Status: COMPLETED | OUTPATIENT
Start: 2022-06-27 | End: 2022-06-27

## 2022-06-27 RX ORDER — LIDOCAINE HYDROCHLORIDE 20 MG/ML
INJECTION, SOLUTION INFILTRATION; PERINEURAL
Status: COMPLETED | OUTPATIENT
Start: 2022-06-27 | End: 2022-06-27

## 2022-06-27 RX ORDER — LIDOCAINE HYDROCHLORIDE AND EPINEPHRINE BITARTRATE 20; .01 MG/ML; MG/ML
INJECTION, SOLUTION SUBCUTANEOUS
Status: COMPLETED | OUTPATIENT
Start: 2022-06-27 | End: 2022-06-27

## 2022-06-27 RX ADMIN — SODIUM CHLORIDE 25 ML/HR: 900 INJECTION INTRAVENOUS at 08:19

## 2022-06-27 RX ADMIN — LIDOCAINE HYDROCHLORIDE 100 MG: 20 INJECTION, SOLUTION INFILTRATION; PERINEURAL at 08:34

## 2022-06-27 RX ADMIN — FENTANYL CITRATE 25 MCG: 50 INJECTION INTRAMUSCULAR; INTRAVENOUS at 08:32

## 2022-06-27 RX ADMIN — MIDAZOLAM HYDROCHLORIDE 1 MG: 1 INJECTION, SOLUTION INTRAMUSCULAR; INTRAVENOUS at 08:15

## 2022-06-27 RX ADMIN — HEPARIN SODIUM 5000 UNITS: 5000 INJECTION INTRAVENOUS; SUBCUTANEOUS at 08:39

## 2022-06-27 RX ADMIN — MIDAZOLAM HYDROCHLORIDE 0.5 MG: 1 INJECTION, SOLUTION INTRAMUSCULAR; INTRAVENOUS at 08:24

## 2022-06-27 RX ADMIN — FENTANYL CITRATE 25 MCG: 50 INJECTION INTRAMUSCULAR; INTRAVENOUS at 08:24

## 2022-06-27 RX ADMIN — DIPHENHYDRAMINE HYDROCHLORIDE 50 MG: 50 INJECTION, SOLUTION INTRAMUSCULAR; INTRAVENOUS at 08:15

## 2022-06-27 RX ADMIN — LIDOCAINE HYDROCHLORIDE,EPINEPHRINE BITARTRATE 100 MG: 20; .01 INJECTION, SOLUTION INFILTRATION; PERINEURAL at 08:29

## 2022-06-27 RX ADMIN — FENTANYL CITRATE 50 MCG: 50 INJECTION INTRAMUSCULAR; INTRAVENOUS at 08:15

## 2022-06-27 RX ADMIN — MIDAZOLAM HYDROCHLORIDE 0.5 MG: 1 INJECTION, SOLUTION INTRAMUSCULAR; INTRAVENOUS at 08:32

## 2022-06-27 ASSESSMENT — PAIN - FUNCTIONAL ASSESSMENT: PAIN_FUNCTIONAL_ASSESSMENT: NONE - DENIES PAIN

## 2022-06-27 NOTE — H&P
Department of Interventional Radiology  [phone]    History and Physical    Patient:  Marley Israel. MRN:  [id number]  SSN:  [id number]    YOB: 1960  Age:  64 y.o. Sex:  male      Primary Care Provider:  Iris Moody MD  Referring Physician:  Gee Torres MD    Subjective:     Chief Complaint: port    History of the Present Illness: The patient is a 64 y.o. male with lymphoma who presents for venous chest port placement. No acute complaints. Groin surgical wound healing well. NPO. Past Medical History:   Diagnosis Date    Arthritis     osteo    Bilateral carpal tunnel syndrome 10/28/2019    CAD (coronary artery disease) 1999    3 stents--last one placed 2019, pt on daily 81 mg ASA.  Pt states he has been released from cardiologist, [facility] [address] [place] [address] Diabetes ([facility] [address] 2000    type 2, average glucose 150-200, symptomatic below 100    Diffuse large B-cell lymphoma of lymph nodes of inguinal region (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 10/28/2019    GERD (gastroesophageal reflux disease)     diet controlled    Hx of colonic polyp 2016    adenoma    Hypercholesterolemia     Hypertension     managed with medication     Past Surgical History:   Procedure Laterality Date    COLONOSCOPY  last 4/25/16    Leandra--trans TA--3 year recall due to prep quality    CORONARY ANGIOPLASTY WITH STENT PLACEMENT  9157,2594, 2019    heart cath times 3 - 3 total stents    KNEE ARTHROSCOPY Left 1985    LYMPH NODE BIOPSY Left 6/10/2022    LEFT INGUINAL BIOPSY performed by Margy Cerna MD at [facility] UNLIST  2019    3 rd stent -- cardiac placed    SKIN BIOPSY N/A 6/10/2022    EXCISION BACK NEVUS WL PER ERICK/REQUEST TO FOLLOW performed by Margy Cerna MD at [address] [facility] Left 11/15/2018    UVULOPALATOPHARYGOPLASTY          Review of Systems:    Pertinent items are noted in HPI. Prior to Admission medications    Medication Sig Start Date End Date Taking? Authorizing Provider   fluticasone [facility]) 50 MCG/ACT nasal spray USE TWO SPRAYS IN [facility] NOSTRIL EVERY DAY 6/14/22   Radu Garza MD   lisinopril (PRINIVIL;ZESTRIL) 10 MG tablet Take 1 tablet by mouth daily 6/13/22   Radu Garza MD   Insulin Degludec (TRESIBA FLEXTOUCH) 200 UNIT/ML SOPN Inject 68 Units into the skin daily 5/19/22 8/17/22  Historical Provider, MD   ZINC PO Take by mouth daily    Ar Automatic Reconciliation   ascorbic acid (VITAMIN C) 500 MG tablet Take 500 mg by mouth daily    Ar Automatic Reconciliation   aspirin 81 MG EC tablet Take 81 mg by mouth daily  11/16/18   Ar Automatic Reconciliation   atorvastatin (LIPITOR) 40 MG tablet Take 20 mg by mouth daily 2/14/22   Ar Automatic Reconciliation   ibuprofen (ADVIL;MOTRIN) 800 MG tablet TAKE ONE TABLET BY MOUTH EVERY 8 HOURS AS NEEDED FOR PAIN 12/20/21   Ar Automatic Reconciliation   melatonin 5 MG TABS tablet Take 10 mg by mouth     Ar Automatic Reconciliation   metFORMIN (GLUCOPHAGE) 1000 MG tablet Take 1,000 mg by mouth 2 times daily 2/14/22   Ar Automatic Reconciliation   traZODone (DESYREL) 100 MG tablet Take 100 mg by mouth  11/29/21   Ar Automatic Reconciliation        No Known Allergies    Family History   Problem Relation Age of Onset    Coronary Art Dis Neg Hx     Lung Disease Mother     Cancer Maternal Uncle         colon    Cancer Maternal Uncle         colon    Cancer Maternal Uncle         colon    Cancer Maternal Uncle         colon    Cancer Maternal Uncle         colon    Cancer Maternal Uncle         colon    Cancer Maternal Uncle         colon    Colon Cancer Father     Cancer Father     Asthma Mother      Social History     Tobacco Use    Smoking status: Never Smoker    Smokeless tobacco: Never Used   Substance Use Topics    Alcohol use:  Yes Alcohol/week: 2.0 standard drinks        Not in a hospital admission. Objective:       Physical Examination:    Vitals:    06/27/22 0658   BP: (!) 145/78   Pulse: 75   Resp: 16   Temp: 97.8 °F (36.6 °C)   TempSrc: Temporal   SpO2: 99%   Weight: 193 lb (87.5 kg)   Height: 5' 8\" (1.727 m)       Pain Assessment  Patient's Stated Pain Goal: 0 - No pain              Patient's Stated Pain Goal: 0 - No pain                                   HEART: regular rate and rhythm  LUNG: clear to auscultation bilaterally  ABDOMEN: normal findings: soft, non-tender  EXTREMITIES: warm, no edema    Laboratory:     Lab Results   Component Value Date     06/22/2022     02/14/2022    K 4.0 06/22/2022    K 4.9 02/14/2022     06/22/2022    CL 98 02/14/2022    CO2 28 06/22/2022    CO2 23 02/14/2022    BUN 12 06/22/2022    BUN 11 02/14/2022    GFRAA >60 06/22/2022    GFRAA 105 02/14/2022    MG 1.2 06/22/2022    GLOB 2.8 06/22/2022    GLOB 3.1 10/14/2019    ALT 32 06/22/2022    ALT 29 02/14/2022     Lab Results   Component Value Date    WBC 7.0 06/22/2022    WBC 8.1 05/16/2022    HGB 14.9 06/22/2022    HGB 14.8 05/16/2022    HCT 42.3 06/22/2022    HCT 42.7 05/16/2022     06/22/2022     05/16/2022     No results found for: APTT, INR    Assessment:     lymphoma        Plan:     Planned Procedure:  Port placement    Risks, benefits, and alternatives reviewed with patient and he agrees to proceed with the procedure.       Signed By: Cecilia Vicente PA-C     June 27, 2022

## 2022-06-27 NOTE — OR NURSING
TRANSFER - OUT REPORT:       Verbal report given to Lena Short RN on Marliss Salvage.  being transferred to IR room 1 for routine post-op         Report consisted of patients Situation, Background, Assessment and      Recommendations(SBAR). Information from the following report(s) SBAR, Procedure Summary and MAR was reviewed with the receiving nurse. Opportunity for questions and clarification was provided. Conscious Sedation:      100 Mcg of Fentanyl administered   2 Mg of Versed administered   50 Mg of Benadryl administered        Pt tolerated procedure well.

## 2022-06-27 NOTE — BRIEF OP NOTE
Brief Postoperative Note      Patient: Manisha Farris YOB: 1960  MRN: [id number]    Date of Procedure: 6/27/2022    Pre-Op Diagnosis: DLBCL    Post-Op Diagnosis: Same       Right IJ approach venous chest port placement. Catheter tip in RA. Ready for use.      : Aayush Almazan    Anesthesia: Moderate sedation    Estimated Blood Loss (mL): Minimal    Complications: None    Specimens:   * No specimens in log *    Implants:  8 Fr SL venous chest port      Drains: * No LDAs found *    Findings: As above    Electronically signed by Lissette Mclaughlin MD on 6/27/2022 at 8:48 AM

## 2022-06-27 NOTE — PRE SEDATION
Sedation Pre-Procedure Note    Patient Name: Rohini Su. YOB: 1960  Room/Bed: Room/bed info not found  Medical Record Number: [id number]  Date: 6/27/2022   Time: 7:45 AM       Indication:  lymphoma    Consent: I have discussed with the patient and/or the patient representative the indication, alternatives, and the possible risks and/or complications of the planned procedure and the anesthesia methods. The patient and/or patient representative appear to understand and agree to proceed. 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 Past Surgical History:   has a past surgical history that includes Coronary angioplasty with stent (2853,6776, 2019); Colonoscopy (last 4/25/16); Knee arthroscopy (Left, 1985); Total knee arthroplasty (Left, 11/15/2018); pr cardiac surg procedure unlist (2019); Uvulopalatopharygoplasty; Tonsillectomy and adenoidectomy (1999); lymph node biopsy (Left, 6/10/2022); and skin biopsy (N/A, 6/10/2022). Medications:   Scheduled Meds:   Continuous Infusions:   PRN Meds:   Home Meds:   Prior to Admission medications    Medication Sig Start Date End Date Taking?  Authorizing Provider   fluticasone (FLONASE) 50 MCG/ACT nasal spray USE TWO SPRAYS IN [facility] NOSTRIL EVERY DAY 6/14/22   Benson Cline MD   lisinopril (PRINIVIL;ZESTRIL) 10 MG tablet Take 1 tablet by mouth daily 6/13/22   Benson Cline MD   Insulin Degludec [facility]) 200 UNIT/ML SOPN Inject 68 Units into the skin daily 5/19/22 8/17/22 Historical Provider, MD   ZINC PO Take by mouth daily    Ar Automatic Reconciliation   ascorbic acid (VITAMIN C) 500 MG tablet Take 500 mg by mouth daily    Ar Automatic Reconciliation   aspirin 81 MG EC tablet Take 81 mg by mouth daily  11/16/18   Ar Automatic Reconciliation   atorvastatin (LIPITOR) 40 MG tablet Take 20 mg by mouth daily 2/14/22   Ar Automatic Reconciliation   ibuprofen (ADVIL;MOTRIN) 800 MG tablet TAKE ONE TABLET BY MOUTH EVERY 8 HOURS AS NEEDED FOR PAIN 12/20/21   Ar Automatic Reconciliation   melatonin 5 MG TABS tablet Take 10 mg by mouth     Ar Automatic Reconciliation   metFORMIN (GLUCOPHAGE) 1000 MG tablet Take 1,000 mg by mouth 2 times daily 2/14/22   Ar Automatic Reconciliation   traZODone (DESYREL) 100 MG tablet Take 100 mg by mouth  11/29/21   Ar Automatic Reconciliation     Coumadin Use Last 7 Days:  no  Antiplatelet drug therapy use last 7 days: yes - asa  Other anticoagulant use last 7 days: no  Additional Medication Information:  dana H&P      Pre-Sedation Documentation and Exam:   I have personally completed a history, physical exam & review of systems for this patient (see notes).     Mallampati Airway Assessment:  normal, Mallampati Class I - (soft palate, fauces, uvula & anterior/posterior tonsillar pillars are visible)    Prior History of Anesthesia Complications:   none    ASA Classification:  Class 2 - A normal healthy patient with mild systemic disease    Sedation/ Anesthesia Plan:   intravenous sedation    Medications Planned:   midazolam (Versed)  intravenously and fentanyl intravenously    Patient is an appropriate candidate for plan of sedation: yes    Electronically signed by Domonique Osman PA-C on 6/27/2022 at 7:45 AM

## 2022-06-29 ENCOUNTER — HOSPITAL ENCOUNTER (OUTPATIENT)
Dept: PET IMAGING | Age: 62
Discharge: HOME | End: 2022-07-02
Payer: COMMERCIAL

## 2022-06-29 ENCOUNTER — OFFICE VISIT (OUTPATIENT)
Dept: ONCOLOGY | Age: 62
End: 2022-06-29
Payer: COMMERCIAL

## 2022-06-29 VITALS
HEART RATE: 94 BPM | WEIGHT: 195.2 LBS | DIASTOLIC BLOOD PRESSURE: 75 MMHG | SYSTOLIC BLOOD PRESSURE: 136 MMHG | TEMPERATURE: 98.9 F | OXYGEN SATURATION: 98 % | BODY MASS INDEX: 29.58 KG/M2 | HEIGHT: 68 IN | RESPIRATION RATE: 16 BRPM

## 2022-06-29 DIAGNOSIS — C83.35 DIFFUSE LARGE B-CELL LYMPHOMA OF LYMPH NODES OF INGUINAL REGION (HCC): ICD-10-CM

## 2022-06-29 DIAGNOSIS — C83.35 DIFFUSE LARGE B-CELL LYMPHOMA OF LYMPH NODES OF INGUINAL REGION (HCC): Primary | ICD-10-CM

## 2022-06-29 LAB
GLUCOSE BLD STRIP.AUTO-MCNC: 158 MG/DL (ref 65–100)
SERVICE CMNT-IMP: ABNORMAL

## 2022-06-29 PROCEDURE — 6360000004 HC RX CONTRAST MEDICATION: Performed by: INTERNAL MEDICINE

## 2022-06-29 PROCEDURE — 78815 PET IMAGE W/CT SKULL-THIGH: CPT

## 2022-06-29 PROCEDURE — 2580000003 HC RX 258: Performed by: INTERNAL MEDICINE

## 2022-06-29 PROCEDURE — 99215 OFFICE O/P EST HI 40 MIN: CPT | Performed by: INTERNAL MEDICINE

## 2022-06-29 PROCEDURE — 3430000000 HC RX DIAGNOSTIC RADIOPHARMACEUTICAL: Performed by: INTERNAL MEDICINE

## 2022-06-29 PROCEDURE — A9552 F18 FDG: HCPCS | Performed by: INTERNAL MEDICINE

## 2022-06-29 PROCEDURE — 82962 GLUCOSE BLOOD TEST: CPT

## 2022-06-29 RX ORDER — ESCITALOPRAM OXALATE 10 MG/1
5 TABLET ORAL DAILY
Qty: 30 TABLET | Refills: 0 | Status: SHIPPED | OUTPATIENT
Start: 2022-06-29 | End: 2022-08-23 | Stop reason: SDUPTHER

## 2022-06-29 RX ORDER — SODIUM CHLORIDE 0.9 % (FLUSH) 0.9 %
20 SYRINGE (ML) INJECTION AS NEEDED
Status: DISCONTINUED | OUTPATIENT
Start: 2022-06-29 | End: 2022-07-03 | Stop reason: HOSPADM

## 2022-06-29 RX ORDER — FLUDEOXYGLUCOSE F 18 200 MCI/ML
12.83 INJECTION, SOLUTION INTRAVENOUS
Status: COMPLETED | OUTPATIENT
Start: 2022-06-29 | End: 2022-06-29

## 2022-06-29 RX ADMIN — FLUDEOXYGLUCOSE F 18 12.83 MILLICURIE: 200 INJECTION, SOLUTION INTRAVENOUS at 12:13

## 2022-06-29 RX ADMIN — DIATRIZOATE MEGLUMINE AND DIATRIZOATE SODIUM 10 ML: 660; 100 LIQUID ORAL; RECTAL at 12:13

## 2022-06-29 RX ADMIN — SODIUM CHLORIDE, PRESERVATIVE FREE 20 ML: 5 INJECTION INTRAVENOUS at 12:13

## 2022-06-29 ASSESSMENT — PATIENT HEALTH QUESTIONNAIRE - PHQ9
SUM OF ALL RESPONSES TO PHQ QUESTIONS 1-9: 5
4. FEELING TIRED OR HAVING LITTLE ENERGY: 3
6. FEELING BAD ABOUT YOURSELF - OR THAT YOU ARE A FAILURE OR HAVE LET YOURSELF OR YOUR FAMILY DOWN: 0
5. POOR APPETITE OR OVEREATING: 0
SUM OF ALL RESPONSES TO PHQ QUESTIONS 1-9: 5
7. TROUBLE CONCENTRATING ON THINGS, SUCH AS READING THE NEWSPAPER OR WATCHING TELEVISION: 0
2. FEELING DOWN, DEPRESSED OR HOPELESS: 0
1. LITTLE INTEREST OR PLEASURE IN DOING THINGS: 0
9. THOUGHTS THAT YOU WOULD BE BETTER OFF DEAD, OR OF HURTING YOURSELF: 0
3. TROUBLE FALLING OR STAYING ASLEEP: 2
SUM OF ALL RESPONSES TO PHQ9 QUESTIONS 1 & 2: 0
SUM OF ALL RESPONSES TO PHQ QUESTIONS 1-9: 5
SUM OF ALL RESPONSES TO PHQ QUESTIONS 1-9: 5
8. MOVING OR SPEAKING SO SLOWLY THAT OTHER PEOPLE COULD HAVE NOTICED. OR THE OPPOSITE, BEING SO FIGETY OR RESTLESS THAT YOU HAVE BEEN MOVING AROUND A LOT MORE THAN USUAL: 0

## 2022-06-29 ASSESSMENT — ANXIETY QUESTIONNAIRES
3. WORRYING TOO MUCH ABOUT DIFFERENT THINGS: 3
7. FEELING AFRAID AS IF SOMETHING AWFUL MIGHT HAPPEN: 0
6. BECOMING EASILY ANNOYED OR IRRITABLE: 3
4. TROUBLE RELAXING: 3
5. BEING SO RESTLESS THAT IT IS HARD TO SIT STILL: 3
1. FEELING NERVOUS, ANXIOUS, OR ON EDGE: 0
GAD7 TOTAL SCORE: 15
2. NOT BEING ABLE TO STOP OR CONTROL WORRYING: 3
IF YOU CHECKED OFF ANY PROBLEMS ON THIS QUESTIONNAIRE, HOW DIFFICULT HAVE THESE PROBLEMS MADE IT FOR YOU TO DO YOUR WORK, TAKE CARE OF THINGS AT HOME, OR GET ALONG WITH OTHER PEOPLE: VERY DIFFICULT

## 2022-06-29 NOTE — ONCOLOGY
START ON PATHWAY REGIMEN - Lymphoma and CLL    [id number]        Prednisone       Rituximab-xxxx       Cyclophosphamide       Doxorubicin (Adriamycin)       Vincristine (Oncovin)     **Always confirm dose/schedule in your pharmacy ordering system**    Patient Characteristics:  Diffuse Large B-Cell Lymphoma or Follicular Lymphoma, Grade 3B, First Line, Stage III and IV  Disease Type: Not Applicable  Disease Type: Diffuse Large B-Cell Lymphoma  Disease Type: Not Applicable  Line of therapy: First Line  Intent of Therapy:  Curative Intent, Discussed with Patient

## 2022-06-29 NOTE — PATIENT INSTRUCTIONS
Patient Instructions from Today's Visit    Reason for Visit:  PET review    Plan:  - Your PET showed some \"hot spots\" in the pelvis area, there is one in your right under arm  - This is a standard risk lymphoma   - We want to start you on chemotherapy called RCHOP, this will be given once every 3 weeks for a total of 6 treatments   - After we start treatment, we plan on doing the scans again to see how well the chemo is working   - We will set you up with chemotherapy education and financial counseling      Follow Up:  As scheduled      Treatment Summary has been discussed and given to patient: Your Chemotherapy Plan      Diagnosis: DLBCL: Diffuse Large B-Cell Lyphoma    Goal of Therapy: __ Palliative      _x_Curative         Name of Chemotherapy medications: RCHOP: Rituxan, Cyclophosphamide, Doxorubicin Vincristine, Prednisone    Number of Cycles Planned: 6                  Length of Cycle:  21 days      Chemotherapy plan is subject to change at your provider's discretion    A copy of this plan has been discussed and given to you by [facility].    -------------------------------------------------------------------------------------------------------------------  Please call our office at [phone] if you have any  of the following symptoms:   · Fever of 100.5 or greater  · Chills  · Shortness of breath  · Swelling or pain in one leg    After office hours an answering service is available and will contact a provider for emergencies or if you are experiencing any of the above symptoms.  Patient did express an interest in My Chart. My Chart log in information explained on the after visit summary printout at the [facility] [email or web address] desk.     HAFSA Farooq, RN  Hematology Navigator  [address] [place] [address]  RTLQ:[phone]  Office: [phone]   Fax: [phone]  Email:  [email or web address]    Navigator is available by phone Monday through Friday 8 am to 4 pm.    If you need assistance after 4pm, or on the weekend please call [phone]. The answering service is available 24 hours a day, 7 days a week.

## 2022-06-29 NOTE — PROGRESS NOTES
[facility]: Office Visit Established Patient    Chief Complaint:    Chief Complaint   Patient presents with    Lymphoma         History of Present Illness:  Mr. Abraham Howard is a 64 y.o. male who returns today for management of diffuse large B cell lymphoma. He presented to his PCP on 4/29/22 reporting an enlarging lump in his left groin for the past 2 months. Physical exam confirmed a mildly tender lump in the patients left inguinal region. The right inguinal region was normal, and no inguinal hernia was noted. Ultrasound of the left groin was performed on 5/13/22 revealing enlarged, abnormal appearing left inguinal lymph nodes measuring up to 3.0 x 3.2 x 1.9 cm. Ultrasound directed biopsy was suggested for further assessment. We saw him for consultation and recommended excisional biopsy, which showed diffuse large B cell lymphoma. We recommended staging with PET/CT, bone marrow biopsy, and awaiting FISH testing on the lymph node, and he returns to discuss results. Anxiety continues and they inquire about starting something for this. Review of Systems:  Constitutional: Negative. HENT: Negative. Eyes: Negative. Respiratory: Negative. Cardiovascular: Negative. Gastrointestinal: Negative. Genitourinary: Negative. Musculoskeletal: Negative. Skin: Negative. Neurological: Negative. Endo/Heme/Allergies: Negative. Psychiatric/Behavioral: Positive for anxiety. All other systems reviewed and are negative. No Known Allergies  Past Medical History:   Diagnosis Date    Arthritis     osteo    Bilateral carpal tunnel syndrome 10/28/2019    CAD (coronary artery disease) 1999    3 stents--last one placed 2019, pt on daily 81 mg ASA.  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    GERD (gastroesophageal reflux disease)     diet controlled    Hx of colonic polyp 2016    adenoma    Hypercholesterolemia     Hypertension     managed with medication     Past Surgical History:   Procedure Laterality Date    COLONOSCOPY  last 4/25/16    Leandra--trans TA--3 year recall due to prep quality    CORONARY ANGIOPLASTY WITH STENT PLACEMENT  7583,7362, 2019    heart cath times 3 - 3 total stents    IR PORT PLACEMENT EQUAL OR GREATER THAN 5 YEARS  6/27/2022    IR PORT PLACEMENT EQUAL OR GREATER THAN 5 YEARS 6/27/2022 [facility]    KNEE ARTHROSCOPY Left 1985    LYMPH NODE BIOPSY Left 6/10/2022    [facility]    3 rd stent -- cardiac placed    SKIN BIOPSY N/A 6/10/2022    EXCISION BACK NEVUS WL PER ERICK/REQUEST TO FOLLOW performed by [facility] MD at [facility] Left 11/15/2018    UVULOPALATOPHARYGOPLASTY       Family History   Problem Relation Age of Onset    Coronary Art Dis Neg Hx     Lung Disease Mother     Cancer Maternal Uncle         colon    Cancer Maternal Uncle         colon    Cancer Maternal Uncle         colon    Cancer Maternal Uncle         colon    Cancer Maternal Uncle         colon    Cancer Maternal Uncle         colon    Cancer Maternal Uncle         colon    Colon Cancer Father     Cancer Father     Asthma Mother      Social History     Socioeconomic History    Marital status:      Spouse name: Not on file    Number of children: Not on file    Years of education: Not on file    Highest education level: Not on file   Occupational History    Not on file   Tobacco Use    Smoking status: Never Smoker    Smokeless tobacco: Never Used   Substance and Sexual Activity  Alcohol use: Yes     Alcohol/week: 2.0 standard drinks    Drug use: No    Sexual activity: Not on file   Other Topics Concern    Not on file   Social History Narrative    Not on file     Social Determinants of Health     Financial Resource Strain:     Difficulty of Paying Living Expenses: Not on file   Food Insecurity:     Worried About Running Out of Food in the Last Year: Not on file    Hortensia of Food in the Last Year: Not on file   Transportation Needs:     Lack of Transportation (Medical): Not on file    Lack of Transportation (Non-Medical): Not on file   Physical Activity:     Days of Exercise per Week: Not on file    Minutes of Exercise per Session: Not on file   Stress:     Feeling of Stress : Not on file   Social Connections:     Frequency of Communication with Friends and Family: Not on file    Frequency of Social Gatherings with Friends and Family: Not on file    Attends Confucianism Services: Not on file    Active Member of [address] [place] [address] or Organizations: Not on file    Attends Club or Organization Meetings: Not on file    Marital Status: Not on file   Intimate Partner Violence:     Fear of Current or Ex-Partner: Not on file    Emotionally Abused: Not on file    Physically Abused: Not on file    Sexually Abused: Not on file   Housing Stability:     Unable to Pay for Housing in the Last Year: Not on file    Number of [place] in the Last Year: Not on file    Unstable Housing in the Last Year: Not on file     Current Outpatient Medications   Medication Sig Dispense Refill    Multiple Vitamins-Minerals (MENS 50+ ADVANCED PO) Take by mouth      LORazepam (ATIVAN) 1 MG tablet Take 1 tab (1mg) 1 hr prior to PET scan.  2 tablet 0    lisinopril (PRINIVIL;ZESTRIL) 10 MG tablet Take 1 tablet by mouth daily 90 tablet 0    Insulin Degludec (TRESIBA FLEXTOUCH) 200 UNIT/ML SOPN Inject 68 Units into the skin daily      ZINC PO Take by mouth daily      ascorbic acid (VITAMIN C) 500 MG tablet Take 500 mg by mouth daily      aspirin 81 MG EC tablet Take 81 mg by mouth daily       atorvastatin (LIPITOR) 40 MG tablet Take 20 mg by mouth daily      ibuprofen (ADVIL;MOTRIN) 800 MG tablet TAKE ONE TABLET BY MOUTH EVERY 8 HOURS AS NEEDED FOR PAIN      melatonin 5 MG TABS tablet Take 10 mg by mouth       metFORMIN (GLUCOPHAGE) 1000 MG tablet Take 1,000 mg by mouth 2 times daily      traZODone (DESYREL) 100 MG tablet Take 100 mg by mouth       fluticasone (FLONASE) 50 MCG/ACT nasal spray USE TWO SPRAYS IN EACH NOSTRIL EVERY DAY (Patient not taking: Reported on 6/29/2022) 1 each 5     No current facility-administered medications for this visit. Facility-Administered Medications Ordered in Other Visits   Medication Dose Route Frequency Provider Last Rate Last Admin    sodium chloride flush 0.9 % injection 20 mL  20 mL IntraVENous PRN Chidi King MD   20 mL at 06/29/22 1213    diatrizoate meglumine-sodium (GASTROGRAFIN) 66-10 % solution 10 mL  10 mL Oral ONCE PRN Chidi King MD   10 mL at 06/29/22 1213       OBJECTIVE:  /75   Pulse 94   Temp 98.9 °F (37.2 °C)   Resp 16   Ht 5' 8\" (1.727 m)   Wt 195 lb 3.2 oz (88.5 kg)   SpO2 98%   BMI 29.68 kg/m²     Physical Exam:  Constitutional: Well developed, well nourished male in no acute distress, sitting comfortably on the examination table. HEENT: Normocephalic and atraumatic. Sclerae anicteric. Skin Warm and dry. No bruising and no rash noted. No erythema. No pallor. Cardiopulmonary Deferred. Neuro Grossly nonfocal with no obvious sensory or motor deficits. MSK Normal range of motion in general.  No edema and no tenderness. Psych Appropriate mood and affect.          Labs:  Recent Results (from the past 96 hour(s))   POCT Glucose    Collection Time: 06/27/22  7:07 AM   Result Value Ref Range    POC Glucose 183 (H) 65 - 100 mg/dL    Performed by: Linda Bryant    POCT Glucose    Collection Time: 06/29/22 12:12 PM Result Value Ref Range    POC Glucose 158 (H) 65 - 100 mg/dL    Performed by: Deedee Astorga    POCT Glucose    Collection Time: 06/30/22  7:52 AM   Result Value Ref Range    POC Glucose 257 (H) 65 - 100 mg/dL    Performed by: Shilpa    CBC with Auto Differential    Collection Time: 06/30/22  7:53 AM   Result Value Ref Range    WBC 5.3 4.3 - 11.1 K/uL    RBC 4.61 4.23 - 5.6 M/uL    Hemoglobin 14.3 13.6 - 17.2 g/dL    Hematocrit 40.3 (L) 41.1 - 50.3 %    MCV 87.4 79.6 - 97.8 FL    MCH 31.0 26.1 - 32.9 PG    MCHC 35.5 (H) 31.4 - 35.0 g/dL    RDW 12.5 11.9 - 14.6 %    Platelets 538 098 - 217 K/uL    MPV 10.0 9.4 - 12.3 FL    nRBC 0.00 0.0 - 0.2 K/uL    Differential Type AUTOMATED      Seg Neutrophils 67 43 - 78 %    Lymphocytes 16 13 - 44 %    Monocytes 10 4.0 - 12.0 %    Eosinophils % 5 0.5 - 7.8 %    Basophils 1 0.0 - 2.0 %    Immature Granulocytes 1 0.0 - 5.0 %    Segs Absolute 3.6 1.7 - 8.2 K/UL    Absolute Lymph # 0.8 0.5 - 4.6 K/UL    Absolute Mono # 0.5 0.1 - 1.3 K/UL    Absolute Eos # 0.3 0.0 - 0.8 K/UL    Basophils Absolute 0.1 0.0 - 0.2 K/UL    Absolute Immature Granulocyte 0.0 0.0 - 0.5 K/UL   POCT Glucose    Collection Time: 06/30/22  9:00 AM   Result Value Ref Range    POC Glucose 236 (H) 65 - 100 mg/dL    Performed by: Paty Downing    Bone Marrow Prep & Nigel Bare    Collection Time: 06/30/22  9:45 AM   Result Value Ref Range    BONE MARROW PREP & FLETCHER STAIN FOR HEMATOLOGY SERVICES RENDERED         Imaging:  PET CT SKULL BASE TO MID THIGH    Result Date: 6/29/2022  EXAMINATION: PET CT SKULL BASE TO MID THIGH 6/29/2022 1:42 PM ACCESSION NUMBER: [id number] COMPARISON: None available INDICATION: Diffuse large b-cell lymphoma, lymph nodes of inguinal region and lower limb, initial staging TECHNIQUE:   Radiopharmaceutical: 12.83 mCi F18-FDG IV.  Positron emission tomography (PET) with concurrently acquired computed tomography (CT) for attenuation correction and anatomical localization imaging; skull base to mid-thigh. Blood glucose at the time of tracer administration is 158 mg/dl, which is adequate for imaging. Approximately 60 minutes after administration of the radiopharmaceutical imaging was initiated. SUV max calculated from patient body wt. Noncaloric dilute oral contrast is given. For this CT scanner at least one of the following techniques is utilized to decrease patient radiation exposure: Automatic exposure control, modulation of MA and KVP based on patient weight, and iterative reconstruction. FINDINGS:  Head/Neck: No hypermetabolic cervical lymphadenopathy. No abnormal radiotracer uptake within the brain, however the normal intense uptake limits evaluation. Chest: No hypermetabolic pulmonary nodule. A 1.7 x 1.2 cm posterior mediastinal retroaortic nodule/lymph node demonstrates mild FDG uptake (max SUV 3.5). Moderately metabolic nonenlarged right axillary lymph node (max SUV 5.9). Partial visualization of a moderately metabolic right upper extremity lymph node adjacent to the distal humerus (max SUV 5.6). 6 mm mildly metabolic prevascular lymph node adjacent to the main pulmonary artery (max SUV 2.8). 8 mm subcutaneous nodule along the right inferior chest wall (max SUV 1.4). Port within the right anterior chest wall. The port catheter tip terminates at the cavoatrial junction. Other changes in her coronary artery calcifications. Abdomen/Pelvis: Hypermetabolic left inguinal lymph node conglomerate measuring 3.9 x 3.5 cm (max SUV 11.9). Enlarged and hypermetabolic right inguinal lymph nodes (max SUV 8.1). Postsurgical changes within the left inguinal region. Hypermetabolic left iliac and pelvic lymph nodes. For example, a left obturator lymph node measures 3.9 x 2.1 cm (max SUV 10.9). Moderately metabolic left periaortic lymph node (max SUV 4.2). Enlarged and hypermetabolic upper abdominal lymph nodes with an aortocaval lymph node conglomerate demonstrating a max SUV of 5.5.  The spleen does not appear enlarged. Physiologic uptake within the gastrointestinal and genitourinary tracts. Scattered vascular calcifications. The prostate gland is not enlarged. Gallstones without evidence of acute cholecystitis. Bones: No hypermetabolic osseous focus to suggest osseous metastatic disease. 1.  Hypermetabolic lymphadenopathy involving the bilateral inguinal, left iliac, retroperitoneal and upper abdomen, compatible with biopsy-proven B-cell lymphoma. 2.  There are additional mild to moderately metabolic mediastinal and right axillary lymph nodes, favored to represent additional sites of tia disease. 3.  Nonspecific 8 mm subcutaneous nodule within the right lower chest wall demonstrating mild FDG uptake, equivocal for additional site of disease. 4.  No splenomegaly. Pathology:  DIAGNOSIS        A:  \"LEFT INGUINAL LYMPH NODE\":             DIFFUSE LARGE B-CELL LYMPHOMA.             SEE COMMENT.          B:  \"NEVUS OF BACK\":             PENDING DERMATOPATHOLOGY CONSULT. Comment        A:  Histologic sections show a diffuse serpiginous infiltrate of   large atypical lymphocytes with indented nuclei, inconspicuous nucleoli   and moderate amounts of cytoplasm.  Immunohistochemical stains show that   the lymphoma cells are positive for CD20, Parrott-5, CD10 and Bcl-6 and   negative for Bcl-2, MUM-1, CD5, Cyclin D1, CD30 (0% positivity) and c-MYC   (less than 40% positivity).  CD3 stain highlights background small   T-lymphocytes.  Ki-67 shows an increased proliferation index in the   serpiginous areas of more than 90%.  The morphologic and immunophenotypic   appearance supports a diagnosis of germinal center subtype of diffuse   large B-cell lymphoma.  FISH studies have been requested and will be   reported separately. ASSESSMENT:   Diagnosis Orders   1.  Diffuse large B-cell lymphoma of lymph nodes of inguinal region ([address]  CBC with Auto Differential    Comprehensive Metabolic Panel    Magnesium Lactate Dehydrogenase    Uric Acid         PLAN:  Lab studies were personally reviewed. DLBCL: high grade with Ki-67 90%, GC subtype, diagnosed with excisional biopsy of lymphadenopathy in left groin, 3.2 cm on ultrasound, enlarging over 3 months time. PET/CT reviewed and shows the most prominent areas of disease in the inguinal and iliac nodes, but there are also nodes in the right axilla and retroperitoneum that are consistent with DLBCL, making him at least [facility] stage III. Bone marrow biopsy to be completed tomorrow, but regardless he will not be a candidate for locoregional therapy. Thankfully, his FISH shows no evidence of more aggressive genotypic findings, so he would be most appropriate for R-CHOP for 6 cycles for definitive therapy. HE will have chemo ed and will require TTE prior to initiation of therapy. They understand and agree to proceed. All questions were asked and answered to the best of my ability. F/u for cycle 1 RCHOP. In all, I spent 40 minutes in the care of Mr. Bobby Morales today, over 50% of which was in direct counseling and coordination of care.               Abraham Fontenot MD, MD  [facility]  [address], [address] [place] [address]  Office : [phone]  Fax : [phone]

## 2022-06-30 ENCOUNTER — HOSPITAL ENCOUNTER (OUTPATIENT)
Dept: CT IMAGING | Age: 62
Discharge: HOME | End: 2022-07-03
Payer: COMMERCIAL

## 2022-06-30 VITALS
DIASTOLIC BLOOD PRESSURE: 55 MMHG | WEIGHT: 195 LBS | SYSTOLIC BLOOD PRESSURE: 110 MMHG | OXYGEN SATURATION: 100 % | TEMPERATURE: 98.1 F | BODY MASS INDEX: 29.55 KG/M2 | HEIGHT: 68 IN | RESPIRATION RATE: 16 BRPM | HEART RATE: 86 BPM

## 2022-06-30 DIAGNOSIS — C83.35 DIFFUSE LARGE B-CELL LYMPHOMA OF LYMPH NODES OF INGUINAL REGION (HCC): ICD-10-CM

## 2022-06-30 LAB
BASOPHILS # BLD: 0.1 K/UL (ref 0–0.2)
BASOPHILS NFR BLD: 1 % (ref 0–2)
BONE MARROW PREP & WRIGHT STAIN: NORMAL
DIFFERENTIAL METHOD BLD: ABNORMAL
EOSINOPHIL # BLD: 0.3 K/UL (ref 0–0.8)
EOSINOPHIL NFR BLD: 5 % (ref 0.5–7.8)
ERYTHROCYTE [DISTWIDTH] IN BLOOD BY AUTOMATED COUNT: 12.5 % (ref 11.9–14.6)
GLUCOSE BLD STRIP.AUTO-MCNC: 236 MG/DL (ref 65–100)
GLUCOSE BLD STRIP.AUTO-MCNC: 257 MG/DL (ref 65–100)
HCT VFR BLD AUTO: 40.3 % (ref 41.1–50.3)
HGB BLD-MCNC: 14.3 G/DL (ref 13.6–17.2)
IMM GRANULOCYTES # BLD AUTO: 0 K/UL (ref 0–0.5)
IMM GRANULOCYTES NFR BLD AUTO: 1 % (ref 0–5)
LYMPHOCYTES # BLD: 0.8 K/UL (ref 0.5–4.6)
LYMPHOCYTES NFR BLD: 16 % (ref 13–44)
MCH RBC QN AUTO: 31 PG (ref 26.1–32.9)
MCHC RBC AUTO-ENTMCNC: 35.5 G/DL (ref 31.4–35)
MCV RBC AUTO: 87.4 FL (ref 79.6–97.8)
MONOCYTES # BLD: 0.5 K/UL (ref 0.1–1.3)
MONOCYTES NFR BLD: 10 % (ref 4–12)
NEUTS SEG # BLD: 3.6 K/UL (ref 1.7–8.2)
NEUTS SEG NFR BLD: 67 % (ref 43–78)
NRBC # BLD: 0 K/UL (ref 0–0.2)
PLATELET # BLD AUTO: 206 K/UL (ref 150–450)
PMV BLD AUTO: 10 FL (ref 9.4–12.3)
RBC # BLD AUTO: 4.61 M/UL (ref 4.23–5.6)
SERVICE CMNT-IMP: ABNORMAL
SERVICE CMNT-IMP: ABNORMAL
WBC # BLD AUTO: 5.3 K/UL (ref 4.3–11.1)

## 2022-06-30 PROCEDURE — 88313 SPECIAL STAINS GROUP 2: CPT

## 2022-06-30 PROCEDURE — 85025 COMPLETE CBC W/AUTO DIFF WBC: CPT

## 2022-06-30 PROCEDURE — 77012 CT SCAN FOR NEEDLE BIOPSY: CPT

## 2022-06-30 PROCEDURE — 82962 GLUCOSE BLOOD TEST: CPT

## 2022-06-30 PROCEDURE — 2500000003 HC RX 250 WO HCPCS: Performed by: RADIOLOGY

## 2022-06-30 PROCEDURE — 88305 TISSUE EXAM BY PATHOLOGIST: CPT

## 2022-06-30 PROCEDURE — 88311 DECALCIFY TISSUE: CPT

## 2022-06-30 PROCEDURE — 6360000002 HC RX W HCPCS: Performed by: RADIOLOGY

## 2022-06-30 PROCEDURE — 2580000003 HC RX 258: Performed by: RADIOLOGY

## 2022-06-30 RX ORDER — SODIUM CHLORIDE 9 MG/ML
INJECTION, SOLUTION INTRAVENOUS CONTINUOUS PRN
Status: COMPLETED | OUTPATIENT
Start: 2022-06-30 | End: 2022-06-30

## 2022-06-30 RX ORDER — LIDOCAINE HYDROCHLORIDE 20 MG/ML
INJECTION, SOLUTION INFILTRATION; PERINEURAL
Status: COMPLETED | OUTPATIENT
Start: 2022-06-30 | End: 2022-06-30

## 2022-06-30 RX ORDER — MIDAZOLAM HYDROCHLORIDE 2 MG/2ML
INJECTION, SOLUTION INTRAMUSCULAR; INTRAVENOUS
Status: COMPLETED | OUTPATIENT
Start: 2022-06-30 | End: 2022-06-30

## 2022-06-30 RX ORDER — DIPHENHYDRAMINE HYDROCHLORIDE 50 MG/ML
INJECTION INTRAMUSCULAR; INTRAVENOUS
Status: COMPLETED | OUTPATIENT
Start: 2022-06-30 | End: 2022-06-30

## 2022-06-30 RX ORDER — FENTANYL CITRATE 50 UG/ML
INJECTION, SOLUTION INTRAMUSCULAR; INTRAVENOUS
Status: COMPLETED | OUTPATIENT
Start: 2022-06-30 | End: 2022-06-30

## 2022-06-30 RX ADMIN — FENTANYL CITRATE 50 MCG: 50 INJECTION INTRAMUSCULAR; INTRAVENOUS at 09:35

## 2022-06-30 RX ADMIN — LIDOCAINE HYDROCHLORIDE 5 ML: 20 INJECTION, SOLUTION INFILTRATION; PERINEURAL at 09:40

## 2022-06-30 RX ADMIN — SODIUM CHLORIDE 25 ML/HR: 900 INJECTION INTRAVENOUS at 09:11

## 2022-06-30 RX ADMIN — MIDAZOLAM HYDROCHLORIDE 1 MG: 1 INJECTION, SOLUTION INTRAMUSCULAR; INTRAVENOUS at 09:35

## 2022-06-30 RX ADMIN — DIPHENHYDRAMINE HYDROCHLORIDE 50 MG: 50 INJECTION, SOLUTION INTRAMUSCULAR; INTRAVENOUS at 09:12

## 2022-06-30 ASSESSMENT — PAIN - FUNCTIONAL ASSESSMENT: PAIN_FUNCTIONAL_ASSESSMENT: NONE - DENIES PAIN

## 2022-06-30 NOTE — OP NOTE
Department of Interventional Radiology  [phone]        Interventional Radiology Brief Procedure Note    Patient: Tabatha Curry MRN: [id number]  SSN: [id number]    YOB: 1960  Age: 64 y.o. Sex: male      Date of Procedure: 6/30/2022    Pre-Procedure Diagnosis: lymphoma    Post-Procedure Diagnosis: SAME    Procedure(s): Image Guided Biopsy    Brief Description of Procedure: CT guided BMBX    Performed By: Sahra Meyer PA-C     Assistants: None    Anesthesia:moderate sedation per MAYNOR Alonso MD    Estimated Blood Loss: Less than 10ml    Specimens:  Pathology    Implants:  None    Findings: no post bx bleeding    Complications: None    Recommendations: bedrest     Follow Up: prn    Signed By: Sahra Meyer PA-C     June 30, 2022

## 2022-06-30 NOTE — H&P
Department of Interventional Radiology  [phone]    History and Physical    Patient:  Marley Israel. MRN:  [id number]  SSN:  [id number]    YOB: 1960  Age:  64 y.o. Sex:  male      Primary Care Provider:  Iris Moody MD  Referring Physician:  Gee Torres MD    Subjective:     Chief Complaint: biopsy    History of the Present Illness: The patient is a 64 y.o. male with lymphoma who presents for bone marrow biopsy. NPO. He did not take insulin or medications this morning. Past Medical History:   Diagnosis Date    Arthritis     osteo    Bilateral carpal tunnel syndrome 10/28/2019    CAD (coronary artery disease) 1999    3 stents--last one placed 2019, pt on daily 81 mg ASA.  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 10/28/2019    GERD (gastroesophageal reflux disease)     diet controlled    Hx of colonic polyp 2016    adenoma    Hypercholesterolemia     Hypertension     managed with medication     Past Surgical History:   Procedure Laterality Date    COLONOSCOPY  last 4/25/16    Leandra--trans TA--3 year recall due to prep quality    CORONARY ANGIOPLASTY WITH STENT PLACEMENT  4212,6656, 2019    heart cath times 3 - 3 total stents    IR PORT PLACEMENT EQUAL OR GREATER THAN 5 YEARS  6/27/2022    IR PORT PLACEMENT EQUAL OR GREATER THAN 5 YEARS 6/27/2022 [facility] SPECI[facility]    KNEE ARTHROSCOPY Left 1985    LYMPH NODE BIOPSY Left 6/10/2022    LEFT INGUINAL BIOPSY performed by Margy Cerna MD at [address] [facility]  2019    3 rd stent -- cardiac placed    SKIN BIOPSY N/A 6/10/2022    EXCISION BACK NEVUS WL PER ERICK/REQUEST TO FOLLOW performed by [facility] 1998 ARTHROPLASTY Left 11/15/2018    UVULOPALATOPHARYGOPLASTY          Review of Systems:    Pertinent items are noted in HPI. Prior to Admission medications    Medication Sig Start Date End Date Taking?  Authorizing Provider   Multiple Vitamins-Minerals (MENS 50+ ADVANCED PO) Take by mouth    Historical Provider, MD   escitalopram (LEXAPRO) 10 MG tablet Take 0.5 tablets by mouth daily 6/29/22   Terri Leonard MD   LORazepam (ATIVAN) 1 MG tablet Take 1 tab (1mg) 1 hr prior to PET scan. 6/27/22 7/4/22  Terri Leonard MD   lisinopril (PRINIVIL;ZESTRIL) 10 MG tablet Take 1 tablet by mouth daily 6/13/22   Benson Cline MD   Insulin Degludec (TRESIBA FLEXTOUCH) 200 UNIT/ML SOPN Inject 68 Units into the skin daily 5/19/22 8/17/22  Historical Provider, MD   ZINC PO Take by mouth daily    Ar Automatic Reconciliation   ascorbic acid (VITAMIN C) 500 MG tablet Take 500 mg by mouth daily    Ar Automatic Reconciliation   aspirin 81 MG EC tablet Take 81 mg by mouth daily  11/16/18   Ar Automatic Reconciliation   atorvastatin (LIPITOR) 40 MG tablet Take 20 mg by mouth daily 2/14/22   Ar Automatic Reconciliation   ibuprofen (ADVIL;MOTRIN) 800 MG tablet TAKE ONE TABLET BY MOUTH EVERY 8 HOURS AS NEEDED FOR PAIN 12/20/21   Ar Automatic Reconciliation   melatonin 5 MG TABS tablet Take 10 mg by mouth     Ar Automatic Reconciliation   metFORMIN (GLUCOPHAGE) 1000 MG tablet Take 1,000 mg by mouth 2 times daily 2/14/22   Ar Automatic Reconciliation   traZODone (DESYREL) 100 MG tablet Take 100 mg by mouth  11/29/21   Ar Automatic Reconciliation        No Known Allergies    Family History   Problem Relation Age of Onset    Coronary Art Dis Neg Hx     Lung Disease Mother     Cancer Maternal Uncle         colon    Cancer Maternal Uncle         colon    Cancer Maternal Uncle         colon    Cancer Maternal Uncle         colon    Cancer Maternal Uncle         colon    Cancer Maternal Uncle         colon    Cancer Maternal Uncle         colon    Colon Cancer Father     Cancer Father     Asthma Mother      Social History     Tobacco Use    Smoking status: Never Smoker    Smokeless tobacco: Never Used   Substance Use Topics    Alcohol use: Yes     Alcohol/week: 2.0 standard drinks        Not in a hospital admission.     Objective:       Physical Examination:    Vitals:    06/30/22 0747 06/30/22 0749   BP: (!) 162/87    Pulse: 88    Resp: 18    Temp: 98.1 °F (36.7 °C)    TempSrc: Oral    SpO2: 97%    Weight:  195 lb (88.5 kg)   Height:  5' 8\" (1.727 m)       Pain Assessment     0/10                                                HEART: regular rate and rhythm  LUNG: clear to auscultation bilaterally  ABDOMEN: normal findings: soft, non-tender  EXTREMITIES: warm, no edema    Laboratory:     Lab Results   Component Value Date/Time     06/22/2022 04:51 PM     02/14/2022 11:09 AM    K 4.0 06/22/2022 04:51 PM    K 4.9 02/14/2022 11:09 AM     06/22/2022 04:51 PM    CL 98 02/14/2022 11:09 AM    CO2 28 06/22/2022 04:51 PM    CO2 23 02/14/2022 11:09 AM    BUN 12 06/22/2022 04:51 PM    BUN 11 02/14/2022 11:09 AM    GFRAA >60 06/22/2022 04:51 PM    GFRAA 105 02/14/2022 11:09 AM    MG 1.2 06/22/2022 04:51 PM    GLOB 2.8 06/22/2022 04:51 PM    GLOB 2.6 06/22/2022 04:51 PM    ALT 32 06/22/2022 04:51 PM    ALT 29 02/14/2022 11:09 AM     Lab Results   Component Value Date/Time    WBC 5.3 06/30/2022 07:53 AM    WBC 7.0 06/22/2022 04:51 PM    HGB 14.3 06/30/2022 07:53 AM    HGB 14.9 06/22/2022 04:51 PM    HCT 40.3 06/30/2022 07:53 AM    HCT 42.3 06/22/2022 04:51 PM     06/30/2022 07:53 AM     06/22/2022 04:51 PM     No results found for: APTT, INR    Assessment:     lymphoma    [unfilled]    Plan:     Planned Procedure:  Bone marrow biopsy    Risks, benefits, and alternatives reviewed with patient and he agrees to proceed with the procedure.       Signed By: Nathalie Bernstein PA-C     June 30, 2022

## 2022-06-30 NOTE — PROGRESS NOTES
Recovery period without difficulty. Pt alert and oriented and denies pain. Dressing is clean, dry, and intact. Reviewed discharge instructions with patient and wife, both verbalized understanding. [facility] Vital signs and Freddie score completed.

## 2022-06-30 NOTE — PROGRESS NOTES
Recovery period without difficulty. Pt alert and oriented and denies pain. Dressing is clean, dry, and intact. Reviewed discharge instructions with patient and wife, both verbalized understanding. [facility] Vital signs and Freddie score completed.

## 2022-07-08 ENCOUNTER — CLINICAL DOCUMENTATION (OUTPATIENT)
Dept: ONCOLOGY | Age: 62
End: 2022-07-08

## 2022-07-08 ENCOUNTER — OFFICE VISIT (OUTPATIENT)
Dept: ONCOLOGY | Age: 62
End: 2022-07-08
Payer: COMMERCIAL

## 2022-07-08 VITALS
DIASTOLIC BLOOD PRESSURE: 89 MMHG | RESPIRATION RATE: 18 BRPM | OXYGEN SATURATION: 96 % | WEIGHT: 194.3 LBS | TEMPERATURE: 97.6 F | HEART RATE: 83 BPM | SYSTOLIC BLOOD PRESSURE: 152 MMHG | BODY MASS INDEX: 29.45 KG/M2 | HEIGHT: 68 IN

## 2022-07-08 DIAGNOSIS — Z71.9 ENCOUNTER FOR HEALTH EDUCATION: Primary | ICD-10-CM

## 2022-07-08 PROCEDURE — 99215 OFFICE O/P EST HI 40 MIN: CPT | Performed by: NURSE PRACTITIONER

## 2022-07-08 RX ORDER — LIDOCAINE AND PRILOCAINE 25; 25 MG/G; MG/G
CREAM TOPICAL
Qty: 30 G | Refills: 2 | Status: SHIPPED | OUTPATIENT
Start: 2022-07-08

## 2022-07-08 RX ORDER — PREDNISONE 50 MG/1
100 TABLET ORAL DAILY
Qty: 10 TABLET | Refills: 0 | Status: SHIPPED | OUTPATIENT
Start: 2022-07-08 | End: 2022-08-03 | Stop reason: SDUPTHER

## 2022-07-08 RX ORDER — ALLOPURINOL 300 MG/1
300 TABLET ORAL DAILY
Qty: 30 TABLET | Refills: 1 | Status: SHIPPED | OUTPATIENT
Start: 2022-07-08 | End: 2022-08-23 | Stop reason: ALTCHOICE

## 2022-07-08 RX ORDER — LIDOCAINE AND PRILOCAINE 25; 25 MG/G; MG/G
CREAM TOPICAL
Qty: 3 G | Refills: 2 | Status: SHIPPED | OUTPATIENT
Start: 2022-07-08 | End: 2022-07-08 | Stop reason: SDUPTHER

## 2022-07-08 RX ORDER — ONDANSETRON 4 MG/1
4 TABLET, ORALLY DISINTEGRATING ORAL EVERY 8 HOURS PRN
Qty: 90 TABLET | Refills: 3 | Status: SHIPPED | OUTPATIENT
Start: 2022-07-08 | End: 2022-09-06

## 2022-07-08 RX ORDER — PROCHLORPERAZINE MALEATE 10 MG
10 TABLET ORAL EVERY 6 HOURS PRN
Qty: 120 TABLET | Refills: 3 | Status: SHIPPED | OUTPATIENT
Start: 2022-07-08

## 2022-07-08 ASSESSMENT — PATIENT HEALTH QUESTIONNAIRE - PHQ9
2. FEELING DOWN, DEPRESSED OR HOPELESS: 0
7. TROUBLE CONCENTRATING ON THINGS, SUCH AS READING THE NEWSPAPER OR WATCHING TELEVISION: 0
9. THOUGHTS THAT YOU WOULD BE BETTER OFF DEAD, OR OF HURTING YOURSELF: 0
4. FEELING TIRED OR HAVING LITTLE ENERGY: 0
SUM OF ALL RESPONSES TO PHQ QUESTIONS 1-9: 0
SUM OF ALL RESPONSES TO PHQ9 QUESTIONS 1 & 2: 0
3. TROUBLE FALLING OR STAYING ASLEEP: 0
6. FEELING BAD ABOUT YOURSELF - OR THAT YOU ARE A FAILURE OR HAVE LET YOURSELF OR YOUR FAMILY DOWN: 0
SUM OF ALL RESPONSES TO PHQ QUESTIONS 1-9: 0
SUM OF ALL RESPONSES TO PHQ QUESTIONS 1-9: 0
8. MOVING OR SPEAKING SO SLOWLY THAT OTHER PEOPLE COULD HAVE NOTICED. OR THE OPPOSITE, BEING SO FIGETY OR RESTLESS THAT YOU HAVE BEEN MOVING AROUND A LOT MORE THAN USUAL: 0
SUM OF ALL RESPONSES TO PHQ QUESTIONS 1-9: 0
1. LITTLE INTEREST OR PLEASURE IN DOING THINGS: 0
10. IF YOU CHECKED OFF ANY PROBLEMS, HOW DIFFICULT HAVE THESE PROBLEMS MADE IT FOR YOU TO DO YOUR WORK, TAKE CARE OF THINGS AT HOME, OR GET ALONG WITH OTHER PEOPLE: 0
5. POOR APPETITE OR OVEREATING: 0

## 2022-07-08 NOTE — PROGRESS NOTES
Kojo Peetrson. is a 64 y.o.male with DLBCL  who presents for chemotherapy education for the following medications:  R-CHOP  Schedule, frequency, and duration of chemotherapy was discussed with patient. The patient was given handouts published by the [address] [place] [address] entitled, \"Chemotherapy and You\" and \"Eating Hints Before, During, and After Cancer Treatment\" for their reference. Medication specific information was printed from [employer] and distributed to the patient. Self care guidelines were distributed and discussed with the patient and included the following. ... 1) Potential long term and short term side effects of therapy including fertility risks for appropriate patients    2)  Symptoms and side effects that require the patient to contact the [address] [place] [address] or require immediate attention    3)  Symptoms or events that require immediate discontinuation of oral or self administered treatments    4)  Procedures for handling medications at home, including storage, safe handling, and management of unused medications    5)  Procedures for handling bodily fluids and waste in the home. 6)  The [address] [place] [address]'s contact information with availability and instructions on who and when to call    7)  The [address] [place] [address] appointment policy and expectations for rescheduling or canceling.  Patient denies any needs.  Patient denies any barriers to learn.  Preference in learning style accessed as visual, written, verbal.     Patient acknowledges readiness to learn by responding appropriately to open ended questions about chemo education, disease process, treatment plan and possible side effects.  Patient and/or family/caregiver feedback on learning and education: None     Patient acknowledges the importance of and agrees to adhering to treatment plan regimen.      Patient printed education materials were given to patient.  Advanced planning form given to patient during education session, patient was advised to contact the office if they would like a referral to social work/ palliative care to complete an advance care plan or would like more information regarding this. Vitals:      Vitals:    07/08/22 1046   BP: (!) 152/89   Pulse:    Resp:    Temp:    SpO2:      Prescriptions for zofran, compazine, allopurinol, prednisone and emla sent as well as instructions. Time was then allowed to accept patient questions. Patient and/or caregiver verbalized understanding of their treatment plan.               HEATHER Orozcoigen [facility]  [address] [address]  Office : [phone]  Fax : [phone]

## 2022-07-08 NOTE — PROGRESS NOTES
I spoke with Cortney Lambert and his wife via the telephone regarding his current [employer], potential oral medication authorizations, enrollment in the [address] ([facility]) and the [employer], and assistance organization resource sheet. Next, I spoke with Cortney Lambert and his wife regarding his insurance questions. I answered questions about medical treatments and services. Next, I spoke with Cortney Lambert and his wife about deductibles, copayments, and out of pocket maximums regarding his current [employer]. Next, I spoke with Cortney Lambert and his wife regarding the financial assistance application. Next, I spoke with Cortney Lambert and his wife regarding the Limited Power of [employer] document. After reading the form over the telephone to Cortney Lambert and his wife, the patient stated he will consider signing the [address] form at his next visit. Next, I spoke with Cortney Lambert and his wife regarding potential oral medication authorizations. I told his that if he ever had any problems getting his oral medications filled, to give the dedicated [facility]  a call. Most of the time, it is simply an authorization that needs to be done with the insurance company. Lastly, I spoke with Cortney Lambert and his wife regarding a form with various resource organizations that could assist with specific needs (example:  transportation, lodging, preparing meals, home cleaning)     I am scheduled to follow-up with Cortney Lambert and his wife at his next visit. Cortney Lambert and his wife expressed understanding of the information above and all questions were answered to his satisfaction.

## 2022-07-11 ENCOUNTER — TELEPHONE (OUTPATIENT)
Dept: ONCOLOGY | Age: 62
End: 2022-07-11

## 2022-07-11 LAB
FLOW CYTOMETRY RESULTS: NORMAL
SPECIMEN SOURCE: NORMAL
TEST ORDERED: NORMAL

## 2022-07-11 NOTE — TELEPHONE ENCOUNTER
pT wife called in regards to 7/19/22 appt to review biopsy results/wants to know if those can be reviewed on the 7/13/22 appt

## 2022-07-13 ENCOUNTER — OFFICE VISIT (OUTPATIENT)
Dept: ONCOLOGY | Age: 62
End: 2022-07-13
Payer: COMMERCIAL

## 2022-07-13 ENCOUNTER — HOSPITAL ENCOUNTER (OUTPATIENT)
Dept: LAB | Age: 62
Discharge: HOME | End: 2022-07-16
Payer: COMMERCIAL

## 2022-07-13 VITALS
TEMPERATURE: 98 F | HEIGHT: 68 IN | BODY MASS INDEX: 29.87 KG/M2 | HEART RATE: 81 BPM | WEIGHT: 197.1 LBS | DIASTOLIC BLOOD PRESSURE: 81 MMHG | OXYGEN SATURATION: 94 % | SYSTOLIC BLOOD PRESSURE: 150 MMHG | RESPIRATION RATE: 16 BRPM

## 2022-07-13 DIAGNOSIS — C83.35 DIFFUSE LARGE B-CELL LYMPHOMA OF LYMPH NODES OF INGUINAL REGION (HCC): Primary | ICD-10-CM

## 2022-07-13 DIAGNOSIS — C83.35 DIFFUSE LARGE B-CELL LYMPHOMA OF LYMPH NODES OF INGUINAL REGION (HCC): ICD-10-CM

## 2022-07-13 LAB
ALBUMIN SERPL-MCNC: 3.8 G/DL (ref 3.2–4.6)
ALBUMIN/GLOB SERPL: 1.4 {RATIO} (ref 1.2–3.5)
ALP SERPL-CCNC: 77 U/L (ref 50–136)
ALT SERPL-CCNC: 30 U/L (ref 12–65)
ANION GAP SERPL CALC-SCNC: 9 MMOL/L (ref 7–16)
AST SERPL-CCNC: 17 U/L (ref 15–37)
BASOPHILS # BLD: 0.1 K/UL (ref 0–0.2)
BASOPHILS NFR BLD: 1 % (ref 0–2)
BILIRUB SERPL-MCNC: 0.6 MG/DL (ref 0.2–1.1)
BUN SERPL-MCNC: 15 MG/DL (ref 8–23)
CALCIUM SERPL-MCNC: 9 MG/DL (ref 8.3–10.4)
CHLORIDE SERPL-SCNC: 102 MMOL/L (ref 98–107)
CO2 SERPL-SCNC: 27 MMOL/L (ref 21–32)
CREAT SERPL-MCNC: 0.9 MG/DL (ref 0.8–1.5)
DIFFERENTIAL METHOD BLD: ABNORMAL
EOSINOPHIL # BLD: 0.4 K/UL (ref 0–0.8)
EOSINOPHIL NFR BLD: 5 % (ref 0.5–7.8)
ERYTHROCYTE [DISTWIDTH] IN BLOOD BY AUTOMATED COUNT: 12.9 % (ref 11.9–14.6)
GLOBULIN SER CALC-MCNC: 2.8 G/DL (ref 2.3–3.5)
GLUCOSE SERPL-MCNC: 264 MG/DL (ref 65–100)
HCT VFR BLD AUTO: 40 %
HGB BLD-MCNC: 14.2 G/DL (ref 13.6–17.2)
IMM GRANULOCYTES # BLD AUTO: 0 K/UL (ref 0–0.5)
IMM GRANULOCYTES NFR BLD AUTO: 1 % (ref 0–5)
LDH SERPL L TO P-CCNC: 153 U/L (ref 110–210)
LYMPHOCYTES # BLD: 0.9 K/UL (ref 0.5–4.6)
LYMPHOCYTES NFR BLD: 14 % (ref 13–44)
MAGNESIUM SERPL-MCNC: 1.2 MG/DL (ref 1.8–2.4)
MCH RBC QN AUTO: 31.1 PG (ref 26.1–32.9)
MCHC RBC AUTO-ENTMCNC: 35.5 G/DL (ref 31.4–35)
MCV RBC AUTO: 87.7 FL (ref 79.6–97.8)
MONOCYTES # BLD: 0.5 K/UL (ref 0.1–1.3)
MONOCYTES NFR BLD: 8 % (ref 4–12)
NEUTS SEG # BLD: 4.6 K/UL (ref 1.7–8.2)
NEUTS SEG NFR BLD: 71 % (ref 43–78)
NRBC # BLD: 0 K/UL (ref 0–0.2)
PLATELET # BLD AUTO: 193 K/UL (ref 150–450)
PMV BLD AUTO: 10.5 FL (ref 9.4–12.3)
POTASSIUM SERPL-SCNC: 4.2 MMOL/L (ref 3.5–5.1)
PROT SERPL-MCNC: 6.6 G/DL (ref 6.3–8.2)
RBC # BLD AUTO: 4.56 M/UL (ref 4.23–5.6)
SODIUM SERPL-SCNC: 138 MMOL/L (ref 136–145)
URATE SERPL-MCNC: 6.4 MG/DL (ref 2.6–6)
WBC # BLD AUTO: 6.5 K/UL (ref 4.3–11.1)

## 2022-07-13 PROCEDURE — 80053 COMPREHEN METABOLIC PANEL: CPT

## 2022-07-13 PROCEDURE — 36415 COLL VENOUS BLD VENIPUNCTURE: CPT

## 2022-07-13 PROCEDURE — 83615 LACTATE (LD) (LDH) ENZYME: CPT

## 2022-07-13 PROCEDURE — 84550 ASSAY OF BLOOD/URIC ACID: CPT

## 2022-07-13 PROCEDURE — 83735 ASSAY OF MAGNESIUM: CPT

## 2022-07-13 PROCEDURE — 85025 COMPLETE CBC W/AUTO DIFF WBC: CPT

## 2022-07-13 PROCEDURE — 99214 OFFICE O/P EST MOD 30 MIN: CPT | Performed by: INTERNAL MEDICINE

## 2022-07-13 RX ORDER — DIPHENHYDRAMINE HYDROCHLORIDE 50 MG/ML
50 INJECTION INTRAMUSCULAR; INTRAVENOUS ONCE
Status: CANCELLED | OUTPATIENT
Start: 2022-07-14 | End: 2022-07-14

## 2022-07-13 RX ORDER — HEPARIN SODIUM (PORCINE) LOCK FLUSH IV SOLN 100 UNIT/ML 100 UNIT/ML
500 SOLUTION INTRAVENOUS PRN
Status: CANCELLED | OUTPATIENT
Start: 2022-07-14

## 2022-07-13 RX ORDER — SODIUM CHLORIDE 9 MG/ML
5-40 INJECTION INTRAVENOUS PRN
Status: CANCELLED | OUTPATIENT
Start: 2022-07-14

## 2022-07-13 RX ORDER — ALBUTEROL SULFATE 90 UG/1
4 AEROSOL, METERED RESPIRATORY (INHALATION) PRN
Status: CANCELLED | OUTPATIENT
Start: 2022-07-14

## 2022-07-13 RX ORDER — DIPHENHYDRAMINE HYDROCHLORIDE 50 MG/ML
50 INJECTION INTRAMUSCULAR; INTRAVENOUS
Status: CANCELLED | OUTPATIENT
Start: 2022-07-14

## 2022-07-13 RX ORDER — ACETAMINOPHEN 325 MG/1
650 TABLET ORAL ONCE
Status: CANCELLED | OUTPATIENT
Start: 2022-07-14 | End: 2022-07-14

## 2022-07-13 RX ORDER — DOXORUBICIN HYDROCHLORIDE 2 MG/ML
50 INJECTION, SOLUTION INTRAVENOUS ONCE
Status: CANCELLED | OUTPATIENT
Start: 2022-07-14 | End: 2022-07-14

## 2022-07-13 RX ORDER — EPINEPHRINE 1 MG/ML
0.3 INJECTION, SOLUTION, CONCENTRATE INTRAVENOUS PRN
Status: CANCELLED | OUTPATIENT
Start: 2022-07-14

## 2022-07-13 RX ORDER — MEPERIDINE HYDROCHLORIDE 50 MG/ML
12.5 INJECTION INTRAMUSCULAR; INTRAVENOUS; SUBCUTANEOUS PRN
Status: CANCELLED | OUTPATIENT
Start: 2022-07-14

## 2022-07-13 RX ORDER — SODIUM CHLORIDE 9 MG/ML
INJECTION, SOLUTION INTRAVENOUS CONTINUOUS
Status: CANCELLED | OUTPATIENT
Start: 2022-07-14

## 2022-07-13 RX ORDER — SODIUM CHLORIDE 9 MG/ML
5-250 INJECTION, SOLUTION INTRAVENOUS PRN
Status: CANCELLED | OUTPATIENT
Start: 2022-07-14

## 2022-07-13 RX ORDER — FAMOTIDINE 10 MG/ML
20 INJECTION, SOLUTION INTRAVENOUS
Status: CANCELLED | OUTPATIENT
Start: 2022-07-14

## 2022-07-13 RX ORDER — ACETAMINOPHEN 325 MG/1
650 TABLET ORAL
Status: CANCELLED | OUTPATIENT
Start: 2022-07-14

## 2022-07-13 RX ORDER — ONDANSETRON 2 MG/ML
8 INJECTION INTRAMUSCULAR; INTRAVENOUS ONCE
Status: CANCELLED | OUTPATIENT
Start: 2022-07-14 | End: 2022-07-14

## 2022-07-13 RX ORDER — SODIUM CHLORIDE 0.9 % (FLUSH) 0.9 %
5-40 SYRINGE (ML) INJECTION PRN
Status: CANCELLED | OUTPATIENT
Start: 2022-07-14

## 2022-07-13 RX ORDER — ONDANSETRON 2 MG/ML
8 INJECTION INTRAMUSCULAR; INTRAVENOUS
Status: CANCELLED | OUTPATIENT
Start: 2022-07-14

## 2022-07-13 ASSESSMENT — PATIENT HEALTH QUESTIONNAIRE - PHQ9
SUM OF ALL RESPONSES TO PHQ QUESTIONS 1-9: 0
SUM OF ALL RESPONSES TO PHQ QUESTIONS 1-9: 0
1. LITTLE INTEREST OR PLEASURE IN DOING THINGS: 0
SUM OF ALL RESPONSES TO PHQ QUESTIONS 1-9: 0
SUM OF ALL RESPONSES TO PHQ QUESTIONS 1-9: 0
2. FEELING DOWN, DEPRESSED OR HOPELESS: 0
SUM OF ALL RESPONSES TO PHQ9 QUESTIONS 1 & 2: 0

## 2022-07-13 NOTE — PATIENT INSTRUCTIONS
Patient Instructions from Today's Visit    Reason for Visit:  Follow up visit    Plan:  - Tomorrow you will start your chemotherapy, it will be a long day, bring snacks  - Take your allopurinol, this will help prevent something called tumor lysis   - Make sure to take your regular home medications tomorrow morning   - Make sure you start your steroids (prednisone) tomorrow morning     Follow Up:  As scheduled     Recent Lab Results:  Hospital Outpatient Visit on 07/13/2022   Component Date Value Ref Range Status    WBC 07/13/2022 6.5  4.3 - 11.1 K/uL Final    RBC 07/13/2022 4.56  4.23 - 5.6 M/uL Final    Hemoglobin 07/13/2022 14.2  13.6 - 17.2 g/dL Final    Hematocrit 07/13/2022 40.0  % Final    MCV 07/13/2022 87.7  79.6 - 97.8 FL Final    MCH 07/13/2022 31.1  26.1 - 32.9 PG Final    MCHC 07/13/2022 35.5* 31.4 - 35.0 g/dL Final    RDW 07/13/2022 12.9  11.9 - 14.6 % Final    Platelets 25/47/9801 193  150 - 450 K/uL Final    MPV 07/13/2022 10.5  9.4 - 12.3 FL Final    nRBC 07/13/2022 0.00  0.0 - 0.2 K/uL Final    **Note: Absolute NRBC parameter is now reported with Hemogram**    Differential Type 07/13/2022 AUTOMATED    Final    Seg Neutrophils 07/13/2022 71  43 - 78 % Final    Lymphocytes 07/13/2022 14  13 - 44 % Final    Monocytes 07/13/2022 8  4.0 - 12.0 % Final    Eosinophils % 07/13/2022 5  0.5 - 7.8 % Final    Basophils 07/13/2022 1  0.0 - 2.0 % Final    Immature Granulocytes 07/13/2022 1  0.0 - 5.0 % Final    Segs Absolute 07/13/2022 4.6  1.7 - 8.2 K/UL Final    Absolute Lymph # 07/13/2022 0.9  0.5 - 4.6 K/UL Final    Absolute Mono # 07/13/2022 0.5  0.1 - 1.3 K/UL Final    Absolute Eos # 07/13/2022 0.4  0.0 - 0.8 K/UL Final    Basophils Absolute 07/13/2022 0.1  0.0 - 0.2 K/UL Final    Absolute Immature Granulocyte 07/13/2022 0.0  0.0 - 0.5 K/UL Final    Sodium 07/13/2022 138  136 - 145 mmol/L Final    Potassium 07/13/2022 4.2  3.5 - 5.1 mmol/L Final    Chloride 07/13/2022 102  98 - 107 mmol/L Final    CO2 07/13/2022 27  21 - 32 mmol/L Final    Anion Gap 07/13/2022 9  7 - 16 mmol/L Final    Glucose 07/13/2022 264* 65 - 100 mg/dL Final    BUN 07/13/2022 15  8 - 23 MG/DL Final    CREATININE 07/13/2022 0.90  0.8 - 1.5 MG/DL Final    GFR  07/13/2022 >60  >60 ml/min/1.73m2 Final    GFR Non- 07/13/2022 >60  >60 ml/min/1.73m2 Final    Comment:      Estimated GFR is calculated using the Modification of Diet in Renal Disease (MDRD) Study equation, reported for both  Americans (GFRAA) and non- Americans (GFRNA), and normalized to 1.73m2 body surface area. The physician must decide which value applies to the patient. The MDRD study equation should only be used in individuals age 25 or older. It has not been validated for the following: pregnant women, patients with serious comorbid conditions,or on certain medications, or persons with extremes of body size, muscle mass, or nutritional status.       Calcium 07/13/2022 9.0  8.3 - 10.4 MG/DL Final    Total Bilirubin 07/13/2022 0.6  0.2 - 1.1 MG/DL Final    ALT 07/13/2022 30  12 - 65 U/L Final    AST 07/13/2022 17  15 - 37 U/L Final    Alk Phosphatase 07/13/2022 77  50 - 136 U/L Final    Total Protein 07/13/2022 6.6  6.3 - 8.2 g/dL Final    Albumin 07/13/2022 3.8  3.2 - 4.6 g/dL Final    Globulin 07/13/2022 2.8  2.3 - 3.5 g/dL Final    Albumin/Globulin Ratio 07/13/2022 1.4  1.2 - 3.5   Final    Magnesium 07/13/2022 1.2* 1.8 - 2.4 mg/dL Final    LD 07/13/2022 153  110 - 210 U/L Final    Uric Acid 07/13/2022 6.4* 2.6 - 6.0 MG/DL Final     Treatment Summary has been discussed and given to patient: n/a  -------------------------------------------------------------------------------------------------------------------  Please call our office at [phone] if you have any  of the following symptoms:   · Fever of 100.5 or greater  · Chills  · Shortness of breath  · Swelling or pain in one leg    After office hours an answering service is available and will contact a provider for emergencies or if you are experiencing any of the above symptoms.  Patient did express an interest in My Chart. My Chart log in information explained on the after visit summary printout at the [facility] Kamlesh Teresa [address] desk. HAFSA Crystal, RN  Hematology Navigator  [address] [place] [address]  ILPO:[phone]  Office: [phone]   Fax: [phone]  Email:  [email or web address]    Navigator is available by phone Monday through Friday 8 am to 4 pm.    If you need assistance after 4pm, or on the weekend please call [phone]. The answering service is available 24 hours a day, 7 days a week.

## 2022-07-13 NOTE — PROGRESS NOTES
[facility] Hematology and Oncology: Office Visit Established Patient    Chief Complaint:    Chief Complaint   Patient presents with    Follow-up         History of Present Illness:  Mr. Lorena Martin is a 64 y.o. male who returns today for management of diffuse large B cell lymphoma. He presented to his PCP on 4/29/22 reporting an enlarging lump in his left groin for the past 2 months. Physical exam confirmed a mildly tender lump in the patients left inguinal region. The right inguinal region was normal, and no inguinal hernia was noted. Ultrasound of the left groin was performed on 5/13/22 revealing enlarged, abnormal appearing left inguinal lymph nodes measuring up to 3.0 x 3.2 x 1.9 cm. Ultrasound directed biopsy was suggested for further assessment. We saw him for consultation and recommended excisional biopsy, which showed diffuse large B cell lymphoma. PET/CT shows the most prominent areas of disease in the inguinal and iliac nodes, but there are also nodes in the right axilla and retroperitoneum that are consistent with DLBCL, making him at least [employer] [facility] stage III. Bone marrow biopsy showed no morphologic findings but did show a small B cell clone worrisome for lymphoma involvement in the bone marrow, this would make him AA stage IV. His FISH shows no evidence of more aggressive genotypic findings, so he would be most appropriate for R-CHOP for 6 cycles for definitive therapy. Here for R-CHOP cycle 1. He is ready to start therapy, he has his port placed and has all of his preparatory medications including antiemetics and allopurinol. Review of Systems:  Constitutional: Negative. HENT: Negative. Eyes: Negative. Respiratory: Negative. Cardiovascular: Negative. Gastrointestinal: Negative. Genitourinary: Negative. Musculoskeletal: Negative. Skin: Negative. Neurological: Negative. Endo/Heme/Allergies: Negative. Psychiatric/Behavioral: Negative.    All other systems reviewed and are negative. No Known Allergies  Past Medical History:   Diagnosis Date    Arthritis     osteo    Bilateral carpal tunnel syndrome 10/28/2019    CAD (coronary artery disease) 1999    3 stents--last one placed 2019, pt on daily 81 mg ASA.  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    GERD (gastroesophageal reflux disease)     diet controlled    Hx of colonic polyp 2016    adenoma    Hypercholesterolemia     Hypertension     managed with medication     Past Surgical History:   Procedure Laterality Date    COLONOSCOPY  last 4/25/16    Leandra--trans TA--3 year recall due to prep quality    CORONARY ANGIOPLASTY WITH STENT PLACEMENT  8681,0422, 2019    heart cath times 3 - 3 total stents    IR PORT PLACEMENT EQUAL OR GREATER THAN 5 YEARS  6/27/2022    IR PORT PLACEMENT EQUAL OR GREATER THAN 5 YEARS 6/27/2022 [facility]OLOG[facility] SPECIALS    KNEE ARTHROSCOPY Left 1985    LYMPH NODE BIOPSY Left 6/10/2022    LEFT [facility]GU[facility]SY [facility]  [facility]    3 rd stent -- cardiac placed    SKIN BIOPSY N/A 6/10/2022    EXCISION BACK NEVUS WL PER ERICK/REQUEST TO FOLLOW performed by Tip Wynn MD at [address] [place] [address] Left 11/15/2018    UVULOPALATOPHARYGOPLASTY       Family History   Problem Relation Age of Onset    Coronary Art Dis Neg Hx     Lung Disease Mother     Cancer Maternal Uncle         colon    Cancer Maternal Uncle         colon    Cancer Maternal Uncle         colon    Cancer Maternal Uncle         colon    Cancer Maternal Uncle         colon    Cancer Maternal Uncle colon    Cancer Maternal Uncle         colon    Colon Cancer Father     Cancer Father     Asthma Mother      Social History     Socioeconomic History    Marital status:      Spouse name: Not on file    Number of children: Not on file    Years of education: Not on file    Highest education level: Not on file   Occupational History    Not on file   Tobacco Use    Smoking status: Never Smoker    Smokeless tobacco: Never Used   Substance and Sexual Activity    Alcohol use: Yes     Alcohol/week: 2.0 standard drinks    Drug use: No    Sexual activity: Not on file   Other Topics Concern    Not on file   Social History Narrative    Not on file     Social Determinants of Health     Financial Resource Strain:     Difficulty of Paying Living Expenses: Not on file   Food Insecurity:     Worried About Running Out of Food in the Last Year: Not on file    Hortensia of Food in the Last Year: Not on file   Transportation Needs:     Lack of Transportation (Medical): Not on file    Lack of Transportation (Non-Medical):  Not on file   Physical Activity:     Days of Exercise per Week: Not on file    Minutes of Exercise per Session: Not on file   Stress:     Feeling of Stress : Not on file   Social Connections:     Frequency of Communication with Friends and Family: Not on file    Frequency of Social Gatherings with Friends and Family: Not on file    Attends Buddhism Services: Not on file    Active Member of [address] [place] [address] [employer] or Organizations: Not on file    Attends Club or Organization Meetings: Not on file    Marital Status: Not on file   Intimate Partner Violence:     Fear of Current or Ex-Partner: Not on file    Emotionally Abused: Not on file    Physically Abused: Not on file    Sexually Abused: Not on file   Housing Stability:     Unable to Pay for Housing in the Last Year: Not on file    Number of [place] in the Last Year: Not on file    Unstable Housing in the Last Year: Not on file Current Outpatient Medications   Medication Sig Dispense Refill    ondansetron (ZOFRAN ODT) 4 MG disintegrating tablet Take 1 tablet by mouth every 8 hours as needed for Nausea or Vomiting 90 tablet 3    prochlorperazine (COMPAZINE) 10 MG tablet Take 1 tablet by mouth every 6 hours as needed (nausea/vomitting) 120 tablet 3    predniSONE (DELTASONE) 50 MG tablet Take 2 tablets by mouth daily for 5 days Take on days 1-5 of chemotherapy 10 tablet 0    allopurinol (ZYLOPRIM) 300 MG tablet Take 1 tablet by mouth daily To prevent tumor lysis syndrome 30 tablet 1    lidocaine-prilocaine (EMLA) 2.5-2.5 % cream Apply topically as needed. Place small amount to port site 45 minutes prior to any blood draw or infusion. Cover with plastic wrap 30 g 2    Multiple Vitamins-Minerals (MENS 50+ ADVANCED PO) Take by mouth      escitalopram (LEXAPRO) 10 MG tablet Take 0.5 tablets by mouth daily 30 tablet 0    lisinopril (PRINIVIL;ZESTRIL) 10 MG tablet Take 1 tablet by mouth daily 90 tablet 0    Insulin Degludec (TRESIBA FLEXTOUCH) 200 UNIT/ML SOPN Inject 68 Units into the skin daily      ZINC PO Take by mouth daily      ascorbic acid (VITAMIN C) 500 MG tablet Take 500 mg by mouth daily      aspirin 81 MG EC tablet Take 81 mg by mouth daily       atorvastatin (LIPITOR) 40 MG tablet Take 20 mg by mouth daily      ibuprofen (ADVIL;MOTRIN) 800 MG tablet TAKE ONE TABLET BY MOUTH EVERY 8 HOURS AS NEEDED FOR PAIN      melatonin 5 MG TABS tablet Take 10 mg by mouth       metFORMIN (GLUCOPHAGE) 1000 MG tablet Take 1,000 mg by mouth 2 times daily      traZODone (DESYREL) 100 MG tablet Take 100 mg by mouth        No current facility-administered medications for this visit.        OBJECTIVE:  BP (!) 150/81 Comment: standing  Pulse 81   Temp 98 °F (36.7 °C) (Oral)   Resp 16   Ht 5' 8\" (1.727 m)   Wt 197 lb 1.6 oz (89.4 kg)   SpO2 94%   BMI 29.97 kg/m²     Physical Exam:  Constitutional: Well developed, well nourished male 102 98 - 107 mmol/L    CO2 27 21 - 32 mmol/L    Anion Gap 9 7 - 16 mmol/L    Glucose 264 (H) 65 - 100 mg/dL    BUN 15 8 - 23 MG/DL    CREATININE 0.90 0.8 - 1.5 MG/DL    GFR African American >60 >60 ml/min/1.73m2    GFR Non- >60 >60 ml/min/1.73m2    Calcium 9.0 8.3 - 10.4 MG/DL    Total Bilirubin 0.6 0.2 - 1.1 MG/DL    ALT 30 12 - 65 U/L    AST 17 15 - 37 U/L    Alk Phosphatase 77 50 - 136 U/L    Total Protein 6.6 6.3 - 8.2 g/dL    Albumin 3.8 3.2 - 4.6 g/dL    Globulin 2.8 2.3 - 3.5 g/dL    Albumin/Globulin Ratio 1.4 1.2 - 3.5     Magnesium    Collection Time: 07/13/22 12:50 PM   Result Value Ref Range    Magnesium 1.2 (L) 1.8 - 2.4 mg/dL   Lactate Dehydrogenase    Collection Time: 07/13/22 12:50 PM   Result Value Ref Range     110 - 210 U/L   Uric Acid    Collection Time: 07/13/22 12:50 PM   Result Value Ref Range    Uric Acid 6.4 (H) 2.6 - 6.0 MG/DL       Imaging:  PET CT SKULL BASE TO MID THIGH    Result Date: 6/29/2022  EXAMINATION: PET CT SKULL BASE TO MID THIGH 6/29/2022 1:42 PM ACCESSION NUMBER: [id number] COMPARISON: None available INDICATION: Diffuse large b-cell lymphoma, lymph nodes of inguinal region and lower limb, initial staging TECHNIQUE:   Radiopharmaceutical: 12.83 mCi F18-FDG IV. Positron emission tomography (PET) with concurrently acquired computed tomography (CT) for attenuation correction and anatomical localization imaging; skull base to mid-thigh. Blood glucose at the time of tracer administration is 158 mg/dl, which is adequate for imaging. Approximately 60 minutes after administration of the radiopharmaceutical imaging was initiated. SUV max calculated from patient body wt. Noncaloric dilute oral contrast is given. For this CT scanner at least one of the following techniques is utilized to decrease patient radiation exposure: Automatic exposure control, modulation of MA and KVP based on patient weight, and iterative reconstruction.  FINDINGS:  Head/Neck: No hypermetabolic cervical lymphadenopathy. No abnormal radiotracer uptake within the brain, however the normal intense uptake limits evaluation. Chest: No hypermetabolic pulmonary nodule. A 1.7 x 1.2 cm posterior mediastinal retroaortic nodule/lymph node demonstrates mild FDG uptake (max SUV 3.5). Moderately metabolic nonenlarged right axillary lymph node (max SUV 5.9). Partial visualization of a moderately metabolic right upper extremity lymph node adjacent to the distal humerus (max SUV 5.6). 6 mm mildly metabolic prevascular lymph node adjacent to the main pulmonary artery (max SUV 2.8). 8 mm subcutaneous nodule along the right inferior chest wall (max SUV 1.4). Port within the right anterior chest wall. The port catheter tip terminates at the cavoatrial junction. Other changes in her coronary artery calcifications. Abdomen/Pelvis: Hypermetabolic left inguinal lymph node conglomerate measuring 3.9 x 3.5 cm (max SUV 11.9). Enlarged and hypermetabolic right inguinal lymph nodes (max SUV 8.1). Postsurgical changes within the left inguinal region. Hypermetabolic left iliac and pelvic lymph nodes. For example, a left obturator lymph node measures 3.9 x 2.1 cm (max SUV 10.9). Moderately metabolic left periaortic lymph node (max SUV 4.2). Enlarged and hypermetabolic upper abdominal lymph nodes with an aortocaval lymph node conglomerate demonstrating a max SUV of 5.5. The spleen does not appear enlarged. Physiologic uptake within the gastrointestinal and genitourinary tracts. Scattered vascular calcifications. The prostate gland is not enlarged. Gallstones without evidence of acute cholecystitis. Bones: No hypermetabolic osseous focus to suggest osseous metastatic disease. 1.  Hypermetabolic lymphadenopathy involving the bilateral inguinal, left iliac, retroperitoneal and upper abdomen, compatible with biopsy-proven B-cell lymphoma.  2.  There are additional mild to moderately metabolic mediastinal and right axillary lymph nodes, favored to represent additional sites of tia disease. 3.  Nonspecific 8 mm subcutaneous nodule within the right lower chest wall demonstrating mild FDG uptake, equivocal for additional site of disease. 4.  No splenomegaly. Pathology:  DIAGNOSIS        A:  \"LEFT INGUINAL LYMPH NODE\":             DIFFUSE LARGE B-CELL LYMPHOMA.             SEE COMMENT.          B:  \"NEVUS OF BACK\":             PENDING DERMATOPATHOLOGY CONSULT. Comment        A:  Histologic sections show a diffuse serpiginous infiltrate of   large atypical lymphocytes with indented nuclei, inconspicuous nucleoli   and moderate amounts of cytoplasm.  Immunohistochemical stains show that   the lymphoma cells are positive for CD20, Ramona-5, CD10 and Bcl-6 and   negative for Bcl-2, MUM-1, CD5, Cyclin D1, CD30 (0% positivity) and c-MYC   (less than 40% positivity).  CD3 stain highlights background small   T-lymphocytes.  Ki-67 shows an increased proliferation index in the   serpiginous areas of more than 90%.  The morphologic and immunophenotypic   appearance supports a diagnosis of germinal center subtype of diffuse   large B-cell lymphoma.  FISH studies have been requested and will be   reported separately. ASSESSMENT:   Diagnosis Orders   1. Diffuse large B-cell lymphoma of lymph nodes of inguinal region (HCC)  CBC with Auto Differential    Comprehensive Metabolic Panel    Magnesium    Uric Acid    Lactate Dehydrogenase    CBC With Auto Differential    Comprehensive metabolic panel    HEPATITIS B SURFACE ANTIGEN    Hepatitis B core antibody, total    Hepatitis B surface antibody         PLAN:  Lab studies were personally reviewed. DLBCL: high grade with Ki-67 90%, GC subtype, diagnosed with excisional biopsy of lymphadenopathy in left groin, 3.2 cm on ultrasound, enlarging over 3 months time.   PET/CT reviewed and shows the most prominent areas of disease in the inguinal and iliac nodes, but there are also nodes in the right axilla and retroperitoneum that are consistent with DLBCL, making him at least [employer] Rx stage III. Bone marrow biopsy showed no morphologic findings but did show a small B cell clone worrisome for lymphoma involvement in the bone marrow. His FISH shows no evidence of more aggressive genotypic findings, so he would be most appropriate for R-CHOP for 6 cycles for definitive therapy. Here for R-CHOP cycle 1. He is ready to start therapy, he has his port placed and has all of his preparatory medications including antiemetics and allopurinol. Labs reviewed and adequate, mild hypoMg++ and hyperuricemia, start allopurinol today. Proceed with cycle 1 R-CHOP, side effects again reviewed including risk of infusion reaction to Rituxan, neutropenia and others from chemotherapy, prednisone related AEs including hyperglycemia and insomnia. G-CSF on Friday. All questions were asked and answered to the best of my ability. F/u for cycle 2 R-CHOP in 3 weeks with repeat PET/CT prior to cycle 3.                Jeanne Blank MD, MD  [facility] Hematology and Oncology  [address] [place] [address]  Office : [phone]  Fax : [phone]

## 2022-07-14 ENCOUNTER — CLINICAL DOCUMENTATION (OUTPATIENT)
Dept: ONCOLOGY | Age: 62
End: 2022-07-14

## 2022-07-14 ENCOUNTER — HOSPITAL ENCOUNTER (OUTPATIENT)
Dept: INFUSION THERAPY | Age: 62
Discharge: HOME | End: 2022-07-14
Payer: OTHER GOVERNMENT

## 2022-07-14 VITALS
RESPIRATION RATE: 18 BRPM | DIASTOLIC BLOOD PRESSURE: 74 MMHG | WEIGHT: 197.6 LBS | HEART RATE: 88 BPM | BODY MASS INDEX: 30.04 KG/M2 | TEMPERATURE: 97.8 F | OXYGEN SATURATION: 95 % | SYSTOLIC BLOOD PRESSURE: 131 MMHG

## 2022-07-14 DIAGNOSIS — C83.35 DIFFUSE LARGE B-CELL LYMPHOMA OF LYMPH NODES OF INGUINAL REGION (HCC): Primary | ICD-10-CM

## 2022-07-14 LAB — HBV SURFACE AB SERPL IA-ACNC: <3.1 MIU/ML

## 2022-07-14 PROCEDURE — 96367 TX/PROPH/DG ADDL SEQ IV INF: CPT

## 2022-07-14 PROCEDURE — 86704 HEP B CORE ANTIBODY TOTAL: CPT

## 2022-07-14 PROCEDURE — 96415 CHEMO IV INFUSION ADDL HR: CPT

## 2022-07-14 PROCEDURE — 6360000002 HC RX W HCPCS: Performed by: INTERNAL MEDICINE

## 2022-07-14 PROCEDURE — 6370000000 HC RX 637 (ALT 250 FOR IP): Performed by: INTERNAL MEDICINE

## 2022-07-14 PROCEDURE — 2580000003 HC RX 258: Performed by: INTERNAL MEDICINE

## 2022-07-14 PROCEDURE — 86706 HEP B SURFACE ANTIBODY: CPT

## 2022-07-14 PROCEDURE — 96411 CHEMO IV PUSH ADDL DRUG: CPT

## 2022-07-14 PROCEDURE — 96417 CHEMO IV INFUS EACH ADDL SEQ: CPT

## 2022-07-14 PROCEDURE — 96375 TX/PRO/DX INJ NEW DRUG ADDON: CPT

## 2022-07-14 PROCEDURE — 96413 CHEMO IV INFUSION 1 HR: CPT

## 2022-07-14 RX ORDER — SODIUM CHLORIDE 9 MG/ML
5-250 INJECTION, SOLUTION INTRAVENOUS PRN
Status: DISCONTINUED | OUTPATIENT
Start: 2022-07-14 | End: 2022-07-15 | Stop reason: HOSPADM

## 2022-07-14 RX ORDER — DIPHENHYDRAMINE HYDROCHLORIDE 50 MG/ML
50 INJECTION INTRAMUSCULAR; INTRAVENOUS
Status: DISPENSED | OUTPATIENT
Start: 2022-07-14 | End: 2022-07-14

## 2022-07-14 RX ORDER — MEPERIDINE HYDROCHLORIDE 25 MG/ML
12.5 INJECTION INTRAMUSCULAR; INTRAVENOUS; SUBCUTANEOUS PRN
Status: DISCONTINUED | OUTPATIENT
Start: 2022-07-14 | End: 2022-07-15 | Stop reason: HOSPADM

## 2022-07-14 RX ORDER — DIPHENHYDRAMINE HYDROCHLORIDE 50 MG/ML
50 INJECTION INTRAMUSCULAR; INTRAVENOUS ONCE
Status: COMPLETED | OUTPATIENT
Start: 2022-07-14 | End: 2022-07-14

## 2022-07-14 RX ORDER — SODIUM CHLORIDE 9 MG/ML
5-40 INJECTION INTRAVENOUS PRN
Status: DISCONTINUED | OUTPATIENT
Start: 2022-07-14 | End: 2022-07-15 | Stop reason: HOSPADM

## 2022-07-14 RX ORDER — DOXORUBICIN HYDROCHLORIDE 2 MG/ML
100 INJECTION, SOLUTION INTRAVENOUS ONCE
Status: COMPLETED | OUTPATIENT
Start: 2022-07-14 | End: 2022-07-14

## 2022-07-14 RX ORDER — ONDANSETRON 2 MG/ML
8 INJECTION INTRAMUSCULAR; INTRAVENOUS
Status: DISPENSED | OUTPATIENT
Start: 2022-07-14 | End: 2022-07-14

## 2022-07-14 RX ORDER — ONDANSETRON 2 MG/ML
8 INJECTION INTRAMUSCULAR; INTRAVENOUS ONCE
Status: COMPLETED | OUTPATIENT
Start: 2022-07-14 | End: 2022-07-14

## 2022-07-14 RX ORDER — ACETAMINOPHEN 325 MG/1
650 TABLET ORAL ONCE
Status: COMPLETED | OUTPATIENT
Start: 2022-07-14 | End: 2022-07-14

## 2022-07-14 RX ADMIN — DOXORUBICIN HYDROCHLORIDE 100 MG: 2 INJECTION, SOLUTION INTRAVENOUS at 14:37

## 2022-07-14 RX ADMIN — SODIUM CHLORIDE, PRESERVATIVE FREE 10 ML: 5 INJECTION INTRAVENOUS at 15:35

## 2022-07-14 RX ADMIN — VINCRISTINE SULFATE 2 MG: 1 INJECTION, SOLUTION INTRAVENOUS at 14:45

## 2022-07-14 RX ADMIN — ONDANSETRON 8 MG: 2 INJECTION INTRAMUSCULAR; INTRAVENOUS at 13:17

## 2022-07-14 RX ADMIN — ACETAMINOPHEN 650 MG: 325 TABLET ORAL at 08:46

## 2022-07-14 RX ADMIN — RITUXIMAB 700 MG: 10 INJECTION, SOLUTION INTRAVENOUS at 09:32

## 2022-07-14 RX ADMIN — DIPHENHYDRAMINE HYDROCHLORIDE 50 MG: 50 INJECTION, SOLUTION INTRAMUSCULAR; INTRAVENOUS at 08:46

## 2022-07-14 RX ADMIN — FOSAPREPITANT 150 MG: 150 INJECTION, POWDER, LYOPHILIZED, FOR SOLUTION INTRAVENOUS at 13:20

## 2022-07-14 RX ADMIN — SODIUM CHLORIDE 20 ML/HR: 9 INJECTION, SOLUTION INTRAVENOUS at 08:30

## 2022-07-14 RX ADMIN — CYCLOPHOSPHAMIDE 1500 MG: 1 INJECTION, POWDER, FOR SOLUTION INTRAVENOUS; ORAL at 13:54

## 2022-07-14 ASSESSMENT — PAIN SCALES - GENERAL: PAINLEVEL_OUTOF10: 0

## 2022-07-14 NOTE — PROGRESS NOTES
Clinical Social Work Note  Name: Mejia Trammell. : 1960    MRN: [id number]    Date of Service: 2022    Type of Service: Health and Behavior Intervention     Length of Service: 16 minutes    Patient Diagnosis: No diagnosis found. Referral Source: Infusion RN    Reason for Visit: FU    Seen patient with his loving wife of 36 years, provided therapeutic reassurance. BERTO-ARASH discussed Distress by using NCCN Guidelines for Patients' Distress. Gave patient information on Cancer and Mental Health. Mini Mental Status Exam: Mejia Trammell. was dressed properly. No abnormal psychomotor movements observed. Intellectual functioning appeared to be intact. Insight was adequate. Judgment was adequate. Patient did not report suicidal ideations, intent or plans. Speech was coherent. Thought process was clear. Patient did not report homicidal ideations, intent or plans. Patient was oriented to self, place, time and situation. Protective Factors: Current care for physical and mental illness, adequate insight and judgment, family support, cultural and Judaism beliefs and values that support self-care. Next Steps: BERTO-ARASH gave contact information and encouraged pt to call should any needs arise. Pt verbalized understanding. BERTO-CP intends to follow up as needed. No flowsheet data found.         Electronically Signed By:  BERTO Hirsch

## 2022-07-15 ENCOUNTER — HOSPITAL ENCOUNTER (OUTPATIENT)
Dept: INFUSION THERAPY | Age: 62
Discharge: HOME | End: 2022-07-15
Payer: COMMERCIAL

## 2022-07-15 VITALS
TEMPERATURE: 97.3 F | DIASTOLIC BLOOD PRESSURE: 74 MMHG | OXYGEN SATURATION: 96 % | RESPIRATION RATE: 18 BRPM | SYSTOLIC BLOOD PRESSURE: 133 MMHG | HEART RATE: 89 BPM

## 2022-07-15 DIAGNOSIS — C83.35 DIFFUSE LARGE B-CELL LYMPHOMA OF LYMPH NODES OF INGUINAL REGION (HCC): Primary | ICD-10-CM

## 2022-07-15 LAB — HBV CORE AB SERPL QL IA: NEGATIVE

## 2022-07-15 PROCEDURE — 6360000002 HC RX W HCPCS: Performed by: NURSE PRACTITIONER

## 2022-07-15 PROCEDURE — 96372 THER/PROPH/DIAG INJ SC/IM: CPT

## 2022-07-15 RX ADMIN — PEGFILGRASTIM-BMEZ 6 MG: 6 INJECTION SUBCUTANEOUS at 16:30

## 2022-07-15 NOTE — PROGRESS NOTES
7/15/2022  Pt to Infusion ambulatory without complaints. Pt received Ziextenzo per order, tolerated well. Remained after with no issues noted. Aware of next Infusion appt on  [email or web address] . Patient instructed to call provider with any issues/concerns. Discharged home with spouse.

## 2022-07-18 ENCOUNTER — CLINICAL DOCUMENTATION (OUTPATIENT)
Dept: ONCOLOGY | Age: 62
End: 2022-07-18

## 2022-07-18 NOTE — PROGRESS NOTES
Received a call from Danny Turner from the [address] Danny Turner stated she spoke with the patient's wife requesting nutritional supplements and was calling to verify if there were any special instructions I.e glucose control or high protein, etc. I let her know he would need glucose control for at least the first week per Dr Basil Dang. I also called the patient to let him know I had spoken to the [facility] regarding his nutritional supplements. Patient appreciated the quick turn around time.

## 2022-07-20 ENCOUNTER — TELEPHONE (OUTPATIENT)
Dept: FAMILY MEDICINE CLINIC | Facility: CLINIC | Age: 62
End: 2022-07-20

## 2022-07-20 ENCOUNTER — HOSPITAL ENCOUNTER (OUTPATIENT)
Dept: INFUSION THERAPY | Age: 62
Discharge: HOME | End: 2022-07-20
Payer: COMMERCIAL

## 2022-07-20 VITALS
DIASTOLIC BLOOD PRESSURE: 79 MMHG | TEMPERATURE: 97.4 F | RESPIRATION RATE: 18 BRPM | HEART RATE: 98 BPM | SYSTOLIC BLOOD PRESSURE: 145 MMHG | OXYGEN SATURATION: 97 %

## 2022-07-20 DIAGNOSIS — C83.35 DIFFUSE LARGE B-CELL LYMPHOMA OF LYMPH NODES OF INGUINAL REGION (HCC): Primary | ICD-10-CM

## 2022-07-20 DIAGNOSIS — E83.42 HYPOMAGNESEMIA: Primary | ICD-10-CM

## 2022-07-20 DIAGNOSIS — E11.40 TYPE 2 DIABETES MELLITUS WITH DIABETIC NEUROPATHY, WITH LONG-TERM CURRENT USE OF INSULIN (HCC): Primary | ICD-10-CM

## 2022-07-20 DIAGNOSIS — Z79.4 TYPE 2 DIABETES MELLITUS WITH DIABETIC NEUROPATHY, WITH LONG-TERM CURRENT USE OF INSULIN (HCC): Primary | ICD-10-CM

## 2022-07-20 LAB
ABO + RH BLD: NORMAL
ALBUMIN SERPL-MCNC: 3.3 G/DL (ref 3.2–4.6)
ALBUMIN/GLOB SERPL: 1.3 {RATIO} (ref 1.2–3.5)
ALP SERPL-CCNC: 78 U/L (ref 50–136)
ALT SERPL-CCNC: 20 U/L (ref 12–65)
ANION GAP SERPL CALC-SCNC: 7 MMOL/L (ref 7–16)
AST SERPL-CCNC: 6 U/L (ref 15–37)
BASOPHILS # BLD: 0 K/UL (ref 0–0.2)
BASOPHILS NFR BLD: 0 % (ref 0–2)
BILIRUB SERPL-MCNC: 0.9 MG/DL (ref 0.2–1.1)
BLOOD GROUP ANTIBODIES SERPL: NORMAL
BUN SERPL-MCNC: 18 MG/DL (ref 8–23)
CALCIUM SERPL-MCNC: 8.5 MG/DL (ref 8.3–10.4)
CHLORIDE SERPL-SCNC: 98 MMOL/L (ref 98–107)
CO2 SERPL-SCNC: 28 MMOL/L (ref 21–32)
CREAT SERPL-MCNC: 0.9 MG/DL (ref 0.8–1.5)
DIFFERENTIAL METHOD BLD: ABNORMAL
EOSINOPHIL # BLD: 0.3 K/UL (ref 0–0.8)
EOSINOPHIL NFR BLD: 9 % (ref 0.5–7.8)
ERYTHROCYTE [DISTWIDTH] IN BLOOD BY AUTOMATED COUNT: 12.5 % (ref 11.9–14.6)
GLOBULIN SER CALC-MCNC: 2.6 G/DL (ref 2.3–3.5)
GLUCOSE SERPL-MCNC: 277 MG/DL (ref 65–100)
HCT VFR BLD AUTO: 37.8 %
HGB BLD-MCNC: 13.7 G/DL (ref 13.6–17.2)
IMM GRANULOCYTES # BLD AUTO: 0 K/UL (ref 0–0.5)
IMM GRANULOCYTES NFR BLD AUTO: 1 % (ref 0–5)
LYMPHOCYTES # BLD: 0.5 K/UL (ref 0.5–4.6)
LYMPHOCYTES NFR BLD: 12 % (ref 13–44)
MAGNESIUM SERPL-MCNC: 1.3 MG/DL (ref 1.8–2.4)
MCH RBC QN AUTO: 31.4 PG (ref 26.1–32.9)
MCHC RBC AUTO-ENTMCNC: 36.2 G/DL (ref 31.4–35)
MCV RBC AUTO: 86.7 FL (ref 79.6–97.8)
MONOCYTES # BLD: 0.3 K/UL (ref 0.1–1.3)
MONOCYTES NFR BLD: 8 % (ref 4–12)
NEUTS SEG # BLD: 2.7 K/UL (ref 1.7–8.2)
NEUTS SEG NFR BLD: 70 % (ref 43–78)
NRBC # BLD: 0 K/UL (ref 0–0.2)
PLATELET # BLD AUTO: 146 K/UL (ref 150–450)
PLATELET COMMENT: SLIGHT
PMV BLD AUTO: 10.5 FL (ref 9.4–12.3)
POTASSIUM SERPL-SCNC: 3.5 MMOL/L (ref 3.5–5.1)
PROT SERPL-MCNC: 5.9 G/DL (ref 6.3–8.2)
RBC # BLD AUTO: 4.36 M/UL (ref 4.23–5.6)
RBC MORPH BLD: ABNORMAL
RBC MORPH BLD: ABNORMAL
SODIUM SERPL-SCNC: 133 MMOL/L (ref 136–145)
SPECIMEN EXP DATE BLD: NORMAL
URATE SERPL-MCNC: 3.8 MG/DL (ref 2.6–6)
WBC # BLD AUTO: 3.8 K/UL (ref 4.3–11.1)
WBC MORPH BLD: ABNORMAL

## 2022-07-20 PROCEDURE — 2580000003 HC RX 258: Performed by: INTERNAL MEDICINE

## 2022-07-20 PROCEDURE — 84550 ASSAY OF BLOOD/URIC ACID: CPT

## 2022-07-20 PROCEDURE — 96365 THER/PROPH/DIAG IV INF INIT: CPT

## 2022-07-20 PROCEDURE — 80053 COMPREHEN METABOLIC PANEL: CPT

## 2022-07-20 PROCEDURE — 96361 HYDRATE IV INFUSION ADD-ON: CPT

## 2022-07-20 PROCEDURE — 6360000002 HC RX W HCPCS: Performed by: NURSE PRACTITIONER

## 2022-07-20 PROCEDURE — 85025 COMPLETE CBC W/AUTO DIFF WBC: CPT

## 2022-07-20 PROCEDURE — 2580000003 HC RX 258: Performed by: NURSE PRACTITIONER

## 2022-07-20 PROCEDURE — 83735 ASSAY OF MAGNESIUM: CPT

## 2022-07-20 PROCEDURE — 86900 BLOOD TYPING SEROLOGIC ABO: CPT

## 2022-07-20 RX ORDER — SODIUM CHLORIDE 9 MG/ML
INJECTION, SOLUTION INTRAVENOUS ONCE
Status: COMPLETED | OUTPATIENT
Start: 2022-07-20 | End: 2022-07-20

## 2022-07-20 RX ORDER — SODIUM CHLORIDE 0.9 % (FLUSH) 0.9 %
5-40 SYRINGE (ML) INJECTION PRN
Status: DISCONTINUED | OUTPATIENT
Start: 2022-07-20 | End: 2022-07-21 | Stop reason: HOSPADM

## 2022-07-20 RX ORDER — LANOLIN ALCOHOL/MO/W.PET/CERES
400 CREAM (GRAM) TOPICAL 3 TIMES DAILY
Status: SHIPPED | OUTPATIENT
Start: 2022-07-20

## 2022-07-20 RX ORDER — MAGNESIUM SULFATE IN WATER 40 MG/ML
2000 INJECTION, SOLUTION INTRAVENOUS ONCE
Status: COMPLETED | OUTPATIENT
Start: 2022-07-20 | End: 2022-07-20

## 2022-07-20 RX ADMIN — SODIUM CHLORIDE, PRESERVATIVE FREE 10 ML: 5 INJECTION INTRAVENOUS at 11:30

## 2022-07-20 RX ADMIN — SODIUM CHLORIDE: 9 INJECTION, SOLUTION INTRAVENOUS at 09:25

## 2022-07-20 RX ADMIN — MAGNESIUM SULFATE HEPTAHYDRATE 2000 MG: 40 INJECTION, SOLUTION INTRAVENOUS at 10:25

## 2022-07-20 RX ADMIN — SODIUM CHLORIDE, PRESERVATIVE FREE 10 ML: 5 INJECTION INTRAVENOUS at 09:23

## 2022-07-20 NOTE — TELEPHONE ENCOUNTER
16 minutes ago (3:06 PM)           Hi Dr. Marquez,     As you may well be aware I have been diagnosed with Large B-Cell Lymphoma and am under the care of Dr. Eugenie Zavala / Oncologist.     I would like you to refer me to an Endocrinologist (Carlitos Rivero) so that my sugar/glucose levels can be managed better through the chemotherapy process in order for my treatments to work their best for my cancer. I am now wearing a Dexcom6 to help manage my sure throughout the day. My wife has left a VM with Dane Reid and we/I hope to hear back from you soon.      [address] [place] [address] [phone] / [phone] Melissa Fischer)

## 2022-07-20 NOTE — PROGRESS NOTES
7/20/2022  Pt to Infusion c/o increased fatigue and diarrhea yesterday. Pt states fell yesterday, not sure why, denies injury, did not FU with dr. Kojo pruett, spoke to [facility] NP and pt received Mag/hydration per order, tolerated well. Aware of next Infusion appt on  [email or web address] Patient instructed to call provider with any issues/concerns. Discharged home with spouse.

## 2022-07-22 ENCOUNTER — TELEPHONE (OUTPATIENT)
Dept: ONCOLOGY | Age: 62
End: 2022-07-22

## 2022-07-22 DIAGNOSIS — E83.42 HYPOMAGNESEMIA: ICD-10-CM

## 2022-07-22 DIAGNOSIS — E83.42 HYPOMAGNESEMIA: Primary | ICD-10-CM

## 2022-07-22 RX ORDER — MAGNESIUM OXIDE 400 MG/1
400 TABLET ORAL 2 TIMES DAILY
Qty: 60 TABLET | Refills: 3 | Status: SHIPPED | OUTPATIENT
Start: 2022-07-22 | End: 2022-08-23 | Stop reason: SDUPTHER

## 2022-07-22 NOTE — TELEPHONE ENCOUNTER
Pharmacies updated in system . [facility] listed as primary.  Both [facility], and [facility] in [place] have been removed from list.

## 2022-07-22 NOTE — TELEPHONE ENCOUNTER
Prescription was called to wrong [facility] today. The pharmacies are fixing it today, but we need to correct the patients pharmacy information. CORRECT PHARMACY INFO:  [facility]  [phone]    Please take out [address] - they have never used that pharmacy either.

## 2022-07-27 DIAGNOSIS — C83.35 DIFFUSE LARGE B-CELL LYMPHOMA OF LYMPH NODES OF INGUINAL REGION (HCC): Primary | ICD-10-CM

## 2022-08-03 ENCOUNTER — OFFICE VISIT (OUTPATIENT)
Dept: ONCOLOGY | Age: 62
End: 2022-08-03
Payer: COMMERCIAL

## 2022-08-03 ENCOUNTER — HOSPITAL ENCOUNTER (OUTPATIENT)
Dept: LAB | Age: 62
Discharge: HOME | End: 2022-08-06
Payer: COMMERCIAL

## 2022-08-03 ENCOUNTER — CLINICAL DOCUMENTATION (OUTPATIENT)
Dept: CASE MANAGEMENT | Age: 62
End: 2022-08-03

## 2022-08-03 VITALS
HEART RATE: 93 BPM | BODY MASS INDEX: 29.1 KG/M2 | RESPIRATION RATE: 16 BRPM | OXYGEN SATURATION: 98 % | TEMPERATURE: 98.1 F | SYSTOLIC BLOOD PRESSURE: 121 MMHG | DIASTOLIC BLOOD PRESSURE: 81 MMHG | HEIGHT: 68 IN | WEIGHT: 192 LBS

## 2022-08-03 DIAGNOSIS — C83.35 DIFFUSE LARGE B-CELL LYMPHOMA OF LYMPH NODES OF INGUINAL REGION (HCC): ICD-10-CM

## 2022-08-03 DIAGNOSIS — F41.9 ANXIETY DUE TO INVASIVE PROCEDURE: Primary | ICD-10-CM

## 2022-08-03 DIAGNOSIS — R53.83 FATIGUE DUE TO TREATMENT: ICD-10-CM

## 2022-08-03 LAB
ALBUMIN SERPL-MCNC: 3.6 G/DL (ref 3.2–4.6)
ALBUMIN/GLOB SERPL: 1.1 {RATIO} (ref 1.2–3.5)
ALP SERPL-CCNC: 88 U/L (ref 50–136)
ALT SERPL-CCNC: 35 U/L (ref 12–65)
ANION GAP SERPL CALC-SCNC: 4 MMOL/L (ref 7–16)
AST SERPL-CCNC: 11 U/L (ref 15–37)
BASOPHILS # BLD: 0.1 K/UL (ref 0–0.2)
BASOPHILS NFR BLD: 1 % (ref 0–2)
BILIRUB SERPL-MCNC: 0.5 MG/DL (ref 0.2–1.1)
BUN SERPL-MCNC: 12 MG/DL (ref 8–23)
CALCIUM SERPL-MCNC: 9.1 MG/DL (ref 8.3–10.4)
CHLORIDE SERPL-SCNC: 103 MMOL/L (ref 98–107)
CO2 SERPL-SCNC: 31 MMOL/L (ref 21–32)
CREAT SERPL-MCNC: 1 MG/DL (ref 0.8–1.5)
DIFFERENTIAL METHOD BLD: ABNORMAL
EOSINOPHIL # BLD: 0.3 K/UL (ref 0–0.8)
EOSINOPHIL NFR BLD: 2 % (ref 0.5–7.8)
ERYTHROCYTE [DISTWIDTH] IN BLOOD BY AUTOMATED COUNT: 12.7 % (ref 11.9–14.6)
GLOBULIN SER CALC-MCNC: 3.2 G/DL (ref 2.3–3.5)
GLUCOSE SERPL-MCNC: 225 MG/DL (ref 65–100)
HCT VFR BLD AUTO: 38.8 %
HGB BLD-MCNC: 14.1 G/DL (ref 13.6–17.2)
IMM GRANULOCYTES # BLD AUTO: 0.2 K/UL (ref 0–0.5)
IMM GRANULOCYTES NFR BLD AUTO: 2 % (ref 0–5)
LDH SERPL L TO P-CCNC: 175 U/L (ref 110–210)
LYMPHOCYTES # BLD: 0.8 K/UL (ref 0.5–4.6)
LYMPHOCYTES NFR BLD: 7 % (ref 13–44)
MAGNESIUM SERPL-MCNC: 1.2 MG/DL (ref 1.8–2.4)
MCH RBC QN AUTO: 31.5 PG (ref 26.1–32.9)
MCHC RBC AUTO-ENTMCNC: 36.3 G/DL (ref 31.4–35)
MCV RBC AUTO: 86.6 FL (ref 79.6–97.8)
MONOCYTES # BLD: 0.9 K/UL (ref 0.1–1.3)
MONOCYTES NFR BLD: 8 % (ref 4–12)
NEUTS SEG # BLD: 9.8 K/UL (ref 1.7–8.2)
NEUTS SEG NFR BLD: 80 % (ref 43–78)
NRBC # BLD: 0 K/UL (ref 0–0.2)
PLATELET # BLD AUTO: 243 K/UL (ref 150–450)
PMV BLD AUTO: 10.1 FL (ref 9.4–12.3)
POTASSIUM SERPL-SCNC: 4.8 MMOL/L (ref 3.5–5.1)
PROT SERPL-MCNC: 6.8 G/DL (ref 6.3–8.2)
RBC # BLD AUTO: 4.48 M/UL (ref 4.23–5.6)
SODIUM SERPL-SCNC: 138 MMOL/L (ref 136–145)
URATE SERPL-MCNC: 4.2 MG/DL (ref 2.6–6)
WBC # BLD AUTO: 12.2 K/UL (ref 4.3–11.1)

## 2022-08-03 PROCEDURE — 85025 COMPLETE CBC W/AUTO DIFF WBC: CPT

## 2022-08-03 PROCEDURE — 99214 OFFICE O/P EST MOD 30 MIN: CPT | Performed by: NURSE PRACTITIONER

## 2022-08-03 PROCEDURE — 36415 COLL VENOUS BLD VENIPUNCTURE: CPT

## 2022-08-03 PROCEDURE — 84550 ASSAY OF BLOOD/URIC ACID: CPT

## 2022-08-03 PROCEDURE — 83615 LACTATE (LD) (LDH) ENZYME: CPT

## 2022-08-03 PROCEDURE — 83735 ASSAY OF MAGNESIUM: CPT

## 2022-08-03 PROCEDURE — 80053 COMPREHEN METABOLIC PANEL: CPT

## 2022-08-03 RX ORDER — SODIUM CHLORIDE 9 MG/ML
INJECTION, SOLUTION INTRAVENOUS CONTINUOUS
Status: CANCELLED | OUTPATIENT
Start: 2022-08-04

## 2022-08-03 RX ORDER — DIPHENHYDRAMINE HYDROCHLORIDE 50 MG/ML
50 INJECTION INTRAMUSCULAR; INTRAVENOUS
Status: CANCELLED | OUTPATIENT
Start: 2022-08-04

## 2022-08-03 RX ORDER — SODIUM CHLORIDE 9 MG/ML
5-40 INJECTION INTRAVENOUS PRN
Status: CANCELLED | OUTPATIENT
Start: 2022-08-04

## 2022-08-03 RX ORDER — ONDANSETRON 2 MG/ML
8 INJECTION INTRAMUSCULAR; INTRAVENOUS ONCE
Status: CANCELLED | OUTPATIENT
Start: 2022-08-04 | End: 2022-08-04

## 2022-08-03 RX ORDER — ACETAMINOPHEN 325 MG/1
650 TABLET ORAL ONCE
Status: CANCELLED | OUTPATIENT
Start: 2022-08-04 | End: 2022-08-04

## 2022-08-03 RX ORDER — DOXORUBICIN HYDROCHLORIDE 2 MG/ML
50 INJECTION, SOLUTION INTRAVENOUS ONCE
Status: CANCELLED | OUTPATIENT
Start: 2022-08-04 | End: 2022-08-04

## 2022-08-03 RX ORDER — PREDNISONE 50 MG/1
100 TABLET ORAL DAILY
Qty: 10 TABLET | Refills: 3 | Status: SHIPPED | OUTPATIENT
Start: 2022-08-03 | End: 2022-08-23 | Stop reason: SDUPTHER

## 2022-08-03 RX ORDER — HEPARIN SODIUM (PORCINE) LOCK FLUSH IV SOLN 100 UNIT/ML 100 UNIT/ML
500 SOLUTION INTRAVENOUS PRN
Status: CANCELLED | OUTPATIENT
Start: 2022-08-04

## 2022-08-03 RX ORDER — MEPERIDINE HYDROCHLORIDE 50 MG/ML
12.5 INJECTION INTRAMUSCULAR; INTRAVENOUS; SUBCUTANEOUS PRN
Status: CANCELLED | OUTPATIENT
Start: 2022-08-04

## 2022-08-03 RX ORDER — SODIUM CHLORIDE 0.9 % (FLUSH) 0.9 %
5-40 SYRINGE (ML) INJECTION PRN
Status: CANCELLED | OUTPATIENT
Start: 2022-08-04

## 2022-08-03 RX ORDER — ALBUTEROL SULFATE 90 UG/1
4 AEROSOL, METERED RESPIRATORY (INHALATION) PRN
Status: CANCELLED | OUTPATIENT
Start: 2022-08-04

## 2022-08-03 RX ORDER — SODIUM CHLORIDE 9 MG/ML
5-250 INJECTION, SOLUTION INTRAVENOUS PRN
Status: CANCELLED | OUTPATIENT
Start: 2022-08-04

## 2022-08-03 RX ORDER — EPINEPHRINE 1 MG/ML
0.3 INJECTION, SOLUTION, CONCENTRATE INTRAVENOUS PRN
Status: CANCELLED | OUTPATIENT
Start: 2022-08-04

## 2022-08-03 RX ORDER — ACETAMINOPHEN 325 MG/1
650 TABLET ORAL
Status: CANCELLED | OUTPATIENT
Start: 2022-08-04

## 2022-08-03 RX ORDER — FAMOTIDINE 10 MG/ML
20 INJECTION, SOLUTION INTRAVENOUS
Status: CANCELLED | OUTPATIENT
Start: 2022-08-04

## 2022-08-03 RX ORDER — DIPHENHYDRAMINE HYDROCHLORIDE 50 MG/ML
50 INJECTION INTRAMUSCULAR; INTRAVENOUS ONCE
Status: CANCELLED | OUTPATIENT
Start: 2022-08-04 | End: 2022-08-04

## 2022-08-03 RX ORDER — ONDANSETRON 2 MG/ML
8 INJECTION INTRAMUSCULAR; INTRAVENOUS
Status: CANCELLED | OUTPATIENT
Start: 2022-08-04

## 2022-08-03 RX ORDER — LORAZEPAM 1 MG/1
2 TABLET ORAL ONCE
Qty: 2 TABLET | Refills: 0 | Status: SHIPPED | OUTPATIENT
Start: 2022-08-03 | End: 2022-08-03

## 2022-08-03 ASSESSMENT — PATIENT HEALTH QUESTIONNAIRE - PHQ9
SUM OF ALL RESPONSES TO PHQ9 QUESTIONS 1 & 2: 0
SUM OF ALL RESPONSES TO PHQ QUESTIONS 1-9: 0
SUM OF ALL RESPONSES TO PHQ QUESTIONS 1-9: 0
1. LITTLE INTEREST OR PLEASURE IN DOING THINGS: 0
2. FEELING DOWN, DEPRESSED OR HOPELESS: 0
SUM OF ALL RESPONSES TO PHQ QUESTIONS 1-9: 0
SUM OF ALL RESPONSES TO PHQ QUESTIONS 1-9: 0

## 2022-08-03 NOTE — PROGRESS NOTES
[facility] Hematology and Oncology: Office Visit Established Patient    Chief Complaint:    Chief Complaint   Patient presents with    Follow-up         History of Present Illness:  Mr. Ana Rosa Saez is a 64 y.o. male who returns today for management of diffuse large B cell lymphoma. He presented to his PCP on 4/29/22 reporting an enlarging lump in his left groin for the past 2 months. Physical exam confirmed a mildly tender lump in the patients left inguinal region. The right inguinal region was normal, and no inguinal hernia was noted. Ultrasound of the left groin was performed on 5/13/22 revealing enlarged, abnormal appearing left inguinal lymph nodes measuring up to 3.0 x 3.2 x 1.9 cm. Ultrasound directed biopsy was suggested for further assessment. We saw him for consultation and recommended excisional biopsy, which showed diffuse large B cell lymphoma. PET/CT shows the most prominent areas of disease in the inguinal and iliac nodes, but there are also nodes in the right axilla and retroperitoneum that are consistent with DLBCL, making him at least [place] stage III. Bone marrow biopsy showed no morphologic findings but did show a small B cell clone worrisome for lymphoma involvement in the bone marrow, this would make him AA stage IV. His FISH shows no evidence of more aggressive genotypic findings, so he would be most appropriate for R-CHOP for 6 cycles for definitive therapy. Here for R-CHOP cycle 2. He tolerated C1 relatively well. He had fairly profound fatigue during the first week following treatment but was still able to ADLs. He also had an episode of near syncope and required magnesium and IVF during that week. He had some issues with his blood sugar as well. His baseline glucose is around 200-300 but he got up to around 600 while on prednisone. He is seeing PCP today to find out about further management given degree of hyperglycemia which he was warned could be dangerous.  His BGL returned to baseline after he completed the 5 days of prednisone. He has had minimal nausea. He waffles between constipation and diarrhea but did not require medications to manage this. No fevers or infectious symptoms. Review of Systems:  Constitutional: Positive for fatigue. HENT: Negative. Eyes: Negative. Respiratory: Negative. Cardiovascular: Negative. Gastrointestinal: Negative. Genitourinary: Negative. Musculoskeletal: Negative. Skin: Negative. Neurological: Negative. Endo/Heme/Allergies: Negative. Psychiatric/Behavioral: Negative. All other systems reviewed and are negative. No Known Allergies  Past Medical History:   Diagnosis Date    Arthritis     osteo    Bilateral carpal tunnel syndrome 10/28/2019    CAD (coronary artery disease) 1999    3 stents--last one placed 2019, pt on daily 81 mg ASA.  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 10/28/2019    GERD (gastroesophageal reflux disease)     diet controlled    Hx of colonic polyp 2016    adenoma    Hypercholesterolemia     Hypertension     managed with medication     Past Surgical History:   Procedure Laterality Date    COLONOSCOPY  last 4/25/16    Leandra--trans TA--3 year recall due to prep quality    CORONARY ANGIOPLASTY WITH STENT PLACEMENT  0061,3336, 2019    heart cath times 3 - 3 total stents    IR PORT PLACEMENT EQUAL OR GREATER THAN 5 YEARS  6/27/2022    IR PORT PLACEMENT EQUAL OR GREATER THAN 5 YEARS 6/27/2022 [facility]    KNEE ARTHROSCOPY Left 1985    LYMPH NODE BIOPSY Left 6/10/2022    LEFT INGUINAL BIOPSY performed by Vy Santiago MD at [facility]  2019    3 rd stent -- cardiac placed SKIN BIOPSY N/A 6/10/2022    EXCISION BACK NEVUS WL PER ERICK/REQUEST TO FOLLOW performed by Dalia Naylor MD at [address] Left 11/15/2018    UVULOPALATOPHARYGOPLASTY       Family History   Problem Relation Age of Onset    Coronary Art Dis Neg Hx     Lung Disease Mother     Cancer Maternal Uncle         colon    Cancer Maternal Uncle         colon    Cancer Maternal Uncle         colon    Cancer Maternal Uncle         colon    Cancer Maternal Uncle         colon    Cancer Maternal Uncle         colon    Cancer Maternal Uncle         colon    Colon Cancer Father     Cancer Father     Asthma Mother      Social History     Socioeconomic History    Marital status:      Spouse name: Not on file    Number of children: Not on file    Years of education: Not on file    Highest education level: Not on file   Occupational History    Not on file   Tobacco Use    Smoking status: Never    Smokeless tobacco: Never   Substance and Sexual Activity    Alcohol use: Yes     Alcohol/week: 2.0 standard drinks    Drug use: No    Sexual activity: Not on file   Other Topics Concern    Not on file   Social History Narrative    Not on file     Social Determinants of Health     Financial Resource Strain: Not on file   Food Insecurity: Not on file   Transportation Needs: Not on file   Physical Activity: Not on file   Stress: Not on file   Social Connections: Not on file   Intimate Partner Violence: Not on file   Housing Stability: Not on file     Current Outpatient Medications   Medication Sig Dispense Refill    predniSONE (DELTASONE) 50 MG tablet Take 2 tablets by mouth in the morning for 5 days. Take on days 1-5 of chemotherapy. 10 tablet 3    LORazepam (ATIVAN) 1 MG tablet Take 2 tablets by mouth once for 1 dose. 2 tablet 0    magnesium oxide (MAG-OX) 400 MG tablet Take 1 tablet by mouth in the morning and 1 tablet before bedtime.  60 tablet 3    ondansetron (ZOFRAN ODT) 4 MG disintegrating tablet Take 1 tablet by mouth every 8 hours as needed for Nausea or Vomiting 90 tablet 3    prochlorperazine (COMPAZINE) 10 MG tablet Take 1 tablet by mouth every 6 hours as needed (nausea/vomitting) 120 tablet 3    allopurinol (ZYLOPRIM) 300 MG tablet Take 1 tablet by mouth daily To prevent tumor lysis syndrome 30 tablet 1    lidocaine-prilocaine (EMLA) 2.5-2.5 % cream Apply topically as needed. Place small amount to port site 45 minutes prior to any blood draw or infusion. Cover with plastic wrap 30 g 2    Multiple Vitamins-Minerals (MENS 50+ ADVANCED PO) Take by mouth      escitalopram (LEXAPRO) 10 MG tablet Take 0.5 tablets by mouth daily 30 tablet 0    lisinopril (PRINIVIL;ZESTRIL) 10 MG tablet Take 1 tablet by mouth daily 90 tablet 0    Insulin Degludec (TRESIBA FLEXTOUCH) 200 UNIT/ML SOPN Inject 68 Units into the skin daily      ZINC PO Take by mouth daily      ascorbic acid (VITAMIN C) 500 MG tablet Take 500 mg by mouth daily      aspirin 81 MG EC tablet Take 81 mg by mouth daily       atorvastatin (LIPITOR) 40 MG tablet Take 20 mg by mouth daily      ibuprofen (ADVIL;MOTRIN) 800 MG tablet TAKE ONE TABLET BY MOUTH EVERY 8 HOURS AS NEEDED FOR PAIN      melatonin 5 MG TABS tablet Take 10 mg by mouth       metFORMIN (GLUCOPHAGE) 1000 MG tablet Take 1,000 mg by mouth 2 times daily      traZODone (DESYREL) 100 MG tablet Take 100 mg by mouth        Current Facility-Administered Medications   Medication Dose Route Frequency Provider Last Rate Last Admin    magnesium oxide (MAG-OX) tablet 400 mg  400 mg Oral TID Lina Reno NP-C           OBJECTIVE:  /81 Comment: standing  Pulse 93   Temp 98.1 °F (36.7 °C) (Oral)   Resp 16   Ht 5' 8\" (1.727 m)   Wt 192 lb (87.1 kg)   SpO2 98%   BMI 29.19 kg/m²     Physical Exam:  Constitutional: Well developed, well nourished male in no acute distress, sitting comfortably in the exam room chair.     HEENT: Normocephalic and atraumatic. Sclerae anicteric. Neck supple without JVD. No thyromegaly present. Lymph node   No palpable submandibular, cervical, supraclavicular lymph nodes. Skin Warm and dry. No bruising and no rash noted. No erythema. No pallor. Respiratory Lungs are clear to auscultation bilaterally without wheezes, rales or rhonchi, normal air exchange without accessory muscle use. CVS Normal rate, regular rhythm and normal S1 and S2. No murmurs, gallops, or rubs. Abdomen Soft, nontender and nondistended, normoactive bowel sounds. No palpable mass. No hepatosplenomegaly. Neuro Grossly nonfocal with no obvious sensory or motor deficits. MSK Normal range of motion in general.  No edema and no tenderness. Psych Appropriate mood and affect.           Labs:  Recent Results (from the past 96 hour(s))   CBC with Auto Differential    Collection Time: 08/03/22  7:34 AM   Result Value Ref Range    WBC 12.2 (H) 4.3 - 11.1 K/uL    RBC 4.48 4.23 - 5.6 M/uL    Hemoglobin 14.1 13.6 - 17.2 g/dL    Hematocrit 38.8 %    MCV 86.6 79.6 - 97.8 FL    MCH 31.5 26.1 - 32.9 PG    MCHC 36.3 (H) 31.4 - 35.0 g/dL    RDW 12.7 11.9 - 14.6 %    Platelets 851 328 - 179 K/uL    MPV 10.1 9.4 - 12.3 FL    nRBC 0.00 0.0 - 0.2 K/uL    Seg Neutrophils 80 (H) 43 - 78 %    Lymphocytes 7 (L) 13 - 44 %    Monocytes 8 4.0 - 12.0 %    Eosinophils % 2 0.5 - 7.8 %    Basophils 1 0.0 - 2.0 %    Immature Granulocytes 2 0.0 - 5.0 %    Segs Absolute 9.8 (H) 1.7 - 8.2 K/UL    Absolute Lymph # 0.8 0.5 - 4.6 K/UL    Absolute Mono # 0.9 0.1 - 1.3 K/UL    Absolute Eos # 0.3 0.0 - 0.8 K/UL    Basophils Absolute 0.1 0.0 - 0.2 K/UL    Absolute Immature Granulocyte 0.2 0.0 - 0.5 K/UL    Differential Type AUTOMATED     Comprehensive Metabolic Panel    Collection Time: 08/03/22  7:34 AM   Result Value Ref Range    Sodium 138 136 - 145 mmol/L    Potassium 4.8 3.5 - 5.1 mmol/L    Chloride 103 98 - 107 mmol/L    CO2 31 21 - 32 mmol/L    Anion Gap 4 (L) 7 - 16 mmol/L Glucose 225 (H) 65 - 100 mg/dL    BUN 12 8 - 23 MG/DL    Creatinine 1.00 0.8 - 1.5 MG/DL    GFR African American >60 >60 ml/min/1.73m2    GFR Non- >60 >60 ml/min/1.73m2    Calcium 9.1 8.3 - 10.4 MG/DL    Total Bilirubin 0.5 0.2 - 1.1 MG/DL    ALT 35 12 - 65 U/L    AST 11 (L) 15 - 37 U/L    Alk Phosphatase 88 50 - 136 U/L    Total Protein 6.8 6.3 - 8.2 g/dL    Albumin 3.6 3.2 - 4.6 g/dL    Globulin 3.2 2.3 - 3.5 g/dL    Albumin/Globulin Ratio 1.1 (L) 1.2 - 3.5     Magnesium    Collection Time: 08/03/22  7:34 AM   Result Value Ref Range    Magnesium 1.2 (L) 1.8 - 2.4 mg/dL   Uric Acid    Collection Time: 08/03/22  7:34 AM   Result Value Ref Range    Uric Acid 4.2 2.6 - 6.0 MG/DL   Lactate Dehydrogenase    Collection Time: 08/03/22  7:34 AM   Result Value Ref Range     110 - 210 U/L         Imaging:  PET CT SKULL BASE TO MID THIGH    Result Date: 6/29/2022  EXAMINATION: PET CT SKULL BASE TO MID THIGH 6/29/2022 1:42 PM ACCESSION NUMBER: [id number] COMPARISON: None available INDICATION: Diffuse large b-cell lymphoma, lymph nodes of inguinal region and lower limb, initial staging TECHNIQUE:   Radiopharmaceutical: 12.83 mCi F18-FDG IV. Positron emission tomography (PET) with concurrently acquired computed tomography (CT) for attenuation correction and anatomical localization imaging; skull base to mid-thigh. Blood glucose at the time of tracer administration is 158 mg/dl, which is adequate for imaging. Approximately 60 minutes after administration of the radiopharmaceutical imaging was initiated. SUV max calculated from patient body wt. Noncaloric dilute oral contrast is given. For this CT scanner at least one of the following techniques is utilized to decrease patient radiation exposure: Automatic exposure control, modulation of MA and KVP based on patient weight, and iterative reconstruction. FINDINGS:  Head/Neck: No hypermetabolic cervical lymphadenopathy.   No abnormal radiotracer uptake within the brain, however the normal intense uptake limits evaluation. Chest: No hypermetabolic pulmonary nodule. A 1.7 x 1.2 cm posterior mediastinal retroaortic nodule/lymph node demonstrates mild FDG uptake (max SUV 3.5). Moderately metabolic nonenlarged right axillary lymph node (max SUV 5.9). Partial visualization of a moderately metabolic right upper extremity lymph node adjacent to the distal humerus (max SUV 5.6). 6 mm mildly metabolic prevascular lymph node adjacent to the main pulmonary artery (max SUV 2.8). 8 mm subcutaneous nodule along the right inferior chest wall (max SUV 1.4). Port within the right anterior chest wall. The port catheter tip terminates at the cavoatrial junction. Other changes in her coronary artery calcifications. Abdomen/Pelvis: Hypermetabolic left inguinal lymph node conglomerate measuring 3.9 x 3.5 cm (max SUV 11.9). Enlarged and hypermetabolic right inguinal lymph nodes (max SUV 8.1). Postsurgical changes within the left inguinal region. Hypermetabolic left iliac and pelvic lymph nodes. For example, a left obturator lymph node measures 3.9 x 2.1 cm (max SUV 10.9). Moderately metabolic left periaortic lymph node (max SUV 4.2). Enlarged and hypermetabolic upper abdominal lymph nodes with an aortocaval lymph node conglomerate demonstrating a max SUV of 5.5. The spleen does not appear enlarged. Physiologic uptake within the gastrointestinal and genitourinary tracts. Scattered vascular calcifications. The prostate gland is not enlarged. Gallstones without evidence of acute cholecystitis. Bones: No hypermetabolic osseous focus to suggest osseous metastatic disease. 1.  Hypermetabolic lymphadenopathy involving the bilateral inguinal, left iliac, retroperitoneal and upper abdomen, compatible with biopsy-proven B-cell lymphoma. 2.  There are additional mild to moderately metabolic mediastinal and right axillary lymph nodes, favored to represent additional sites of tia disease.  3. Nonspecific 8 mm subcutaneous nodule within the right lower chest wall demonstrating mild FDG uptake, equivocal for additional site of disease. 4.  No splenomegaly. Pathology:  DIAGNOSIS        A:  \"LEFT INGUINAL LYMPH NODE\":             DIFFUSE LARGE B-CELL LYMPHOMA. SEE COMMENT. B:  \"NEVUS OF BACK\":             PENDING DERMATOPATHOLOGY CONSULT. Comment        A:  Histologic sections show a diffuse serpiginous infiltrate of   large atypical lymphocytes with indented nuclei, inconspicuous nucleoli   and moderate amounts of cytoplasm. Immunohistochemical stains show that   the lymphoma cells are positive for CD20, Nitro-5, CD10 and Bcl-6 and   negative for Bcl-2, MUM-1, CD5, Cyclin D1, CD30 (0% positivity) and c-MYC   (less than 40% positivity). CD3 stain highlights background small   T-lymphocytes. Ki-67 shows an increased proliferation index in the   serpiginous areas of more than 90%. The morphologic and immunophenotypic   appearance supports a diagnosis of germinal center subtype of diffuse   large B-cell lymphoma. FISH studies have been requested and will be   reported separately. ASSESSMENT:   Diagnosis Orders   1. Anxiety due to invasive procedure  LORazepam (ATIVAN) 1 MG tablet      2. Diffuse large B-cell lymphoma of lymph nodes of inguinal region (HCC)  CBC with Auto Differential    Comprehensive Metabolic Panel    Magnesium    Uric Acid    Lactate Dehydrogenase    CBC With Auto Differential    Comprehensive metabolic panel      3. Fatigue due to treatment                PLAN:  Lab studies were personally reviewed. DLBCL: high grade with Ki-67 90%, GC subtype, diagnosed with excisional biopsy of lymphadenopathy in left groin, 3.2 cm on ultrasound, enlarging over 3 months time.   PET/CT reviewed and shows the most prominent areas of disease in the inguinal and iliac nodes, but there are also nodes in the right axilla and retroperitoneum that are consistent with DLBCL, making him at least [employer] stage III. Bone marrow biopsy showed no morphologic findings but did show a small B cell clone worrisome for lymphoma involvement in the bone marrow. His FISH shows no evidence of more aggressive genotypic findings, so he would be most appropriate for R-CHOP for 6 cycles for definitive therapy. Here for R-CHOP cycle 2. He tolerated C1 relatively well. He had fairly profound fatigue during the first week following treatment but was still able to ADLs. He also had an episode of near syncope and required magnesium and IVF during that week. He had some issues with his blood sugar as well. His baseline glucose is around 200-300 but he got up to around 600 while on prednisone. He is seeing PCP today to find out about further management given degree of hyperglycemia which he was warned could be dangerous. His BGL returned to baseline after he completed the 5 days of prednisone. He has had minimal nausea. He waffles between constipation and diarrhea but did not require medications to manage this. No fevers or infectious symptoms. Labs reviewed. WBC 12 after G-CSF following treatment. CBC unremarkable. Uric acid stable. Mg down to 1.2. Instructed to increase to QID and will arrange tox-check next week for Mg and IVF as needed. He is scheduled for PET/CT prior to C3 and he was prescribed ativan to help with anxiety prior to that procedure. Continue supportive medications. All questions were asked and answered to the best of my ability. F/u for cycle 3 R-CHOP in 3 weeks with repeat PET/CT prior to cycle 3.                Lydia Blush, APRN - [id number]  [address] [place] [address]  Office : [phone]  Fax : [phone]

## 2022-08-03 NOTE — PATIENT INSTRUCTIONS
Summary has been discussed and given to patient: n/a        -------------------------------------------------------------------------------------------------------------------  Please call our office at [phone] if you have any  of the following symptoms:   Fever of 100.5 or greater  Chills  Shortness of breath  Swelling or pain in one leg    After office hours an answering service is available and will contact a provider for emergencies or if you are experiencing any of the above symptoms. Patient did express an interest in My Chart. My Chart log in information explained on the after visit summary printout at the [facility]  desk.     Bree Ascencio RN  Hematology Navigator  [address] [place] [address]

## 2022-08-04 ENCOUNTER — HOSPITAL ENCOUNTER (OUTPATIENT)
Dept: INFUSION THERAPY | Age: 62
Discharge: HOME | End: 2022-08-04
Payer: COMMERCIAL

## 2022-08-04 VITALS
DIASTOLIC BLOOD PRESSURE: 73 MMHG | TEMPERATURE: 97.9 F | BODY MASS INDEX: 29.35 KG/M2 | HEART RATE: 83 BPM | SYSTOLIC BLOOD PRESSURE: 137 MMHG | WEIGHT: 193 LBS | OXYGEN SATURATION: 98 % | RESPIRATION RATE: 18 BRPM

## 2022-08-04 DIAGNOSIS — C83.35 DIFFUSE LARGE B-CELL LYMPHOMA OF LYMPH NODES OF INGUINAL REGION (HCC): Primary | ICD-10-CM

## 2022-08-04 DIAGNOSIS — E83.42 HYPOMAGNESEMIA: ICD-10-CM

## 2022-08-04 PROCEDURE — 96367 TX/PROPH/DG ADDL SEQ IV INF: CPT

## 2022-08-04 PROCEDURE — 2580000003 HC RX 258: Performed by: NURSE PRACTITIONER

## 2022-08-04 PROCEDURE — 6370000000 HC RX 637 (ALT 250 FOR IP): Performed by: NURSE PRACTITIONER

## 2022-08-04 PROCEDURE — 96417 CHEMO IV INFUS EACH ADDL SEQ: CPT

## 2022-08-04 PROCEDURE — 6360000002 HC RX W HCPCS: Performed by: NURSE PRACTITIONER

## 2022-08-04 PROCEDURE — 96411 CHEMO IV PUSH ADDL DRUG: CPT

## 2022-08-04 PROCEDURE — 96375 TX/PRO/DX INJ NEW DRUG ADDON: CPT

## 2022-08-04 PROCEDURE — 96415 CHEMO IV INFUSION ADDL HR: CPT

## 2022-08-04 PROCEDURE — 96413 CHEMO IV INFUSION 1 HR: CPT

## 2022-08-04 RX ORDER — SODIUM CHLORIDE 9 MG/ML
5-250 INJECTION, SOLUTION INTRAVENOUS PRN
Status: DISCONTINUED | OUTPATIENT
Start: 2022-08-04 | End: 2022-08-05 | Stop reason: HOSPADM

## 2022-08-04 RX ORDER — ACETAMINOPHEN 325 MG/1
650 TABLET ORAL ONCE
Status: COMPLETED | OUTPATIENT
Start: 2022-08-04 | End: 2022-08-04

## 2022-08-04 RX ORDER — SODIUM CHLORIDE 0.9 % (FLUSH) 0.9 %
5-40 SYRINGE (ML) INJECTION PRN
Status: DISCONTINUED | OUTPATIENT
Start: 2022-08-04 | End: 2022-08-05 | Stop reason: HOSPADM

## 2022-08-04 RX ORDER — DOXORUBICIN HYDROCHLORIDE 2 MG/ML
100 INJECTION, SOLUTION INTRAVENOUS ONCE
Status: COMPLETED | OUTPATIENT
Start: 2022-08-04 | End: 2022-08-04

## 2022-08-04 RX ORDER — MAGNESIUM OXIDE 400 MG/1
400 TABLET ORAL 2 TIMES DAILY
Qty: 60 TABLET | Refills: 3 | Status: CANCELLED | OUTPATIENT
Start: 2022-08-04

## 2022-08-04 RX ORDER — DIPHENHYDRAMINE HYDROCHLORIDE 50 MG/ML
50 INJECTION INTRAMUSCULAR; INTRAVENOUS ONCE
Status: COMPLETED | OUTPATIENT
Start: 2022-08-04 | End: 2022-08-04

## 2022-08-04 RX ORDER — ONDANSETRON 2 MG/ML
8 INJECTION INTRAMUSCULAR; INTRAVENOUS ONCE
Status: COMPLETED | OUTPATIENT
Start: 2022-08-04 | End: 2022-08-04

## 2022-08-04 RX ADMIN — ONDANSETRON 8 MG: 2 INJECTION INTRAMUSCULAR; INTRAVENOUS at 11:12

## 2022-08-04 RX ADMIN — FOSAPREPITANT 150 MG: 150 INJECTION, POWDER, LYOPHILIZED, FOR SOLUTION INTRAVENOUS at 11:15

## 2022-08-04 RX ADMIN — SODIUM CHLORIDE, PRESERVATIVE FREE 10 ML: 5 INJECTION INTRAVENOUS at 12:05

## 2022-08-04 RX ADMIN — DIPHENHYDRAMINE HYDROCHLORIDE 50 MG: 50 INJECTION, SOLUTION INTRAMUSCULAR; INTRAVENOUS at 09:06

## 2022-08-04 RX ADMIN — VINCRISTINE SULFATE 2 MG: 1 INJECTION, SOLUTION INTRAVENOUS at 12:08

## 2022-08-04 RX ADMIN — SODIUM CHLORIDE, PRESERVATIVE FREE 10 ML: 5 INJECTION INTRAVENOUS at 13:00

## 2022-08-04 RX ADMIN — DOXORUBICIN HYDROCHLORIDE 100 MG: 2 INJECTION, SOLUTION INTRAVENOUS at 11:56

## 2022-08-04 RX ADMIN — ACETAMINOPHEN 650 MG: 325 TABLET ORAL at 09:06

## 2022-08-04 RX ADMIN — SODIUM CHLORIDE, PRESERVATIVE FREE 10 ML: 5 INJECTION INTRAVENOUS at 11:11

## 2022-08-04 RX ADMIN — SODIUM CHLORIDE 100 ML/HR: 9 INJECTION, SOLUTION INTRAVENOUS at 08:30

## 2022-08-04 RX ADMIN — CYCLOPHOSPHAMIDE 1500 MG: 1 INJECTION, POWDER, FOR SOLUTION INTRAVENOUS; ORAL at 12:20

## 2022-08-04 RX ADMIN — RITUXIMAB 700 MG: 10 INJECTION, SOLUTION INTRAVENOUS at 09:39

## 2022-08-04 RX ADMIN — SODIUM CHLORIDE, PRESERVATIVE FREE 10 ML: 5 INJECTION INTRAVENOUS at 08:30

## 2022-08-04 NOTE — PROGRESS NOTES
Clinical Social Work Note  Name: Rajani Mujica : 1960    MRN: [id number]    Date of Service: 2022    Type of Service: Health and Behavior Intervention     Length of Service: 30 minutes    Patient Diagnosis:   1. Diffuse large B-cell lymphoma of lymph nodes of inguinal region [facility])         Referral Source: Infusion RN    Reason for Visit: F/U    CM Note: Seen patient with his wife,  provided therapeutic reassurance. BEROT-CP discussed Self-Compassion and Distress. Gave them the Distress Information and workbook. Next Radical Acceptance. Mini Mental Status Exam: Rajani Mujica was dressed properly. No abnormal psychomotor movements observed. Intellectual functioning appeared to be intact. Insight was adequate. Judgment was adequate. Patient did not report suicidal ideations, intent or plans. Speech was coherent. Thought process was clear. Patient did not report homicidal ideations, intent or plans. Patient was oriented to self, place, time and situation. Protective Factors: Current care for physical and mental illness, adequate insight and judgment, family support, cultural and Scientology beliefs and values that support self-care. Next Steps: BERTO-CP gave contact information and encouraged pt to call should any needs arise. Pt verbalized understanding. BERTO -CP intends to follow up as needed. No flowsheet data found.         Electronically Signed By:  BERTO Cornejo

## 2022-08-04 NOTE — PROGRESS NOTES
Arrived to the [facility]. R-CHOP completed. Patient tolerated without difficulty. Any issues or concerns during appointment: None. (Oral Magnesium dose increased for home meds). Patient aware of next infusion appointment on   August 5 (date) at 1330 (time). Patient instructed to call provider with temperature of 100.4 or greater or nausea/vomiting/ diarrhea or pain not controlled by medications  Discharged ambulatory with spouse.

## 2022-08-05 ENCOUNTER — HOSPITAL ENCOUNTER (OUTPATIENT)
Dept: INFUSION THERAPY | Age: 62
Discharge: HOME | End: 2022-08-05
Payer: OTHER GOVERNMENT

## 2022-08-05 VITALS
HEART RATE: 95 BPM | TEMPERATURE: 98.2 F | DIASTOLIC BLOOD PRESSURE: 68 MMHG | RESPIRATION RATE: 18 BRPM | OXYGEN SATURATION: 95 % | SYSTOLIC BLOOD PRESSURE: 120 MMHG

## 2022-08-05 DIAGNOSIS — C83.35 DIFFUSE LARGE B-CELL LYMPHOMA OF LYMPH NODES OF INGUINAL REGION (HCC): Primary | ICD-10-CM

## 2022-08-05 PROCEDURE — 6360000002 HC RX W HCPCS: Performed by: NURSE PRACTITIONER

## 2022-08-05 PROCEDURE — 96372 THER/PROPH/DIAG INJ SC/IM: CPT

## 2022-08-05 RX ADMIN — PEGFILGRASTIM-BMEZ 6 MG: 6 INJECTION SUBCUTANEOUS at 13:58

## 2022-08-05 NOTE — PROGRESS NOTES
Arrived to the [facility]. Joshua Domínguez completed. Provided education on Ziextenzo    Patient instructed to report any side affects to ordering provider. Patient tolerated without complications. Any issues or concerns during appointment: NO.  Patient aware of next infusion appointment on 8/9/22 at [address] [place] [address]. Discharged ambulatory.

## 2022-08-08 ENCOUNTER — NURSE ONLY (OUTPATIENT)
Dept: FAMILY MEDICINE CLINIC | Facility: CLINIC | Age: 62
End: 2022-08-08

## 2022-08-08 ENCOUNTER — TELEPHONE (OUTPATIENT)
Dept: ONCOLOGY | Age: 62
End: 2022-08-08

## 2022-08-08 DIAGNOSIS — I10 ESSENTIAL HYPERTENSION: Primary | ICD-10-CM

## 2022-08-08 LAB
CHOLEST SERPL-MCNC: 127 MG/DL
HDLC SERPL-MCNC: 51 MG/DL (ref 40–60)
HDLC SERPL: 2.5 {RATIO}
LDLC SERPL CALC-MCNC: 55.6 MG/DL
TRIGL SERPL-MCNC: 102 MG/DL (ref 35–150)
TSH W FREE THYROID IF ABNORMAL: 1.34 UIU/ML (ref 0.36–3.74)
VLDLC SERPL CALC-MCNC: 20.4 MG/DL (ref 6–23)

## 2022-08-08 NOTE — TELEPHONE ENCOUNTER
Wife called on behalf of pt requesting to speak to the nurse in regards to know if the appt for infusion tomorrow is necessary. Stated something came up and might not be able to make it tomorrow.

## 2022-08-18 LAB
GLUCOSE BLD STRIP.AUTO-MCNC: 351 MG/DL (ref 65–100)
SERVICE CMNT-IMP: ABNORMAL

## 2022-08-18 PROCEDURE — 82962 GLUCOSE BLOOD TEST: CPT

## 2022-08-19 ENCOUNTER — OFFICE VISIT (OUTPATIENT)
Dept: FAMILY MEDICINE CLINIC | Facility: CLINIC | Age: 62
End: 2022-08-19
Payer: COMMERCIAL

## 2022-08-19 DIAGNOSIS — E11.40 TYPE 2 DIABETES MELLITUS WITH DIABETIC NEUROPATHY, WITH LONG-TERM CURRENT USE OF INSULIN (HCC): Primary | ICD-10-CM

## 2022-08-19 DIAGNOSIS — L98.9 SKIN LESION OF SCALP: ICD-10-CM

## 2022-08-19 DIAGNOSIS — Z79.4 TYPE 2 DIABETES MELLITUS WITH DIABETIC NEUROPATHY, WITH LONG-TERM CURRENT USE OF INSULIN (HCC): Primary | ICD-10-CM

## 2022-08-19 LAB — HBA1C MFR BLD: 10.1 %

## 2022-08-19 PROCEDURE — 83036 HEMOGLOBIN GLYCOSYLATED A1C: CPT | Performed by: STUDENT IN AN ORGANIZED HEALTH CARE EDUCATION/TRAINING PROGRAM

## 2022-08-19 PROCEDURE — 3051F HG A1C>EQUAL 7.0%<8.0%: CPT | Performed by: STUDENT IN AN ORGANIZED HEALTH CARE EDUCATION/TRAINING PROGRAM

## 2022-08-19 PROCEDURE — 99214 OFFICE O/P EST MOD 30 MIN: CPT | Performed by: STUDENT IN AN ORGANIZED HEALTH CARE EDUCATION/TRAINING PROGRAM

## 2022-08-19 RX ORDER — IBUPROFEN 800 MG/1
800 TABLET ORAL EVERY 8 HOURS PRN
Qty: 120 TABLET | Refills: 5 | Status: SHIPPED | OUTPATIENT
Start: 2022-08-19

## 2022-08-19 RX ORDER — LISINOPRIL 10 MG/1
10 TABLET ORAL DAILY
Qty: 90 TABLET | Refills: 1 | Status: SHIPPED | OUTPATIENT
Start: 2022-08-19

## 2022-08-19 RX ORDER — INSULIN LISPRO 100 [IU]/ML
5-10 INJECTION, SOLUTION INTRAVENOUS; SUBCUTANEOUS
Qty: 15 ML | Refills: 1 | Status: SHIPPED | OUTPATIENT
Start: 2022-08-19 | End: 2022-09-06

## 2022-08-19 RX ORDER — ATORVASTATIN CALCIUM 40 MG/1
20 TABLET, FILM COATED ORAL DAILY
Qty: 90 TABLET | Refills: 1 | Status: SHIPPED | OUTPATIENT
Start: 2022-08-19

## 2022-08-19 RX ORDER — TRAZODONE HYDROCHLORIDE 100 MG/1
100 TABLET ORAL NIGHTLY
Qty: 90 TABLET | Refills: 1 | Status: SHIPPED | OUTPATIENT
Start: 2022-08-19

## 2022-08-19 RX ORDER — INSULIN DEGLUDEC 200 U/ML
72 INJECTION, SOLUTION SUBCUTANEOUS DAILY
Qty: 3 ML | Refills: 5 | Status: SHIPPED | OUTPATIENT
Start: 2022-08-19 | End: 2022-09-06

## 2022-08-19 NOTE — PROGRESS NOTES
[facility]  Daniella Nolasco  Phone [phone]  Fax:  [phone]    Sheri Alanis (:  1960) is a 58 y.o. male here for evaluation of the following chief complaint(s):  Diabetes, Hypertension, and Insomnia (Refills )       ASSESSMENT/PLAN:  1. Type 2 diabetes mellitus with diabetic neuropathy, with long-term current use of insulin (HCC)  -     AMB POC HEMOGLOBIN A1C  2. Skin lesion of scalp  -     [facility]    Uncontrolled diabetes, POC hemoglobin A1c today was 10.1. Last A1c in May was 7.5. Patient is currently receiving chemo treatment for lymphoma, he does 5 days of prednisone after each treatment. Suspect this is contributing to his worsening diabetes. Will have patient increase Tresiba to 82 units daily and start Humalog 5 units with meals. Discussed with patient how to titrate his Humalog dose up. Continue to monitor sugars closely with Dexcom. He does have endocrinology appointment on . Patient requesting dermatology referral for skin lesion on his scalp, referral placed. Return in about 5 days (around 2022) for recheck. Subjective   SUBJECTIVE/OBJECTIVE:  HPI  72-year-old male with PMH of CAD, HTN, DM2, HLD, and diffuse large B-cell lymphoma who presents for regular 3-month follow-up.  -On Tresiba and metformin, unable to tolerate Ozempic/Rybelsus in the past  -Followed by oncology for diffuse large B-cell lymphoma, has had 2/6 chemo treatments  -Wearing Dexcom past few weeks, glucose always over 200  -Doing Prednisone for 5 days after each chemo treatment  -Eats 4-6 meals per day, fairly low carb    Review of Systems       Objective     Vitals:    22 1511   BP: 122/72   Pulse: (!) 103   Temp: 97 °F (36.1 °C)   SpO2: 97%       Physical Exam  Vitals reviewed. Constitutional:       General: He is not in acute distress. HENT:      Head: Normocephalic and atraumatic.    Cardiovascular:      Rate and Rhythm: Regular rhythm. Tachycardia present. Pulmonary:      Effort: Pulmonary effort is normal.      Breath sounds: Normal breath sounds. Musculoskeletal:      Right lower leg: No edema. Left lower leg: No edema. Skin:     General: Skin is warm and dry. Comments: Brown flat skin lesion over right scalp   Neurological:      General: No focal deficit present. Mental Status: He is alert and oriented to person, place, and time. An electronic signature was used to authenticate this note.     --Brenna Del Rio MD

## 2022-08-22 ENCOUNTER — HOSPITAL ENCOUNTER (OUTPATIENT)
Dept: PET IMAGING | Age: 62
Discharge: HOME | End: 2022-08-21
Payer: COMMERCIAL

## 2022-08-22 ENCOUNTER — HOSPITAL ENCOUNTER (OUTPATIENT)
Dept: PET IMAGING | Age: 62
Discharge: HOME | End: 2022-08-25
Payer: COMMERCIAL

## 2022-08-22 DIAGNOSIS — C83.35 DIFFUSE LARGE B-CELL LYMPHOMA OF LYMPH NODES OF INGUINAL REGION (HCC): ICD-10-CM

## 2022-08-22 LAB
GLUCOSE BLD STRIP.AUTO-MCNC: 144 MG/DL (ref 65–100)
SERVICE CMNT-IMP: ABNORMAL

## 2022-08-22 PROCEDURE — 6360000004 HC RX CONTRAST MEDICATION: Performed by: INTERNAL MEDICINE

## 2022-08-22 PROCEDURE — 2580000003 HC RX 258: Performed by: INTERNAL MEDICINE

## 2022-08-22 PROCEDURE — 78815 PET IMAGE W/CT SKULL-THIGH: CPT

## 2022-08-22 PROCEDURE — 82962 GLUCOSE BLOOD TEST: CPT

## 2022-08-22 PROCEDURE — A9552 F18 FDG: HCPCS | Performed by: INTERNAL MEDICINE

## 2022-08-22 PROCEDURE — 3430000000 HC RX DIAGNOSTIC RADIOPHARMACEUTICAL: Performed by: INTERNAL MEDICINE

## 2022-08-22 RX ORDER — FLUDEOXYGLUCOSE F 18 200 MCI/ML
11.29 INJECTION, SOLUTION INTRAVENOUS
Status: COMPLETED | OUTPATIENT
Start: 2022-08-22 | End: 2022-08-22

## 2022-08-22 RX ORDER — SODIUM CHLORIDE 0.9 % (FLUSH) 0.9 %
10 SYRINGE (ML) INJECTION ONCE AS NEEDED
Status: COMPLETED | OUTPATIENT
Start: 2022-08-22 | End: 2022-08-22

## 2022-08-22 RX ADMIN — FLUDEOXYGLUCOSE F 18 11.29 MILLICURIE: 200 INJECTION, SOLUTION INTRAVENOUS at 11:28

## 2022-08-22 RX ADMIN — SODIUM CHLORIDE, PRESERVATIVE FREE 10 ML: 5 INJECTION INTRAVENOUS at 11:28

## 2022-08-22 RX ADMIN — DIATRIZOATE MEGLUMINE AND DIATRIZOATE SODIUM 10 ML: 660; 100 LIQUID ORAL; RECTAL at 11:28

## 2022-08-23 ENCOUNTER — OFFICE VISIT (OUTPATIENT)
Dept: ONCOLOGY | Age: 62
End: 2022-08-23
Payer: COMMERCIAL

## 2022-08-23 ENCOUNTER — CLINICAL DOCUMENTATION (OUTPATIENT)
Dept: CASE MANAGEMENT | Age: 62
End: 2022-08-23

## 2022-08-23 ENCOUNTER — HOSPITAL ENCOUNTER (OUTPATIENT)
Dept: LAB | Age: 62
Discharge: HOME | End: 2022-08-26
Payer: COMMERCIAL

## 2022-08-23 VITALS
HEART RATE: 93 BPM | OXYGEN SATURATION: 97 % | WEIGHT: 192.8 LBS | TEMPERATURE: 97.7 F | SYSTOLIC BLOOD PRESSURE: 139 MMHG | HEIGHT: 68 IN | RESPIRATION RATE: 17 BRPM | BODY MASS INDEX: 29.22 KG/M2 | DIASTOLIC BLOOD PRESSURE: 77 MMHG

## 2022-08-23 DIAGNOSIS — R53.83 FATIGUE DUE TO TREATMENT: ICD-10-CM

## 2022-08-23 DIAGNOSIS — C83.35 DIFFUSE LARGE B-CELL LYMPHOMA OF LYMPH NODES OF INGUINAL REGION (HCC): Primary | ICD-10-CM

## 2022-08-23 DIAGNOSIS — E83.42 HYPOMAGNESEMIA: ICD-10-CM

## 2022-08-23 DIAGNOSIS — C83.35 DIFFUSE LARGE B-CELL LYMPHOMA OF LYMPH NODES OF INGUINAL REGION (HCC): ICD-10-CM

## 2022-08-23 LAB
ALBUMIN SERPL-MCNC: 3.5 G/DL (ref 3.2–4.6)
ALBUMIN/GLOB SERPL: 1.1 {RATIO} (ref 1.2–3.5)
ALP SERPL-CCNC: 82 U/L (ref 50–136)
ALT SERPL-CCNC: 32 U/L (ref 12–65)
ANION GAP SERPL CALC-SCNC: 5 MMOL/L (ref 7–16)
AST SERPL-CCNC: 13 U/L (ref 15–37)
BASOPHILS # BLD: 0.1 K/UL (ref 0–0.2)
BASOPHILS NFR BLD: 1 % (ref 0–2)
BILIRUB SERPL-MCNC: 0.4 MG/DL (ref 0.2–1.1)
BUN SERPL-MCNC: 14 MG/DL (ref 8–23)
CALCIUM SERPL-MCNC: 9.4 MG/DL (ref 8.3–10.4)
CHLORIDE SERPL-SCNC: 103 MMOL/L (ref 98–107)
CO2 SERPL-SCNC: 29 MMOL/L (ref 21–32)
CREAT SERPL-MCNC: 1 MG/DL (ref 0.8–1.5)
DIFFERENTIAL METHOD BLD: ABNORMAL
EOSINOPHIL # BLD: 0.1 K/UL (ref 0–0.8)
EOSINOPHIL NFR BLD: 2 % (ref 0.5–7.8)
ERYTHROCYTE [DISTWIDTH] IN BLOOD BY AUTOMATED COUNT: 14.2 % (ref 11.9–14.6)
GLOBULIN SER CALC-MCNC: 3.1 G/DL (ref 2.3–3.5)
GLUCOSE SERPL-MCNC: 159 MG/DL (ref 65–100)
HCT VFR BLD AUTO: 36.6 %
HGB BLD-MCNC: 12.9 G/DL (ref 13.6–17.2)
IMM GRANULOCYTES # BLD AUTO: 0.1 K/UL (ref 0–0.5)
IMM GRANULOCYTES NFR BLD AUTO: 1 % (ref 0–5)
LDH SERPL L TO P-CCNC: 180 U/L (ref 110–210)
LYMPHOCYTES # BLD: 0.6 K/UL (ref 0.5–4.6)
LYMPHOCYTES NFR BLD: 7 % (ref 13–44)
MAGNESIUM SERPL-MCNC: 1.6 MG/DL (ref 1.8–2.4)
MCH RBC QN AUTO: 31.2 PG (ref 26.1–32.9)
MCHC RBC AUTO-ENTMCNC: 35.2 G/DL (ref 31.4–35)
MCV RBC AUTO: 88.4 FL (ref 79.6–97.8)
MONOCYTES # BLD: 0.7 K/UL (ref 0.1–1.3)
MONOCYTES NFR BLD: 8 % (ref 4–12)
NEUTS SEG # BLD: 7.3 K/UL (ref 1.7–8.2)
NEUTS SEG NFR BLD: 81 % (ref 43–78)
NRBC # BLD: 0 K/UL (ref 0–0.2)
PLATELET # BLD AUTO: 188 K/UL (ref 150–450)
PMV BLD AUTO: 9.6 FL (ref 9.4–12.3)
POTASSIUM SERPL-SCNC: 4.4 MMOL/L (ref 3.5–5.1)
PROT SERPL-MCNC: 6.6 G/DL (ref 6.3–8.2)
RBC # BLD AUTO: 4.14 M/UL (ref 4.23–5.6)
SODIUM SERPL-SCNC: 137 MMOL/L (ref 136–145)
URATE SERPL-MCNC: 4.3 MG/DL (ref 2.6–6)
WBC # BLD AUTO: 8.9 K/UL (ref 4.3–11.1)

## 2022-08-23 PROCEDURE — 99214 OFFICE O/P EST MOD 30 MIN: CPT | Performed by: INTERNAL MEDICINE

## 2022-08-23 PROCEDURE — 36415 COLL VENOUS BLD VENIPUNCTURE: CPT

## 2022-08-23 PROCEDURE — 84550 ASSAY OF BLOOD/URIC ACID: CPT

## 2022-08-23 PROCEDURE — 85025 COMPLETE CBC W/AUTO DIFF WBC: CPT

## 2022-08-23 PROCEDURE — 83735 ASSAY OF MAGNESIUM: CPT

## 2022-08-23 PROCEDURE — 83615 LACTATE (LD) (LDH) ENZYME: CPT

## 2022-08-23 PROCEDURE — 80053 COMPREHEN METABOLIC PANEL: CPT

## 2022-08-23 RX ORDER — SODIUM CHLORIDE 0.9 % (FLUSH) 0.9 %
5-40 SYRINGE (ML) INJECTION PRN
Status: CANCELLED | OUTPATIENT
Start: 2022-08-25

## 2022-08-23 RX ORDER — ONDANSETRON 2 MG/ML
8 INJECTION INTRAMUSCULAR; INTRAVENOUS
Status: CANCELLED | OUTPATIENT
Start: 2022-08-25

## 2022-08-23 RX ORDER — EPINEPHRINE 1 MG/ML
0.3 INJECTION, SOLUTION, CONCENTRATE INTRAVENOUS PRN
Status: CANCELLED | OUTPATIENT
Start: 2022-08-25

## 2022-08-23 RX ORDER — DIPHENHYDRAMINE HYDROCHLORIDE 50 MG/ML
50 INJECTION INTRAMUSCULAR; INTRAVENOUS ONCE
Status: CANCELLED | OUTPATIENT
Start: 2022-08-25 | End: 2022-08-25

## 2022-08-23 RX ORDER — PREDNISONE 50 MG/1
100 TABLET ORAL DAILY
Qty: 10 TABLET | Refills: 3 | Status: SHIPPED | OUTPATIENT
Start: 2022-08-23 | End: 2022-09-13 | Stop reason: SDUPTHER

## 2022-08-23 RX ORDER — ACETAMINOPHEN 325 MG/1
650 TABLET ORAL
Status: CANCELLED | OUTPATIENT
Start: 2022-08-25

## 2022-08-23 RX ORDER — ALBUTEROL SULFATE 90 UG/1
4 AEROSOL, METERED RESPIRATORY (INHALATION) PRN
Status: CANCELLED | OUTPATIENT
Start: 2022-08-25

## 2022-08-23 RX ORDER — DIPHENHYDRAMINE HYDROCHLORIDE 50 MG/ML
50 INJECTION INTRAMUSCULAR; INTRAVENOUS
Status: CANCELLED | OUTPATIENT
Start: 2022-08-25

## 2022-08-23 RX ORDER — SODIUM CHLORIDE 9 MG/ML
INJECTION, SOLUTION INTRAVENOUS CONTINUOUS
Status: CANCELLED | OUTPATIENT
Start: 2022-08-25

## 2022-08-23 RX ORDER — MEPERIDINE HYDROCHLORIDE 50 MG/ML
12.5 INJECTION INTRAMUSCULAR; INTRAVENOUS; SUBCUTANEOUS PRN
Status: CANCELLED | OUTPATIENT
Start: 2022-08-25

## 2022-08-23 RX ORDER — FAMOTIDINE 10 MG/ML
20 INJECTION, SOLUTION INTRAVENOUS
Status: CANCELLED | OUTPATIENT
Start: 2022-08-25

## 2022-08-23 RX ORDER — HEPARIN SODIUM (PORCINE) LOCK FLUSH IV SOLN 100 UNIT/ML 100 UNIT/ML
500 SOLUTION INTRAVENOUS PRN
Status: CANCELLED | OUTPATIENT
Start: 2022-08-25

## 2022-08-23 RX ORDER — ESCITALOPRAM OXALATE 10 MG/1
5 TABLET ORAL DAILY
Qty: 30 TABLET | Refills: 0 | Status: SHIPPED | OUTPATIENT
Start: 2022-08-23 | End: 2022-10-04 | Stop reason: SDUPTHER

## 2022-08-23 RX ORDER — MAGNESIUM OXIDE 400 MG/1
400 TABLET ORAL 4 TIMES DAILY
Qty: 120 TABLET | Refills: 1 | Status: SHIPPED | OUTPATIENT
Start: 2022-08-23 | End: 2022-09-22

## 2022-08-23 RX ORDER — ACETAMINOPHEN 325 MG/1
650 TABLET ORAL ONCE
Status: CANCELLED | OUTPATIENT
Start: 2022-08-25 | End: 2022-08-25

## 2022-08-23 RX ORDER — SODIUM CHLORIDE 9 MG/ML
5-250 INJECTION, SOLUTION INTRAVENOUS PRN
Status: CANCELLED | OUTPATIENT
Start: 2022-08-25

## 2022-08-23 RX ORDER — ONDANSETRON 2 MG/ML
8 INJECTION INTRAMUSCULAR; INTRAVENOUS ONCE
Status: CANCELLED | OUTPATIENT
Start: 2022-08-25 | End: 2022-08-25

## 2022-08-23 RX ORDER — SODIUM CHLORIDE 9 MG/ML
5-40 INJECTION INTRAVENOUS PRN
Status: CANCELLED | OUTPATIENT
Start: 2022-08-25

## 2022-08-23 RX ORDER — DOXORUBICIN HYDROCHLORIDE 2 MG/ML
50 INJECTION, SOLUTION INTRAVENOUS ONCE
Status: CANCELLED | OUTPATIENT
Start: 2022-08-25 | End: 2022-08-25

## 2022-08-23 ASSESSMENT — PATIENT HEALTH QUESTIONNAIRE - PHQ9
1. LITTLE INTEREST OR PLEASURE IN DOING THINGS: 0
SUM OF ALL RESPONSES TO PHQ9 QUESTIONS 1 & 2: 0
SUM OF ALL RESPONSES TO PHQ QUESTIONS 1-9: 0
2. FEELING DOWN, DEPRESSED OR HOPELESS: 0
SUM OF ALL RESPONSES TO PHQ QUESTIONS 1-9: 0

## 2022-08-23 NOTE — PATIENT INSTRUCTIONS
Patient Instructions from Today's Visit    Reason for Visit:  Pre chemo    Diagnosis Information:  [email or web address]/. net/about-us/asco-answers-patient-education-materials/azzj-hhylbio-xuiu-sheets  Patient was educated and given handouts published by [employer] entitled [employer] Answers Fact Sheets about their diagnosis of  during todays office visit. Plan:  -Your PET scan looks good. Follow Up:   As planned    Recent Lab Results:  Hospital Outpatient Visit on 08/23/2022   Component Date Value Ref Range Status    WBC 08/23/2022 8.9  4.3 - 11.1 K/uL Final    RBC 08/23/2022 4.14 (A) 4.23 - 5.6 M/uL Final    Hemoglobin 08/23/2022 12.9 (A) 13.6 - 17.2 g/dL Final    Hematocrit 08/23/2022 36.6  % Final    MCV 08/23/2022 88.4  79.6 - 97.8 FL Final    MCH 08/23/2022 31.2  26.1 - 32.9 PG Final    MCHC 08/23/2022 35.2 (A) 31.4 - 35.0 g/dL Final    RDW 08/23/2022 14.2  11.9 - 14.6 % Final    Platelets 77/01/9777 188  150 - 450 K/uL Final    MPV 08/23/2022 9.6  9.4 - 12.3 FL Final    nRBC 08/23/2022 0.00  0.0 - 0.2 K/uL Final    **Note: Absolute NRBC parameter is now reported with Hemogram**    Seg Neutrophils 08/23/2022 81 (A) 43 - 78 % Final    Lymphocytes 08/23/2022 7 (A) 13 - 44 % Final    Monocytes 08/23/2022 8  4.0 - 12.0 % Final    Eosinophils % 08/23/2022 2  0.5 - 7.8 % Final    Basophils 08/23/2022 1  0.0 - 2.0 % Final    Immature Granulocytes 08/23/2022 1  0.0 - 5.0 % Final    Segs Absolute 08/23/2022 7.3  1.7 - 8.2 K/UL Final    Absolute Lymph # 08/23/2022 0.6  0.5 - 4.6 K/UL Final    Absolute Mono # 08/23/2022 0.7  0.1 - 1.3 K/UL Final    Absolute Eos # 08/23/2022 0.1  0.0 - 0.8 K/UL Final    Basophils Absolute 08/23/2022 0.1  0.0 - 0.2 K/UL Final    Absolute Immature Granulocyte 08/23/2022 0.1  0.0 - 0.5 K/UL Final    Differential Type 08/23/2022 AUTOMATED    Final    Sodium 08/23/2022 137  136 - 145 mmol/L Final    Potassium 08/23/2022 4.4  3.5 - 5.1 mmol/L Final    Chloride 08/23/2022 103  98 - 107 mmol/L Final

## 2022-08-23 NOTE — PROGRESS NOTES
[facility] Hematology and Oncology: Office Visit Established Patient    Chief Complaint:    Chief Complaint   Patient presents with    Follow-up         History of Present Illness:  Mr. Lauren Villalobos is a 64 y.o. male who returns today for management of diffuse large B cell lymphoma. He presented to his PCP on 4/29/22 reporting an enlarging lump in his left groin for the past 2 months. Physical exam confirmed a mildly tender lump in the patients left inguinal region. The right inguinal region was normal, and no inguinal hernia was noted. Ultrasound of the left groin was performed on 5/13/22 revealing enlarged, abnormal appearing left inguinal lymph nodes measuring up to 3.0 x 3.2 x 1.9 cm. Ultrasound directed biopsy was suggested for further assessment. We saw him for consultation and recommended excisional biopsy, which showed diffuse large B cell lymphoma. PET/CT shows the most prominent areas of disease in the inguinal and iliac nodes, but there are also nodes in the right axilla and retroperitoneum that are consistent with DLBCL, making him at least [employer] [facility] stage III. Bone marrow biopsy showed no morphologic findings but did show a small B cell clone worrisome for lymphoma involvement in the bone marrow, this would make him AA stage IV. His FISH shows no evidence of more aggressive genotypic findings, so he would be most appropriate for R-CHOP for 6 cycles for definitive therapy. Here for R-CHOP cycle 3 and restaging. He has tolerated therapy quite well thus far. His main symptom is fatigue, he tries to stay active but even after a short walk for exercise he is exhausted. He continues on magnesium replacement with mild diarrhea, but no nausea at all. No taste changes, appetite is good. He is not a water drinker so they have struggled finding the right beverage for hydration, he is currently drinking Prime hydration or Body Armor with good results.   He had some issues with his blood sugar, improved with more intensive management, following with Dr Radha Flores. He actually could not have his PET last week due to hyperglycemia and just had it done last evening. No fevers or infectious symptoms. Review of Systems:  Constitutional: Positive for fatigue. HENT: Negative. Eyes: Negative. Respiratory: Negative. Cardiovascular: Negative. Gastrointestinal: Positive for diarrhea (mild). Genitourinary: Negative. Musculoskeletal: Negative. Skin: Negative. Neurological: Negative. Endo/Heme/Allergies: Negative. Psychiatric/Behavioral: Negative. All other systems reviewed and are negative. No Known Allergies  Past Medical History:   Diagnosis Date    Arthritis     osteo    Bilateral carpal tunnel syndrome 10/28/2019    CAD (coronary artery disease) 1999    3 stents--last one placed 2019, pt on daily 81 mg ASA.  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 10/28/2019    GERD (gastroesophageal reflux disease)     diet controlled    Hx of colonic polyp 2016    adenoma    Hypercholesterolemia     Hypertension     managed with medication     Past Surgical History:   Procedure Laterality Date    COLONOSCOPY  last 4/25/16    Leandra--trans TA--3 year recall due to prep quality    CORONARY ANGIOPLASTY WITH STENT PLACEMENT  7183,4321, 2019    heart cath times 3 - 3 total stents    IR PORT PLACEMENT EQUAL OR GREATER THAN 5 YEARS  6/27/2022    IR PORT PLACEMENT EQUAL OR GREATER THAN 5 YEARS 6/27/2022 [facility] RADIOLOGY SPECIALS    KNEE ARTHROSCOPY Left 1985    LYMPH NODE BIOPSY Left 6/10/2022    LEFT INGUINAL BIOPSY performed by Angeline Bsasett MD at [facility]   2019    3 rd stent -- cardiac placed    SKIN BIOPSY N/A 6/10/2022    EXCISION BACK NEVUS WL PER ERICK/REQUEST TO FOLLOW performed by Royal Lozano MD at [address] Left 11/15/2018    UVULOPALATOPHARYGOPLASTY       Family History   Problem Relation Age of Onset    Coronary Art Dis Neg Hx     Lung Disease Mother     Cancer Maternal Uncle         colon    Cancer Maternal Uncle         colon    Cancer Maternal Uncle         colon    Cancer Maternal Uncle         colon    Cancer Maternal Uncle         colon    Cancer Maternal Uncle         colon    Cancer Maternal Uncle         colon    Colon Cancer Father     Cancer Father     Asthma Mother      Social History     Socioeconomic History    Marital status:      Spouse name: Not on file    Number of children: Not on file    Years of education: Not on file    Highest education level: Not on file   Occupational History    Not on file   Tobacco Use    Smoking status: Never    Smokeless tobacco: Never   Vaping Use    Vaping Use: Never used   Substance and Sexual Activity    Alcohol use:  Yes     Alcohol/week: 2.0 standard drinks    Drug use: No    Sexual activity: Not Currently   Other Topics Concern    Not on file   Social History Narrative    Not on file     Social Determinants of Health     Financial Resource Strain: Not on file   Food Insecurity: Not on file   Transportation Needs: Not on file   Physical Activity: Not on file   Stress: Not on file   Social Connections: Not on file   Intimate Partner Violence: Not on file   Housing Stability: Not on file     Current Outpatient Medications   Medication Sig Dispense Refill    metFORMIN (GLUCOPHAGE) 1000 MG tablet Take 1 tablet by mouth 2 times daily 180 tablet 1    atorvastatin (LIPITOR) 40 MG tablet Take 0.5 tablets by mouth daily 90 tablet 1    lisinopril (PRINIVIL;ZESTRIL) 10 MG tablet Take 1 tablet by mouth daily 90 tablet 1    Insulin Degludec (TRESIBA FLEXTOUCH) 200 UNIT/ML SOPN Inject 72 Units into the skin daily 3 mL 5    traZODone (DESYREL) 100 MG tablet Take 1 tablet by mouth nightly 90 tablet 1    ibuprofen (ADVIL;MOTRIN) 800 MG tablet Take 1 tablet by mouth every 8 hours as needed for Pain 120 tablet 5    Insulin Pen Needle 32G X 4 MM MISC 1 each by Does not apply route daily 100 each 5    insulin lispro, 1 Unit Dial, (HUMALOG KWIKPEN) 100 UNIT/ML SOPN Inject 5-10 Units into the skin 3 times daily (with meals) 15 mL 1    magnesium oxide (MAG-OX) 400 MG tablet Take 1 tablet by mouth in the morning and 1 tablet before bedtime. (Patient taking differently: Take 400 mg by mouth 4 times daily) 60 tablet 3    allopurinol (ZYLOPRIM) 300 MG tablet Take 1 tablet by mouth daily To prevent tumor lysis syndrome 30 tablet 1    lidocaine-prilocaine (EMLA) 2.5-2.5 % cream Apply topically as needed. Place small amount to port site 45 minutes prior to any blood draw or infusion.  Cover with plastic wrap 30 g 2    Multiple Vitamins-Minerals (MENS 50+ ADVANCED PO) Take by mouth      escitalopram (LEXAPRO) 10 MG tablet Take 0.5 tablets by mouth daily 30 tablet 0    ZINC PO Take by mouth daily      ascorbic acid (VITAMIN C) 500 MG tablet Take 500 mg by mouth daily      aspirin 81 MG EC tablet Take 81 mg by mouth daily       melatonin 5 MG TABS tablet Take 10 mg by mouth       ondansetron (ZOFRAN ODT) 4 MG disintegrating tablet Take 1 tablet by mouth every 8 hours as needed for Nausea or Vomiting (Patient not taking: Reported on 8/23/2022) 90 tablet 3    prochlorperazine (COMPAZINE) 10 MG tablet Take 1 tablet by mouth every 6 hours as needed (nausea/vomitting) (Patient not taking: Reported on 8/23/2022) 120 tablet 3     Current Facility-Administered Medications   Medication Dose Route Frequency Provider Last Rate Last Admin    magnesium oxide (MAG-OX) tablet 400 mg  400 mg Oral TID ADA Lan         Facility-Administered Medications Ordered in Other Visits   Medication Dose Route Frequency Provider Last Rate Last Admin    diatrizoate meglumine-sodium (GASTROGRAFIN) 66-10 % solution 10 mL  10 mL Oral ONCE PRN Ann El MD   10 mL at 08/22/22 1128       OBJECTIVE:  /77 (Site: Left Upper Arm, Position: Standing, Cuff Size: Medium Adult)   Pulse 93   Temp 97.7 °F (36.5 °C) (Oral)   Resp 17   Ht 5' 8\" (1.727 m)   Wt 192 lb 12.8 oz (87.5 kg)   SpO2 97%   BMI 29.32 kg/m²     Physical Exam:  Constitutional: Well developed, well nourished male in no acute distress, sitting comfortably in the exam room chair. HEENT: Normocephalic and atraumatic. Sclerae anicteric. Neck supple without JVD. No thyromegaly present. Lymph node   No palpable submandibular, cervical, supraclavicular, axillary lymph nodes. Skin Warm and dry. No bruising and no rash noted. No erythema. No pallor. Respiratory Lungs are clear to auscultation bilaterally without wheezes, rales or rhonchi, normal air exchange without accessory muscle use. CVS Normal rate, regular rhythm and normal S1 and S2. No murmurs, gallops, or rubs. Abdomen Soft, nontender and nondistended, normoactive bowel sounds. No palpable mass. No hepatosplenomegaly. Neuro Grossly nonfocal with no obvious sensory or motor deficits. MSK Normal range of motion in general.  No edema and no tenderness. Psych Appropriate mood and affect.           Labs:  Recent Results (from the past 96 hour(s))   AMB POC HEMOGLOBIN A1C    Collection Time: 08/19/22  4:02 PM   Result Value Ref Range    Hemoglobin A1C, POC 10.1 %   POCT Glucose    Collection Time: 08/22/22 11:25 AM   Result Value Ref Range    POC Glucose 144 (H) 65 - 100 mg/dL    Performed by: Nayan/Pet    CBC with Auto Differential    Collection Time: 08/23/22  7:24 AM   Result Value Ref Range    WBC 8.9 4.3 - 11.1 K/uL    RBC 4.14 (L) 4.23 - 5.6 M/uL    Hemoglobin 12.9 (L) 13.6 - 17.2 g/dL    Hematocrit 36.6 %    MCV 88.4 79.6 - 97.8 FL    MCH 31.2 26.1 - 32.9 PG    MCHC 35.2 (H) 31.4 - 35.0 g/dL    RDW 14.2 11.9 - 14.6 %    Platelets 732 279 - 049 K/uL    MPV 9.6 9.4 - 12.3 FL    nRBC 0.00 0.0 - 0.2 K/uL    Seg Neutrophils 81 (H) 43 - 78 %    Lymphocytes 7 (L) 13 - 44 %    Monocytes 8 4.0 - 12.0 %    Eosinophils % 2 0.5 - 7.8 %    Basophils 1 0.0 - 2.0 %    Immature Granulocytes 1 0.0 - 5.0 %    Segs Absolute 7.3 1.7 - 8.2 K/UL    Absolute Lymph # 0.6 0.5 - 4.6 K/UL    Absolute Mono # 0.7 0.1 - 1.3 K/UL    Absolute Eos # 0.1 0.0 - 0.8 K/UL    Basophils Absolute 0.1 0.0 - 0.2 K/UL    Absolute Immature Granulocyte 0.1 0.0 - 0.5 K/UL    Differential Type AUTOMATED     Comprehensive Metabolic Panel    Collection Time: 08/23/22  7:24 AM   Result Value Ref Range    Sodium 137 136 - 145 mmol/L    Potassium 4.4 3.5 - 5.1 mmol/L    Chloride 103 98 - 107 mmol/L    CO2 29 21 - 32 mmol/L    Anion Gap 5 (L) 7 - 16 mmol/L    Glucose 159 (H) 65 - 100 mg/dL    BUN 14 8 - 23 MG/DL    Creatinine 1.00 0.8 - 1.5 MG/DL    GFR African American >60 >60 ml/min/1.73m2    GFR Non- >60 >60 ml/min/1.73m2    Calcium 9.4 8.3 - 10.4 MG/DL    Total Bilirubin 0.4 0.2 - 1.1 MG/DL    ALT 32 12 - 65 U/L    AST 13 (L) 15 - 37 U/L    Alk Phosphatase 82 50 - 136 U/L    Total Protein 6.6 6.3 - 8.2 g/dL    Albumin 3.5 3.2 - 4.6 g/dL    Globulin 3.1 2.3 - 3.5 g/dL    Albumin/Globulin Ratio 1.1 (L) 1.2 - 3.5     Magnesium    Collection Time: 08/23/22  7:24 AM   Result Value Ref Range    Magnesium 1.6 (L) 1.8 - 2.4 mg/dL   Uric Acid    Collection Time: 08/23/22  7:24 AM   Result Value Ref Range    Uric Acid 4.3 2.6 - 6.0 MG/DL   Lactate Dehydrogenase    Collection Time: 08/23/22  7:24 AM   Result Value Ref Range     110 - 210 U/L         Imaging:  PET CT SKULL BASE TO MID THIGH    Result Date: 6/29/2022  EXAMINATION: PET CT SKULL BASE TO MID THIGH 6/29/2022 1:42 PM ACCESSION NUMBER: [id number] COMPARISON: None available INDICATION: Diffuse large b-cell lymphoma, lymph nodes of inguinal region and lower limb, initial staging TECHNIQUE:   Radiopharmaceutical: 12.83 mCi F18-FDG IV. Positron emission tomography (PET) with concurrently acquired computed tomography (CT) for attenuation correction and anatomical localization imaging; skull base to mid-thigh. Blood glucose at the time of tracer administration is 158 mg/dl, which is adequate for imaging. Approximately 60 minutes after administration of the radiopharmaceutical imaging was initiated. SUV max calculated from patient body wt. Noncaloric dilute oral contrast is given. For this CT scanner at least one of the following techniques is utilized to decrease patient radiation exposure: Automatic exposure control, modulation of MA and KVP based on patient weight, and iterative reconstruction. FINDINGS:  Head/Neck: No hypermetabolic cervical lymphadenopathy. No abnormal radiotracer uptake within the brain, however the normal intense uptake limits evaluation. Chest: No hypermetabolic pulmonary nodule. A 1.7 x 1.2 cm posterior mediastinal retroaortic nodule/lymph node demonstrates mild FDG uptake (max SUV 3.5). Moderately metabolic nonenlarged right axillary lymph node (max SUV 5.9). Partial visualization of a moderately metabolic right upper extremity lymph node adjacent to the distal humerus (max SUV 5.6). 6 mm mildly metabolic prevascular lymph node adjacent to the main pulmonary artery (max SUV 2.8). 8 mm subcutaneous nodule along the right inferior chest wall (max SUV 1.4). Port within the right anterior chest wall. The port catheter tip terminates at the cavoatrial junction. Other changes in her coronary artery calcifications. Abdomen/Pelvis: Hypermetabolic left inguinal lymph node conglomerate measuring 3.9 x 3.5 cm (max SUV 11.9). Enlarged and hypermetabolic right inguinal lymph nodes (max SUV 8.1). Postsurgical changes within the left inguinal region. Hypermetabolic left iliac and pelvic lymph nodes.  For example, a left obturator lymph node measures 3.9 x 2.1 cm (max SUV 10.9). Moderately metabolic left periaortic lymph node (max SUV 4.2). Enlarged and hypermetabolic upper abdominal lymph nodes with an aortocaval lymph node conglomerate demonstrating a max SUV of 5.5. The spleen does not appear enlarged. Physiologic uptake within the gastrointestinal and genitourinary tracts. Scattered vascular calcifications. The prostate gland is not enlarged. Gallstones without evidence of acute cholecystitis. Bones: No hypermetabolic osseous focus to suggest osseous metastatic disease. 1.  Hypermetabolic lymphadenopathy involving the bilateral inguinal, left iliac, retroperitoneal and upper abdomen, compatible with biopsy-proven B-cell lymphoma. 2.  There are additional mild to moderately metabolic mediastinal and right axillary lymph nodes, favored to represent additional sites of tia disease. 3.  Nonspecific 8 mm subcutaneous nodule within the right lower chest wall demonstrating mild FDG uptake, equivocal for additional site of disease. 4.  No splenomegaly. Pathology:  DIAGNOSIS        A:  \"LEFT INGUINAL LYMPH NODE\":             DIFFUSE LARGE B-CELL LYMPHOMA. SEE COMMENT. B:  \"NEVUS OF BACK\":             PENDING DERMATOPATHOLOGY CONSULT. Comment        A:  Histologic sections show a diffuse serpiginous infiltrate of   large atypical lymphocytes with indented nuclei, inconspicuous nucleoli   and moderate amounts of cytoplasm. Immunohistochemical stains show that   the lymphoma cells are positive for CD20, Glenvil-5, CD10 and Bcl-6 and   negative for Bcl-2, MUM-1, CD5, Cyclin D1, CD30 (0% positivity) and c-MYC   (less than 40% positivity). CD3 stain highlights background small   T-lymphocytes. Ki-67 shows an increased proliferation index in the   serpiginous areas of more than 90%. The morphologic and immunophenotypic   appearance supports a diagnosis of germinal center subtype of diffuse   large B-cell lymphoma.   FISH studies have been requested and will be   reported separately. ASSESSMENT:   Diagnosis Orders   1. Diffuse large B-cell lymphoma of lymph nodes of inguinal region ([facility] [address]        2. Hypomagnesemia        3. Fatigue due to treatment                  PLAN:  Lab studies and imaging studies were personally reviewed. DLBCL: high grade with Ki-67 90%, GC subtype, diagnosed with excisional biopsy of lymphadenopathy in left groin, 3.2 cm on ultrasound, enlarging over 3 months time. PET/CT reviewed and shows the most prominent areas of disease in the inguinal and iliac nodes, but there are also nodes in the right axilla and retroperitoneum that are consistent with DLBCL, making him at least [place] stage III. Bone marrow biopsy showed no morphologic findings but did show a small B cell clone worrisome for lymphoma involvement in the bone marrow. His FISH shows no evidence of more aggressive genotypic findings, so he would be most appropriate for R-CHOP for 6 cycles for definitive therapy. Here for R-CHOP cycle 3 and restaging. He has tolerated therapy quite well thus far. His main symptom is fatigue, he tries to stay active but even after a short walk for exercise he is exhausted. He continues on magnesium replacement with mild diarrhea, but no nausea at all. No taste changes, appetite is good. He is not a water drinker so they have struggled finding the right beverage for hydration, he is currently drinking Prime hydration or Body Armor with good results. He had some issues with his blood sugar, improved with more intensive management, following with Dr Marquez. He actually could not have his PET last week due to hyperglycemia and just had it done last evening. No fevers or infectious symptoms. Labs reviewed and adequate, WBC normal, Hgb 12.9. Mild hypoMg++, continue oral replacement.   LDH normal.  PET/CT reviewed and shows no evidence of residual disease in the inguinal region or elsewhere, a complete response. Continue R-CHOP to a planned 6 cycles, then enter surveillance if he remains in CR. All questions were asked and answered to the best of my ability. F/u for cycle 4 R-CHOP in 3 weeks.                Sarbjit Gagnon MD, MD  [facility] Hematology and Oncology  [address] [place] [address]  Office : [phone]  Fax : [phone]

## 2022-08-23 NOTE — PROGRESS NOTES
8/23-Patient seen in the office with Dr. Tomer Barrios. Reviewed PET scan with patient. Labs reviewed, pt will receive C3 HOP 8/25 and follow up next month. Refills sent. Pt instructed to call with any needs.

## 2022-08-24 ENCOUNTER — OFFICE VISIT (OUTPATIENT)
Dept: FAMILY MEDICINE CLINIC | Facility: CLINIC | Age: 62
End: 2022-08-24
Payer: COMMERCIAL

## 2022-08-24 VITALS
HEIGHT: 68 IN | WEIGHT: 191 LBS | OXYGEN SATURATION: 97 % | HEART RATE: 93 BPM | DIASTOLIC BLOOD PRESSURE: 72 MMHG | SYSTOLIC BLOOD PRESSURE: 142 MMHG | TEMPERATURE: 97 F | BODY MASS INDEX: 28.95 KG/M2

## 2022-08-24 DIAGNOSIS — Z79.4 TYPE 2 DIABETES MELLITUS WITH DIABETIC NEUROPATHY, WITH LONG-TERM CURRENT USE OF INSULIN (HCC): Primary | ICD-10-CM

## 2022-08-24 DIAGNOSIS — E11.40 TYPE 2 DIABETES MELLITUS WITH DIABETIC NEUROPATHY, WITH LONG-TERM CURRENT USE OF INSULIN (HCC): Primary | ICD-10-CM

## 2022-08-24 DIAGNOSIS — C83.35 DIFFUSE LARGE B-CELL LYMPHOMA OF LYMPH NODES OF INGUINAL REGION (HCC): Primary | ICD-10-CM

## 2022-08-24 PROCEDURE — 99213 OFFICE O/P EST LOW 20 MIN: CPT | Performed by: STUDENT IN AN ORGANIZED HEALTH CARE EDUCATION/TRAINING PROGRAM

## 2022-08-24 PROCEDURE — 3051F HG A1C>EQUAL 7.0%<8.0%: CPT | Performed by: STUDENT IN AN ORGANIZED HEALTH CARE EDUCATION/TRAINING PROGRAM

## 2022-08-24 RX ORDER — BLOOD-GLUCOSE SENSOR
EACH MISCELLANEOUS
Qty: 9 EACH | Refills: 1 | Status: SHIPPED | OUTPATIENT
Start: 2022-08-24

## 2022-08-24 RX ORDER — BLOOD-GLUCOSE TRANSMITTER
EACH MISCELLANEOUS
Qty: 1 EACH | Refills: 1 | Status: SHIPPED | OUTPATIENT
Start: 2022-08-24

## 2022-08-24 NOTE — PROGRESS NOTES
[facility]  Daniella Nolasco  Phone [phone]  Fax:  [phone]    Manisha Farris (:  1960) is a 58 y.o. male here for evaluation of the following chief complaint(s):  Diabetes       ASSESSMENT/PLAN:  1. Type 2 diabetes mellitus with diabetic neuropathy, with long-term current use of insulin ([facility])  -     C-Peptide; Future    Recent hemoglobin A1c elevated at 10.1. Patient currently undergoing treatment for diffuse large B-cell lymphoma which seems to be negatively affecting his diabetes control. He has been improving his diet. Blood glucose has improved since starting mealtime Humalog. Continue Tresiba 82 units daily, increase mealtime Humalog to 12-15 units while on prednisone. Will check C-peptide. Last 14 days of Dexcom data reviewed: Average glucose 271, 63% very high, 19% high, 18% in range, 0% low    Return in about 3 months (around 2022) for recheck. Subjective   SUBJECTIVE/OBJECTIVE:  HPI  77-year-old male with PMH of CAD, HTN, DM2, HLD, and diffuse large B-cell lymphoma who presents for 5-day diabetes follow-up. -Started on mealtime Humalog at last visit, Dian Heart was increased to 82 units daily  -On insulin past 5 years, diagnosed with diabetes in     Review of Systems       Objective     Vitals:    22 1303   BP: (!) 142/72   Pulse: 93   Temp: 97 °F (36.1 °C)   SpO2: 97%       Physical Exam  Vitals reviewed. Constitutional:       General: He is not in acute distress. HENT:      Head: Normocephalic and atraumatic. Cardiovascular:      Rate and Rhythm: Normal rate. Pulmonary:      Effort: Pulmonary effort is normal.   Neurological:      General: No focal deficit present. Mental Status: He is alert and oriented to person, place, and time. Psychiatric:         Mood and Affect: Mood normal.         Behavior: Behavior normal.              An electronic signature was used to authenticate this note.     --Suzzane Smoker MD [place]

## 2022-08-25 ENCOUNTER — HOSPITAL ENCOUNTER (OUTPATIENT)
Dept: INFUSION THERAPY | Age: 62
Discharge: HOME | End: 2022-08-25
Payer: OTHER GOVERNMENT

## 2022-08-25 ENCOUNTER — CLINICAL DOCUMENTATION (OUTPATIENT)
Dept: ONCOLOGY | Age: 62
End: 2022-08-25

## 2022-08-25 VITALS
SYSTOLIC BLOOD PRESSURE: 141 MMHG | WEIGHT: 192.4 LBS | DIASTOLIC BLOOD PRESSURE: 73 MMHG | RESPIRATION RATE: 16 BRPM | TEMPERATURE: 97.3 F | BODY MASS INDEX: 29.25 KG/M2 | HEART RATE: 84 BPM | OXYGEN SATURATION: 98 %

## 2022-08-25 DIAGNOSIS — C83.35 DIFFUSE LARGE B-CELL LYMPHOMA OF LYMPH NODES OF INGUINAL REGION (HCC): Primary | ICD-10-CM

## 2022-08-25 PROCEDURE — 6370000000 HC RX 637 (ALT 250 FOR IP): Performed by: INTERNAL MEDICINE

## 2022-08-25 PROCEDURE — 96413 CHEMO IV INFUSION 1 HR: CPT

## 2022-08-25 PROCEDURE — 6360000002 HC RX W HCPCS: Performed by: INTERNAL MEDICINE

## 2022-08-25 PROCEDURE — 96415 CHEMO IV INFUSION ADDL HR: CPT

## 2022-08-25 PROCEDURE — 96411 CHEMO IV PUSH ADDL DRUG: CPT

## 2022-08-25 PROCEDURE — 96367 TX/PROPH/DG ADDL SEQ IV INF: CPT

## 2022-08-25 PROCEDURE — 96417 CHEMO IV INFUS EACH ADDL SEQ: CPT

## 2022-08-25 PROCEDURE — 96375 TX/PRO/DX INJ NEW DRUG ADDON: CPT

## 2022-08-25 PROCEDURE — 2580000003 HC RX 258: Performed by: INTERNAL MEDICINE

## 2022-08-25 RX ORDER — ONDANSETRON 2 MG/ML
8 INJECTION INTRAMUSCULAR; INTRAVENOUS ONCE
Status: COMPLETED | OUTPATIENT
Start: 2022-08-25 | End: 2022-08-25

## 2022-08-25 RX ORDER — SODIUM CHLORIDE 0.9 % (FLUSH) 0.9 %
5-40 SYRINGE (ML) INJECTION PRN
Status: DISCONTINUED | OUTPATIENT
Start: 2022-08-25 | End: 2022-08-26 | Stop reason: HOSPADM

## 2022-08-25 RX ORDER — ACETAMINOPHEN 325 MG/1
650 TABLET ORAL ONCE
Status: COMPLETED | OUTPATIENT
Start: 2022-08-25 | End: 2022-08-25

## 2022-08-25 RX ORDER — DOXORUBICIN HYDROCHLORIDE 2 MG/ML
100 INJECTION, SOLUTION INTRAVENOUS ONCE
Status: COMPLETED | OUTPATIENT
Start: 2022-08-25 | End: 2022-08-25

## 2022-08-25 RX ORDER — SODIUM CHLORIDE 9 MG/ML
5-250 INJECTION, SOLUTION INTRAVENOUS PRN
Status: DISCONTINUED | OUTPATIENT
Start: 2022-08-25 | End: 2022-08-26 | Stop reason: HOSPADM

## 2022-08-25 RX ORDER — DIPHENHYDRAMINE HYDROCHLORIDE 50 MG/ML
50 INJECTION INTRAMUSCULAR; INTRAVENOUS ONCE
Status: COMPLETED | OUTPATIENT
Start: 2022-08-25 | End: 2022-08-25

## 2022-08-25 RX ADMIN — VINCRISTINE SULFATE 2 MG: 1 INJECTION, SOLUTION INTRAVENOUS at 12:32

## 2022-08-25 RX ADMIN — ONDANSETRON 8 MG: 2 INJECTION INTRAMUSCULAR; INTRAVENOUS at 11:21

## 2022-08-25 RX ADMIN — ACETAMINOPHEN 650 MG: 325 TABLET ORAL at 08:57

## 2022-08-25 RX ADMIN — DOXORUBICIN HYDROCHLORIDE 100 MG: 2 INJECTION, SOLUTION INTRAVENOUS at 12:25

## 2022-08-25 RX ADMIN — FOSAPREPITANT 150 MG: 150 INJECTION, POWDER, LYOPHILIZED, FOR SOLUTION INTRAVENOUS at 11:25

## 2022-08-25 RX ADMIN — RITUXIMAB 700 MG: 10 INJECTION, SOLUTION INTRAVENOUS at 09:47

## 2022-08-25 RX ADMIN — DIPHENHYDRAMINE HYDROCHLORIDE 50 MG: 50 INJECTION INTRAMUSCULAR; INTRAVENOUS at 08:57

## 2022-08-25 RX ADMIN — CYCLOPHOSPHAMIDE 1500 MG: 1 INJECTION, POWDER, FOR SOLUTION INTRAVENOUS; ORAL at 11:48

## 2022-08-25 RX ADMIN — SODIUM CHLORIDE, PRESERVATIVE FREE 10 ML: 5 INJECTION INTRAVENOUS at 08:31

## 2022-08-25 NOTE — PROGRESS NOTES
[facility] the [facility]. HOP completed. Patient tolerated well. Any issues or concerns during appointment: none. Patient aware of next infusion appointment on 8/26/22 (date) at 5:00 pm (time). Patient instructed to call provider with temperature of 100.4 or greater or nausea/vomiting/ diarrhea or pain not controlled by medications  Discharged ambulatory.

## 2022-08-25 NOTE — PROGRESS NOTES
Clinical Social Work Note  Name: Abimbola Flores. : 1960    MRN: [id number]    Date of Service: 2022    Type of Service: Health and Behavior Intervention     Length of Service: 16 minutes    Patient Diagnosis: No diagnosis found. Referral Source: Infusion RN    Reason for Visit: F/U    CM Note: Seen patient by himself during Infusion. \"I am cancer free\". BERTO-ARASH discussed CBT skills, mini-vacation. Mini Mental Status Exam: Abimbola Seu was dressed properly. No abnormal psychomotor movements observed. Intellectual functioning appeared to be intact. Insight was adequate. Judgment was adequate. Patient did not report suicidal ideations, intent or plans. Speech was coherent. Thought process was clear. Patient did not report homicidal ideations, intent or plans. Patient was oriented to self, place, time and situation. Protective Factors: Current care for physical and mental illness, adequate insight and judgment, family support, cultural and Worship beliefs and values that support self-care. Next Steps: BERTO-ARASH gave contact information and encouraged pt to call should any needs arise. Pt verbalized understanding. BERTO -CP intends to follow up as needed. No flowsheet data found.         Electronically Signed By:  BERTO Pierre

## 2022-08-26 ENCOUNTER — HOSPITAL ENCOUNTER (OUTPATIENT)
Dept: INFUSION THERAPY | Age: 62
Discharge: HOME | End: 2022-08-26
Payer: COMMERCIAL

## 2022-08-26 VITALS
OXYGEN SATURATION: 98 % | HEART RATE: 99 BPM | RESPIRATION RATE: 16 BRPM | DIASTOLIC BLOOD PRESSURE: 88 MMHG | TEMPERATURE: 98.3 F | SYSTOLIC BLOOD PRESSURE: 151 MMHG

## 2022-08-26 DIAGNOSIS — C83.35 DIFFUSE LARGE B-CELL LYMPHOMA OF LYMPH NODES OF INGUINAL REGION (HCC): Primary | ICD-10-CM

## 2022-08-26 PROCEDURE — 96372 THER/PROPH/DIAG INJ SC/IM: CPT

## 2022-08-26 PROCEDURE — 6360000002 HC RX W HCPCS: Performed by: INTERNAL MEDICINE

## 2022-08-26 RX ADMIN — PEGFILGRASTIM-BMEZ 6 MG: 6 INJECTION SUBCUTANEOUS at 16:38

## 2022-08-26 NOTE — PROGRESS NOTES
[facility] the [facility]. Ziextenzo injection completed. Provided education on falls prevention. Patient instructed to report any side affects to ordering provider. Patient tolerated well. Any issues or concerns during appointment: none. Patient aware of next infusion appointment on 8/30 at 8:00 am.  Discharged ambulatory to home.

## 2022-08-30 ENCOUNTER — HOSPITAL ENCOUNTER (OUTPATIENT)
Dept: INFUSION THERAPY | Age: 62
Discharge: HOME | End: 2022-08-30
Payer: COMMERCIAL

## 2022-08-30 VITALS
OXYGEN SATURATION: 98 % | BODY MASS INDEX: 29.92 KG/M2 | DIASTOLIC BLOOD PRESSURE: 77 MMHG | SYSTOLIC BLOOD PRESSURE: 149 MMHG | RESPIRATION RATE: 16 BRPM | TEMPERATURE: 97.4 F | WEIGHT: 196.8 LBS | HEART RATE: 96 BPM

## 2022-08-30 DIAGNOSIS — C83.35 DIFFUSE LARGE B-CELL LYMPHOMA OF LYMPH NODES OF INGUINAL REGION (HCC): Primary | ICD-10-CM

## 2022-08-30 LAB
ALBUMIN SERPL-MCNC: 3.5 G/DL (ref 3.2–4.6)
ALBUMIN/GLOB SERPL: 1.3 {RATIO} (ref 1.2–3.5)
ALP SERPL-CCNC: 136 U/L (ref 50–136)
ALT SERPL-CCNC: 23 U/L (ref 12–65)
ANION GAP SERPL CALC-SCNC: 10 MMOL/L (ref 7–16)
AST SERPL-CCNC: 4 U/L (ref 15–37)
BILIRUB SERPL-MCNC: 0.5 MG/DL (ref 0.2–1.1)
BUN SERPL-MCNC: 23 MG/DL (ref 8–23)
CALCIUM SERPL-MCNC: 9.2 MG/DL (ref 8.3–10.4)
CHLORIDE SERPL-SCNC: 99 MMOL/L (ref 98–107)
CO2 SERPL-SCNC: 25 MMOL/L (ref 21–32)
CREAT SERPL-MCNC: 1.1 MG/DL (ref 0.8–1.5)
DIFFERENTIAL METHOD BLD: ABNORMAL
ERYTHROCYTE [DISTWIDTH] IN BLOOD BY AUTOMATED COUNT: 14.7 % (ref 11.9–14.6)
GLOBULIN SER CALC-MCNC: 2.6 G/DL (ref 2.3–3.5)
GLUCOSE SERPL-MCNC: 381 MG/DL (ref 65–100)
HCT VFR BLD AUTO: 35.3 %
HGB BLD-MCNC: 12.4 G/DL (ref 13.6–17.2)
LYMPHOCYTES # BLD: 0.4 K/UL (ref 0.5–4.6)
LYMPHOCYTES NFR BLD MANUAL: 2 % (ref 16–44)
MAGNESIUM SERPL-MCNC: 1.6 MG/DL (ref 1.8–2.4)
MCH RBC QN AUTO: 32 PG (ref 26.1–32.9)
MCHC RBC AUTO-ENTMCNC: 35.1 G/DL (ref 31.4–35)
MCV RBC AUTO: 91 FL (ref 79.6–97.8)
METAMYELOCYTES NFR BLD MANUAL: 1 %
NEUTS BAND NFR BLD MANUAL: 1 % (ref 0–6)
NEUTS SEG # BLD: 20.1 K/UL (ref 1.7–8.2)
NEUTS SEG NFR BLD MANUAL: 96 % (ref 47–75)
NRBC # BLD: 0 K/UL (ref 0–0.2)
PLATELET # BLD AUTO: 208 K/UL (ref 150–450)
PLATELET COMMENT: ADEQUATE
PMV BLD AUTO: 10.1 FL (ref 9.4–12.3)
POTASSIUM SERPL-SCNC: 4 MMOL/L (ref 3.5–5.1)
PROT SERPL-MCNC: 6.1 G/DL (ref 6.3–8.2)
RBC # BLD AUTO: 3.88 M/UL (ref 4.23–5.6)
RBC MORPH BLD: ABNORMAL
RBC MORPH BLD: ABNORMAL
SODIUM SERPL-SCNC: 134 MMOL/L (ref 136–145)
URATE SERPL-MCNC: 4.9 MG/DL (ref 2.6–6)
WBC # BLD AUTO: 20.5 K/UL (ref 4.3–11.1)
WBC MORPH BLD: ABNORMAL

## 2022-08-30 PROCEDURE — 85025 COMPLETE CBC W/AUTO DIFF WBC: CPT

## 2022-08-30 PROCEDURE — 84681 ASSAY OF C-PEPTIDE: CPT

## 2022-08-30 PROCEDURE — 80053 COMPREHEN METABOLIC PANEL: CPT

## 2022-08-30 PROCEDURE — 83735 ASSAY OF MAGNESIUM: CPT

## 2022-08-30 PROCEDURE — 99211 OFF/OP EST MAY X REQ PHY/QHP: CPT

## 2022-08-30 PROCEDURE — 84550 ASSAY OF BLOOD/URIC ACID: CPT

## 2022-08-30 NOTE — PROGRESS NOTES
Arrived to the [facility]. Lab drawn peripheral completed. Patient refused to have his port accessed for lab draw due to soreness at site. Notified Dr. Clair Mane about labs, no orders at this time. Patient tolerated well. Any issues or concerns during appointment: no issues. Patient aware of next infusion appointment on 9/15/2022 (date) at 8:00 AM (time). Discharged ambulatory, accompanied with spouse.

## 2022-08-31 LAB — C PEPTIDE SERPL-MCNC: 5.1 NG/ML (ref 1.1–4.4)

## 2022-09-04 VITALS
HEIGHT: 68 IN | BODY MASS INDEX: 29.4 KG/M2 | HEART RATE: 103 BPM | OXYGEN SATURATION: 97 % | DIASTOLIC BLOOD PRESSURE: 72 MMHG | WEIGHT: 194 LBS | TEMPERATURE: 97 F | SYSTOLIC BLOOD PRESSURE: 122 MMHG

## 2022-09-06 ENCOUNTER — OFFICE VISIT (OUTPATIENT)
Dept: ENDOCRINOLOGY | Age: 62
End: 2022-09-06
Payer: COMMERCIAL

## 2022-09-06 VITALS
OXYGEN SATURATION: 100 % | HEIGHT: 68 IN | DIASTOLIC BLOOD PRESSURE: 72 MMHG | WEIGHT: 194.6 LBS | HEART RATE: 80 BPM | BODY MASS INDEX: 29.49 KG/M2 | SYSTOLIC BLOOD PRESSURE: 138 MMHG

## 2022-09-06 DIAGNOSIS — I10 ESSENTIAL HYPERTENSION: ICD-10-CM

## 2022-09-06 DIAGNOSIS — E78.00 HYPERCHOLESTEROLEMIA: ICD-10-CM

## 2022-09-06 DIAGNOSIS — Z79.4 TYPE 2 DIABETES MELLITUS WITH HYPERGLYCEMIA, WITH LONG-TERM CURRENT USE OF INSULIN (HCC): Primary | ICD-10-CM

## 2022-09-06 DIAGNOSIS — I25.10 CORONARY ARTERY DISEASE INVOLVING NATIVE CORONARY ARTERY OF NATIVE HEART WITHOUT ANGINA PECTORIS: ICD-10-CM

## 2022-09-06 DIAGNOSIS — E11.65 TYPE 2 DIABETES MELLITUS WITH HYPERGLYCEMIA, WITH LONG-TERM CURRENT USE OF INSULIN (HCC): Primary | ICD-10-CM

## 2022-09-06 PROCEDURE — 3051F HG A1C>EQUAL 7.0%<8.0%: CPT | Performed by: DIETITIAN, REGISTERED

## 2022-09-06 PROCEDURE — 99215 OFFICE O/P EST HI 40 MIN: CPT | Performed by: DIETITIAN, REGISTERED

## 2022-09-06 PROCEDURE — 95251 CONT GLUC MNTR ANALYSIS I&R: CPT | Performed by: DIETITIAN, REGISTERED

## 2022-09-06 RX ORDER — BLOOD SUGAR DIAGNOSTIC
1 STRIP MISCELLANEOUS DAILY
Qty: 100 EACH | Refills: 3 | Status: SHIPPED | OUTPATIENT
Start: 2022-09-06

## 2022-09-06 RX ORDER — BLOOD-GLUCOSE SENSOR
EACH MISCELLANEOUS
Qty: 9 EACH | Refills: 3 | Status: SHIPPED | OUTPATIENT
Start: 2022-09-06

## 2022-09-06 RX ORDER — INSULIN DEGLUDEC 200 U/ML
72 INJECTION, SOLUTION SUBCUTANEOUS DAILY
COMMUNITY
End: 2022-09-06 | Stop reason: SDUPTHER

## 2022-09-06 RX ORDER — GLUCAGON INJECTION, SOLUTION 1 MG/.2ML
1 INJECTION, SOLUTION SUBCUTANEOUS AS NEEDED
Qty: 0.4 ML | Refills: 11 | Status: SHIPPED | OUTPATIENT
Start: 2022-09-06

## 2022-09-06 RX ORDER — INSULIN LISPRO 100 [IU]/ML
15 INJECTION, SOLUTION INTRAVENOUS; SUBCUTANEOUS
COMMUNITY
End: 2022-09-06 | Stop reason: SDUPTHER

## 2022-09-06 RX ORDER — BLOOD-GLUCOSE TRANSMITTER
EACH MISCELLANEOUS
Qty: 1 EACH | Refills: 3 | Status: SHIPPED | OUTPATIENT
Start: 2022-09-06

## 2022-09-06 RX ORDER — INSULIN DEGLUDEC 200 U/ML
82 INJECTION, SOLUTION SUBCUTANEOUS DAILY
Qty: 54 ML | Refills: 11 | Status: SHIPPED | OUTPATIENT
Start: 2022-09-06

## 2022-09-06 RX ORDER — INSULIN LISPRO 100 [IU]/ML
16 INJECTION, SOLUTION INTRAVENOUS; SUBCUTANEOUS
Qty: 45 ML | Refills: 11 | Status: SHIPPED | OUTPATIENT
Start: 2022-09-06

## 2022-09-06 ASSESSMENT — ENCOUNTER SYMPTOMS
DIARRHEA: 1
BACK PAIN: 0
COUGH: 0
SHORTNESS OF BREATH: 0
CONSTIPATION: 0
TROUBLE SWALLOWING: 0
VOMITING: 0
WHEEZING: 0
ABDOMINAL PAIN: 0
NAUSEA: 0
VOICE CHANGE: 0

## 2022-09-06 NOTE — PROGRESS NOTES
[facility]    Patrick ADA Swann   [address], [address] [facility], [address] [place], [place] [address]  Phone [phone]  Facsimile [phone]        Reason for visit:     Margy Stokes.  (1960) is here for new evaluation and treatment of Type 2 Diabetes Mellitus, referred by Patrick Garcia MD, PCP. ASSESSMENT AND PLAN:    The beneficiary requires a therapeutic CGM for treatment and management of diabetes mellitus. The beneficiary has been using a home blood glucose monitor and performing frequent (4 or more times a day) blood glucose testing. The beneficiary is insulin treated with 3 or more daily injections of insulin or a continuous subcutaneous insulin infusion pump. The beneficiary's insulin treatment regimen requires frequent adjustments by the beneficiary on the basis of therapeutic CGM testing results. Interpretation of 72 hour glucose monitor: At least 72 hours of data were reviewed. The patient utilizes a Dexcom G6 continuous glucose monitoring system. The average glucose during the reviewed timeframe was 254 with a standard deviation of 80 (time in range 20%, hyperglycemia 79%, hypoglycemia <1%). There is a pattern of constant hyperglycemia. 1. Type 2 diabetes mellitus with hyperglycemia, with long-term current use of insulin (HCC)  A1c is 10.1%. Glycemic control is suboptimal.  Patient is irritable during visit today and stated that NP was 25 minutes behind schedule to his appointment. Patient made a verbal complaint during his visit about his timely expectations for medical office visits and stated he expects NP to be \"prompt\" to her visit because he is prompt to his visit. NP apologized for delay due to patient care and requested that we forward with the visit- patient agreed.  Patient has diffuse large B-Cell Lymphoma of lymph nodes of inguinal region and is receiving oncology treatment which includes steroids for 5 days after each treatment. Patient has 3 treatments remaining. Patient reports the worst glucose control he has seen since having diabetes due to steroid treatment. Historically patient unable to tolerate GLP-1 receptor agonist and declines further consideration due to potential for worsening diarrhea. We discussed options for treatment today which for now include primarily insulin MDI basal bolus therapy. C-peptide present which shows likely insulin resistant. However, no diabetes antibody previous testing to assist with further interpretation. Patient may be a candidate for SGLT2 inhibitor to consider when A1c is below 8%. Recommend patient to discuss SGLT2 inhibitor with oncology for recommendation. Patient will likely need more aggressive mealtime insulin therapy with correction scale on days he is receiving steroid treatment. - Glutamic Acid Decarboxylase; Future  - Microalbumin / Creatinine Urine Ratio; Future  - GVOKE HYPOPEN 2-PACK 1 MG/0.2ML SOAJ; Inject 1 mg into the skin as needed (as needed for BG less than 50 mg/dL)  Dispense: 0.4 mL; Refill: 11  - HUMALOG KWIKPEN 100 UNIT/ML SOPN; Inject 16 Units into the skin 3 times daily (before meals) Add Correction 5 for 50 above 150 mg/dL. Max Daily Dose; 150 units  Dispense: 45 mL; Refill: 11  - TRESIBA FLEXTOUCH 200 UNIT/ML SOPN; Inject 82 Units into the skin daily Titrate by 2 units every 3 days to fasting BG goal of  mg/dL. Max Daily Dose. 120 units  Dispense: 54 mL; Refill: 11  - ONETOUCH ULTRA strip; 1 each by In Vitro route daily Check bG 4 times daily E11.65. Dispense: 100 each; Refill: 3  - [facility]  - Insulin Pen Needle 32G X 4 MM MISC; 1 each by Does not apply route daily  Dispense: 400 each; Refill: 11  - Vitamin D 25 Hydroxy; Future  - GLUCOSE MONITOR, 72 HOUR, PHYS INTERP    2. Hypercholesterolemia  Last LDL 55.6 above goal of less than 50 on atorvastatin 20 mg daily.     3. Essential hypertension  BP controlled. BP Readings from Last 3 Encounters:   09/06/22 138/72   08/30/22 (!) 149/77   08/26/22 (!) 151/88   --Lisinopril 10 mg daily. 4. Coronary artery disease involving native coronary artery of native heart without angina pectoris  Patient may benefit from SGLT2 inhibitor once A1c resolves to less than 8% and oncology approves. 5. Diffuse large B-cell lymphoma of lymph nodes of inguinal region  --- Receiving R-CHOP chemotherapy treatment with 3 cycles remaining and 5 days of steroid therapy between treatment. Follow-up and Dispositions    Return in about 1 month (around 10/6/2022). Tests or Results Reviewed: (see lab dates below in note) HgbA1C, Cr, GFR, Microalbumin/Cr ratio, Lipid Profile, TSH. History of Present Illness:    History given by patient and his wife. Takes prednisone 5 days after treatment every 3rd week on Thursdays. He has 3 more treatments left. Date of DM diagnosis: year 2004, 22 years ago. Current Diabetes Medications: Tresiba 82 units every morning, metformin 1000 mg 2 times daily, Humalog 15 units AC TID    Medication Failures: ozempic - diarrhea stomach irritation fatigue, trulicity - diarrhea, stomach upset, fatigue, glimiperide, rybelsus- stomach upset, diarrhea    No history of DKA, DFW, pancreatitis, and gastroparesis.     Current symptoms: See Review of Systems    Nephropathy: Stage 2 Kidney Disease  08/30/2022 Cr 1.10, GFR >60, microalbumin/Cr ratio none    Hemoglobin A1c:  10/14/2019 7.6%  2/2/2021 8.8%  8/4/2021 8.6%  2/14/2022 8.8%  5/16/2022 7.5%  8/19/2022 10.1%    Lipids:   08/08/2022 TC- 127, LDL- 55.6, VLDL- 20.4,  HDL- 51, TG- 102    Thyroid:   08/08/2022 TSH 1.34 (0.358-3.740)    Other Labs:    Fasting Glucose:  08/30/2022 381    C-Peptide:  08/30/2022 5.1 (1.1-4.4)    Home blood glucose monitoring frequency:   By review of CGM download over past 28 days  Average blood glucose 254  Time in range 20%  High 28%, Very High 51%  Low <1%, Very Low 0%     Average    Fasting 234    AC lunch 249    AC supper 270    Bedtime 278      Blood glucose levels are uncontrolled, most significant elevations are constant    Hypoglycemia: rare    Neuropathy: none    Retinopathy: Last eye exam was 2/2022 and demonstrated no diabetic retinopathy. Eye care specialist is Dr. Hussain Ramirez, [facility]. Diet:   Cut out fried foods, carboholic-cut out 99% of carbs. No ETOH. Loves sweets- candies and cakes, 3 meals daily  DRINK: water, prime - 2 grams sugar    Exercise:    Not much- minimal walking due to chemo CA treatment. Diabetes education: The patient has not received formal diabetes education. BP:  BP Readings from Last 3 Encounters:   09/06/22 138/72   08/30/22 (!) 149/77   08/26/22 (!) 151/88      Weight Trends: Wt Readings from Last 3 Encounters:   09/06/22 194 lb 9.6 oz (88.3 kg)   08/30/22 196 lb 12.8 oz (89.3 kg)   08/25/22 192 lb 6.4 oz (87.3 kg)      Medical/Surgical/Social/Family History: Reviewed in Chart    Medications: Reviewed in chart    Allergies  Patient has no known allergies. Review of Systems   Constitutional:  Positive for fatigue. Negative for appetite change, diaphoresis and unexpected weight change. HENT:  Negative for trouble swallowing and voice change. Eyes:  Negative for visual disturbance. Respiratory:  Negative for cough, shortness of breath and wheezing. Cardiovascular:  Negative for chest pain, palpitations and leg swelling. Gastrointestinal:  Positive for diarrhea (magnesium - 4 pills daily). Negative for abdominal pain, constipation, nausea and vomiting. Endocrine: Positive for polyphagia. Negative for cold intolerance, heat intolerance, polydipsia and polyuria. Genitourinary:  Negative for difficulty urinating and frequency. Musculoskeletal:  Positive for myalgias. Negative for arthralgias and back pain. Skin:  Negative for pallor.    Neurological:  Negative for dizziness, tremors, weakness, numbness and headaches. Off balance   Hematological:  Negative for adenopathy. Psychiatric/Behavioral:  Positive for sleep disturbance. Negative for dysphoric mood. The patient is not nervous/anxious. /72   Pulse 80   Ht 5' 8\" (1.727 m)   Wt 194 lb 9.6 oz (88.3 kg)   SpO2 100%   BMI 29.59 kg/m²     Physical Exam  Constitutional:       General: He is not in acute distress. Appearance: Normal appearance. He is normal weight. He is not ill-appearing. HENT:      Head: Normocephalic. Cardiovascular:      Rate and Rhythm: Normal rate and regular rhythm. Pulses: Normal pulses. Pulmonary:      Effort: No respiratory distress. Breath sounds: Normal breath sounds. No wheezing or rhonchi. Chest:      Chest wall: No tenderness. Abdominal:      General: There is no distension. Palpations: Abdomen is soft. Tenderness: There is no abdominal tenderness. There is no guarding. Musculoskeletal:         General: No swelling, tenderness or signs of injury. Cervical back: Neck supple. No tenderness. Right lower leg: No edema. Left lower leg: No edema. Feet:      Right foot:      Skin integrity: Skin integrity normal. No ulcer. Left foot:      Skin integrity: Skin integrity normal. No ulcer. Lymphadenopathy:      Cervical: No cervical adenopathy. Skin:     General: Skin is warm and dry. Findings: No erythema or rash. Neurological:      Mental Status: He is alert. Motor: No weakness.    Psychiatric:         Mood and Affect: Mood normal.         Behavior: Behavior normal.       Orders Placed This Encounter   Procedures    GLUCOSE MONITOR, 72 HOUR, PHYS INTERP    Glutamic Acid Decarboxylase     Standing Status:   Future     Standing Expiration Date:   9/6/2023    Microalbumin / Creatinine Urine Ratio     Standing Status:   Future     Standing Expiration Date:   9/6/2023    Vitamin D 25 Hydroxy     Standing Status:   Future     Standing Expiration Date: 9/6/2023    [facility]     Referral Priority:   Routine     Referral Type:   Eval and Treat     Referral Reason:   Specialty Services Required     Number of Visits Requested:   1       Current Outpatient Medications   Medication Sig Dispense Refill    GVOKE HYPOPEN 2-PACK 1 MG/0.2ML SOAJ Inject 1 mg into the skin as needed (as needed for BG less than 50 mg/dL) 0.4 mL 11    HUMALOG KWIKPEN 100 UNIT/ML SOPN Inject 16 Units into the skin 3 times daily (before meals) Add Correction 5 for 50 above 150 mg/dL. Max Daily Dose; 150 units 45 mL 11    TRESIBA FLEXTOUCH 200 UNIT/ML SOPN Inject 82 Units into the skin daily Titrate by 2 units every 3 days to fasting BG goal of  mg/dL. Max Daily Dose. 120 units 54 mL 11    ONETOUCH ULTRA strip 1 each by In Vitro route daily Check bG 4 times daily E11.65. 100 each 3    Insulin Pen Needle 32G X 4 MM MISC 1 each by Does not apply route daily 400 each 11    Continuous Blood Gluc Transmit (DEXCOM G6 TRANSMITTER) MISC Replace every 90 days 1 each 1    Continuous Blood Gluc Sensor (DEXCOM G6 SENSOR) MISC Replaced every 10 days 9 each 1    magnesium oxide (MAG-OX) 400 MG tablet Take 1 tablet by mouth 4 times daily 120 tablet 1    escitalopram (LEXAPRO) 10 MG tablet Take 0.5 tablets by mouth daily 30 tablet 0    atorvastatin (LIPITOR) 40 MG tablet Take 0.5 tablets by mouth daily 90 tablet 1    lisinopril (PRINIVIL;ZESTRIL) 10 MG tablet Take 1 tablet by mouth daily 90 tablet 1    traZODone (DESYREL) 100 MG tablet Take 1 tablet by mouth nightly 90 tablet 1    ibuprofen (ADVIL;MOTRIN) 800 MG tablet Take 1 tablet by mouth every 8 hours as needed for Pain 120 tablet 5    prochlorperazine (COMPAZINE) 10 MG tablet Take 1 tablet by mouth every 6 hours as needed (nausea/vomitting) 120 tablet 3    lidocaine-prilocaine (EMLA) 2.5-2.5 % cream Apply topically as needed. Place small amount to port site 45 minutes prior to any blood draw or infusion.  Cover with plastic wrap 30 g 2    Multiple Vitamins-Minerals (MENS 50+ ADVANCED PO) Take by mouth      ZINC PO Take by mouth daily      ascorbic acid (VITAMIN C) 500 MG tablet Take 500 mg by mouth daily      aspirin 81 MG EC tablet Take 81 mg by mouth daily       melatonin 5 MG TABS tablet Take 10 mg by mouth        Current Facility-Administered Medications   Medication Dose Route Frequency Provider Last Rate Last Admin    magnesium oxide (MAG-OX) tablet 400 mg  400 mg Oral TID ADA Burgess NP-C    On this date 9/6/2022 I have spent 60 minutes reviewing previous notes, test results and face to face with the patient discussing the diagnosis and importance of compliance with the treatment plan as well as documenting on the day of the visit. Portions of this note were generated with the assistance of voice recognition software. As such, some errors in transcription may be present.

## 2022-09-07 ENCOUNTER — TELEPHONE (OUTPATIENT)
Dept: ENDOCRINOLOGY | Age: 62
End: 2022-09-07

## 2022-09-07 ENCOUNTER — TELEPHONE (OUTPATIENT)
Dept: DIABETES SERVICES | Age: 62
End: 2022-09-07

## 2022-09-07 NOTE — TELEPHONE ENCOUNTER
Amara Fulton NP clarified referral is for education. Called and left message and call back number on spouses Carolyn's phone, as it is the number provided to call.

## 2022-09-07 NOTE — TELEPHONE ENCOUNTER
PA for the Dexcom G 6 has been completed, and was previously approved: An active PA is already on file with expiration date of 05/23/2023.  Please wait to resubmit request within 60 days of that expiration date to obtain a PA renewal

## 2022-09-07 NOTE — PROGRESS NOTES
Received [facility] referral/auth for care.   [id number]  Dates: 8.22.22 - 8.22.23

## 2022-09-13 ENCOUNTER — FOLLOWUP TELEPHONE ENCOUNTER (OUTPATIENT)
Dept: DIABETES SERVICES | Age: 62
End: 2022-09-13

## 2022-09-13 ENCOUNTER — OFFICE VISIT (OUTPATIENT)
Dept: ONCOLOGY | Age: 62
End: 2022-09-13
Payer: COMMERCIAL

## 2022-09-13 ENCOUNTER — HOSPITAL ENCOUNTER (OUTPATIENT)
Dept: LAB | Age: 62
Discharge: HOME | End: 2022-09-16
Payer: COMMERCIAL

## 2022-09-13 VITALS
DIASTOLIC BLOOD PRESSURE: 75 MMHG | RESPIRATION RATE: 21 BRPM | WEIGHT: 194 LBS | OXYGEN SATURATION: 97 % | SYSTOLIC BLOOD PRESSURE: 120 MMHG | BODY MASS INDEX: 29.4 KG/M2 | HEIGHT: 68 IN | TEMPERATURE: 98.1 F | HEART RATE: 86 BPM

## 2022-09-13 DIAGNOSIS — C83.35 DIFFUSE LARGE B-CELL LYMPHOMA OF LYMPH NODES OF INGUINAL REGION (HCC): Primary | ICD-10-CM

## 2022-09-13 DIAGNOSIS — E83.42 HYPOMAGNESEMIA: ICD-10-CM

## 2022-09-13 DIAGNOSIS — R53.83 FATIGUE DUE TO TREATMENT: ICD-10-CM

## 2022-09-13 DIAGNOSIS — C83.35 DIFFUSE LARGE B-CELL LYMPHOMA OF LYMPH NODES OF INGUINAL REGION (HCC): ICD-10-CM

## 2022-09-13 LAB
ALBUMIN SERPL-MCNC: 3.6 G/DL (ref 3.2–4.6)
ALBUMIN/GLOB SERPL: 1.2 {RATIO} (ref 1.2–3.5)
ALP SERPL-CCNC: 85 U/L (ref 50–136)
ALT SERPL-CCNC: 28 U/L (ref 12–65)
ANION GAP SERPL CALC-SCNC: 5 MMOL/L (ref 4–13)
AST SERPL-CCNC: 12 U/L (ref 15–37)
BASOPHILS # BLD: 0.1 K/UL (ref 0–0.2)
BASOPHILS NFR BLD: 1 % (ref 0–2)
BILIRUB SERPL-MCNC: 0.4 MG/DL (ref 0.2–1.1)
BUN SERPL-MCNC: 13 MG/DL (ref 8–23)
CALCIUM SERPL-MCNC: 9.1 MG/DL (ref 8.3–10.4)
CHLORIDE SERPL-SCNC: 104 MMOL/L (ref 101–110)
CO2 SERPL-SCNC: 29 MMOL/L (ref 21–32)
CREAT SERPL-MCNC: 0.8 MG/DL (ref 0.8–1.5)
DIFFERENTIAL METHOD BLD: ABNORMAL
EOSINOPHIL # BLD: 0.1 K/UL (ref 0–0.8)
EOSINOPHIL NFR BLD: 1 % (ref 0.5–7.8)
ERYTHROCYTE [DISTWIDTH] IN BLOOD BY AUTOMATED COUNT: 14.7 % (ref 11.9–14.6)
GLOBULIN SER CALC-MCNC: 3.1 G/DL (ref 2.3–3.5)
GLUCOSE SERPL-MCNC: 145 MG/DL (ref 65–100)
HCT VFR BLD AUTO: 34.3 %
HGB BLD-MCNC: 12 G/DL (ref 13.6–17.2)
IMM GRANULOCYTES # BLD AUTO: 0.1 K/UL (ref 0–0.5)
IMM GRANULOCYTES NFR BLD AUTO: 1 % (ref 0–5)
LYMPHOCYTES # BLD: 0.5 K/UL (ref 0.5–4.6)
LYMPHOCYTES NFR BLD: 6 % (ref 13–44)
MAGNESIUM SERPL-MCNC: 1.5 MG/DL (ref 1.8–2.4)
MCH RBC QN AUTO: 31.7 PG (ref 26.1–32.9)
MCHC RBC AUTO-ENTMCNC: 35 G/DL (ref 31.4–35)
MCV RBC AUTO: 90.5 FL (ref 79.6–97.8)
MONOCYTES # BLD: 0.7 K/UL (ref 0.1–1.3)
MONOCYTES NFR BLD: 8 % (ref 4–12)
NEUTS SEG # BLD: 7.2 K/UL (ref 1.7–8.2)
NEUTS SEG NFR BLD: 83 % (ref 43–78)
NRBC # BLD: 0 K/UL (ref 0–0.2)
PLATELET # BLD AUTO: 218 K/UL (ref 150–450)
PMV BLD AUTO: 9.5 FL (ref 9.4–12.3)
POTASSIUM SERPL-SCNC: 4 MMOL/L (ref 3.5–5.1)
PROT SERPL-MCNC: 6.7 G/DL (ref 6.3–8.2)
RBC # BLD AUTO: 3.79 M/UL (ref 4.23–5.6)
SODIUM SERPL-SCNC: 138 MMOL/L (ref 136–145)
URATE SERPL-MCNC: 5.3 MG/DL (ref 2.6–6)
WBC # BLD AUTO: 8.7 K/UL (ref 4.3–11.1)

## 2022-09-13 PROCEDURE — 80053 COMPREHEN METABOLIC PANEL: CPT

## 2022-09-13 PROCEDURE — 85025 COMPLETE CBC W/AUTO DIFF WBC: CPT

## 2022-09-13 PROCEDURE — 36415 COLL VENOUS BLD VENIPUNCTURE: CPT

## 2022-09-13 PROCEDURE — 84550 ASSAY OF BLOOD/URIC ACID: CPT

## 2022-09-13 PROCEDURE — 83735 ASSAY OF MAGNESIUM: CPT

## 2022-09-13 PROCEDURE — 99214 OFFICE O/P EST MOD 30 MIN: CPT | Performed by: NURSE PRACTITIONER

## 2022-09-13 RX ORDER — ACETAMINOPHEN 325 MG/1
650 TABLET ORAL
Status: CANCELLED | OUTPATIENT
Start: 2022-09-15

## 2022-09-13 RX ORDER — HEPARIN SODIUM (PORCINE) LOCK FLUSH IV SOLN 100 UNIT/ML 100 UNIT/ML
500 SOLUTION INTRAVENOUS PRN
Status: CANCELLED | OUTPATIENT
Start: 2022-09-15

## 2022-09-13 RX ORDER — PREDNISONE 50 MG/1
100 TABLET ORAL DAILY
Qty: 10 TABLET | Refills: 3 | Status: SHIPPED | OUTPATIENT
Start: 2022-09-13 | End: 2022-09-18

## 2022-09-13 RX ORDER — SODIUM CHLORIDE 0.9 % (FLUSH) 0.9 %
5-40 SYRINGE (ML) INJECTION PRN
Status: CANCELLED | OUTPATIENT
Start: 2022-09-15

## 2022-09-13 RX ORDER — FAMOTIDINE 10 MG/ML
20 INJECTION, SOLUTION INTRAVENOUS
Status: CANCELLED | OUTPATIENT
Start: 2022-09-15

## 2022-09-13 RX ORDER — DOXORUBICIN HYDROCHLORIDE 2 MG/ML
50 INJECTION, SOLUTION INTRAVENOUS ONCE
Status: CANCELLED | OUTPATIENT
Start: 2022-09-15 | End: 2022-09-15

## 2022-09-13 RX ORDER — SODIUM CHLORIDE 9 MG/ML
5-40 INJECTION INTRAVENOUS PRN
Status: CANCELLED | OUTPATIENT
Start: 2022-09-15

## 2022-09-13 RX ORDER — SODIUM CHLORIDE 9 MG/ML
5-250 INJECTION, SOLUTION INTRAVENOUS PRN
Status: CANCELLED | OUTPATIENT
Start: 2022-09-15

## 2022-09-13 RX ORDER — EPINEPHRINE 1 MG/ML
0.3 INJECTION, SOLUTION, CONCENTRATE INTRAVENOUS PRN
Status: CANCELLED | OUTPATIENT
Start: 2022-09-15

## 2022-09-13 RX ORDER — ALBUTEROL SULFATE 90 UG/1
4 AEROSOL, METERED RESPIRATORY (INHALATION) PRN
Status: CANCELLED | OUTPATIENT
Start: 2022-09-15

## 2022-09-13 RX ORDER — DIPHENHYDRAMINE HYDROCHLORIDE 50 MG/ML
50 INJECTION INTRAMUSCULAR; INTRAVENOUS ONCE
Status: CANCELLED | OUTPATIENT
Start: 2022-09-15 | End: 2022-09-15

## 2022-09-13 RX ORDER — ONDANSETRON 2 MG/ML
8 INJECTION INTRAMUSCULAR; INTRAVENOUS
Status: CANCELLED | OUTPATIENT
Start: 2022-09-15

## 2022-09-13 RX ORDER — SODIUM CHLORIDE 9 MG/ML
INJECTION, SOLUTION INTRAVENOUS CONTINUOUS
Status: CANCELLED | OUTPATIENT
Start: 2022-09-15

## 2022-09-13 RX ORDER — DIPHENHYDRAMINE HYDROCHLORIDE 50 MG/ML
50 INJECTION INTRAMUSCULAR; INTRAVENOUS
Status: CANCELLED | OUTPATIENT
Start: 2022-09-15

## 2022-09-13 RX ORDER — ONDANSETRON 2 MG/ML
8 INJECTION INTRAMUSCULAR; INTRAVENOUS ONCE
Status: CANCELLED | OUTPATIENT
Start: 2022-09-15 | End: 2022-09-15

## 2022-09-13 RX ORDER — MEPERIDINE HYDROCHLORIDE 50 MG/ML
12.5 INJECTION INTRAMUSCULAR; INTRAVENOUS; SUBCUTANEOUS PRN
Status: CANCELLED | OUTPATIENT
Start: 2022-09-15

## 2022-09-13 RX ORDER — ACETAMINOPHEN 325 MG/1
650 TABLET ORAL ONCE
Status: CANCELLED | OUTPATIENT
Start: 2022-09-15 | End: 2022-09-15

## 2022-09-13 ASSESSMENT — PATIENT HEALTH QUESTIONNAIRE - PHQ9
1. LITTLE INTEREST OR PLEASURE IN DOING THINGS: 0
SUM OF ALL RESPONSES TO PHQ QUESTIONS 1-9: 0
SUM OF ALL RESPONSES TO PHQ QUESTIONS 1-9: 0
SUM OF ALL RESPONSES TO PHQ9 QUESTIONS 1 & 2: 0
SUM OF ALL RESPONSES TO PHQ QUESTIONS 1-9: 0
SUM OF ALL RESPONSES TO PHQ QUESTIONS 1-9: 0
2. FEELING DOWN, DEPRESSED OR HOPELESS: 0

## 2022-09-13 NOTE — PROGRESS NOTES
[employer] [facility]: Office Visit Established Patient    Chief Complaint:    Chief Complaint   Patient presents with    Follow-up         History of Present Illness:  Mr. Dixon Avelar is a 58 y.o. male who returns today for management of diffuse large B cell lymphoma. He presented to his PCP on 4/29/22 reporting an enlarging lump in his left groin for the past 2 months. Physical exam confirmed a mildly tender lump in the patients left inguinal region. The right inguinal region was normal, and no inguinal hernia was noted. Ultrasound of the left groin was performed on 5/13/22 revealing enlarged, abnormal appearing left inguinal lymph nodes measuring up to 3.0 x 3.2 x 1.9 cm. Ultrasound directed biopsy was suggested for further assessment. We saw him for consultation and recommended excisional biopsy, which showed diffuse large B cell lymphoma. PET/CT shows the most prominent areas of disease in the inguinal and iliac nodes, but there are also nodes in the right axilla and retroperitoneum that are consistent with DLBCL, making him at least [facility] stage III. Bone marrow biopsy showed no morphologic findings but did show a small B cell clone worrisome for lymphoma involvement in the bone marrow, this would make him AA stage IV. His FISH shows no evidence of more aggressive genotypic findings, so he would be most appropriate for R-CHOP for 6 cycles for definitive therapy. PET/CT following 2 cycles revealed CR. He returns today for follow up and C4 R-CHOP. He is tolerating treatment well. There is no nausea, oscillating bowel are ongoing, remains on oral Mg+. He reports a few very minor oral sores that resolve quickly. He is eating and drinking very well, weight is stable. Mild fatigue is present. No current cough, shortness of breath, or edema. He reports recent back pain, improving-has been seeing chiropractor. No neuropathy. Denies any fevers or infectious symptoms.   Wife states he has intermittent night sweats, however he attributes it to new sheets that make him hot. Review of Systems:  Constitutional: Positive for fatigue. HENT: Negative. Eyes: Negative. Respiratory: Negative. Cardiovascular: Negative. Gastrointestinal: Positive for diarrhea (mild). Genitourinary: Negative. Musculoskeletal: Positive for back pain. Skin: Negative. Neurological: Negative. Endo/Heme/Allergies: Negative. Psychiatric/Behavioral: Negative. All other systems reviewed and are negative. No Known Allergies  Past Medical History:   Diagnosis Date    Arthritis     osteo    Bilateral carpal tunnel syndrome 10/28/2019    CAD (coronary artery disease) 1999    3 stents--last one placed 2019, pt on daily 81 mg ASA.  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    GERD (gastroesophageal reflux disease)     diet controlled    Hx of colonic polyp 2016    adenoma    Hypercholesterolemia     Hypertension     managed with medication     Past Surgical History:   Procedure Laterality Date    COLONOSCOPY  last 4/25/16    Leandra--trans TA--3 year recall due to prep quality    CORONARY ANGIOPLASTY WITH STENT PLACEMENT  0964,9680, 2019    heart cath times 3 - 3 total stents    IR PORT PLACEMENT EQUAL OR GREATER THAN 5 YEARS  6/27/2022    IR PORT PLACEMENT EQUAL OR GREATER THAN 5 YEARS 6/27/2022 [facility] SPECIALS    KNEE ARTHROSCOPY Left 1985    LYMPH NODE BIOPSY Left 6/10/2022    LEFT INGUINAL BIOPSY performed by Davis Carreon MD at [address]  2019    3 rd stent -- cardiac placed    SKIN BIOPSY N/A 6/10/2022    EXCISION BACK NEVUS WL PER ERICK/REQUEST TO FOLLOW performed by Patti Castro MD at [address] Left 11/15/2018    UVULOPALATOPHARYGOPLASTY       Family History   Problem Relation Age of Onset    Lung Disease Mother     Asthma Mother     Colon Cancer Father     Cancer Father     Cancer Maternal Uncle         colon    Cancer Maternal Uncle         colon    Cancer Maternal Uncle         colon    Cancer Maternal Uncle         colon    Cancer Maternal Uncle         colon    Cancer Maternal Uncle         colon    Cancer Maternal Uncle         colon    Coronary Art Dis Neg Hx      Social History     Socioeconomic History    Marital status:      Spouse name: Not on file    Number of children: Not on file    Years of education: Not on file    Highest education level: Not on file   Occupational History    Not on file   Tobacco Use    Smoking status: Never    Smokeless tobacco: Never   Vaping Use    Vaping Use: Never used   Substance and Sexual Activity    Alcohol use: Yes     Alcohol/week: 2.0 standard drinks    Drug use: No    Sexual activity: Not Currently   Other Topics Concern    Not on file   Social History Narrative    Not on file     Social Determinants of Health     Financial Resource Strain: Not on file   Food Insecurity: Not on file   Transportation Needs: Not on file   Physical Activity: Not on file   Stress: Not on file   Social Connections: Not on file   Intimate Partner Violence: Not on file   Housing Stability: Not on file     Current Outpatient Medications   Medication Sig Dispense Refill    predniSONE (DELTASONE) 50 MG tablet Take 2 tablets by mouth daily for 5 days Take on days 1-5 of chemotherapy 10 tablet 3    GVOKE HYPOPEN 2-PACK 1 MG/0.2ML SOAJ Inject 1 mg into the skin as needed (as needed for BG less than 50 mg/dL) 0.4 mL 11    HUMALOG KWIKPEN 100 UNIT/ML SOPN Inject 16 Units into the skin 3 times daily (before meals) Add Correction 5 for 50 above 150 mg/dL.  Max Daily Dose; 150 units 45 mL 11    TRESIBA FLEXTOUCH 200 UNIT/ML SOPN Inject 82 Units into the skin daily Titrate by 2 units every 3 days to fasting BG goal of  mg/dL. Max Daily Dose. 120 units 54 mL 11    ONETOUCH ULTRA strip 1 each by In Vitro route daily Check bG 4 times daily E11.65. 100 each 3    Insulin Pen Needle 32G X 4 MM MISC 1 each by Does not apply route daily 400 each 11    Continuous Blood Gluc Sensor (DEXCOM G6 SENSOR) MISC Change every 10 days as directed. 9 each 3    Continuous Blood Gluc Transmit (DEXCOM G6 TRANSMITTER) MISC Change every 90 days as directed. 1 each 3    Continuous Blood Gluc Transmit (DEXCOM G6 TRANSMITTER) MISC Replace every 90 days 1 each 1    Continuous Blood Gluc Sensor (DEXCOM G6 SENSOR) MISC Replaced every 10 days 9 each 1    magnesium oxide (MAG-OX) 400 MG tablet Take 1 tablet by mouth 4 times daily 120 tablet 1    escitalopram (LEXAPRO) 10 MG tablet Take 0.5 tablets by mouth daily 30 tablet 0    atorvastatin (LIPITOR) 40 MG tablet Take 0.5 tablets by mouth daily 90 tablet 1    lisinopril (PRINIVIL;ZESTRIL) 10 MG tablet Take 1 tablet by mouth daily 90 tablet 1    traZODone (DESYREL) 100 MG tablet Take 1 tablet by mouth nightly 90 tablet 1    ibuprofen (ADVIL;MOTRIN) 800 MG tablet Take 1 tablet by mouth every 8 hours as needed for Pain 120 tablet 5    prochlorperazine (COMPAZINE) 10 MG tablet Take 1 tablet by mouth every 6 hours as needed (nausea/vomitting) 120 tablet 3    lidocaine-prilocaine (EMLA) 2.5-2.5 % cream Apply topically as needed. Place small amount to port site 45 minutes prior to any blood draw or infusion.  Cover with plastic wrap 30 g 2    Multiple Vitamins-Minerals (MENS 50+ ADVANCED PO) Take by mouth      ZINC PO Take by mouth daily      ascorbic acid (VITAMIN C) 500 MG tablet Take 500 mg by mouth daily      aspirin 81 MG EC tablet Take 81 mg by mouth daily       melatonin 5 MG TABS tablet Take 10 mg by mouth        Current Facility-Administered Medications Medication Dose Route Frequency Provider Last Rate Last Admin    magnesium oxide (MAG-OX) tablet 400 mg  400 mg Oral TID Marita Escobar, NP-C           OBJECTIVE:  /75 (Site: Left Upper Arm, Position: Sitting, Cuff Size: Medium Adult)   Pulse 86   Temp 98.1 °F (36.7 °C) (Oral)   Resp 21   Ht 5' 8\" (1.727 m)   Wt 194 lb (88 kg)   SpO2 97%   BMI 29.50 kg/m²     Physical Exam:  Constitutional: Well developed, well nourished male in no acute distress, sitting comfortably in the exam room chair. HEENT: Normocephalic and atraumatic. Sclerae anicteric. Neck supple without JVD. No thyromegaly present. Lymph node   Deferred   Skin Warm and dry. No bruising and no rash noted. No erythema. No pallor. Respiratory Lungs are clear to auscultation bilaterally without wheezes, rales or rhonchi, normal air exchange without accessory muscle use. CVS Normal rate, regular rhythm and normal S1 and S2. No murmurs, gallops, or rubs. Abdomen Soft, nontender and nondistended, normoactive bowel sounds. Neuro Grossly nonfocal with no obvious sensory or motor deficits. MSK Normal range of motion in general.  No edema and no tenderness. Psych Appropriate mood and affect.           Labs:  Recent Results (from the past 96 hour(s))   CBC with Auto Differential    Collection Time: 09/13/22 11:55 AM   Result Value Ref Range    WBC 8.7 4.3 - 11.1 K/uL    RBC 3.79 (L) 4.23 - 5.6 M/uL    Hemoglobin 12.0 (L) 13.6 - 17.2 g/dL    Hematocrit 34.3 %    MCV 90.5 79.6 - 97.8 FL    MCH 31.7 26.1 - 32.9 PG    MCHC 35.0 31.4 - 35.0 g/dL    RDW 14.7 (H) 11.9 - 14.6 %    Platelets 627 766 - 473 K/uL    MPV 9.5 9.4 - 12.3 FL    nRBC 0.00 0.0 - 0.2 K/uL    Differential Type AUTOMATED      Seg Neutrophils 83 (H) 43 - 78 %    Lymphocytes 6 (L) 13 - 44 %    Monocytes 8 4.0 - 12.0 %    Eosinophils % 1 0.5 - 7.8 %    Basophils 1 0.0 - 2.0 %    Immature Granulocytes 1 0.0 - 5.0 %    Segs Absolute 7.2 1.7 - 8.2 K/UL Absolute Lymph # 0.5 0.5 - 4.6 K/UL    Absolute Mono # 0.7 0.1 - 1.3 K/UL    Absolute Eos # 0.1 0.0 - 0.8 K/UL    Basophils Absolute 0.1 0.0 - 0.2 K/UL    Absolute Immature Granulocyte 0.1 0.0 - 0.5 K/UL   Comprehensive Metabolic Panel    Collection Time: 09/13/22 11:55 AM   Result Value Ref Range    Sodium 138 136 - 145 mmol/L    Potassium 4.0 3.5 - 5.1 mmol/L    Chloride 104 101 - 110 mmol/L    CO2 29 21 - 32 mmol/L    Anion Gap 5 4 - 13 mmol/L    Glucose 145 (H) 65 - 100 mg/dL    BUN 13 8 - 23 MG/DL    Creatinine 0.80 0.8 - 1.5 MG/DL    GFR African American >60 >60 ml/min/1.73m2    GFR Non- >60 >60 ml/min/1.73m2    Calcium 9.1 8.3 - 10.4 MG/DL    Total Bilirubin 0.4 0.2 - 1.1 MG/DL    ALT 28 12 - 65 U/L    AST 12 (L) 15 - 37 U/L    Alk Phosphatase 85 50 - 136 U/L    Total Protein 6.7 6.3 - 8.2 g/dL    Albumin 3.6 3.2 - 4.6 g/dL    Globulin 3.1 2.3 - 3.5 g/dL    Albumin/Globulin Ratio 1.2 1.2 - 3.5     Magnesium    Collection Time: 09/13/22 11:55 AM   Result Value Ref Range    Magnesium 1.5 (L) 1.8 - 2.4 mg/dL   Uric Acid    Collection Time: 09/13/22 11:55 AM   Result Value Ref Range    Uric Acid 5.3 2.6 - 6.0 MG/DL         Imaging:  PET CT SKULL BASE TO MID THIGH    Result Date: 6/29/2022  EXAMINATION: PET CT SKULL BASE TO MID THIGH 6/29/2022 1:42 PM ACCESSION NUMBER: [id number] COMPARISON: None available INDICATION: Diffuse large b-cell lymphoma, lymph nodes of inguinal region and lower limb, initial staging TECHNIQUE:   Radiopharmaceutical: 12.83 mCi F18-FDG IV. Positron emission tomography (PET) with concurrently acquired computed tomography (CT) for attenuation correction and anatomical localization imaging; skull base to mid-thigh. Blood glucose at the time of tracer administration is 158 mg/dl, which is adequate for imaging. Approximately 60 minutes after administration of the radiopharmaceutical imaging was initiated. SUV max calculated from patient body wt.  Noncaloric dilute oral metastatic disease. 1.  Hypermetabolic lymphadenopathy involving the bilateral inguinal, left iliac, retroperitoneal and upper abdomen, compatible with biopsy-proven B-cell lymphoma. 2.  There are additional mild to moderately metabolic mediastinal and right axillary lymph nodes, favored to represent additional sites of tia disease. 3.  Nonspecific 8 mm subcutaneous nodule within the right lower chest wall demonstrating mild FDG uptake, equivocal for additional site of disease. 4.  No splenomegaly. Pathology:  DIAGNOSIS        A:  \"LEFT INGUINAL LYMPH NODE\":             DIFFUSE LARGE B-CELL LYMPHOMA. SEE COMMENT. B:  \"NEVUS OF BACK\":             PENDING DERMATOPATHOLOGY CONSULT. Comment        A:  Histologic sections show a diffuse serpiginous infiltrate of   large atypical lymphocytes with indented nuclei, inconspicuous nucleoli   and moderate amounts of cytoplasm. Immunohistochemical stains show that   the lymphoma cells are positive for CD20, Picacho-5, CD10 and Bcl-6 and   negative for Bcl-2, MUM-1, CD5, Cyclin D1, CD30 (0% positivity) and c-MYC   (less than 40% positivity). CD3 stain highlights background small   T-lymphocytes. Ki-67 shows an increased proliferation index in the   serpiginous areas of more than 90%. The morphologic and immunophenotypic   appearance supports a diagnosis of germinal center subtype of diffuse   large B-cell lymphoma. FISH studies have been requested and will be   reported separately. ASSESSMENT:   Diagnosis Orders   1. Diffuse large B-cell lymphoma of lymph nodes of inguinal region (HCC)  predniSONE (DELTASONE) 50 MG tablet    CBC with Auto Differential    Comprehensive Metabolic Panel    Magnesium    Uric Acid    Lactate Dehydrogenase    CBC With Auto Differential    Comprehensive metabolic panel      2. Hypomagnesemia        3.  Fatigue due to treatment                  PLAN:  Lab studies and imaging studies were personally reviewed. DLBCL: high grade with Ki-67 90%, GC subtype, diagnosed with excisional biopsy of lymphadenopathy in left groin, 3.2 cm on ultrasound, enlarging over 3 months time. PET/CT reviewed and shows the most prominent areas of disease in the inguinal and iliac nodes, but there are also nodes in the right axilla and retroperitoneum that are consistent with DLBCL, making him at least [facility] stage III. Bone marrow biopsy showed no morphologic findings but did show a small B cell clone worrisome for lymphoma involvement in the bone marrow. His FISH shows no evidence of more aggressive genotypic findings, so he would be most appropriate for R-CHOP for 6 cycles for definitive therapy. He returns today for follow up and C4 R-CHOP. He is tolerating treatment well. There is no nausea, oscillating bowel are ongoing, remains on oral Mg+. He reports a few very minor oral sores that resolve quickly. He is eating and drinking very well, weight is stable. Mild fatigue is present. No current cough, shortness of breath, or edema. He reports recent back pain, improving-has been seeing chiropractor. No neuropathy. Denies any fevers or infectious symptoms. Wife states he has intermittent night sweats, however he attributes it to new sheets that make him hot. Labs reviewed and adequate to proceed with treatment. All questions were asked and answered to the best of my ability. F/u for cycle 5 R-CHOP in 3 weeks.                [facility]  [address] [address]  Office : [phone]  Fax : [phone]

## 2022-09-13 NOTE — TELEPHONE ENCOUNTER
Second attempt to contact pt regarding diabetes referral. Left message. Pt has [facility]

## 2022-09-13 NOTE — PATIENT INSTRUCTIONS
Patient Instructions from Today's Visit    Reason for Visit:  Follow up visit     Plan:  -You will have another PET scan after treatment is completed. -Keep hydrated.  -We are glad you were able to get your sugars under control.   -Prednisone was sent to your pharmacy. There is another refill of Magnesium at your pharmacy.      Follow Up:  -As Scheduled     Recent Lab Results:  Hospital Outpatient Visit on 09/13/2022   Component Date Value Ref Range Status    WBC 09/13/2022 8.7  4.3 - 11.1 K/uL Final    RBC 09/13/2022 3.79 (A) 4.23 - 5.6 M/uL Final    Hemoglobin 09/13/2022 12.0 (A) 13.6 - 17.2 g/dL Final    Hematocrit 09/13/2022 34.3  % Final    MCV 09/13/2022 90.5  79.6 - 97.8 FL Final    MCH 09/13/2022 31.7  26.1 - 32.9 PG Final    MCHC 09/13/2022 35.0  31.4 - 35.0 g/dL Final    RDW 09/13/2022 14.7 (A) 11.9 - 14.6 % Final    Platelets 77/06/8197 218  150 - 450 K/uL Final    MPV 09/13/2022 9.5  9.4 - 12.3 FL Final    nRBC 09/13/2022 0.00  0.0 - 0.2 K/uL Final    **Note: Absolute NRBC parameter is now reported with Hemogram**    Differential Type 09/13/2022 AUTOMATED    Final    Seg Neutrophils 09/13/2022 83 (A) 43 - 78 % Final    Lymphocytes 09/13/2022 6 (A) 13 - 44 % Final    Monocytes 09/13/2022 8  4.0 - 12.0 % Final    Eosinophils % 09/13/2022 1  0.5 - 7.8 % Final    Basophils 09/13/2022 1  0.0 - 2.0 % Final    Immature Granulocytes 09/13/2022 1  0.0 - 5.0 % Final    Segs Absolute 09/13/2022 7.2  1.7 - 8.2 K/UL Final    Absolute Lymph # 09/13/2022 0.5  0.5 - 4.6 K/UL Final    Absolute Mono # 09/13/2022 0.7  0.1 - 1.3 K/UL Final    Absolute Eos # 09/13/2022 0.1  0.0 - 0.8 K/UL Final    Basophils Absolute 09/13/2022 0.1  0.0 - 0.2 K/UL Final    Absolute Immature Granulocyte 09/13/2022 0.1  0.0 - 0.5 K/UL Final         Treatment Summary has been discussed and given to patient: n/a        -------------------------------------------------------------------------------------------------------------------  Please call our office at [phone] if you have any  of the following symptoms:   Fever of 100.5 or greater  Chills  Shortness of breath  Swelling or pain in one leg    After office hours an answering service is available and will contact a provider for emergencies or if you are experiencing any of the above symptoms. Patient did express an interest in My Chart. My Chart log in information explained on the after visit summary printout at the [facility] [employer] desk. HAFSA Silav, RN  Hematology Navigator  [address] [place] [address]  Cell: [phone]  Office: [phone]   Fax: [email or web address] is available by phone Monday through Friday 8 am to 4 pm.    If you need assistance after 4pm, or on the weekend please call [phone]. The answering service is available 24 hours a day, 7 days a week.

## 2022-09-14 ENCOUNTER — TELEPHONE (OUTPATIENT)
Dept: DIABETES SERVICES | Age: 62
End: 2022-09-14

## 2022-09-14 NOTE — TELEPHONE ENCOUNTER
Received the following e mail from [facility] on 22 at [address] - \" Just spoke with Jasvir DOMINGO 1960 wife, she stated her  has cancer as well and they would like to hold off on the diabetes education until the first of the year after he completes chemo therapy.  \"

## 2022-09-15 ENCOUNTER — HOSPITAL ENCOUNTER (OUTPATIENT)
Dept: INFUSION THERAPY | Age: 62
Discharge: HOME | End: 2022-09-15
Payer: COMMERCIAL

## 2022-09-15 VITALS
WEIGHT: 194.2 LBS | OXYGEN SATURATION: 97 % | DIASTOLIC BLOOD PRESSURE: 78 MMHG | TEMPERATURE: 97.6 F | SYSTOLIC BLOOD PRESSURE: 127 MMHG | BODY MASS INDEX: 29.53 KG/M2 | RESPIRATION RATE: 16 BRPM | HEART RATE: 92 BPM

## 2022-09-15 DIAGNOSIS — C83.35 DIFFUSE LARGE B-CELL LYMPHOMA OF LYMPH NODES OF INGUINAL REGION (HCC): Primary | ICD-10-CM

## 2022-09-15 PROCEDURE — 2580000003 HC RX 258: Performed by: NURSE PRACTITIONER

## 2022-09-15 PROCEDURE — 96375 TX/PRO/DX INJ NEW DRUG ADDON: CPT

## 2022-09-15 PROCEDURE — 96411 CHEMO IV PUSH ADDL DRUG: CPT

## 2022-09-15 PROCEDURE — 96417 CHEMO IV INFUS EACH ADDL SEQ: CPT

## 2022-09-15 PROCEDURE — 6360000002 HC RX W HCPCS: Performed by: NURSE PRACTITIONER

## 2022-09-15 PROCEDURE — 96413 CHEMO IV INFUSION 1 HR: CPT

## 2022-09-15 PROCEDURE — 96367 TX/PROPH/DG ADDL SEQ IV INF: CPT

## 2022-09-15 PROCEDURE — 6370000000 HC RX 637 (ALT 250 FOR IP): Performed by: NURSE PRACTITIONER

## 2022-09-15 RX ORDER — DOXORUBICIN HYDROCHLORIDE 2 MG/ML
100 INJECTION, SOLUTION INTRAVENOUS ONCE
Status: COMPLETED | OUTPATIENT
Start: 2022-09-15 | End: 2022-09-15

## 2022-09-15 RX ORDER — ACETAMINOPHEN 325 MG/1
650 TABLET ORAL ONCE
Status: COMPLETED | OUTPATIENT
Start: 2022-09-15 | End: 2022-09-15

## 2022-09-15 RX ORDER — DIPHENHYDRAMINE HYDROCHLORIDE 50 MG/ML
50 INJECTION INTRAMUSCULAR; INTRAVENOUS ONCE
Status: COMPLETED | OUTPATIENT
Start: 2022-09-15 | End: 2022-09-15

## 2022-09-15 RX ORDER — ONDANSETRON 2 MG/ML
8 INJECTION INTRAMUSCULAR; INTRAVENOUS ONCE
Status: COMPLETED | OUTPATIENT
Start: 2022-09-15 | End: 2022-09-15

## 2022-09-15 RX ORDER — SODIUM CHLORIDE 0.9 % (FLUSH) 0.9 %
5-40 SYRINGE (ML) INJECTION PRN
Status: DISCONTINUED | OUTPATIENT
Start: 2022-09-15 | End: 2022-09-16 | Stop reason: HOSPADM

## 2022-09-15 RX ORDER — SODIUM CHLORIDE 9 MG/ML
5-250 INJECTION, SOLUTION INTRAVENOUS PRN
Status: DISCONTINUED | OUTPATIENT
Start: 2022-09-15 | End: 2022-09-16 | Stop reason: HOSPADM

## 2022-09-15 RX ADMIN — DIPHENHYDRAMINE HYDROCHLORIDE 50 MG: 50 INJECTION, SOLUTION INTRAMUSCULAR; INTRAVENOUS at 08:22

## 2022-09-15 RX ADMIN — FOSAPREPITANT 150 MG: 150 INJECTION, POWDER, LYOPHILIZED, FOR SOLUTION INTRAVENOUS at 10:09

## 2022-09-15 RX ADMIN — SODIUM CHLORIDE 125 ML/HR: 9 INJECTION, SOLUTION INTRAVENOUS at 10:30

## 2022-09-15 RX ADMIN — SODIUM CHLORIDE, PRESERVATIVE FREE 10 ML: 5 INJECTION INTRAVENOUS at 10:48

## 2022-09-15 RX ADMIN — CYCLOPHOSPHAMIDE 1500 MG: 1 INJECTION, POWDER, FOR SOLUTION INTRAVENOUS; ORAL at 10:30

## 2022-09-15 RX ADMIN — ONDANSETRON 8 MG: 2 INJECTION INTRAMUSCULAR; INTRAVENOUS at 10:07

## 2022-09-15 RX ADMIN — VINCRISTINE SULFATE 2 MG: 1 INJECTION, SOLUTION INTRAVENOUS at 11:08

## 2022-09-15 RX ADMIN — RITUXIMAB 700 MG: 10 INJECTION, SOLUTION INTRAVENOUS at 08:55

## 2022-09-15 RX ADMIN — ACETAMINOPHEN 650 MG: 325 TABLET, FILM COATED ORAL at 08:22

## 2022-09-15 RX ADMIN — DOXORUBICIN HYDROCHLORIDE 100 MG: 2 INJECTION, SOLUTION INTRAVENOUS at 11:03

## 2022-09-15 NOTE — PROGRESS NOTES
[facility]. Assessment completed. No needs voiced at this time. Patient tolerated infusion well and is aware of next appointment on 9/16/2022 @1630. Patient discharged ambulatory.

## 2022-09-16 ENCOUNTER — HOSPITAL ENCOUNTER (OUTPATIENT)
Dept: INFUSION THERAPY | Age: 62
Discharge: HOME | End: 2022-09-16
Payer: COMMERCIAL

## 2022-09-16 VITALS
RESPIRATION RATE: 16 BRPM | TEMPERATURE: 97.3 F | SYSTOLIC BLOOD PRESSURE: 156 MMHG | OXYGEN SATURATION: 99 % | DIASTOLIC BLOOD PRESSURE: 64 MMHG | HEART RATE: 93 BPM

## 2022-09-16 DIAGNOSIS — C83.35 DIFFUSE LARGE B-CELL LYMPHOMA OF LYMPH NODES OF INGUINAL REGION (HCC): Primary | ICD-10-CM

## 2022-09-16 PROCEDURE — 96372 THER/PROPH/DIAG INJ SC/IM: CPT

## 2022-09-16 PROCEDURE — 6360000002 HC RX W HCPCS: Performed by: NURSE PRACTITIONER

## 2022-09-16 RX ADMIN — PEGFILGRASTIM-BMEZ 6 MG: 6 INJECTION SUBCUTANEOUS at 16:15

## 2022-09-16 NOTE — PROGRESS NOTES
Arrived to the [facility]. Ziextenzo injection completed. Patient tolerated well. Any issues or concerns during appointment: no  Patient aware of next infusion appointment on 10/6/2022 (date) at 0830 (time). Patient aware of next lab and [facility] office visit on 10/4/2022 (date) at [facility] (time). Patient instructed to call provider with temperature of 100.4 or greater or nausea/vomiting/ diarrhea or pain not controlled by medications  Discharged ambulatory.

## 2022-10-04 ENCOUNTER — OFFICE VISIT (OUTPATIENT)
Dept: ONCOLOGY | Age: 62
End: 2022-10-04
Payer: COMMERCIAL

## 2022-10-04 ENCOUNTER — CLINICAL DOCUMENTATION (OUTPATIENT)
Dept: CASE MANAGEMENT | Age: 62
End: 2022-10-04

## 2022-10-04 ENCOUNTER — HOSPITAL ENCOUNTER (OUTPATIENT)
Dept: LAB | Age: 62
Discharge: HOME | End: 2022-10-07
Payer: OTHER GOVERNMENT

## 2022-10-04 VITALS
RESPIRATION RATE: 16 BRPM | HEART RATE: 96 BPM | OXYGEN SATURATION: 99 % | BODY MASS INDEX: 29.39 KG/M2 | SYSTOLIC BLOOD PRESSURE: 130 MMHG | DIASTOLIC BLOOD PRESSURE: 84 MMHG | TEMPERATURE: 97.6 F | WEIGHT: 193.9 LBS | HEIGHT: 68 IN

## 2022-10-04 DIAGNOSIS — C83.35 DIFFUSE LARGE B-CELL LYMPHOMA OF LYMPH NODES OF INGUINAL REGION (HCC): ICD-10-CM

## 2022-10-04 DIAGNOSIS — C83.35 DIFFUSE LARGE B-CELL LYMPHOMA OF LYMPH NODES OF INGUINAL REGION (HCC): Primary | ICD-10-CM

## 2022-10-04 LAB
ALBUMIN SERPL-MCNC: 3.6 G/DL (ref 3.2–4.6)
ALBUMIN/GLOB SERPL: 1.2 {RATIO} (ref 1.2–3.5)
ALP SERPL-CCNC: 100 U/L (ref 50–136)
ALT SERPL-CCNC: 32 U/L (ref 12–65)
ANION GAP SERPL CALC-SCNC: 4 MMOL/L (ref 4–13)
AST SERPL-CCNC: 20 U/L (ref 15–37)
BASOPHILS # BLD: 0.1 K/UL (ref 0–0.2)
BASOPHILS NFR BLD: 1 % (ref 0–2)
BILIRUB SERPL-MCNC: 0.4 MG/DL (ref 0.2–1.1)
BUN SERPL-MCNC: 15 MG/DL (ref 8–23)
CALCIUM SERPL-MCNC: 9.5 MG/DL (ref 8.3–10.4)
CHLORIDE SERPL-SCNC: 103 MMOL/L (ref 101–110)
CO2 SERPL-SCNC: 30 MMOL/L (ref 21–32)
CREAT SERPL-MCNC: 0.9 MG/DL (ref 0.8–1.5)
DIFFERENTIAL METHOD BLD: ABNORMAL
EOSINOPHIL # BLD: 0.2 K/UL (ref 0–0.8)
EOSINOPHIL NFR BLD: 2 % (ref 0.5–7.8)
ERYTHROCYTE [DISTWIDTH] IN BLOOD BY AUTOMATED COUNT: 15 % (ref 11.9–14.6)
GLOBULIN SER CALC-MCNC: 3 G/DL (ref 2.3–3.5)
GLUCOSE SERPL-MCNC: 260 MG/DL (ref 65–100)
HCT VFR BLD AUTO: 35.7 %
HGB BLD-MCNC: 12.1 G/DL (ref 13.6–17.2)
IMM GRANULOCYTES # BLD AUTO: 0.1 K/UL (ref 0–0.5)
IMM GRANULOCYTES NFR BLD AUTO: 1 % (ref 0–5)
LDH SERPL L TO P-CCNC: 172 U/L (ref 110–210)
LYMPHOCYTES # BLD: 0.5 K/UL (ref 0.5–4.6)
LYMPHOCYTES NFR BLD: 5 % (ref 13–44)
MAGNESIUM SERPL-MCNC: 1.6 MG/DL (ref 1.8–2.4)
MCH RBC QN AUTO: 31.6 PG (ref 26.1–32.9)
MCHC RBC AUTO-ENTMCNC: 33.9 G/DL (ref 31.4–35)
MCV RBC AUTO: 93.2 FL (ref 79.6–97.8)
MONOCYTES # BLD: 0.8 K/UL (ref 0.1–1.3)
MONOCYTES NFR BLD: 8 % (ref 4–12)
NEUTS SEG # BLD: 7.5 K/UL (ref 1.7–8.2)
NEUTS SEG NFR BLD: 82 % (ref 43–78)
NRBC # BLD: 0 K/UL (ref 0–0.2)
PLATELET # BLD AUTO: 224 K/UL (ref 150–450)
PMV BLD AUTO: 9.6 FL (ref 9.4–12.3)
POTASSIUM SERPL-SCNC: 4.8 MMOL/L (ref 3.5–5.1)
PROT SERPL-MCNC: 6.6 G/DL (ref 6.3–8.2)
RBC # BLD AUTO: 3.83 M/UL (ref 4.23–5.6)
SODIUM SERPL-SCNC: 137 MMOL/L (ref 136–145)
URATE SERPL-MCNC: 6.5 MG/DL (ref 2.6–6)
WBC # BLD AUTO: 9.1 K/UL (ref 4.3–11.1)

## 2022-10-04 PROCEDURE — 80053 COMPREHEN METABOLIC PANEL: CPT

## 2022-10-04 PROCEDURE — 85025 COMPLETE CBC W/AUTO DIFF WBC: CPT

## 2022-10-04 PROCEDURE — 36415 COLL VENOUS BLD VENIPUNCTURE: CPT

## 2022-10-04 PROCEDURE — 83615 LACTATE (LD) (LDH) ENZYME: CPT

## 2022-10-04 PROCEDURE — 99214 OFFICE O/P EST MOD 30 MIN: CPT | Performed by: INTERNAL MEDICINE

## 2022-10-04 PROCEDURE — 83735 ASSAY OF MAGNESIUM: CPT

## 2022-10-04 PROCEDURE — 84550 ASSAY OF BLOOD/URIC ACID: CPT

## 2022-10-04 RX ORDER — SODIUM CHLORIDE 9 MG/ML
5-40 INJECTION INTRAVENOUS PRN
Status: CANCELLED | OUTPATIENT
Start: 2022-10-06

## 2022-10-04 RX ORDER — SODIUM CHLORIDE 9 MG/ML
5-250 INJECTION, SOLUTION INTRAVENOUS PRN
Status: CANCELLED | OUTPATIENT
Start: 2022-10-06

## 2022-10-04 RX ORDER — FAMOTIDINE 10 MG/ML
20 INJECTION, SOLUTION INTRAVENOUS
Status: CANCELLED | OUTPATIENT
Start: 2022-10-06

## 2022-10-04 RX ORDER — SODIUM CHLORIDE 9 MG/ML
INJECTION, SOLUTION INTRAVENOUS CONTINUOUS
Status: CANCELLED | OUTPATIENT
Start: 2022-10-06

## 2022-10-04 RX ORDER — DOXORUBICIN HYDROCHLORIDE 2 MG/ML
50 INJECTION, SOLUTION INTRAVENOUS ONCE
Status: CANCELLED | OUTPATIENT
Start: 2022-10-06 | End: 2022-10-06

## 2022-10-04 RX ORDER — ALBUTEROL SULFATE 90 UG/1
4 AEROSOL, METERED RESPIRATORY (INHALATION) PRN
Status: CANCELLED | OUTPATIENT
Start: 2022-10-06

## 2022-10-04 RX ORDER — ACETAMINOPHEN 325 MG/1
650 TABLET ORAL
Status: CANCELLED | OUTPATIENT
Start: 2022-10-06

## 2022-10-04 RX ORDER — ONDANSETRON 2 MG/ML
8 INJECTION INTRAMUSCULAR; INTRAVENOUS ONCE
Status: CANCELLED | OUTPATIENT
Start: 2022-10-06 | End: 2022-10-06

## 2022-10-04 RX ORDER — SODIUM CHLORIDE 0.9 % (FLUSH) 0.9 %
5-40 SYRINGE (ML) INJECTION PRN
Status: CANCELLED | OUTPATIENT
Start: 2022-10-06

## 2022-10-04 RX ORDER — DIPHENHYDRAMINE HYDROCHLORIDE 50 MG/ML
50 INJECTION INTRAMUSCULAR; INTRAVENOUS ONCE
Status: CANCELLED | OUTPATIENT
Start: 2022-10-06 | End: 2022-10-06

## 2022-10-04 RX ORDER — ACETAMINOPHEN 325 MG/1
650 TABLET ORAL ONCE
Status: CANCELLED | OUTPATIENT
Start: 2022-10-06 | End: 2022-10-06

## 2022-10-04 RX ORDER — ONDANSETRON 2 MG/ML
8 INJECTION INTRAMUSCULAR; INTRAVENOUS
Status: CANCELLED | OUTPATIENT
Start: 2022-10-06

## 2022-10-04 RX ORDER — ESCITALOPRAM OXALATE 10 MG/1
5 TABLET ORAL DAILY
Qty: 30 TABLET | Refills: 0 | Status: SHIPPED | OUTPATIENT
Start: 2022-10-04

## 2022-10-04 RX ORDER — DIPHENHYDRAMINE HYDROCHLORIDE 50 MG/ML
50 INJECTION INTRAMUSCULAR; INTRAVENOUS
Status: CANCELLED | OUTPATIENT
Start: 2022-10-06

## 2022-10-04 RX ORDER — HEPARIN SODIUM (PORCINE) LOCK FLUSH IV SOLN 100 UNIT/ML 100 UNIT/ML
500 SOLUTION INTRAVENOUS PRN
Status: CANCELLED | OUTPATIENT
Start: 2022-10-06

## 2022-10-04 RX ORDER — EPINEPHRINE 1 MG/ML
0.3 INJECTION, SOLUTION, CONCENTRATE INTRAVENOUS PRN
Status: CANCELLED | OUTPATIENT
Start: 2022-10-06

## 2022-10-04 RX ORDER — MEPERIDINE HYDROCHLORIDE 50 MG/ML
12.5 INJECTION INTRAMUSCULAR; INTRAVENOUS; SUBCUTANEOUS PRN
Status: CANCELLED | OUTPATIENT
Start: 2022-10-06

## 2022-10-04 ASSESSMENT — ANXIETY QUESTIONNAIRES
3. WORRYING TOO MUCH ABOUT DIFFERENT THINGS: 0
1. FEELING NERVOUS, ANXIOUS, OR ON EDGE: 2
4. TROUBLE RELAXING: 2
6. BECOMING EASILY ANNOYED OR IRRITABLE: 3
5. BEING SO RESTLESS THAT IT IS HARD TO SIT STILL: 2
IF YOU CHECKED OFF ANY PROBLEMS ON THIS QUESTIONNAIRE, HOW DIFFICULT HAVE THESE PROBLEMS MADE IT FOR YOU TO DO YOUR WORK, TAKE CARE OF THINGS AT HOME, OR GET ALONG WITH OTHER PEOPLE: SOMEWHAT DIFFICULT
7. FEELING AFRAID AS IF SOMETHING AWFUL MIGHT HAPPEN: 0
GAD7 TOTAL SCORE: 9
2. NOT BEING ABLE TO STOP OR CONTROL WORRYING: 0

## 2022-10-04 ASSESSMENT — PATIENT HEALTH QUESTIONNAIRE - PHQ9
SUM OF ALL RESPONSES TO PHQ QUESTIONS 1-9: 3
SUM OF ALL RESPONSES TO PHQ QUESTIONS 1-9: 3
7. TROUBLE CONCENTRATING ON THINGS, SUCH AS READING THE NEWSPAPER OR WATCHING TELEVISION: 0
6. FEELING BAD ABOUT YOURSELF - OR THAT YOU ARE A FAILURE OR HAVE LET YOURSELF OR YOUR FAMILY DOWN: 0
SUM OF ALL RESPONSES TO PHQ9 QUESTIONS 1 & 2: 0
5. POOR APPETITE OR OVEREATING: 0
9. THOUGHTS THAT YOU WOULD BE BETTER OFF DEAD, OR OF HURTING YOURSELF: 0
10. IF YOU CHECKED OFF ANY PROBLEMS, HOW DIFFICULT HAVE THESE PROBLEMS MADE IT FOR YOU TO DO YOUR WORK, TAKE CARE OF THINGS AT HOME, OR GET ALONG WITH OTHER PEOPLE: 1
8. MOVING OR SPEAKING SO SLOWLY THAT OTHER PEOPLE COULD HAVE NOTICED. OR THE OPPOSITE, BEING SO FIGETY OR RESTLESS THAT YOU HAVE BEEN MOVING AROUND A LOT MORE THAN USUAL: 0
SUM OF ALL RESPONSES TO PHQ QUESTIONS 1-9: 3
2. FEELING DOWN, DEPRESSED OR HOPELESS: 0
3. TROUBLE FALLING OR STAYING ASLEEP: 0
SUM OF ALL RESPONSES TO PHQ QUESTIONS 1-9: 3
1. LITTLE INTEREST OR PLEASURE IN DOING THINGS: 0
4. FEELING TIRED OR HAVING LITTLE ENERGY: 3

## 2022-10-04 NOTE — PROGRESS NOTES
[facility] Hematology and Oncology: Office Visit Established Patient    Chief Complaint:    Chief Complaint   Patient presents with    Follow-up         History of Present Illness:  Mr. Edilson Rice is a 58 y.o. male who returns today for management of diffuse large B cell lymphoma. He presented to his PCP on 4/29/22 reporting an enlarging lump in his left groin for the past 2 months. Physical exam confirmed a mildly tender lump in the patients left inguinal region. The right inguinal region was normal, and no inguinal hernia was noted. Ultrasound of the left groin was performed on 5/13/22 revealing enlarged, abnormal appearing left inguinal lymph nodes measuring up to 3.0 x 3.2 x 1.9 cm. Ultrasound directed biopsy was suggested for further assessment. We saw him for consultation and recommended excisional biopsy, which showed diffuse large B cell lymphoma. PET/CT shows the most prominent areas of disease in the inguinal and iliac nodes, but there are also nodes in the right axilla and retroperitoneum that are consistent with DLBCL, making him at least [place] stage III. Bone marrow biopsy showed no morphologic findings but did show a small B cell clone worrisome for lymphoma involvement in the bone marrow, this would make him AA stage IV. His FISH shows no evidence of more aggressive genotypic findings, so he would be most appropriate for R-CHOP for 6 cycles for definitive therapy. PET/CT following 2 cycles revealed CR. He returns today for follow up and cycle 5 R-CHOP. He is tolerating treatment very well overall. His main complaints are worsening fatigue and back pain with each infusion, seems to peak during the first week after therapy then improve (but not resolve). He has some issues with dexterity in the fingers, and some issues with balance, although his wife notes that this is not a completely new thing for him. He has diabetes but has always passed microfilament testing before.   No pain and able to do all ADLs. They do feel he has some mild neurocognitive impairment. No GI symptoms. Denies any fevers or infectious symptoms. Review of Systems:  Constitutional: Positive for fatigue. HENT: Negative. Eyes: Negative. Respiratory: Negative. Cardiovascular: Negative. Gastrointestinal: Negative. Genitourinary: Negative. Musculoskeletal: Positive for back pain. Skin: Negative. Neurological: Negative. Endo/Heme/Allergies: Negative. Psychiatric/Behavioral: Negative. All other systems reviewed and are negative. No Known Allergies  Past Medical History:   Diagnosis Date    Arthritis     osteo    Bilateral carpal tunnel syndrome 10/28/2019    CAD (coronary artery disease) 1999    3 stents--last one placed 2019, pt on daily 81 mg ASA.  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    GERD (gastroesophageal reflux disease)     diet controlled    Hx of colonic polyp 2016    adenoma    Hypercholesterolemia     Hypertension     managed with medication     Past Surgical History:   Procedure Laterality Date    COLONOSCOPY  last 4/25/16    Leandra--trans TA--3 year recall due to prep quality    CORONARY ANGIOPLASTY WITH STENT PLACEMENT  [id number], 2019    heart cath times 3 - 3 total stents    IR PORT PLACEMENT EQUAL OR GREATER THAN 5 YEARS  6/27/2022    IR PORT PLACEMENT EQUAL OR GREATER THAN 5 YEARS 6/27/2022 [facility]IOLOG[facility] SPECIALS    KNEE ARTHROSCOPY Left 1985    LYMPH NODE BIOPSY Left 6/10/2022    LEFT INGUINAL BIOPSY performed by Nilesh Lamar MD at [address] [place]  2019    3 rd stent -- cardiac placed    SKIN BIOPSY N/A 6/10/2022    EXCISION BACK NEVUS WL PER ERICK/REQUEST TO FOLLOW performed by Rubens Waters MD at [address] Left 11/15/2018    UVULOPALATOPHARYGOPLASTY       Family History   Problem Relation Age of Onset    Lung Disease Mother     Asthma Mother     Colon Cancer Father     Cancer Father     Cancer Maternal Uncle         colon    Cancer Maternal Uncle         colon    Cancer Maternal Uncle         colon    Cancer Maternal Uncle         colon    Cancer Maternal Uncle         colon    Cancer Maternal Uncle         colon    Cancer Maternal Uncle         colon    Coronary Art Dis Neg Hx      Social History     Socioeconomic History    Marital status:      Spouse name: Not on file    Number of children: Not on file    Years of education: Not on file    Highest education level: Not on file   Occupational History    Not on file   Tobacco Use    Smoking status: Never    Smokeless tobacco: Never   Vaping Use    Vaping Use: Never used   Substance and Sexual Activity    Alcohol use:  Yes     Alcohol/week: 2.0 standard drinks    Drug use: No    Sexual activity: Not Currently   Other Topics Concern    Not on file   Social History Narrative    Not on file     Social Determinants of Health     Financial Resource Strain: Not on file   Food Insecurity: Not on file   Transportation Needs: Not on file   Physical Activity: Not on file   Stress: Not on file   Social Connections: Not on file   Intimate Partner Violence: Not on file   Housing Stability: Not on file     Current Outpatient Medications   Medication Sig Dispense Refill    metFORMIN (GLUCOPHAGE) 1000 MG tablet Take 1,000 mg by mouth 2 times daily (with meals)      escitalopram (LEXAPRO) 10 MG tablet Take 0.5 tablets by mouth daily 30 tablet 0    GVOKE HYPOPEN 2-PACK 1 MG/0.2ML SOAJ Inject 1 mg into the skin as needed (as needed for BG less than 50 mg/dL) 0.4 mL 11    HUMALOG KWIKPEN 100 UNIT/ML SOPN Inject 16 Units into the skin 3 times daily (before meals) Add Correction 5 for 50 above 150 mg/dL. Max Daily Dose; 150 units 45 mL 11    TRESIBA FLEXTOUCH 200 UNIT/ML SOPN Inject 82 Units into the skin daily Titrate by 2 units every 3 days to fasting BG goal of  mg/dL. Max Daily Dose. 120 units 54 mL 11    Insulin Pen Needle 32G X 4 MM MISC 1 each by Does not apply route daily 400 each 11    atorvastatin (LIPITOR) 40 MG tablet Take 0.5 tablets by mouth daily 90 tablet 1    lisinopril (PRINIVIL;ZESTRIL) 10 MG tablet Take 1 tablet by mouth daily 90 tablet 1    traZODone (DESYREL) 100 MG tablet Take 1 tablet by mouth nightly 90 tablet 1    ibuprofen (ADVIL;MOTRIN) 800 MG tablet Take 1 tablet by mouth every 8 hours as needed for Pain 120 tablet 5    lidocaine-prilocaine (EMLA) 2.5-2.5 % cream Apply topically as needed. Place small amount to port site 45 minutes prior to any blood draw or infusion. Cover with plastic wrap 30 g 2    Multiple Vitamins-Minerals (MENS 50+ ADVANCED PO) Take by mouth      ZINC PO Take by mouth daily      ascorbic acid (VITAMIN C) 500 MG tablet Take 500 mg by mouth daily      aspirin 81 MG EC tablet Take 81 mg by mouth daily       melatonin 5 MG TABS tablet Take 10 mg by mouth       ONETOUCH ULTRA strip 1 each by In Vitro route daily Check bG 4 times daily E11.65. 100 each 3    Continuous Blood Gluc Sensor (DEXCOM G6 SENSOR) MISC Change every 10 days as directed. 9 each 3    Continuous Blood Gluc Transmit (DEXCOM G6 TRANSMITTER) MISC Change every 90 days as directed.  1 each 3    Continuous Blood Gluc Transmit (DEXCOM G6 TRANSMITTER) MISC Replace every 90 days 1 each 1    Continuous Blood Gluc Sensor (DEXCOM G6 SENSOR) MISC Replaced every 10 days 9 each 1    prochlorperazine (COMPAZINE) 10 MG tablet Take 1 tablet by mouth every 6 hours as needed (nausea/vomitting) (Patient not taking: Reported on 10/4/2022) 120 tablet 3     Current Facility-Administered Medications   Medication Dose Route Frequency Provider Last Rate Last Admin    magnesium oxide (MAG-OX) tablet 400 mg  400 mg Oral TID Elbertkajal August, NP-C           OBJECTIVE:  /84   Pulse 96   Temp 97.6 °F (36.4 °C)   Resp 16   Ht 5' 8\" (1.727 m)   Wt 193 lb 14.4 oz (88 kg)   SpO2 99%   BMI 29.48 kg/m²     Physical Exam:  Constitutional: Well developed, well nourished male in no acute distress, sitting comfortably in the exam room chair. HEENT: Normocephalic and atraumatic. Sclerae anicteric. Neck supple without JVD. No thyromegaly present. Lymph node   Deferred   Skin Warm and dry. No bruising and no rash noted. No erythema. No pallor. Respiratory Lungs are clear to auscultation bilaterally without wheezes, rales or rhonchi, normal air exchange without accessory muscle use. CVS Normal rate, regular rhythm and normal S1 and S2. No murmurs, gallops, or rubs. Abdomen Soft, nontender and nondistended, normoactive bowel sounds. Neuro Grossly nonfocal with no obvious sensory or motor deficits. MSK Normal range of motion in general.  No edema and no tenderness. Psych Appropriate mood and affect.           Labs:  Recent Results (from the past 96 hour(s))   CBC with Auto Differential    Collection Time: 10/04/22  8:33 AM   Result Value Ref Range    WBC 9.1 4.3 - 11.1 K/uL    RBC 3.83 (L) 4.23 - 5.6 M/uL    Hemoglobin 12.1 (L) 13.6 - 17.2 g/dL    Hematocrit 35.7 %    MCV 93.2 79.6 - 97.8 FL    MCH 31.6 26.1 - 32.9 PG    MCHC 33.9 31.4 - 35.0 g/dL    RDW 15.0 (H) 11.9 - 14.6 %    Platelets 125 810 - 471 K/uL    MPV 9.6 9.4 - 12.3 FL    nRBC 0.00 0.0 - 0.2 K/uL    Differential Type AUTOMATED      Seg Neutrophils 82 (H) 43 - 78 %    Lymphocytes 5 (L) 13 - 44 %    Monocytes 8 4.0 - 12.0 %    Eosinophils % 2 0.5 - 7.8 %    Basophils 1 0.0 - 2.0 %    Immature Granulocytes 1 0.0 - 5.0 %    Segs Absolute 7.5 1.7 - 8.2 K/UL    Absolute Lymph # 0.5 0.5 - 4.6 K/UL    Absolute Mono # 0.8 0.1 - 1.3 K/UL    Absolute Eos # 0.2 0.0 - 0.8 K/UL    Basophils Absolute 0.1 0.0 - 0.2 K/UL    Absolute Immature Granulocyte 0.1 0.0 - 0.5 K/UL   Comprehensive Metabolic Panel    Collection Time: 10/04/22  8:33 AM   Result Value Ref Range    Sodium 137 136 - 145 mmol/L    Potassium 4.8 3.5 - 5.1 mmol/L    Chloride 103 101 - 110 mmol/L    CO2 30 21 - 32 mmol/L    Anion Gap 4 4 - 13 mmol/L    Glucose 260 (H) 65 - 100 mg/dL    BUN 15 8 - 23 MG/DL    Creatinine 0.90 0.8 - 1.5 MG/DL    Est, Glom Filt Rate >60 >60 ml/min/1.73m2    Calcium 9.5 8.3 - 10.4 MG/DL    Total Bilirubin 0.4 0.2 - 1.1 MG/DL    ALT 32 12 - 65 U/L    AST 20 15 - 37 U/L    Alk Phosphatase 100 50 - 136 U/L    Total Protein 6.6 6.3 - 8.2 g/dL    Albumin 3.6 3.2 - 4.6 g/dL    Globulin 3.0 2.3 - 3.5 g/dL    Albumin/Globulin Ratio 1.2 1.2 - 3.5     Magnesium    Collection Time: 10/04/22  8:33 AM   Result Value Ref Range    Magnesium 1.6 (L) 1.8 - 2.4 mg/dL   Uric Acid    Collection Time: 10/04/22  8:33 AM   Result Value Ref Range    Uric Acid 6.5 (H) 2.6 - 6.0 MG/DL   Lactate Dehydrogenase    Collection Time: 10/04/22  8:33 AM   Result Value Ref Range     110 - 210 U/L         Imaging:  PET CT SKULL BASE TO MID THIGH    Result Date: 6/29/2022  EXAMINATION: PET CT SKULL BASE TO MID THIGH 6/29/2022 1:42 PM ACCESSION NUMBER: [id number] COMPARISON: None available INDICATION: Diffuse large b-cell lymphoma, lymph nodes of inguinal region and lower limb, initial staging TECHNIQUE:   Radiopharmaceutical: 12.83 mCi F18-FDG IV. Positron emission tomography (PET) with concurrently acquired computed tomography (CT) for attenuation correction and anatomical localization imaging; skull base to mid-thigh. Blood glucose at the time of tracer administration is 158 mg/dl, which is adequate for imaging. Approximately 60 minutes after administration of the radiopharmaceutical imaging was initiated. SUV max calculated from patient body wt. Noncaloric dilute oral contrast is given.   For this CT scanner at least one of the following techniques is utilized to decrease patient radiation exposure: Automatic exposure control, modulation of MA and KVP based on patient weight, and iterative reconstruction. FINDINGS:  Head/Neck: No hypermetabolic cervical lymphadenopathy. No abnormal radiotracer uptake within the brain, however the normal intense uptake limits evaluation. Chest: No hypermetabolic pulmonary nodule. A 1.7 x 1.2 cm posterior mediastinal retroaortic nodule/lymph node demonstrates mild FDG uptake (max SUV 3.5). Moderately metabolic nonenlarged right axillary lymph node (max SUV 5.9). Partial visualization of a moderately metabolic right upper extremity lymph node adjacent to the distal humerus (max SUV 5.6). 6 mm mildly metabolic prevascular lymph node adjacent to the main pulmonary artery (max SUV 2.8). 8 mm subcutaneous nodule along the right inferior chest wall (max SUV 1.4). Port within the right anterior chest wall. The port catheter tip terminates at the cavoatrial junction. Other changes in her coronary artery calcifications. Abdomen/Pelvis: Hypermetabolic left inguinal lymph node conglomerate measuring 3.9 x 3.5 cm (max SUV 11.9). Enlarged and hypermetabolic right inguinal lymph nodes (max SUV 8.1). Postsurgical changes within the left inguinal region. Hypermetabolic left iliac and pelvic lymph nodes. For example, a left obturator lymph node measures 3.9 x 2.1 cm (max SUV 10.9). Moderately metabolic left periaortic lymph node (max SUV 4.2). Enlarged and hypermetabolic upper abdominal lymph nodes with an aortocaval lymph node conglomerate demonstrating a max SUV of 5.5. The spleen does not appear enlarged. Physiologic uptake within the gastrointestinal and genitourinary tracts. Scattered vascular calcifications. The prostate gland is not enlarged. Gallstones without evidence of acute cholecystitis. Bones: No hypermetabolic osseous focus to suggest osseous metastatic disease.      1.  Hypermetabolic lymphadenopathy involving the bilateral inguinal, left iliac, retroperitoneal and upper abdomen, compatible with biopsy-proven B-cell lymphoma. 2.  There are additional mild to moderately metabolic mediastinal and right axillary lymph nodes, favored to represent additional sites of tia disease. 3.  Nonspecific 8 mm subcutaneous nodule within the right lower chest wall demonstrating mild FDG uptake, equivocal for additional site of disease. 4.  No splenomegaly. Pathology:  DIAGNOSIS        A:  \"LEFT INGUINAL LYMPH NODE\":             DIFFUSE LARGE B-CELL LYMPHOMA. SEE COMMENT. B:  \"NEVUS OF BACK\":             PENDING DERMATOPATHOLOGY CONSULT. Comment        A:  Histologic sections show a diffuse serpiginous infiltrate of   large atypical lymphocytes with indented nuclei, inconspicuous nucleoli   and moderate amounts of cytoplasm. Immunohistochemical stains show that   the lymphoma cells are positive for CD20, Squire-5, CD10 and Bcl-6 and   negative for Bcl-2, MUM-1, CD5, Cyclin D1, CD30 (0% positivity) and c-MYC   (less than 40% positivity). CD3 stain highlights background small   T-lymphocytes. Ki-67 shows an increased proliferation index in the   serpiginous areas of more than 90%. The morphologic and immunophenotypic   appearance supports a diagnosis of germinal center subtype of diffuse   large B-cell lymphoma. FISH studies have been requested and will be   reported separately. ASSESSMENT:   Diagnosis Orders   1. Diffuse large B-cell lymphoma of lymph nodes of inguinal region ([address]  PET CT SKULL BASE TO MID THIGH    CBC with Auto Differential    Comprehensive Metabolic Panel    Magnesium    Uric Acid    Lactate Dehydrogenase    escitalopram (LEXAPRO) 10 MG tablet                  PLAN:  Lab studies and imaging studies were personally reviewed.     DLBCL: high grade with Ki-67 90%, GC subtype, diagnosed with excisional biopsy of lymphadenopathy in left groin, 3.2 cm on ultrasound, enlarging over 3 months time. PET/CT reviewed and shows the most prominent areas of disease in the inguinal and iliac nodes, but there are also nodes in the right axilla and retroperitoneum that are consistent with DLBCL, making him at least [employer] stage III. Bone marrow biopsy showed no morphologic findings but did show a small B cell clone worrisome for lymphoma involvement in the bone marrow. His FISH shows no evidence of more aggressive genotypic findings, so he would be most appropriate for R-CHOP for 6 cycles for definitive therapy. PET/CT after 2 cycles shows CR, continue to 6 cycles as planned. He returns today for follow up and cycle 5 R-CHOP. He is tolerating treatment very well overall. His main complaints are worsening fatigue and back pain with each infusion, seems to peak during the first week after therapy then improve (but not resolve). He has some issues with dexterity in the fingers, and some issues with balance, although his wife notes that this is not a completely new thing for him. He has diabetes but has always passed microfilament testing before. No pain and able to do all ADLs. They do feel he has some mild neurocognitive impairment. No GI symptoms. Denies any fevers or infectious symptoms. Labs reviewed and adequate to proceed with treatment. Although he has some symptoms that are therapy related, none of these are prohibitive including neuropathy and back pain. I expect these to improve at completion of treatment, although neuropathy may persist or take longer to recover due to DM. All questions were asked and answered to the best of my ability. F/u for cycle 6 R-CHOP in 3 weeks, this will be the final cycle and we will enter observation if repeat PET confirms CR.                Popeye Booker MD, MD  [address] [place] [address] Hematology and Oncology  [address] [place] [address]  Office : [phone]  Fax : [phone]

## 2022-10-04 NOTE — PROGRESS NOTES
10/4-Patient seen in the office with Dr. Len Vargas. Patient has been doing well, does c/o neuropathy, decreased dexterity/balance. Informed patient this should improve once treatment is complete. Labs reviewed, patient is ready to proceed with [address] Thursday with [facility] Friday. Patient will follow up with NP in 3 weeks and have a PET scan on 11/14. Refills sent for Lexapro and Prednisone.

## 2022-10-04 NOTE — PATIENT INSTRUCTIONS
Patient Instructions from Today's Visit    Reason for Visit:  Follow up visit     Plan:  -We will get another PET scan once treatment is completed. Once we review that, you will be in the surveillance period. We will do CT scans periodically to monitor you for 2 years. -After we get another \"clean\" PET scan, we will refer you to have your PORT removed. -With each cycle, your recovery time will be longer.   -Thursday you will receive Cycle 5 of RCHOP.      Follow Up:  As Scheduled    Recent Lab Results:  Hospital Outpatient Visit on 10/04/2022   Component Date Value Ref Range Status    WBC 10/04/2022 9.1  4.3 - 11.1 K/uL Final    RBC 10/04/2022 3.83 (A)  4.23 - 5.6 M/uL Final    Hemoglobin 10/04/2022 12.1 (A)  13.6 - 17.2 g/dL Final    Hematocrit 10/04/2022 35.7  % Final    MCV 10/04/2022 93.2  79.6 - 97.8 FL Final    MCH 10/04/2022 31.6  26.1 - 32.9 PG Final    MCHC 10/04/2022 33.9  31.4 - 35.0 g/dL Final    RDW 10/04/2022 15.0 (A)  11.9 - 14.6 % Final    Platelets 81/34/9821 224  150 - 450 K/uL Final    MPV 10/04/2022 9.6  9.4 - 12.3 FL Final    nRBC 10/04/2022 0.00  0.0 - 0.2 K/uL Final    **Note: Absolute NRBC parameter is now reported with Hemogram**    Differential Type 10/04/2022 AUTOMATED    Final    Seg Neutrophils 10/04/2022 82 (A)  43 - 78 % Final    Lymphocytes 10/04/2022 5 (A)  13 - 44 % Final    Monocytes 10/04/2022 8  4.0 - 12.0 % Final    Eosinophils % 10/04/2022 2  0.5 - 7.8 % Final    Basophils 10/04/2022 1  0.0 - 2.0 % Final    Immature Granulocytes 10/04/2022 1  0.0 - 5.0 % Final    Segs Absolute 10/04/2022 7.5  1.7 - 8.2 K/UL Final    Absolute Lymph # 10/04/2022 0.5  0.5 - 4.6 K/UL Final    Absolute Mono # 10/04/2022 0.8  0.1 - 1.3 K/UL Final    Absolute Eos # 10/04/2022 0.2  0.0 - 0.8 K/UL Final    Basophils Absolute 10/04/2022 0.1  0.0 - 0.2 K/UL Final    Absolute Immature Granulocyte 10/04/2022 0.1  0.0 - 0.5 K/UL Final    Sodium 10/04/2022 137  136 - 145 mmol/L Final    Potassium

## 2022-10-06 ENCOUNTER — CLINICAL DOCUMENTATION (OUTPATIENT)
Dept: ONCOLOGY | Age: 62
End: 2022-10-06

## 2022-10-06 ENCOUNTER — HOSPITAL ENCOUNTER (OUTPATIENT)
Dept: INFUSION THERAPY | Age: 62
Discharge: HOME | End: 2022-10-06
Payer: OTHER GOVERNMENT

## 2022-10-06 VITALS
WEIGHT: 195.8 LBS | RESPIRATION RATE: 16 BRPM | BODY MASS INDEX: 29.77 KG/M2 | SYSTOLIC BLOOD PRESSURE: 152 MMHG | TEMPERATURE: 97.3 F | OXYGEN SATURATION: 98 % | DIASTOLIC BLOOD PRESSURE: 84 MMHG | HEART RATE: 84 BPM

## 2022-10-06 DIAGNOSIS — C83.35 DIFFUSE LARGE B-CELL LYMPHOMA OF LYMPH NODES OF INGUINAL REGION (HCC): Primary | ICD-10-CM

## 2022-10-06 PROCEDURE — 96375 TX/PRO/DX INJ NEW DRUG ADDON: CPT

## 2022-10-06 PROCEDURE — 96413 CHEMO IV INFUSION 1 HR: CPT

## 2022-10-06 PROCEDURE — 6370000000 HC RX 637 (ALT 250 FOR IP): Performed by: INTERNAL MEDICINE

## 2022-10-06 PROCEDURE — 96367 TX/PROPH/DG ADDL SEQ IV INF: CPT

## 2022-10-06 PROCEDURE — 96411 CHEMO IV PUSH ADDL DRUG: CPT

## 2022-10-06 PROCEDURE — 96417 CHEMO IV INFUS EACH ADDL SEQ: CPT

## 2022-10-06 PROCEDURE — 6360000002 HC RX W HCPCS: Performed by: INTERNAL MEDICINE

## 2022-10-06 PROCEDURE — 2580000003 HC RX 258: Performed by: INTERNAL MEDICINE

## 2022-10-06 PROCEDURE — 96415 CHEMO IV INFUSION ADDL HR: CPT

## 2022-10-06 RX ORDER — ACETAMINOPHEN 325 MG/1
650 TABLET ORAL ONCE
Status: COMPLETED | OUTPATIENT
Start: 2022-10-06 | End: 2022-10-06

## 2022-10-06 RX ORDER — ONDANSETRON 2 MG/ML
8 INJECTION INTRAMUSCULAR; INTRAVENOUS ONCE
Status: COMPLETED | OUTPATIENT
Start: 2022-10-06 | End: 2022-10-06

## 2022-10-06 RX ORDER — SODIUM CHLORIDE 0.9 % (FLUSH) 0.9 %
5-40 SYRINGE (ML) INJECTION PRN
Status: DISCONTINUED | OUTPATIENT
Start: 2022-10-06 | End: 2022-10-07 | Stop reason: HOSPADM

## 2022-10-06 RX ORDER — SODIUM CHLORIDE 9 MG/ML
5-250 INJECTION, SOLUTION INTRAVENOUS PRN
Status: DISCONTINUED | OUTPATIENT
Start: 2022-10-06 | End: 2022-10-07 | Stop reason: HOSPADM

## 2022-10-06 RX ORDER — DOXORUBICIN HYDROCHLORIDE 2 MG/ML
100 INJECTION, SOLUTION INTRAVENOUS ONCE
Status: COMPLETED | OUTPATIENT
Start: 2022-10-06 | End: 2022-10-06

## 2022-10-06 RX ORDER — DIPHENHYDRAMINE HYDROCHLORIDE 50 MG/ML
50 INJECTION INTRAMUSCULAR; INTRAVENOUS ONCE
Status: COMPLETED | OUTPATIENT
Start: 2022-10-06 | End: 2022-10-06

## 2022-10-06 RX ADMIN — SODIUM CHLORIDE, PRESERVATIVE FREE 10 ML: 5 INJECTION INTRAVENOUS at 10:59

## 2022-10-06 RX ADMIN — CYCLOPHOSPHAMIDE 1500 MG: 1 INJECTION, POWDER, FOR SOLUTION INTRAVENOUS; ORAL at 10:55

## 2022-10-06 RX ADMIN — DIPHENHYDRAMINE HYDROCHLORIDE 50 MG: 50 INJECTION, SOLUTION INTRAMUSCULAR; INTRAVENOUS at 08:31

## 2022-10-06 RX ADMIN — DOXORUBICIN HYDROCHLORIDE 100 MG: 2 INJECTION, SOLUTION INTRAVENOUS at 11:28

## 2022-10-06 RX ADMIN — SODIUM CHLORIDE 125 ML/HR: 9 INJECTION, SOLUTION INTRAVENOUS at 10:51

## 2022-10-06 RX ADMIN — FOSAPREPITANT DIMEGLUMINE 150 MG: 150 INJECTION, POWDER, LYOPHILIZED, FOR SOLUTION INTRAVENOUS at 10:30

## 2022-10-06 RX ADMIN — ONDANSETRON 8 MG: 2 INJECTION INTRAMUSCULAR; INTRAVENOUS at 10:27

## 2022-10-06 RX ADMIN — RITUXIMAB 700 MG: 10 INJECTION, SOLUTION INTRAVENOUS at 08:52

## 2022-10-06 RX ADMIN — ACETAMINOPHEN 650 MG: 325 TABLET, FILM COATED ORAL at 08:31

## 2022-10-06 RX ADMIN — VINCRISTINE SULFATE 2 MG: 1 INJECTION, SOLUTION INTRAVENOUS at 11:33

## 2022-10-06 NOTE — PROGRESS NOTES
Progress Note      Name: Sonia Howard. : 1960  MRN: [id number]  Date of Service: 10/6/2022    Length of Service: 16 minutes    Reason for Visit: F/U    Subjective: Seen pt with his wife. \"The 27th will be my last chemo! \" Patient denied suicidal or homicidal ideations, intent or plans. Patient denied self-injury behaviors. Pt denied alcohol and other substance abuse. LISW-CP discussed Distress by using NCCN Guidelines for Patients' Distress with patient. Denied psychosocial needs at this time. Objective:  Appearance  Hygiene: clean and  grooming  Dress: clean,  neat, climate appropriate   Speech  General:  clarity  Rate: normal  Latency: none  Volume: normal  Behavior  General: relaxed  Eye Contact: appropriate  Cooperativeness  Cooperative and friendly. Thinking  Thought Processes   logical and goal directed (self-care)  Thought Content  Future oriented  Mood  Good\"  Affect  Type:  congruent  Range: full range  Appropriateness to content and congruence with stated mood  Insight/Judgment  Adequate    Assessment/Plans: Will continue to meet with and be available to patient as as needed    No flowsheet data found.     BERTO Cervantes

## 2022-10-06 NOTE — PROGRESS NOTES
Patient arrived to [facility] for [facility]. Assessment completed. No needs voiced at this time. Patient tolerated infusion well and is aware of next appointment on 10/7/2022 @1530. Patient discharged ambulatory.

## 2022-10-07 ENCOUNTER — HOSPITAL ENCOUNTER (OUTPATIENT)
Dept: INFUSION THERAPY | Age: 62
Discharge: HOME | End: 2022-10-07
Payer: OTHER GOVERNMENT

## 2022-10-07 VITALS
OXYGEN SATURATION: 97 % | SYSTOLIC BLOOD PRESSURE: 126 MMHG | HEART RATE: 99 BPM | TEMPERATURE: 97.9 F | DIASTOLIC BLOOD PRESSURE: 62 MMHG | RESPIRATION RATE: 16 BRPM

## 2022-10-07 DIAGNOSIS — C83.35 DIFFUSE LARGE B-CELL LYMPHOMA OF LYMPH NODES OF INGUINAL REGION (HCC): Primary | ICD-10-CM

## 2022-10-07 PROCEDURE — 6360000002 HC RX W HCPCS: Performed by: INTERNAL MEDICINE

## 2022-10-07 PROCEDURE — 96372 THER/PROPH/DIAG INJ SC/IM: CPT

## 2022-10-07 RX ADMIN — PEGFILGRASTIM-BMEZ 6 MG: 6 INJECTION SUBCUTANEOUS at 15:29

## 2022-10-25 ENCOUNTER — OFFICE VISIT (OUTPATIENT)
Dept: ONCOLOGY | Age: 62
End: 2022-10-25
Payer: OTHER GOVERNMENT

## 2022-10-25 ENCOUNTER — HOSPITAL ENCOUNTER (OUTPATIENT)
Dept: LAB | Age: 62
Discharge: HOME | End: 2022-10-28
Payer: COMMERCIAL

## 2022-10-25 VITALS
SYSTOLIC BLOOD PRESSURE: 124 MMHG | BODY MASS INDEX: 29.58 KG/M2 | OXYGEN SATURATION: 99 % | DIASTOLIC BLOOD PRESSURE: 73 MMHG | WEIGHT: 195.2 LBS | RESPIRATION RATE: 12 BRPM | HEIGHT: 68 IN | TEMPERATURE: 97.7 F | HEART RATE: 98 BPM

## 2022-10-25 DIAGNOSIS — R53.83 FATIGUE DUE TO TREATMENT: ICD-10-CM

## 2022-10-25 DIAGNOSIS — C83.35 DIFFUSE LARGE B-CELL LYMPHOMA OF LYMPH NODES OF INGUINAL REGION (HCC): ICD-10-CM

## 2022-10-25 DIAGNOSIS — C83.35 DIFFUSE LARGE B-CELL LYMPHOMA OF LYMPH NODES OF INGUINAL REGION (HCC): Primary | ICD-10-CM

## 2022-10-25 DIAGNOSIS — G62.9 POLYNEUROPATHY: ICD-10-CM

## 2022-10-25 LAB
ALBUMIN SERPL-MCNC: 3.6 G/DL (ref 3.2–4.6)
ALBUMIN/GLOB SERPL: 1.2 {RATIO} (ref 0.4–1.6)
ALP SERPL-CCNC: 77 U/L (ref 50–136)
ALT SERPL-CCNC: 28 U/L (ref 12–65)
ANION GAP SERPL CALC-SCNC: 5 MMOL/L (ref 2–11)
AST SERPL-CCNC: 15 U/L (ref 15–37)
BASOPHILS # BLD: 0.1 K/UL (ref 0–0.2)
BASOPHILS NFR BLD: 1 % (ref 0–2)
BILIRUB SERPL-MCNC: 0.4 MG/DL (ref 0.2–1.1)
BUN SERPL-MCNC: 8 MG/DL (ref 8–23)
CALCIUM SERPL-MCNC: 9.8 MG/DL (ref 8.3–10.4)
CHLORIDE SERPL-SCNC: 107 MMOL/L (ref 101–110)
CO2 SERPL-SCNC: 30 MMOL/L (ref 21–32)
CREAT SERPL-MCNC: 0.9 MG/DL (ref 0.8–1.5)
DIFFERENTIAL METHOD BLD: ABNORMAL
EOSINOPHIL # BLD: 0.2 K/UL (ref 0–0.8)
EOSINOPHIL NFR BLD: 2 % (ref 0.5–7.8)
ERYTHROCYTE [DISTWIDTH] IN BLOOD BY AUTOMATED COUNT: 13.8 % (ref 11.9–14.6)
GLOBULIN SER CALC-MCNC: 3 G/DL (ref 2.8–4.5)
GLUCOSE SERPL-MCNC: 149 MG/DL (ref 65–100)
HCT VFR BLD AUTO: 33.8 %
HGB BLD-MCNC: 11.5 G/DL (ref 13.6–17.2)
IMM GRANULOCYTES # BLD AUTO: 0.1 K/UL (ref 0–0.5)
IMM GRANULOCYTES NFR BLD AUTO: 1 % (ref 0–5)
LDH SERPL L TO P-CCNC: 175 U/L (ref 110–210)
LYMPHOCYTES # BLD: 0.4 K/UL (ref 0.5–4.6)
LYMPHOCYTES NFR BLD: 4 % (ref 13–44)
MAGNESIUM SERPL-MCNC: 1.6 MG/DL (ref 1.8–2.4)
MCH RBC QN AUTO: 32 PG (ref 26.1–32.9)
MCHC RBC AUTO-ENTMCNC: 34 G/DL (ref 31.4–35)
MCV RBC AUTO: 94.2 FL (ref 82–102)
MONOCYTES # BLD: 0.8 K/UL (ref 0.1–1.3)
MONOCYTES NFR BLD: 8 % (ref 4–12)
NEUTS SEG # BLD: 8.3 K/UL (ref 1.7–8.2)
NEUTS SEG NFR BLD: 85 % (ref 43–78)
NRBC # BLD: 0 K/UL (ref 0–0.2)
PLATELET # BLD AUTO: 243 K/UL (ref 150–450)
PMV BLD AUTO: 9.3 FL (ref 9.4–12.3)
POTASSIUM SERPL-SCNC: 4.4 MMOL/L (ref 3.5–5.1)
PROT SERPL-MCNC: 6.6 G/DL (ref 6.3–8.2)
RBC # BLD AUTO: 3.59 M/UL (ref 4.23–5.6)
SODIUM SERPL-SCNC: 142 MMOL/L (ref 133–143)
URATE SERPL-MCNC: 5.6 MG/DL (ref 2.6–6)
WBC # BLD AUTO: 9.8 K/UL (ref 4.3–11.1)

## 2022-10-25 PROCEDURE — 84550 ASSAY OF BLOOD/URIC ACID: CPT

## 2022-10-25 PROCEDURE — 83615 LACTATE (LD) (LDH) ENZYME: CPT

## 2022-10-25 PROCEDURE — 99214 OFFICE O/P EST MOD 30 MIN: CPT | Performed by: NURSE PRACTITIONER

## 2022-10-25 PROCEDURE — 83735 ASSAY OF MAGNESIUM: CPT

## 2022-10-25 PROCEDURE — 85025 COMPLETE CBC W/AUTO DIFF WBC: CPT

## 2022-10-25 PROCEDURE — 36415 COLL VENOUS BLD VENIPUNCTURE: CPT

## 2022-10-25 PROCEDURE — 80053 COMPREHEN METABOLIC PANEL: CPT

## 2022-10-25 RX ORDER — DIPHENHYDRAMINE HYDROCHLORIDE 50 MG/ML
50 INJECTION INTRAMUSCULAR; INTRAVENOUS ONCE
Status: CANCELLED | OUTPATIENT
Start: 2022-10-27 | End: 2022-10-27

## 2022-10-25 RX ORDER — ONDANSETRON 2 MG/ML
8 INJECTION INTRAMUSCULAR; INTRAVENOUS ONCE
Status: CANCELLED | OUTPATIENT
Start: 2022-10-27 | End: 2022-10-27

## 2022-10-25 RX ORDER — FAMOTIDINE 10 MG/ML
20 INJECTION, SOLUTION INTRAVENOUS
Status: CANCELLED | OUTPATIENT
Start: 2022-10-27

## 2022-10-25 RX ORDER — EPINEPHRINE 1 MG/ML
0.3 INJECTION, SOLUTION, CONCENTRATE INTRAVENOUS PRN
Status: CANCELLED | OUTPATIENT
Start: 2022-10-27

## 2022-10-25 RX ORDER — SODIUM CHLORIDE 9 MG/ML
5-40 INJECTION INTRAVENOUS PRN
Status: CANCELLED | OUTPATIENT
Start: 2022-10-27

## 2022-10-25 RX ORDER — MEPERIDINE HYDROCHLORIDE 50 MG/ML
12.5 INJECTION INTRAMUSCULAR; INTRAVENOUS; SUBCUTANEOUS PRN
Status: CANCELLED | OUTPATIENT
Start: 2022-10-27

## 2022-10-25 RX ORDER — ONDANSETRON 2 MG/ML
8 INJECTION INTRAMUSCULAR; INTRAVENOUS
Status: CANCELLED | OUTPATIENT
Start: 2022-10-27

## 2022-10-25 RX ORDER — ACETAMINOPHEN 325 MG/1
650 TABLET ORAL
Status: CANCELLED | OUTPATIENT
Start: 2022-10-27

## 2022-10-25 RX ORDER — ALBUTEROL SULFATE 90 UG/1
4 AEROSOL, METERED RESPIRATORY (INHALATION) PRN
Status: CANCELLED | OUTPATIENT
Start: 2022-10-27

## 2022-10-25 RX ORDER — SODIUM CHLORIDE 9 MG/ML
5-250 INJECTION, SOLUTION INTRAVENOUS PRN
Status: CANCELLED | OUTPATIENT
Start: 2022-10-27

## 2022-10-25 RX ORDER — DOXORUBICIN HYDROCHLORIDE 2 MG/ML
50 INJECTION, SOLUTION INTRAVENOUS ONCE
Status: CANCELLED | OUTPATIENT
Start: 2022-10-27 | End: 2022-10-27

## 2022-10-25 RX ORDER — ACETAMINOPHEN 325 MG/1
650 TABLET ORAL ONCE
Status: CANCELLED | OUTPATIENT
Start: 2022-10-27 | End: 2022-10-27

## 2022-10-25 RX ORDER — HEPARIN SODIUM (PORCINE) LOCK FLUSH IV SOLN 100 UNIT/ML 100 UNIT/ML
500 SOLUTION INTRAVENOUS PRN
Status: CANCELLED | OUTPATIENT
Start: 2022-10-27

## 2022-10-25 RX ORDER — SODIUM CHLORIDE 9 MG/ML
INJECTION, SOLUTION INTRAVENOUS CONTINUOUS
Status: CANCELLED | OUTPATIENT
Start: 2022-10-27

## 2022-10-25 RX ORDER — DIPHENHYDRAMINE HYDROCHLORIDE 50 MG/ML
50 INJECTION INTRAMUSCULAR; INTRAVENOUS
Status: CANCELLED | OUTPATIENT
Start: 2022-10-27

## 2022-10-25 RX ORDER — SODIUM CHLORIDE 0.9 % (FLUSH) 0.9 %
5-40 SYRINGE (ML) INJECTION PRN
Status: CANCELLED | OUTPATIENT
Start: 2022-10-27

## 2022-10-25 ASSESSMENT — PATIENT HEALTH QUESTIONNAIRE - PHQ9
SUM OF ALL RESPONSES TO PHQ QUESTIONS 1-9: 1
SUM OF ALL RESPONSES TO PHQ QUESTIONS 1-9: 1
2. FEELING DOWN, DEPRESSED OR HOPELESS: 1
SUM OF ALL RESPONSES TO PHQ QUESTIONS 1-9: 1
SUM OF ALL RESPONSES TO PHQ QUESTIONS 1-9: 1

## 2022-10-25 NOTE — PROGRESS NOTES
[facility]ology [facility] On[facility]: Office Visit Established Patient    Chief Complaint:    Chief Complaint   Patient presents with    Follow-up           History of Present Illness:  Mr. Patricia Cloud is a 58 y.o. male who returns today for management of diffuse large B cell lymphoma. He presented to his PCP on 4/29/22 reporting an enlarging lump in his left groin for the past 2 months. Physical exam confirmed a mildly tender lump in the patients left inguinal region. The right inguinal region was normal, and no inguinal hernia was noted. Ultrasound of the left groin was performed on 5/13/22 revealing enlarged, abnormal appearing left inguinal lymph nodes measuring up to 3.0 x 3.2 x 1.9 cm. Ultrasound directed biopsy was suggested for further assessment. We saw him for consultation and recommended excisional biopsy, which showed diffuse large B cell lymphoma. PET/CT shows the most prominent areas of disease in the inguinal and iliac nodes, but there are also nodes in the right axilla and retroperitoneum that are consistent with DLBCL, making him at least [employer] [facility] stage III. Bone marrow biopsy showed no morphologic findings but did show a small B cell clone worrisome for lymphoma involvement in the bone marrow, this would make him AA stage IV. His FISH shows no evidence of more aggressive genotypic findings, so he would be most appropriate for R-CHOP for 6 cycles for definitive therapy. PET/CT following 2 cycles revealed CR. He returns today for follow up and cycle 6 R-CHOP which will be his last planned treatment. He is tolerating treatment very well overall. His main complaints are worsening fatigue and back pain with each infusion, seems to peak during the first week after therapy then improve (but not resolve). He reports it was worse with this cycle and and he has had issues sleeping but feels he is able to bear through it.   He has some issues with dexterity in the fingers, and some issues with balance, although his wife notes that this is not a completely new thing for him. No GI symptoms. Denies any fevers or infectious symptoms. Review of Systems:  Constitutional: Positive for fatigue. HENT: Negative. Eyes: Negative. Respiratory: Negative. Cardiovascular: Negative. Gastrointestinal: Negative. Genitourinary: Negative. Musculoskeletal: Positive for back pain. Skin: Negative. Neurological: Negative. Endo/Heme/Allergies: Negative. Psychiatric/Behavioral: Negative. All other systems reviewed and are negative. No Known Allergies  Past Medical History:   Diagnosis Date    Arthritis     osteo    Bilateral carpal tunnel syndrome 10/28/2019    CAD (coronary artery disease) 1999    3 stents--last one placed 2019, pt on daily 81 mg ASA.  Pt states he has been released from cardiologist, pt is followed by [address] [place] [address]    Diabetes 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 10/28/2019    GERD (gastroesophageal reflux disease)     diet controlled    Hx of colonic polyp 2016    adenoma    Hypercholesterolemia     Hypertension     managed with medication     Past Surgical History:   Procedure Laterality Date    COLONOSCOPY  last 4/25/16    Leandra--trans TA--3 year recall due to prep quality    CORONARY ANGIOPLASTY WITH STENT PLACEMENT  2973,3779, 2019    heart cath times 3 - 3 total stents    IR PORT PLACEMENT EQUAL OR GREATER THAN 5 YEARS  6/27/2022    IR PORT PLACEMENT EQUAL OR GREATER THAN 5 YEARS 6/27/2022 [facility] SPECIALS    KNEE ARTHROSCOPY Left 1985    LYMPH NODE BIOPSY Left 6/10/2022    LEFT INGUINAL BIOPSY performed by Brittany Mena MD at [address] [place]  2019    3 rd stent -- cardiac placed    SKIN BIOPSY N/A 6/10/2022    EXCISION BACK NEVUS WL PER ERICK/REQUEST TO FOLLOW performed by David Fernandez MD at [id number] Left 11/15/2018    UVULOPALATOPHARYGOPLASTY       Family History   Problem Relation Age of Onset    Lung Disease Mother     Asthma Mother     Colon Cancer Father     Cancer Father     Cancer Maternal Uncle         colon    Cancer Maternal Uncle         colon    Cancer Maternal Uncle         colon    Cancer Maternal Uncle         colon    Cancer Maternal Uncle         colon    Cancer Maternal Uncle         colon    Cancer Maternal Uncle         colon    Coronary Art Dis Neg Hx      Social History     Socioeconomic History    Marital status:      Spouse name: Not on file    Number of children: Not on file    Years of education: Not on file    Highest education level: Not on file   Occupational History    Not on file   Tobacco Use    Smoking status: Never    Smokeless tobacco: Never   Vaping Use    Vaping Use: Never used   Substance and Sexual Activity    Alcohol use:  Yes     Alcohol/week: 2.0 standard drinks    Drug use: No    Sexual activity: Not Currently   Other Topics Concern    Not on file   Social History Narrative    Not on file     Social Determinants of Health     Financial Resource Strain: Not on file   Food Insecurity: Not on file   Transportation Needs: Not on file   Physical Activity: Not on file   Stress: Not on file   Social Connections: Not on file   Intimate Partner Violence: Not on file   Housing Stability: Not on file     Current Outpatient Medications   Medication Sig Dispense Refill    metFORMIN (GLUCOPHAGE) 1000 MG tablet Take 1,000 mg by mouth 2 times daily (with meals)      escitalopram (LEXAPRO) 10 MG tablet Take 0.5 tablets by mouth daily 30 tablet 0    GVOKE HYPOPEN 2-PACK 1 MG/0.2ML SOAJ Inject 1 mg into the skin as needed (as needed for BG less than 50 mg/dL) 0.4 mL 11    HUMALOG KWIKPEN 100 UNIT/ML SOPN Inject 16 Units into the skin 3 times daily (before meals) Add Correction 5 for 50 above 150 mg/dL. Max Daily Dose; 150 units 45 mL 11    TRESIBA FLEXTOUCH 200 UNIT/ML SOPN Inject 82 Units into the skin daily Titrate by 2 units every 3 days to fasting BG goal of  mg/dL. Max Daily Dose. 120 units 54 mL 11    ONETOUCH ULTRA strip 1 each by In Vitro route daily Check bG 4 times daily E11.65. 100 each 3    Insulin Pen Needle 32G X 4 MM MISC 1 each by Does not apply route daily 400 each 11    Continuous Blood Gluc Sensor (DEXCOM G6 SENSOR) MISC Replaced every 10 days 9 each 1    atorvastatin (LIPITOR) 40 MG tablet Take 0.5 tablets by mouth daily 90 tablet 1    lisinopril (PRINIVIL;ZESTRIL) 10 MG tablet Take 1 tablet by mouth daily 90 tablet 1    traZODone (DESYREL) 100 MG tablet Take 1 tablet by mouth nightly 90 tablet 1    ibuprofen (ADVIL;MOTRIN) 800 MG tablet Take 1 tablet by mouth every 8 hours as needed for Pain 120 tablet 5    lidocaine-prilocaine (EMLA) 2.5-2.5 % cream Apply topically as needed. Place small amount to port site 45 minutes prior to any blood draw or infusion. Cover with plastic wrap 30 g 2    Multiple Vitamins-Minerals (MENS 50+ ADVANCED PO) Take by mouth      ZINC PO Take by mouth daily      ascorbic acid (VITAMIN C) 500 MG tablet Take 500 mg by mouth daily      aspirin 81 MG EC tablet Take 81 mg by mouth daily       melatonin 5 MG TABS tablet Take 10 mg by mouth       Continuous Blood Gluc Sensor (DEXCOM G6 SENSOR) MISC Change every 10 days as directed. (Patient not taking: Reported on 10/25/2022) 9 each 3    Continuous Blood Gluc Transmit (DEXCOM G6 TRANSMITTER) MISC Change every 90 days as directed.  (Patient not taking: Reported on 10/25/2022) 1 each 3    Continuous Blood Gluc Transmit (DEXCOM G6 TRANSMITTER) MISC Replace every 90 days (Patient not taking: Reported on 10/25/2022) 1 each 1    prochlorperazine (COMPAZINE) 10 MG tablet Take 1 tablet by mouth every 6 hours as needed (nausea/vomitting) (Patient not taking: No sig reported) 120 tablet 3     Current Facility-Administered Medications   Medication Dose Route Frequency Provider Last Rate Last Admin    magnesium oxide (MAG-OX) tablet 400 mg  400 mg Oral TID ADA Lorenz           OBJECTIVE:  /73 (Site: Left Upper Arm, Position: Standing)   Pulse 98   Temp 97.7 °F (36.5 °C)   Resp 12   Ht 5' 8\" (1.727 m)   Wt 195 lb 3.2 oz (88.5 kg)   SpO2 99%   BMI 29.68 kg/m²     Physical Exam:  Constitutional: Well developed, well nourished male in no acute distress, sitting comfortably in the exam room chair. HEENT: Normocephalic and atraumatic. Sclerae anicteric. Neck supple without JVD. No thyromegaly present. Lymph node   Deferred   Skin Warm and dry. No bruising and no rash noted. No erythema. No pallor. Respiratory Lungs are clear to auscultation bilaterally without wheezes, rales or rhonchi, normal air exchange without accessory muscle use. CVS Normal rate, regular rhythm and normal S1 and S2. No murmurs, gallops, or rubs. Abdomen Soft, nontender and nondistended, normoactive bowel sounds. Neuro Grossly nonfocal with no obvious sensory or motor deficits. MSK Normal range of motion in general.  No edema and no tenderness. Psych Appropriate mood and affect.           Labs:  Recent Results (from the past 96 hour(s))   CBC with Auto Differential    Collection Time: 10/25/22 11:10 AM   Result Value Ref Range    WBC 9.8 4.3 - 11.1 K/uL    RBC 3.59 (L) 4.23 - 5.6 M/uL    Hemoglobin 11.5 (L) 13.6 - 17.2 g/dL    Hematocrit 33.8 %    MCV 94.2 82.0 - 102.0 FL    MCH 32.0 26.1 - 32.9 PG    MCHC 34.0 31.4 - 35.0 g/dL    RDW 13.8 11.9 - 14.6 %    Platelets 096 088 - 032 K/uL    MPV 9.3 (L) 9.4 - 12.3 FL    nRBC 0.00 0.0 - 0.2 K/uL    Differential Type AUTOMATED      Seg Neutrophils 85 (H) 43 - 78 %    Lymphocytes 4 (L) 13 - 44 %    Monocytes 8 4.0 - 12.0 %    Eosinophils % 2 0.5 - 7.8 %    Basophils 1 0.0 - 2.0 %    Immature Granulocytes 1 0.0 - 5.0 %    Segs Absolute 8.3 (H) 1.7 - 8.2 K/UL    Absolute Lymph # 0.4 (L) 0.5 - 4.6 K/UL    Absolute Mono # 0.8 0.1 - 1.3 K/UL    Absolute Eos # 0.2 0.0 - 0.8 K/UL    Basophils Absolute 0.1 0.0 - 0.2 K/UL    Absolute Immature Granulocyte 0.1 0.0 - 0.5 K/UL   Comprehensive Metabolic Panel    Collection Time: 10/25/22 11:10 AM   Result Value Ref Range    Sodium 142 133 - 143 mmol/L    Potassium 4.4 3.5 - 5.1 mmol/L    Chloride 107 101 - 110 mmol/L    CO2 30 21 - 32 mmol/L    Anion Gap 5 2 - 11 mmol/L    Glucose 149 (H) 65 - 100 mg/dL    BUN 8 8 - 23 MG/DL    Creatinine 0.90 0.8 - 1.5 MG/DL    Est, Glom Filt Rate >60 >60 ml/min/1.73m2    Calcium 9.8 8.3 - 10.4 MG/DL    Total Bilirubin 0.4 0.2 - 1.1 MG/DL    ALT 28 12 - 65 U/L    AST 15 15 - 37 U/L    Alk Phosphatase 77 50 - 136 U/L    Total Protein 6.6 6.3 - 8.2 g/dL    Albumin 3.6 3.2 - 4.6 g/dL    Globulin 3.0 2.8 - 4.5 g/dL    Albumin/Globulin Ratio 1.2 0.4 - 1.6     Magnesium    Collection Time: 10/25/22 11:10 AM   Result Value Ref Range    Magnesium 1.6 (L) 1.8 - 2.4 mg/dL   Uric Acid    Collection Time: 10/25/22 11:10 AM   Result Value Ref Range    Uric Acid 5.6 2.6 - 6.0 MG/DL   Lactate Dehydrogenase    Collection Time: 10/25/22 11:10 AM   Result Value Ref Range     110 - 210 U/L           Imaging:  PET CT SKULL BASE TO MID THIGH    Result Date: 6/29/2022  EXAMINATION: PET CT SKULL BASE TO MID THIGH 6/29/2022 1:42 PM ACCESSION NUMBER: [id number] COMPARISON: None available INDICATION: Diffuse large b-cell lymphoma, lymph nodes of inguinal region and lower limb, initial staging TECHNIQUE:   Radiopharmaceutical: 12.83 mCi F18-FDG IV. Positron emission tomography (PET) with concurrently acquired computed tomography (CT) for attenuation correction and anatomical localization imaging; skull base to mid-thigh. Blood glucose at the time of tracer administration is 158 mg/dl, which is adequate for imaging.  Approximately 60 minutes after administration of the radiopharmaceutical imaging was initiated. SUV max calculated from patient body wt. Noncaloric dilute oral contrast is given. For this CT scanner at least one of the following techniques is utilized to decrease patient radiation exposure: Automatic exposure control, modulation of MA and KVP based on patient weight, and iterative reconstruction. FINDINGS:  Head/Neck: No hypermetabolic cervical lymphadenopathy. No abnormal radiotracer uptake within the brain, however the normal intense uptake limits evaluation. Chest: No hypermetabolic pulmonary nodule. A 1.7 x 1.2 cm posterior mediastinal retroaortic nodule/lymph node demonstrates mild FDG uptake (max SUV 3.5). Moderately metabolic nonenlarged right axillary lymph node (max SUV 5.9). Partial visualization of a moderately metabolic right upper extremity lymph node adjacent to the distal humerus (max SUV 5.6). 6 mm mildly metabolic prevascular lymph node adjacent to the main pulmonary artery (max SUV 2.8). 8 mm subcutaneous nodule along the right inferior chest wall (max SUV 1.4). Port within the right anterior chest wall. The port catheter tip terminates at the cavoatrial junction. Other changes in her coronary artery calcifications. Abdomen/Pelvis: Hypermetabolic left inguinal lymph node conglomerate measuring 3.9 x 3.5 cm (max SUV 11.9). Enlarged and hypermetabolic right inguinal lymph nodes (max SUV 8.1). Postsurgical changes within the left inguinal region. Hypermetabolic left iliac and pelvic lymph nodes. For example, a left obturator lymph node measures 3.9 x 2.1 cm (max SUV 10.9). Moderately metabolic left periaortic lymph node (max SUV 4.2). Enlarged and hypermetabolic upper abdominal lymph nodes with an aortocaval lymph node conglomerate demonstrating a max SUV of 5.5. The spleen does not appear enlarged. Physiologic uptake within the gastrointestinal and genitourinary tracts. Scattered vascular calcifications. The prostate gland is not enlarged.  Gallstones without evidence of acute cholecystitis. Bones: No hypermetabolic osseous focus to suggest osseous metastatic disease. 1.  Hypermetabolic lymphadenopathy involving the bilateral inguinal, left iliac, retroperitoneal and upper abdomen, compatible with biopsy-proven B-cell lymphoma. 2.  There are additional mild to moderately metabolic mediastinal and right axillary lymph nodes, favored to represent additional sites of tia disease. 3.  Nonspecific 8 mm subcutaneous nodule within the right lower chest wall demonstrating mild FDG uptake, equivocal for additional site of disease. 4.  No splenomegaly. Pathology:  DIAGNOSIS        A:  \"LEFT INGUINAL LYMPH NODE\":             DIFFUSE LARGE B-CELL LYMPHOMA. SEE COMMENT. B:  \"NEVUS OF BACK\":             PENDING DERMATOPATHOLOGY CONSULT. Comment        A:  Histologic sections show a diffuse serpiginous infiltrate of   large atypical lymphocytes with indented nuclei, inconspicuous nucleoli   and moderate amounts of cytoplasm. Immunohistochemical stains show that   the lymphoma cells are positive for CD20, Albertson-5, CD10 and Bcl-6 and   negative for Bcl-2, MUM-1, CD5, Cyclin D1, CD30 (0% positivity) and c-MYC   (less than 40% positivity). CD3 stain highlights background small   T-lymphocytes. Ki-67 shows an increased proliferation index in the   serpiginous areas of more than 90%. The morphologic and immunophenotypic   appearance supports a diagnosis of germinal center subtype of diffuse   large B-cell lymphoma. FISH studies have been requested and will be   reported separately. ASSESSMENT:   Diagnosis Orders   1. Diffuse large B-cell lymphoma of lymph nodes of inguinal region ([facility] [address]  CBC With Auto Differential    Comprehensive metabolic panel      2. Fatigue due to treatment        3. Polyneuropathy                      PLAN:  Lab studies and imaging studies were personally reviewed.     DLBCL: high grade with Ki-67 90%, GC subtype, diagnosed with excisional biopsy of lymphadenopathy in left groin, 3.2 cm on ultrasound, enlarging over 3 months time. PET/CT reviewed and shows the most prominent areas of disease in the inguinal and iliac nodes, but there are also nodes in the right axilla and retroperitoneum that are consistent with DLBCL, making him at least [employer] [facility] stage III. Bone marrow biopsy showed no morphologic findings but did show a small B cell clone worrisome for lymphoma involvement in the bone marrow. His FISH shows no evidence of more aggressive genotypic findings, so he would be most appropriate for R-CHOP for 6 cycles for definitive therapy. PET/CT after 2 cycles shows CR, continue to 6 cycles as planned. He returns today for follow up and cycle 6 R-CHOP which will be his last planned treatment. He is tolerating treatment very well overall. His main complaints are worsening fatigue and back pain with each infusion, seems to peak during the first week after therapy then improve (but not resolve). He reports it was worse with this cycle and and he has had issues sleeping but feels he is able to bear through it. He has some issues with dexterity in the fingers, and some issues with balance, although his wife notes that this is not a completely new thing for him. No GI symptoms. Denies any fevers or infectious symptoms. Labs reviewed and adequate to proceed with treatment. Although he has some symptoms that are therapy related, none of these are prohibitive including neuropathy and back pain. I expect these to improve at completion of treatment, although neuropathy may persist or take longer to recover due to DM. All questions were asked and answered to the best of my ability. F/u for PET/CT and OV following cycle 6 R-CHOP. This will be the final cycle and we will enter observation if repeat PET confirms CR.                Lennox Look, APRN - [id number] [facility]  [address]  Office : [phone]  Fax : [phone]

## 2022-10-27 ENCOUNTER — HOSPITAL ENCOUNTER (OUTPATIENT)
Dept: INFUSION THERAPY | Age: 62
Discharge: HOME | End: 2022-10-27
Payer: COMMERCIAL

## 2022-10-27 VITALS
SYSTOLIC BLOOD PRESSURE: 120 MMHG | RESPIRATION RATE: 16 BRPM | DIASTOLIC BLOOD PRESSURE: 68 MMHG | WEIGHT: 195 LBS | BODY MASS INDEX: 29.65 KG/M2 | HEART RATE: 70 BPM | TEMPERATURE: 97.8 F | OXYGEN SATURATION: 97 %

## 2022-10-27 DIAGNOSIS — C83.35 DIFFUSE LARGE B-CELL LYMPHOMA OF LYMPH NODES OF INGUINAL REGION (HCC): Primary | ICD-10-CM

## 2022-10-27 PROCEDURE — 96413 CHEMO IV INFUSION 1 HR: CPT

## 2022-10-27 PROCEDURE — 96367 TX/PROPH/DG ADDL SEQ IV INF: CPT

## 2022-10-27 PROCEDURE — 6360000002 HC RX W HCPCS: Performed by: NURSE PRACTITIONER

## 2022-10-27 PROCEDURE — 96415 CHEMO IV INFUSION ADDL HR: CPT

## 2022-10-27 PROCEDURE — 96375 TX/PRO/DX INJ NEW DRUG ADDON: CPT

## 2022-10-27 PROCEDURE — 6370000000 HC RX 637 (ALT 250 FOR IP): Performed by: NURSE PRACTITIONER

## 2022-10-27 PROCEDURE — 2580000003 HC RX 258: Performed by: NURSE PRACTITIONER

## 2022-10-27 PROCEDURE — 96417 CHEMO IV INFUS EACH ADDL SEQ: CPT

## 2022-10-27 PROCEDURE — 96411 CHEMO IV PUSH ADDL DRUG: CPT

## 2022-10-27 RX ORDER — DOXORUBICIN HYDROCHLORIDE 2 MG/ML
100 INJECTION, SOLUTION INTRAVENOUS ONCE
Status: COMPLETED | OUTPATIENT
Start: 2022-10-27 | End: 2022-10-27

## 2022-10-27 RX ORDER — ONDANSETRON 2 MG/ML
8 INJECTION INTRAMUSCULAR; INTRAVENOUS ONCE
Status: COMPLETED | OUTPATIENT
Start: 2022-10-27 | End: 2022-10-27

## 2022-10-27 RX ORDER — MEPERIDINE HYDROCHLORIDE 25 MG/ML
12.5 INJECTION INTRAMUSCULAR; INTRAVENOUS; SUBCUTANEOUS PRN
Status: DISCONTINUED | OUTPATIENT
Start: 2022-10-27 | End: 2022-10-28 | Stop reason: HOSPADM

## 2022-10-27 RX ORDER — SODIUM CHLORIDE 9 MG/ML
5-250 INJECTION, SOLUTION INTRAVENOUS PRN
Status: DISCONTINUED | OUTPATIENT
Start: 2022-10-27 | End: 2022-10-28 | Stop reason: HOSPADM

## 2022-10-27 RX ORDER — SODIUM CHLORIDE 0.9 % (FLUSH) 0.9 %
5-40 SYRINGE (ML) INJECTION PRN
Status: DISCONTINUED | OUTPATIENT
Start: 2022-10-27 | End: 2022-10-28 | Stop reason: HOSPADM

## 2022-10-27 RX ORDER — DIPHENHYDRAMINE HYDROCHLORIDE 50 MG/ML
50 INJECTION INTRAMUSCULAR; INTRAVENOUS ONCE
Status: COMPLETED | OUTPATIENT
Start: 2022-10-27 | End: 2022-10-27

## 2022-10-27 RX ORDER — ACETAMINOPHEN 325 MG/1
650 TABLET ORAL ONCE
Status: COMPLETED | OUTPATIENT
Start: 2022-10-27 | End: 2022-10-27

## 2022-10-27 RX ADMIN — ONDANSETRON 8 MG: 2 INJECTION INTRAMUSCULAR; INTRAVENOUS at 10:38

## 2022-10-27 RX ADMIN — FOSAPREPITANT 150 MG: 150 INJECTION, POWDER, LYOPHILIZED, FOR SOLUTION INTRAVENOUS at 10:42

## 2022-10-27 RX ADMIN — SODIUM CHLORIDE 100 ML/HR: 9 INJECTION, SOLUTION INTRAVENOUS at 08:29

## 2022-10-27 RX ADMIN — RITUXIMAB 700 MG: 10 INJECTION, SOLUTION INTRAVENOUS at 09:07

## 2022-10-27 RX ADMIN — DOXORUBICIN HYDROCHLORIDE 100 MG: 2 INJECTION, SOLUTION INTRAVENOUS at 11:45

## 2022-10-27 RX ADMIN — CYCLOPHOSPHAMIDE 1500 MG: 1 INJECTION, POWDER, FOR SOLUTION INTRAVENOUS; ORAL at 11:06

## 2022-10-27 RX ADMIN — DIPHENHYDRAMINE HYDROCHLORIDE 50 MG: 50 INJECTION, SOLUTION INTRAMUSCULAR; INTRAVENOUS at 08:32

## 2022-10-27 RX ADMIN — SODIUM CHLORIDE, PRESERVATIVE FREE 10 ML: 5 INJECTION INTRAVENOUS at 12:08

## 2022-10-27 RX ADMIN — VINCRISTINE SULFATE 2 MG: 1 INJECTION, SOLUTION INTRAVENOUS at 11:55

## 2022-10-27 RX ADMIN — ACETAMINOPHEN 650 MG: 325 TABLET, FILM COATED ORAL at 08:29

## 2022-10-27 NOTE — PROGRESS NOTES
Arrived to the [facility]. HOP completed. Patient tolerated without problems. Any issues or concerns during appointment: no.  Patient aware of next infusion appointment on 10/28/22 (date) at 1600 (time). Patient instructed to call provider with temperature of 100.4 or greater or nausea/vomiting/ diarrhea or pain not controlled by medications  Discharged ambulatory with spouse. Booster status unknown

## 2022-10-28 ENCOUNTER — HOSPITAL ENCOUNTER (OUTPATIENT)
Dept: INFUSION THERAPY | Age: 62
Discharge: HOME | End: 2022-10-28
Payer: COMMERCIAL

## 2022-10-28 VITALS
DIASTOLIC BLOOD PRESSURE: 77 MMHG | TEMPERATURE: 97.5 F | OXYGEN SATURATION: 94 % | HEART RATE: 91 BPM | SYSTOLIC BLOOD PRESSURE: 112 MMHG | RESPIRATION RATE: 16 BRPM

## 2022-10-28 DIAGNOSIS — C83.35 DIFFUSE LARGE B-CELL LYMPHOMA OF LYMPH NODES OF INGUINAL REGION (HCC): Primary | ICD-10-CM

## 2022-10-28 PROCEDURE — 6360000002 HC RX W HCPCS: Performed by: NURSE PRACTITIONER

## 2022-10-28 PROCEDURE — 96372 THER/PROPH/DIAG INJ SC/IM: CPT

## 2022-10-28 RX ADMIN — PEGFILGRASTIM-BMEZ 6 MG: 6 INJECTION SUBCUTANEOUS at 16:05

## 2022-10-28 NOTE — PROGRESS NOTES
Arrived to the [facility]. Jeanine Keen completed. Provided education on [employer]    Patient instructed to report any side affects to ordering provider. Patient tolerated without complications. Any issues or concerns during appointment: NO.  Patient aware of next infusion appointment on 11/15/22  at 1130. Discharged ambulatory.

## 2022-11-14 ENCOUNTER — HOSPITAL ENCOUNTER (OUTPATIENT)
Dept: PET IMAGING | Age: 62
Discharge: HOME | End: 2022-11-17
Payer: OTHER GOVERNMENT

## 2022-11-14 DIAGNOSIS — C83.35 DIFFUSE LARGE B-CELL LYMPHOMA OF LYMPH NODES OF INGUINAL REGION (HCC): ICD-10-CM

## 2022-11-14 LAB
GLUCOSE BLD STRIP.AUTO-MCNC: 159 MG/DL (ref 65–100)
SERVICE CMNT-IMP: ABNORMAL

## 2022-11-14 PROCEDURE — 78815 PET IMAGE W/CT SKULL-THIGH: CPT

## 2022-11-14 PROCEDURE — 82962 GLUCOSE BLOOD TEST: CPT

## 2022-11-14 PROCEDURE — A9552 F18 FDG: HCPCS | Performed by: INTERNAL MEDICINE

## 2022-11-14 PROCEDURE — 3430000000 HC RX DIAGNOSTIC RADIOPHARMACEUTICAL: Performed by: INTERNAL MEDICINE

## 2022-11-14 PROCEDURE — 6360000004 HC RX CONTRAST MEDICATION: Performed by: INTERNAL MEDICINE

## 2022-11-14 RX ORDER — FLUDEOXYGLUCOSE F 18 200 MCI/ML
15.45 INJECTION, SOLUTION INTRAVENOUS
Status: COMPLETED | OUTPATIENT
Start: 2022-11-14 | End: 2022-11-14

## 2022-11-14 RX ADMIN — FLUDEOXYGLUCOSE F 18 15.45 MILLICURIE: 200 INJECTION, SOLUTION INTRAVENOUS at 09:32

## 2022-11-14 RX ADMIN — DIATRIZOATE MEGLUMINE AND DIATRIZOATE SODIUM 10 ML: 660; 100 LIQUID ORAL; RECTAL at 09:45

## 2022-11-15 ENCOUNTER — CLINICAL DOCUMENTATION (OUTPATIENT)
Dept: CASE MANAGEMENT | Age: 62
End: 2022-11-15

## 2022-11-15 ENCOUNTER — HOSPITAL ENCOUNTER (OUTPATIENT)
Dept: LAB | Age: 62
Discharge: HOME | End: 2022-11-18
Payer: OTHER GOVERNMENT

## 2022-11-15 ENCOUNTER — OFFICE VISIT (OUTPATIENT)
Dept: ONCOLOGY | Age: 62
End: 2022-11-15
Payer: OTHER GOVERNMENT

## 2022-11-15 VITALS
HEIGHT: 68 IN | TEMPERATURE: 97.8 F | HEART RATE: 111 BPM | OXYGEN SATURATION: 96 % | BODY MASS INDEX: 28.6 KG/M2 | SYSTOLIC BLOOD PRESSURE: 99 MMHG | RESPIRATION RATE: 16 BRPM | WEIGHT: 188.7 LBS | DIASTOLIC BLOOD PRESSURE: 73 MMHG

## 2022-11-15 DIAGNOSIS — C83.30 DIFFUSE LARGE B-CELL LYMPHOMA, UNSPECIFIED BODY REGION (HCC): Primary | ICD-10-CM

## 2022-11-15 DIAGNOSIS — C83.35 DIFFUSE LARGE B-CELL LYMPHOMA OF LYMPH NODES OF INGUINAL REGION (HCC): ICD-10-CM

## 2022-11-15 DIAGNOSIS — C83.35 DIFFUSE LARGE B-CELL LYMPHOMA OF LYMPH NODES OF INGUINAL REGION (HCC): Primary | ICD-10-CM

## 2022-11-15 LAB
ALBUMIN SERPL-MCNC: 3.7 G/DL (ref 3.2–4.6)
ALBUMIN/GLOB SERPL: 1.2 {RATIO} (ref 0.4–1.6)
ALP SERPL-CCNC: 94 U/L (ref 50–136)
ALT SERPL-CCNC: 29 U/L (ref 12–65)
ANION GAP SERPL CALC-SCNC: 5 MMOL/L (ref 2–11)
AST SERPL-CCNC: 14 U/L (ref 15–37)
BASOPHILS # BLD: 0.1 K/UL (ref 0–0.2)
BASOPHILS NFR BLD: 1 % (ref 0–2)
BILIRUB SERPL-MCNC: 0.6 MG/DL (ref 0.2–1.1)
BUN SERPL-MCNC: 11 MG/DL (ref 8–23)
CALCIUM SERPL-MCNC: 9.5 MG/DL (ref 8.3–10.4)
CHLORIDE SERPL-SCNC: 102 MMOL/L (ref 101–110)
CO2 SERPL-SCNC: 28 MMOL/L (ref 21–32)
CREAT SERPL-MCNC: 0.9 MG/DL (ref 0.8–1.5)
DIFFERENTIAL METHOD BLD: ABNORMAL
EOSINOPHIL # BLD: 0.1 K/UL (ref 0–0.8)
EOSINOPHIL NFR BLD: 1 % (ref 0.5–7.8)
ERYTHROCYTE [DISTWIDTH] IN BLOOD BY AUTOMATED COUNT: 13.2 % (ref 11.9–14.6)
GLOBULIN SER CALC-MCNC: 3.2 G/DL (ref 2.8–4.5)
GLUCOSE SERPL-MCNC: 172 MG/DL (ref 65–100)
HCT VFR BLD AUTO: 33.3 %
HGB BLD-MCNC: 11.5 G/DL (ref 13.6–17.2)
IMM GRANULOCYTES # BLD AUTO: 0.1 K/UL (ref 0–0.5)
IMM GRANULOCYTES NFR BLD AUTO: 2 % (ref 0–5)
LDH SERPL L TO P-CCNC: 229 U/L (ref 110–210)
LYMPHOCYTES # BLD: 0.3 K/UL (ref 0.5–4.6)
LYMPHOCYTES NFR BLD: 4 % (ref 13–44)
MAGNESIUM SERPL-MCNC: 1.7 MG/DL (ref 1.8–2.4)
MCH RBC QN AUTO: 31.9 PG (ref 26.1–32.9)
MCHC RBC AUTO-ENTMCNC: 34.5 G/DL (ref 31.4–35)
MCV RBC AUTO: 92.5 FL (ref 82–102)
MONOCYTES # BLD: 1.1 K/UL (ref 0.1–1.3)
MONOCYTES NFR BLD: 13 % (ref 4–12)
NEUTS SEG # BLD: 6.8 K/UL (ref 1.7–8.2)
NEUTS SEG NFR BLD: 79 % (ref 43–78)
NRBC # BLD: 0 K/UL (ref 0–0.2)
PLATELET # BLD AUTO: 249 K/UL (ref 150–450)
PMV BLD AUTO: 9.1 FL (ref 9.4–12.3)
POTASSIUM SERPL-SCNC: 4.6 MMOL/L (ref 3.5–5.1)
PROT SERPL-MCNC: 6.9 G/DL (ref 6.3–8.2)
RBC # BLD AUTO: 3.6 M/UL (ref 4.23–5.6)
SODIUM SERPL-SCNC: 135 MMOL/L (ref 133–143)
URATE SERPL-MCNC: 5.5 MG/DL (ref 2.6–6)
WBC # BLD AUTO: 8.6 K/UL (ref 4.3–11.1)

## 2022-11-15 PROCEDURE — 83735 ASSAY OF MAGNESIUM: CPT

## 2022-11-15 PROCEDURE — 3078F DIAST BP <80 MM HG: CPT | Performed by: INTERNAL MEDICINE

## 2022-11-15 PROCEDURE — 84550 ASSAY OF BLOOD/URIC ACID: CPT

## 2022-11-15 PROCEDURE — 80053 COMPREHEN METABOLIC PANEL: CPT

## 2022-11-15 PROCEDURE — 99214 OFFICE O/P EST MOD 30 MIN: CPT | Performed by: INTERNAL MEDICINE

## 2022-11-15 PROCEDURE — 85025 COMPLETE CBC W/AUTO DIFF WBC: CPT

## 2022-11-15 PROCEDURE — 83615 LACTATE (LD) (LDH) ENZYME: CPT

## 2022-11-15 PROCEDURE — 3074F SYST BP LT 130 MM HG: CPT | Performed by: INTERNAL MEDICINE

## 2022-11-15 PROCEDURE — 36415 COLL VENOUS BLD VENIPUNCTURE: CPT

## 2022-11-15 RX ORDER — ESCITALOPRAM OXALATE 10 MG/1
5 TABLET ORAL DAILY
Qty: 30 TABLET | Refills: 0 | Status: SHIPPED | OUTPATIENT
Start: 2022-11-15

## 2022-11-15 RX ORDER — OMEPRAZOLE 20 MG/1
CAPSULE, DELAYED RELEASE ORAL
COMMUNITY
Start: 2022-11-11

## 2022-11-15 ASSESSMENT — PATIENT HEALTH QUESTIONNAIRE - PHQ9
SUM OF ALL RESPONSES TO PHQ QUESTIONS 1-9: 1
SUM OF ALL RESPONSES TO PHQ9 QUESTIONS 1 & 2: 1
2. FEELING DOWN, DEPRESSED OR HOPELESS: 1
SUM OF ALL RESPONSES TO PHQ QUESTIONS 1-9: 1
1. LITTLE INTEREST OR PLEASURE IN DOING THINGS: 0

## 2022-11-15 NOTE — PATIENT INSTRUCTIONS
Patient Instructions from Today's Visit    Reason for Visit:  PET review       Plan:  - PET scab looks GREAT! !  - You are in complete REMISSION! !  - The fatigue will take some time to go away, if you try and stay active it will actually help reduce your fatigue  - Please if you feel like you have any questions or concerns, feel free to call the office at [phone] and ask for Dr Jerez Host nurse and she will be able to help you   - Stay on the lexapro for now    Follow Up:  As schedule    Recent Lab Results:  Hospital Outpatient Visit on 11/15/2022   Component Date Value Ref Range Status    WBC 11/15/2022 8.6  4.3 - 11.1 K/uL Final    RBC 11/15/2022 3.60 (A)  4.23 - 5.6 M/uL Final    Hemoglobin 11/15/2022 11.5 (A)  13.6 - 17.2 g/dL Final    Hematocrit 11/15/2022 33.3  % Final    MCV 11/15/2022 92.5  82.0 - 102.0 FL Final    MCH 11/15/2022 31.9  26.1 - 32.9 PG Final    MCHC 11/15/2022 34.5  31.4 - 35.0 g/dL Final    RDW 11/15/2022 13.2  11.9 - 14.6 % Final    Platelets 46/14/4014 249  150 - 450 K/uL Final    MPV 11/15/2022 9.1 (A)  9.4 - 12.3 FL Final    nRBC 11/15/2022 0.00  0.0 - 0.2 K/uL Final    **Note: Absolute NRBC parameter is now reported with Hemogram**    Seg Neutrophils 11/15/2022 79 (A)  43 - 78 % Final    Lymphocytes 11/15/2022 4 (A)  13 - 44 % Final    Monocytes 11/15/2022 13 (A)  4.0 - 12.0 % Final    Eosinophils % 11/15/2022 1  0.5 - 7.8 % Final    Basophils 11/15/2022 1  0.0 - 2.0 % Final    Immature Granulocytes 11/15/2022 2  0.0 - 5.0 % Final    Segs Absolute 11/15/2022 6.8  1.7 - 8.2 K/UL Final    Absolute Lymph # 11/15/2022 0.3 (A)  0.5 - 4.6 K/UL Final    Absolute Mono # 11/15/2022 1.1  0.1 - 1.3 K/UL Final    Absolute Eos # 11/15/2022 0.1  0.0 - 0.8 K/UL Final    Basophils Absolute 11/15/2022 0.1  0.0 - 0.2 K/UL Final    Absolute Immature Granulocyte 11/15/2022 0.1  0.0 - 0.5 K/UL Final    Differential Type 11/15/2022 AUTOMATED    Final     Treatment Summary has been discussed and given to patient: n/a    -------------------------------------------------------------------------------------------------------------------  Please call our office at [phone] if you have any  of the following symptoms:   Fever of 100.5 or greater  Chills  Shortness of breath  Swelling or pain in one leg    After office hours an answering service is available and will contact a provider for emergencies or if you are experiencing any of the above symptoms. Patient did express an interest in My Chart. My Chart log in information explained on the after visit summary printout at the [address] desk.     Mary Vazquez RN

## 2022-11-15 NOTE — PROGRESS NOTES
Pt will return to clinic in 3 months and in 6 months will get a PET. Pt wants port removed will get that scheduled     At this time, navigation is signing off. Will remain available for any future needs.

## 2022-11-15 NOTE — PROGRESS NOTES
[facility] Hematology and Oncology: Office Visit Established Patient    Chief Complaint:    Chief Complaint   Patient presents with    Follow-up           History of Present Illness:  Mr. Nikki Luna is a 58 y.o. male who returns today for management of diffuse large B cell lymphoma. He presented to his PCP on 4/29/22 reporting an enlarging lump in his left groin for the past 2 months. Physical exam confirmed a mildly tender lump in the patients left inguinal region. The right inguinal region was normal, and no inguinal hernia was noted. Ultrasound of the left groin was performed on 5/13/22 revealing enlarged, abnormal appearing left inguinal lymph nodes measuring up to 3.0 x 3.2 x 1.9 cm. Ultrasound directed biopsy was suggested for further assessment. We saw him for consultation and recommended excisional biopsy, which showed diffuse large B cell lymphoma. PET/CT shows the most prominent areas of disease in the inguinal and iliac nodes, but there are also nodes in the right axilla and retroperitoneum that are consistent with DLBCL, making him at least [place] stage III. Bone marrow biopsy showed no morphologic findings but did show a small B cell clone worrisome for lymphoma involvement in the bone marrow, this would make him AA stage IV. His FISH shows no evidence of more aggressive genotypic findings, so he would be most appropriate for R-CHOP for 6 cycles for definitive therapy. PET/CT following 2 cycles revealed CR. He returns today for follow up after cycle 6 R-CHOP and restaging. He is doing very well overall. He does have some expected toxicity including fatigue and neuropathy, his dexterity has been affected in his fingers. He had some diarrhea and resultant weight loss this cycle. His port is causing some discomfort and he is ready for this to be removed. Review of Systems:  Constitutional: Positive for fatigue. HENT: Negative. Eyes: Negative. Respiratory: Negative. Cardiovascular: Negative. Gastrointestinal: Negative. Genitourinary: Negative. Musculoskeletal: Positive for back pain. Skin: Negative. Neurological: Positive for neuropathy. Endo/Heme/Allergies: Negative. Psychiatric/Behavioral: Negative. All other systems reviewed and are negative. No Known Allergies  Past Medical History:   Diagnosis Date    Arthritis     osteo    Bilateral carpal tunnel syndrome 10/28/2019    CAD (coronary artery disease) 1999    3 stents--last one placed 2019, pt on daily 81 mg ASA.  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 10/28/2019    GERD (gastroesophageal reflux disease)     diet controlled    Hx of colonic polyp 2016    adenoma    Hypercholesterolemia     Hypertension     managed with medication     Past Surgical History:   Procedure Laterality Date    COLONOSCOPY  last 4/25/16    Leandra--trans TA--3 year recall due to prep quality    CORONARY ANGIOPLASTY WITH STENT PLACEMENT  [id number], 2019    heart cath times 3 - 3 total stents    IR PORT PLACEMENT EQUAL OR GREATER THAN 5 YEARS  6/27/2022    IR PORT PLACEMENT EQUAL OR GREATER THAN 5 YEARS 6/27/2022 [facility] RADIOLOGY SPECIALS    KNEE ARTHROSCOPY Left 1985    LYMPH NODE BIOPSY Left 6/10/2022    LEFT INGUINAL BIOPSY performed by Karlie Myers MD at [address]  2019    3 rd stent -- cardiac placed    SKIN BIOPSY N/A 6/10/2022    EXCISION BACK NEVUS WL PER ERICK/REQUEST TO FOLLOW performed by Karlie Myers MD at [id number] Left 11/15/2018    UVULOPALATOPHARYGOPLASTY       Family History   Problem Relation Age of Onset    Lung Disease Mother     Asthma Mother     Colon Cancer Father     Cancer Father     Cancer Maternal Uncle         colon    Cancer Maternal Uncle         colon    Cancer Maternal Uncle         colon    Cancer Maternal Uncle         colon    Cancer Maternal Uncle         colon    Cancer Maternal Uncle         colon    Cancer Maternal Uncle         colon    Coronary Art Dis Neg Hx      Social History     Socioeconomic History    Marital status:      Spouse name: Not on file    Number of children: Not on file    Years of education: Not on file    Highest education level: Not on file   Occupational History    Not on file   Tobacco Use    Smoking status: Never    Smokeless tobacco: Never   Vaping Use    Vaping Use: Never used   Substance and Sexual Activity    Alcohol use: Yes     Alcohol/week: 2.0 standard drinks    Drug use: No    Sexual activity: Not Currently   Other Topics Concern    Not on file   Social History Narrative    Not on file     Social Determinants of Health     Financial Resource Strain: Not on file   Food Insecurity: Not on file   Transportation Needs: Not on file   Physical Activity: Not on file   Stress: Not on file   Social Connections: Not on file   Intimate Partner Violence: Not on file   Housing Stability: Not on file     Current Outpatient Medications   Medication Sig Dispense Refill    omeprazole (PRILOSEC) 20 MG delayed release capsule       escitalopram (LEXAPRO) 10 MG tablet Take 0.5 tablets by mouth daily 30 tablet 0    metFORMIN (GLUCOPHAGE) 1000 MG tablet Take 1,000 mg by mouth 2 times daily (with meals)      HUMALOG KWIKPEN 100 UNIT/ML SOPN Inject 16 Units into the skin 3 times daily (before meals) Add Correction 5 for 50 above 150 mg/dL. Max Daily Dose; 150 units 45 mL 11    TRESIBA FLEXTOUCH 200 UNIT/ML SOPN Inject 82 Units into the skin daily Titrate by 2 units every 3 days to fasting BG goal of  mg/dL. Max Daily Dose.  120 units 54 mL 11    ONETOUCH ULTRA strip 1 each by In Vitro route daily Check bG 4 times daily E11.65. 100 each 3    Insulin Pen Needle 32G X 4 MM MISC 1 each by Does not apply route daily 400 each 11    Continuous Blood Gluc Sensor (DEXCOM G6 SENSOR) MISC Replaced every 10 days 9 each 1    atorvastatin (LIPITOR) 40 MG tablet Take 0.5 tablets by mouth daily 90 tablet 1    lisinopril (PRINIVIL;ZESTRIL) 10 MG tablet Take 1 tablet by mouth daily 90 tablet 1    traZODone (DESYREL) 100 MG tablet Take 1 tablet by mouth nightly 90 tablet 1    ibuprofen (ADVIL;MOTRIN) 800 MG tablet Take 1 tablet by mouth every 8 hours as needed for Pain 120 tablet 5    lidocaine-prilocaine (EMLA) 2.5-2.5 % cream Apply topically as needed. Place small amount to port site 45 minutes prior to any blood draw or infusion. Cover with plastic wrap 30 g 2    Multiple Vitamins-Minerals (MENS 50+ ADVANCED PO) Take by mouth      ZINC PO Take by mouth daily      ascorbic acid (VITAMIN C) 500 MG tablet Take 500 mg by mouth daily      aspirin 81 MG EC tablet Take 81 mg by mouth daily       melatonin 5 MG TABS tablet Take 10 mg by mouth       GVOKE HYPOPEN 2-PACK 1 MG/0.2ML SOAJ Inject 1 mg into the skin as needed (as needed for BG less than 50 mg/dL) (Patient not taking: Reported on 11/15/2022) 0.4 mL 11    Continuous Blood Gluc Sensor (DEXCOM G6 SENSOR) MISC Change every 10 days as directed. (Patient not taking: Reported on 10/25/2022) 9 each 3    Continuous Blood Gluc Transmit (DEXCOM G6 TRANSMITTER) MISC Change every 90 days as directed.  (Patient not taking: Reported on 10/25/2022) 1 each 3    Continuous Blood Gluc Transmit (DEXCOM G6 TRANSMITTER) MISC Replace every 90 days (Patient not taking: Reported on 10/25/2022) 1 each 1    prochlorperazine (COMPAZINE) 10 MG tablet Take 1 tablet by mouth every 6 hours as needed (nausea/vomitting) (Patient not taking: No sig reported) 120 tablet 3     Current Facility-Administered Medications   Medication Dose Route Frequency Provider Last Rate Last Admin magnesium oxide (MAG-OX) tablet 400 mg  400 mg Oral TID ADA Aguillon         Facility-Administered Medications Ordered in Other Visits   Medication Dose Route Frequency Provider Last Rate Last Admin    diatrizoate meglumine-sodium (GASTROGRAFIN) 66-10 % solution 10 mL  10 mL Oral ONCE PRN Hemant Jackson MD   10 mL at 11/14/22 0945       OBJECTIVE:  BP 99/73 (Site: Left Upper Arm, Position: Standing, Cuff Size: Medium Adult)   Pulse (!) 111   Temp 97.8 °F (36.6 °C) (Oral)   Resp 16   Ht 5' 8\" (1.727 m)   Wt 188 lb 11.2 oz (85.6 kg)   SpO2 96%   BMI 28.69 kg/m²     Physical Exam:  Constitutional: Well developed, well nourished male in no acute distress, sitting comfortably in the exam room chair. HEENT: Normocephalic and atraumatic. Sclerae anicteric. Neck supple without JVD. No thyromegaly present. Lymph node   Deferred   Skin Warm and dry. No bruising and no rash noted. No erythema. No pallor. Respiratory Lungs are clear to auscultation bilaterally without wheezes, rales or rhonchi, normal air exchange without accessory muscle use. CVS Normal rate, regular rhythm and normal S1 and S2. No murmurs, gallops, or rubs. Abdomen Soft, nontender and nondistended, normoactive bowel sounds. Neuro Grossly nonfocal with no obvious sensory or motor deficits. MSK Normal range of motion in general.  No edema and no tenderness. Psych Appropriate mood and affect. Labs:  Recent Results (from the past 96 hour(s))   POCT Glucose    Collection Time: 11/14/22  9:33 AM   Result Value Ref Range    POC Glucose 159 (H) 65 - 100 mg/dL    Performed by:  Souleymane    CBC with Auto Differential    Collection Time: 11/15/22  8:16 AM   Result Value Ref Range    WBC 8.6 4.3 - 11.1 K/uL    RBC 3.60 (L) 4.23 - 5.6 M/uL    Hemoglobin 11.5 (L) 13.6 - 17.2 g/dL    Hematocrit 33.3 %    MCV 92.5 82.0 - 102.0 FL    MCH 31.9 26.1 - 32.9 PG    MCHC 34.5 31.4 - 35.0 g/dL    RDW 13.2 11.9 - 14.6 %    Platelets 346 496 - 203 K/uL    MPV 9.1 (L) 9.4 - 12.3 FL    nRBC 0.00 0.0 - 0.2 K/uL    Seg Neutrophils 79 (H) 43 - 78 %    Lymphocytes 4 (L) 13 - 44 %    Monocytes 13 (H) 4.0 - 12.0 %    Eosinophils % 1 0.5 - 7.8 %    Basophils 1 0.0 - 2.0 %    Immature Granulocytes 2 0.0 - 5.0 %    Segs Absolute 6.8 1.7 - 8.2 K/UL    Absolute Lymph # 0.3 (L) 0.5 - 4.6 K/UL    Absolute Mono # 1.1 0.1 - 1.3 K/UL    Absolute Eos # 0.1 0.0 - 0.8 K/UL    Basophils Absolute 0.1 0.0 - 0.2 K/UL    Absolute Immature Granulocyte 0.1 0.0 - 0.5 K/UL    Differential Type AUTOMATED             Imaging:  PET CT SKULL BASE TO MID THIGH    Result Date: 6/29/2022  EXAMINATION: PET CT SKULL BASE TO MID THIGH 6/29/2022 1:42 PM ACCESSION NUMBER: [id number] COMPARISON: None available INDICATION: Diffuse large b-cell lymphoma, lymph nodes of inguinal region and lower limb, initial staging TECHNIQUE:   Radiopharmaceutical: 12.83 mCi F18-FDG IV. Positron emission tomography (PET) with concurrently acquired computed tomography (CT) for attenuation correction and anatomical localization imaging; skull base to mid-thigh. Blood glucose at the time of tracer administration is 158 mg/dl, which is adequate for imaging. Approximately 60 minutes after administration of the radiopharmaceutical imaging was initiated. SUV max calculated from patient body wt. Noncaloric dilute oral contrast is given. For this CT scanner at least one of the following techniques is utilized to decrease patient radiation exposure: Automatic exposure control, modulation of MA and KVP based on patient weight, and iterative reconstruction. FINDINGS:  Head/Neck: No hypermetabolic cervical lymphadenopathy. No abnormal radiotracer uptake within the brain, however the normal intense uptake limits evaluation. Chest: No hypermetabolic pulmonary nodule. A 1.7 x 1.2 cm posterior mediastinal retroaortic nodule/lymph node demonstrates mild FDG uptake (max SUV 3.5).  Moderately metabolic nonenlarged right axillary lymph node (max SUV 5.9). Partial visualization of a moderately metabolic right upper extremity lymph node adjacent to the distal humerus (max SUV 5.6). 6 mm mildly metabolic prevascular lymph node adjacent to the main pulmonary artery (max SUV 2.8). 8 mm subcutaneous nodule along the right inferior chest wall (max SUV 1.4). Port within the right anterior chest wall. The port catheter tip terminates at the cavoatrial junction. Other changes in her coronary artery calcifications. Abdomen/Pelvis: Hypermetabolic left inguinal lymph node conglomerate measuring 3.9 x 3.5 cm (max SUV 11.9). Enlarged and hypermetabolic right inguinal lymph nodes (max SUV 8.1). Postsurgical changes within the left inguinal region. Hypermetabolic left iliac and pelvic lymph nodes. For example, a left obturator lymph node measures 3.9 x 2.1 cm (max SUV 10.9). Moderately metabolic left periaortic lymph node (max SUV 4.2). Enlarged and hypermetabolic upper abdominal lymph nodes with an aortocaval lymph node conglomerate demonstrating a max SUV of 5.5. The spleen does not appear enlarged. Physiologic uptake within the gastrointestinal and genitourinary tracts. Scattered vascular calcifications. The prostate gland is not enlarged. Gallstones without evidence of acute cholecystitis. Bones: No hypermetabolic osseous focus to suggest osseous metastatic disease. 1.  Hypermetabolic lymphadenopathy involving the bilateral inguinal, left iliac, retroperitoneal and upper abdomen, compatible with biopsy-proven B-cell lymphoma. 2.  There are additional mild to moderately metabolic mediastinal and right axillary lymph nodes, favored to represent additional sites of tia disease. 3.  Nonspecific 8 mm subcutaneous nodule within the right lower chest wall demonstrating mild FDG uptake, equivocal for additional site of disease. 4.  No splenomegaly.        Pathology:  DIAGNOSIS        A:  \"LEFT INGUINAL LYMPH NODE\": DIFFUSE LARGE B-CELL LYMPHOMA. SEE COMMENT. B:  \"NEVUS OF BACK\":             PENDING DERMATOPATHOLOGY CONSULT. Comment        A:  Histologic sections show a diffuse serpiginous infiltrate of   large atypical lymphocytes with indented nuclei, inconspicuous nucleoli   and moderate amounts of cytoplasm. Immunohistochemical stains show that   the lymphoma cells are positive for CD20, Parnell-5, CD10 and Bcl-6 and   negative for Bcl-2, MUM-1, CD5, Cyclin D1, CD30 (0% positivity) and c-MYC   (less than 40% positivity). CD3 stain highlights background small   T-lymphocytes. Ki-67 shows an increased proliferation index in the   serpiginous areas of more than 90%. The morphologic and immunophenotypic   appearance supports a diagnosis of germinal center subtype of diffuse   large B-cell lymphoma. FISH studies have been requested and will be   reported separately. ASSESSMENT:   Diagnosis Orders   1. Diffuse large B-cell lymphoma of lymph nodes of inguinal region ([facility] [address]  escitalopram (LEXAPRO) 10 MG tablet    IR REMOVE TUNNELED CVAD WO SQ PORT/PUMP                      PLAN:  Lab studies and imaging studies were personally reviewed. DLBCL: high grade with Ki-67 90%, GC subtype, diagnosed with excisional biopsy of lymphadenopathy in left groin, 3.2 cm on ultrasound, enlarging over 3 months time. PET/CT reviewed and shows the most prominent areas of disease in the inguinal and iliac nodes, but there are also nodes in the right axilla and retroperitoneum that are consistent with DLBCL, making him at least [place] stage III. Bone marrow biopsy showed no morphologic findings but did show a small B cell clone worrisome for lymphoma involvement in the bone marrow. His FISH shows no evidence of more aggressive genotypic findings, so he would be most appropriate for R-CHOP for 6 cycles for definitive therapy. PET/CT after 2 cycles shows CR, continue to 6 cycles as planned.     He returns today for follow up after cycle 6 R-CHOP and restaging. He is doing very well overall. He does have some expected toxicity including fatigue and neuropathy, his dexterity has been affected in his fingers. He had some diarrhea and resultant weight loss this cycle. His port is causing some discomfort and he is ready for this to be removed. Labs reviewed and adequate, mild anemia. PET/CT reviewed and shows continued complete response. He will now enter observation with repeat labs and OV in 3 months, CT in 6 months, this will continue for the first two years. He can have his port removed, we will refer. I expect his neuropathy and other treatment related AEs to improve now that he has completed treatment, although neuropathy may persist or take longer to recover due to DM. All questions were asked and answered to the best of my ability. F/u in 3 months for labs/OV, he will call sooner with any other issues.                  Tasia Frankel, MD, MD  [facility] Hematology and Oncology  [address] [place] [address]  Office : [phone]  Fax : [phone]

## 2022-11-19 ENCOUNTER — HOSPITAL ENCOUNTER (EMERGENCY)
Dept: CT IMAGING | Age: 62
Discharge: HOME | End: 2022-11-22
Payer: COMMERCIAL

## 2022-11-19 ENCOUNTER — HOSPITAL ENCOUNTER (EMERGENCY)
Dept: GENERAL RADIOLOGY | Age: 62
Discharge: HOME | End: 2022-11-22
Payer: COMMERCIAL

## 2022-11-19 ENCOUNTER — HOSPITAL ENCOUNTER (EMERGENCY)
Age: 62
Discharge: LEFT BEFORE DISCHARGE | End: 2022-11-20
Attending: EMERGENCY MEDICINE | Admitting: EMERGENCY MEDICINE
Payer: COMMERCIAL

## 2022-11-19 VITALS
DIASTOLIC BLOOD PRESSURE: 87 MMHG | RESPIRATION RATE: 16 BRPM | HEART RATE: 80 BPM | BODY MASS INDEX: 28.79 KG/M2 | OXYGEN SATURATION: 99 % | HEIGHT: 68 IN | TEMPERATURE: 97.6 F | SYSTOLIC BLOOD PRESSURE: 145 MMHG | WEIGHT: 190 LBS

## 2022-11-19 DIAGNOSIS — R10.9 ABDOMINAL PAIN, UNSPECIFIED ABDOMINAL LOCATION: Primary | ICD-10-CM

## 2022-11-19 LAB
ALBUMIN SERPL-MCNC: 3.7 G/DL (ref 3.2–4.6)
ALBUMIN/GLOB SERPL: 1.1 {RATIO} (ref 0.4–1.6)
ALP SERPL-CCNC: 96 U/L (ref 50–136)
ALT SERPL-CCNC: 27 U/L (ref 12–65)
ANION GAP SERPL CALC-SCNC: 9 MMOL/L (ref 2–11)
AST SERPL-CCNC: 13 U/L (ref 15–37)
BASOPHILS # BLD: 0 K/UL (ref 0–0.2)
BASOPHILS NFR BLD: 1 % (ref 0–2)
BILIRUB SERPL-MCNC: 0.8 MG/DL (ref 0.2–1.1)
BUN SERPL-MCNC: 15 MG/DL (ref 8–23)
CALCIUM SERPL-MCNC: 9.7 MG/DL (ref 8.3–10.4)
CHLORIDE SERPL-SCNC: 102 MMOL/L (ref 101–110)
CO2 SERPL-SCNC: 24 MMOL/L (ref 21–32)
CREAT SERPL-MCNC: 1.04 MG/DL (ref 0.8–1.5)
DIFFERENTIAL METHOD BLD: ABNORMAL
EOSINOPHIL # BLD: 0.1 K/UL (ref 0–0.8)
EOSINOPHIL NFR BLD: 1 % (ref 0.5–7.8)
ERYTHROCYTE [DISTWIDTH] IN BLOOD BY AUTOMATED COUNT: 13.2 % (ref 11.9–14.6)
GLOBULIN SER CALC-MCNC: 3.3 G/DL (ref 2.8–4.5)
GLUCOSE SERPL-MCNC: 243 MG/DL (ref 65–100)
HCT VFR BLD AUTO: 32.5 % (ref 41.1–50.3)
HGB BLD-MCNC: 11.3 G/DL (ref 13.6–17.2)
IMM GRANULOCYTES # BLD AUTO: 0 K/UL (ref 0–0.5)
IMM GRANULOCYTES NFR BLD AUTO: 0 % (ref 0–5)
LACTATE SERPL-SCNC: 2 MMOL/L (ref 0.4–2)
LIPASE SERPL-CCNC: 61 U/L (ref 73–393)
LYMPHOCYTES # BLD: 0.5 K/UL (ref 0.5–4.6)
LYMPHOCYTES NFR BLD: 8 % (ref 13–44)
MCH RBC QN AUTO: 31 PG (ref 26.1–32.9)
MCHC RBC AUTO-ENTMCNC: 34.8 G/DL (ref 31.4–35)
MCV RBC AUTO: 89 FL (ref 82–102)
MONOCYTES # BLD: 1 K/UL (ref 0.1–1.3)
MONOCYTES NFR BLD: 15 % (ref 4–12)
NEUTS SEG # BLD: 4.8 K/UL (ref 1.7–8.2)
NEUTS SEG NFR BLD: 75 % (ref 43–78)
NRBC # BLD: 0 K/UL (ref 0–0.2)
PLATELET # BLD AUTO: 274 K/UL (ref 150–450)
PMV BLD AUTO: 9.2 FL (ref 9.4–12.3)
POTASSIUM SERPL-SCNC: 3.8 MMOL/L (ref 3.5–5.1)
PROT SERPL-MCNC: 7 G/DL (ref 6.3–8.2)
RBC # BLD AUTO: 3.65 M/UL (ref 4.23–5.6)
SODIUM SERPL-SCNC: 135 MMOL/L (ref 133–143)
TROPONIN I SERPL HS-MCNC: 25.2 PG/ML (ref 0–14)
WBC # BLD AUTO: 6.4 K/UL (ref 4.3–11.1)

## 2022-11-19 PROCEDURE — 83605 ASSAY OF LACTIC ACID: CPT

## 2022-11-19 PROCEDURE — 96374 THER/PROPH/DIAG INJ IV PUSH: CPT | Performed by: EMERGENCY MEDICINE

## 2022-11-19 PROCEDURE — 84484 ASSAY OF TROPONIN QUANT: CPT

## 2022-11-19 PROCEDURE — 6360000002 HC RX W HCPCS: Performed by: EMERGENCY MEDICINE

## 2022-11-19 PROCEDURE — 96375 TX/PRO/DX INJ NEW DRUG ADDON: CPT | Performed by: EMERGENCY MEDICINE

## 2022-11-19 PROCEDURE — 6360000004 HC RX CONTRAST MEDICATION: Performed by: EMERGENCY MEDICINE

## 2022-11-19 PROCEDURE — 99284 EMERGENCY DEPT VISIT MOD MDM: CPT | Performed by: EMERGENCY MEDICINE

## 2022-11-19 PROCEDURE — 6360000002 HC RX W HCPCS

## 2022-11-19 PROCEDURE — 85025 COMPLETE CBC W/AUTO DIFF WBC: CPT

## 2022-11-19 PROCEDURE — 80053 COMPREHEN METABOLIC PANEL: CPT

## 2022-11-19 PROCEDURE — 2580000003 HC RX 258: Performed by: EMERGENCY MEDICINE

## 2022-11-19 PROCEDURE — 83690 ASSAY OF LIPASE: CPT

## 2022-11-19 PROCEDURE — 71045 X-RAY EXAM CHEST 1 VIEW: CPT

## 2022-11-19 PROCEDURE — 74177 CT ABD & PELVIS W/CONTRAST: CPT

## 2022-11-19 RX ORDER — 0.9 % SODIUM CHLORIDE 0.9 %
1000 INTRAVENOUS SOLUTION INTRAVENOUS ONCE
Status: COMPLETED | OUTPATIENT
Start: 2022-11-19 | End: 2022-11-20

## 2022-11-19 RX ORDER — MORPHINE SULFATE 2 MG/ML
INJECTION, SOLUTION INTRAMUSCULAR; INTRAVENOUS
Status: COMPLETED
Start: 2022-11-19 | End: 2022-11-19

## 2022-11-19 RX ORDER — SODIUM CHLORIDE 0.9 % (FLUSH) 0.9 %
10 SYRINGE (ML) INJECTION
Status: DISCONTINUED | OUTPATIENT
Start: 2022-11-19 | End: 2022-11-23 | Stop reason: HOSPADM

## 2022-11-19 RX ORDER — MORPHINE SULFATE 4 MG/ML
4 INJECTION, SOLUTION INTRAMUSCULAR; INTRAVENOUS
Status: DISCONTINUED | OUTPATIENT
Start: 2022-11-19 | End: 2022-11-20 | Stop reason: HOSPADM

## 2022-11-19 RX ORDER — 0.9 % SODIUM CHLORIDE 0.9 %
100 INTRAVENOUS SOLUTION INTRAVENOUS
Status: DISCONTINUED | OUTPATIENT
Start: 2022-11-19 | End: 2022-11-23 | Stop reason: HOSPADM

## 2022-11-19 RX ORDER — ONDANSETRON 2 MG/ML
4 INJECTION INTRAMUSCULAR; INTRAVENOUS ONCE
Status: COMPLETED | OUTPATIENT
Start: 2022-11-19 | End: 2022-11-19

## 2022-11-19 RX ADMIN — IOPAMIDOL 100 ML: 755 INJECTION, SOLUTION INTRAVENOUS at 23:33

## 2022-11-19 RX ADMIN — SODIUM CHLORIDE 1000 ML: 9 INJECTION, SOLUTION INTRAVENOUS at 22:54

## 2022-11-19 RX ADMIN — MORPHINE SULFATE 4 MG: 2 INJECTION, SOLUTION INTRAMUSCULAR; INTRAVENOUS at 22:53

## 2022-11-19 RX ADMIN — ONDANSETRON 4 MG: 2 INJECTION INTRAMUSCULAR; INTRAVENOUS at 22:53

## 2022-11-19 ASSESSMENT — PAIN SCALES - GENERAL: PAINLEVEL_OUTOF10: 8

## 2022-11-19 ASSESSMENT — ENCOUNTER SYMPTOMS
NAUSEA: 1
DIARRHEA: 1
CONSTIPATION: 0
ABDOMINAL PAIN: 1
BLOOD IN STOOL: 0
BACK PAIN: 1
COUGH: 0
VOMITING: 1
SHORTNESS OF BREATH: 1
SORE THROAT: 0

## 2022-11-19 ASSESSMENT — PAIN - FUNCTIONAL ASSESSMENT: PAIN_FUNCTIONAL_ASSESSMENT: 0-10

## 2022-11-20 LAB
APPEARANCE UR: CLEAR
BACTERIA URNS QL MICRO: NEGATIVE /HPF
BILIRUB UR QL: NEGATIVE
CASTS URNS QL MICRO: ABNORMAL /LPF
COLOR UR: ABNORMAL
EPI CELLS #/AREA URNS HPF: ABNORMAL /HPF
GLUCOSE UR STRIP.AUTO-MCNC: NEGATIVE MG/DL
HGB UR QL STRIP: NEGATIVE
KETONES UR QL STRIP.AUTO: 40 MG/DL
LEUKOCYTE ESTERASE UR QL STRIP.AUTO: NEGATIVE
MUCOUS THREADS URNS QL MICRO: 0 /LPF
NITRITE UR QL STRIP.AUTO: NEGATIVE
PH UR STRIP: 5.5 [PH] (ref 5–9)
PROT UR STRIP-MCNC: NEGATIVE MG/DL
RBC #/AREA URNS HPF: ABNORMAL /HPF
SP GR UR REFRACTOMETRY: >1.035 (ref 1–1.02)
TROPONIN I SERPL HS-MCNC: 24.1 PG/ML (ref 0–14)
URINE CULTURE IF INDICATED: ABNORMAL
UROBILINOGEN UR QL STRIP.AUTO: 0.2 EU/DL (ref 0.2–1)
WBC URNS QL MICRO: ABNORMAL /HPF

## 2022-11-20 PROCEDURE — 84484 ASSAY OF TROPONIN QUANT: CPT

## 2022-11-20 PROCEDURE — 81001 URINALYSIS AUTO W/SCOPE: CPT

## 2022-11-20 NOTE — ED PROVIDER NOTES
[facility] Emergency Department Provider Note                   PCP:                Cuca Lozano MD               Age: 58 y.o. Sex: male       Giulia Plaza is a 58 y.o. male who presents to the Emergency Department with chief complaint of    Chief Complaint   Patient presents with    Abdominal Pain      Patient states that yesterday he had a routine EGD and colonoscopy done. He states that he he had 2 polyps removed and noted that he did have a bleeding peptic ulcer. He states he felt fine after the procedures. He states around 3 AM he developed diffuse abdominal burning sensation that radiates to his chest and his whole back. He states it has been constant and severe. There is nothing that makes the pain better or worse. It is associated with nausea and vomiting. Patient states that he did have a solid bowel movement this morning without any blood in it. He has not had any blood in his vomit. Patient did have an episode of diarrhea when he arrived to the emergency room. He has never had pain like this before. The history is provided by the patient and the spouse. All other systems reviewed and are negative. Review of Systems   Constitutional:  Positive for appetite change, chills and fatigue. Negative for fever. HENT:  Negative for sore throat. Eyes:  Negative for visual disturbance. Respiratory:  Positive for shortness of breath. Negative for cough. Cardiovascular:  Positive for chest pain. Gastrointestinal:  Positive for abdominal pain, diarrhea, nausea and vomiting. Negative for blood in stool and constipation. Genitourinary:  Positive for decreased urine volume. Negative for dysuria, flank pain and hematuria. Musculoskeletal:  Positive for back pain. Skin:  Negative for rash. Neurological:  Negative for light-headedness and headaches.      Past Medical History:   Diagnosis Date    Arthritis     osteo    Bilateral carpal tunnel syndrome 10/28/2019    CAD (coronary artery disease) 1999    3 stents--last one placed 2019, pt on daily 81 mg ASA.  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 10/28/2019    GERD (gastroesophageal reflux disease)     diet controlled    Hx of colonic polyp 2016    adenoma    Hypercholesterolemia     Hypertension     managed with medication        Past Surgical History:   Procedure Laterality Date    COLONOSCOPY  last 4/25/16    Leandra--trans TA--3 year recall due to prep quality    CORONARY ANGIOPLASTY WITH STENT PLACEMENT  0768,6122, 2019    heart cath times 3 - 3 total stents    IR PORT PLACEMENT EQUAL OR GREATER THAN 5 YEARS  6/27/2022    IR PORT PLACEMENT EQUAL OR GREATER THAN 5 YEARS 6/27/2022 [facility]    KNEE ARTHROSCOPY Left 1985    LYMPH NODE BIOPSY Left 6/10/2022    LEFT INGUINAL BIOPSY performed by Iza Asencio MD at [address] [place]  2019    3 rd stent -- cardiac placed    SKIN BIOPSY N/A 6/10/2022    EXCISION BACK NEVUS WL PER ERICK/REQUEST TO FOLLOW performed by Iza Asencio MD at [address] Left 11/15/2018    UVULOPALATOPHARYGOPLASTY          Family History   Problem Relation Age of Onset    Lung Disease Mother     Asthma Mother     Colon Cancer Father     Cancer Father     Cancer Maternal Uncle         colon    Cancer Maternal Uncle         colon    Cancer Maternal Uncle         colon    Cancer Maternal Uncle         colon    Cancer Maternal Uncle         colon    Cancer Maternal Uncle         colon    Cancer Maternal Uncle         colon    Coronary Art Dis Neg Hx         Social History     Socioeconomic History    Marital status:      Spouse name: None    Number of children: None    Years of education: None    Highest education level: None   Tobacco Use    Smoking status: Never    Smokeless tobacco: Never   Vaping Use    Vaping Use: Never used   Substance and Sexual Activity    Alcohol use: Yes     Alcohol/week: 2.0 standard drinks    Drug use: No    Sexual activity: Not Currently        Allergies: Patient has no known allergies. Previous Medications    ASCORBIC ACID (VITAMIN C) 500 MG TABLET    Take 500 mg by mouth daily    ASPIRIN 81 MG EC TABLET    Take 81 mg by mouth daily     ATORVASTATIN (LIPITOR) 40 MG TABLET    Take 0.5 tablets by mouth daily    CONTINUOUS BLOOD GLUC SENSOR (DEXCOM G6 SENSOR) MISC    Replaced every 10 days    CONTINUOUS BLOOD GLUC SENSOR (DEXCOM G6 SENSOR) MISC    Change every 10 days as directed. CONTINUOUS BLOOD GLUC TRANSMIT (DEXCOM G6 TRANSMITTER) MISC    Replace every 90 days    CONTINUOUS BLOOD GLUC TRANSMIT (DEXCOM G6 TRANSMITTER) MISC    Change every 90 days as directed. ESCITALOPRAM (LEXAPRO) 10 MG TABLET    Take 0.5 tablets by mouth daily    GVOKE HYPOPEN 2-PACK 1 MG/0.2ML SOAJ    Inject 1 mg into the skin as needed (as needed for BG less than 50 mg/dL)    HUMALOG KWIKPEN 100 UNIT/ML SOPN    Inject 16 Units into the skin 3 times daily (before meals) Add Correction 5 for 50 above 150 mg/dL. Max Daily Dose; 150 units    IBUPROFEN (ADVIL;MOTRIN) 800 MG TABLET    Take 1 tablet by mouth every 8 hours as needed for Pain    INSULIN PEN NEEDLE 32G X 4 MM MISC    1 each by Does not apply route daily    LIDOCAINE-PRILOCAINE (EMLA) 2.5-2.5 % CREAM    Apply topically as needed. Place small amount to port site 45 minutes prior to any blood draw or infusion.  Cover with plastic wrap    LISINOPRIL (PRINIVIL;ZESTRIL) 10 MG TABLET    Take 1 tablet by mouth daily    MELATONIN 5 MG TABS TABLET    Take 10 mg by mouth     METFORMIN (GLUCOPHAGE) 1000 MG TABLET    Take 1,000 mg by mouth 2 times daily (with meals) MULTIPLE VITAMINS-MINERALS (MENS 50+ ADVANCED PO)    Take by mouth    OMEPRAZOLE (PRILOSEC) 20 MG DELAYED RELEASE CAPSULE        ONETOUCH ULTRA STRIP    1 each by In Vitro route daily Check bG 4 times daily E11.65. PROCHLORPERAZINE (COMPAZINE) 10 MG TABLET    Take 1 tablet by mouth every 6 hours as needed (nausea/vomitting)    TRAZODONE (DESYREL) 100 MG TABLET    Take 1 tablet by mouth nightly    TRESIBA FLEXTOUCH 200 UNIT/ML SOPN    Inject 82 Units into the skin daily Titrate by 2 units every 3 days to fasting BG goal of  mg/dL. Max Daily Dose. 120 units    ZINC PO    Take by mouth daily        Vitals signs and nursing note reviewed. No data found. Physical Exam  Vitals and nursing note reviewed. Constitutional:       Appearance: Normal appearance. HENT:      Head: Normocephalic and atraumatic. Cardiovascular:      Rate and Rhythm: Normal rate and regular rhythm. Pulses: Normal pulses. Heart sounds: Normal heart sounds. Pulmonary:      Effort: Pulmonary effort is normal.      Breath sounds: Normal breath sounds. Abdominal:      General: Abdomen is flat. Bowel sounds are decreased. There is no distension. Palpations: Abdomen is soft. Tenderness: There is generalized abdominal tenderness. There is guarding. There is no right CVA tenderness, left CVA tenderness or rebound. Musculoskeletal:         General: No tenderness. Cervical back: Normal range of motion and neck supple. Skin:     General: Skin is warm and dry. Neurological:      General: No focal deficit present. Mental Status: He is alert and oriented to person, place, and time.         Orders Placed This Encounter   Procedures    CT ABDOMEN PELVIS W IV CONTRAST Additional Contrast? None    XR CHEST PORTABLE    CBC with Diff    CMP    Lipase    Urinalysis with Reflex to Culture    Lactate, Sepsis    Troponin    Troponin    Diet NPO    POCT Urine Dipstick    Cardiac Monitor - ED Only    Continuous Pulse Oximetry    EKG 12 Lead    Saline lock IV       Procedures        Results Include:    Recent Results (from the past 24 hour(s))   CBC with Diff    Collection Time: 11/19/22  8:13 PM   Result Value Ref Range    WBC 6.4 4.3 - 11.1 K/uL    RBC 3.65 (L) 4.23 - 5.6 M/uL    Hemoglobin 11.3 (L) 13.6 - 17.2 g/dL    Hematocrit 32.5 (L) 41.1 - 50.3 %    MCV 89.0 82.0 - 102.0 FL    MCH 31.0 26.1 - 32.9 PG    MCHC 34.8 31.4 - 35.0 g/dL    RDW 13.2 11.9 - 14.6 %    Platelets 019 588 - 467 K/uL    MPV 9.2 (L) 9.4 - 12.3 FL    nRBC 0.00 0.0 - 0.2 K/uL    Differential Type AUTOMATED      Seg Neutrophils 75 43 - 78 %    Lymphocytes 8 (L) 13 - 44 %    Monocytes 15 (H) 4.0 - 12.0 %    Eosinophils % 1 0.5 - 7.8 %    Basophils 1 0.0 - 2.0 %    Immature Granulocytes 0 0.0 - 5.0 %    Segs Absolute 4.8 1.7 - 8.2 K/UL    Absolute Lymph # 0.5 0.5 - 4.6 K/UL    Absolute Mono # 1.0 0.1 - 1.3 K/UL    Absolute Eos # 0.1 0.0 - 0.8 K/UL    Basophils Absolute 0.0 0.0 - 0.2 K/UL    Absolute Immature Granulocyte 0.0 0.0 - 0.5 K/UL   CMP    Collection Time: 11/19/22  8:13 PM   Result Value Ref Range    Sodium 135 133 - 143 mmol/L    Potassium 3.8 3.5 - 5.1 mmol/L    Chloride 102 101 - 110 mmol/L    CO2 24 21 - 32 mmol/L    Anion Gap 9 2 - 11 mmol/L    Glucose 243 (H) 65 - 100 mg/dL    BUN 15 8 - 23 MG/DL    Creatinine 1.04 0.8 - 1.5 MG/DL    Est, Glom Filt Rate >60 >60 ml/min/1.73m2    Calcium 9.7 8.3 - 10.4 MG/DL    Total Bilirubin 0.8 0.2 - 1.1 MG/DL    ALT 27 12 - 65 U/L    AST 13 (L) 15 - 37 U/L    Alk Phosphatase 96 50 - 136 U/L    Total Protein 7.0 6.3 - 8.2 g/dL    Albumin 3.7 3.2 - 4.6 g/dL    Globulin 3.3 2.8 - 4.5 g/dL    Albumin/Globulin Ratio 1.1 0.4 - 1.6     Lipase    Collection Time: 11/19/22  8:13 PM   Result Value Ref Range    Lipase 61 (L) 73 - 393 U/L   Troponin    Collection Time: 11/19/22  8:13 PM   Result Value Ref Range    Troponin, High Sensitivity 25.2 (H) 0 - 14 pg/mL   Lactate, Sepsis    Collection Time: 11/19/22 11:00 PM   Result Value Ref Range    Lactic Acid, Sepsis 2.0 0.4 - 2.0 MMOL/L   Urinalysis with Reflex to Culture    Collection Time: 11/20/22 12:10 AM    Specimen: Urine   Result Value Ref Range    Color, UA YELLOW/STRAW      Appearance CLEAR      Specific Gravity, UA >1.035 (H) 1.001 - 1.023    pH, Urine 5.5 5.0 - 9.0      Protein, UA Negative NEG mg/dL    Glucose, UA Negative mg/dL    Ketones, Urine 40 (A) NEG mg/dL    Bilirubin Urine Negative NEG      Blood, Urine Negative NEG      Urobilinogen, Urine 0.2 0.2 - 1.0 EU/dL    Nitrite, Urine Negative NEG      Leukocyte Esterase, Urine Negative NEG      Urine Culture if Indicated CULTURE NOT INDICATED BY UA RESULT      WBC, UA 0-4 U4 /hpf    RBC, UA 0-5 U5 /hpf    BACTERIA, URINE Negative NEG /hpf    Epithelial Cells UA 0-5 U5 /hpf    Casts 0-2 U2 /lpf    Mucus, UA 0 0 /lpf          CT ABDOMEN PELVIS W IV CONTRAST Additional Contrast? None    (Results Pending)   XR CHEST PORTABLE    (Results Pending)                      ED Course as of 11/20/22 0041   Sun Nov 20, 2022   0032 I spoke with radiologist who states patient has gallstones with some gallbladder wall thickening. He did not see any evidence of bowel perforation or any other acute process. I reexamined patient and he is still having upper abdominal tenderness. I have recommended that he get a dedicated right upper quadrant ultrasound but patient is refusing this and wants to leave the emergency department. I explained the risk and benefits and patient voiced understanding and still insists on leaving [address] Patient's wife is at bedside. [CW]      ED Course User Index  [CW] Thomas Pope MD       [facility]  Number of Diagnoses or Management Options  Abdominal pain, unspecified abdominal location  Diagnosis management comments: Patient presented for diffuse abdomen, back, and chest pain after undergoing EGD and colonoscopy yesterday. Patient's CBC, CMP, and lactate are unremarkable.   His initial troponin is mildly elevated and I ordered a repeat troponin. Patient's abdomen pelvic CT was read by the radiologist as no obvious perforation or free air. Patient does have gallstones and gallbladder wall thickening. I did recommend the patient obtain right upper quadrant ultrasound to further evaluate his abdominal pain since he does have very significant tenderness. Patient however refuses this and signed out [address] Patient is alert and oriented and shows no signs of cognitive impairment so he was allowed to sign out AMA. ICD-10-CM    1. Abdominal pain, unspecified abdominal location  R10.9           DISPOSITION [place] 11/20/2022 12:38:07 AM       Voice dictation software was used during the making of this note. This software is not perfect and grammatical and other typographical errors may be present. This note has not been completely proofread for errors.      Ronald Ko MD  11/20/22 [id number]

## 2022-11-20 NOTE — ED TRIAGE NOTES
Pt WC to triage with c/o abd, back, and side pain since having an upper GI yesterday morning. Pt reports some vomiting he reports \"white stuff. \" Pt also reports decreased appetite and states he has not been able to drink much. Pt has paperwork from his upper GI and colonoscopy.

## 2022-11-22 ENCOUNTER — TELEPHONE (OUTPATIENT)
Dept: FAMILY MEDICINE CLINIC | Facility: CLINIC | Age: 62
End: 2022-11-22

## 2022-11-22 DIAGNOSIS — C83.35 DIFFUSE LARGE B-CELL LYMPHOMA OF LYMPH NODES OF INGUINAL REGION (HCC): ICD-10-CM

## 2022-11-22 DIAGNOSIS — E83.42 HYPOMAGNESEMIA: ICD-10-CM

## 2022-11-22 RX ORDER — MAGNESIUM OXIDE 400 MG/1
400 TABLET ORAL 4 TIMES DAILY
Qty: 120 TABLET | Refills: 1 | Status: SHIPPED | OUTPATIENT
Start: 2022-11-22 | End: 2022-12-22

## 2022-11-29 ENCOUNTER — HOSPITAL ENCOUNTER (OUTPATIENT)
Dept: INTERVENTIONAL RADIOLOGY/VASCULAR | Age: 62
Discharge: HOME | End: 2022-12-02
Payer: OTHER GOVERNMENT

## 2022-11-29 VITALS
HEART RATE: 82 BPM | TEMPERATURE: 97.6 F | OXYGEN SATURATION: 100 % | DIASTOLIC BLOOD PRESSURE: 74 MMHG | SYSTOLIC BLOOD PRESSURE: 151 MMHG | RESPIRATION RATE: 18 BRPM

## 2022-11-29 DIAGNOSIS — C83.30 DIFFUSE LARGE B-CELL LYMPHOMA, UNSPECIFIED BODY REGION (HCC): ICD-10-CM

## 2022-11-29 PROCEDURE — 36590 REMOVAL TUNNELED CV CATH: CPT

## 2022-11-29 PROCEDURE — 2500000003 HC RX 250 WO HCPCS: Performed by: PHYSICIAN ASSISTANT

## 2022-11-29 RX ORDER — LIDOCAINE HYDROCHLORIDE AND EPINEPHRINE BITARTRATE 20; .01 MG/ML; MG/ML
INJECTION, SOLUTION SUBCUTANEOUS
Status: COMPLETED | OUTPATIENT
Start: 2022-11-29 | End: 2022-11-29

## 2022-11-29 RX ADMIN — LIDOCAINE HYDROCHLORIDE,EPINEPHRINE BITARTRATE 7 ML: 20; .01 INJECTION, SOLUTION INFILTRATION; PERINEURAL at 14:33

## 2022-11-29 NOTE — DISCHARGE INSTRUCTIONS
Port/Catheter Removal Discharge Instructions          General information: Your doctor has ordered us to remove your port, or catheter. This could be that you do not need it anymore, it is not doing its job, or your physician has decided on another plan for your treatment. Post-Op Instructions:     Patients may experience some mild swelling, redness and or bruising around the incision. The area may be tender. This is normal after a surgical procedure. This may take several days to resolve. Do not allow bra straps or any clothing to rub the skin over the incision with skin glue present     You may start taking showers again after 48 hours (about 2 days). Please protect the area with saran wrap if possible. Do not take a bath or swim until the skin has healed. Skin glue will flake off within 7-10 days (about 1 and a half weeks). Once it is healed it is okay to bathe and swim. Do not use antibiotic ointment, rubbing alcohol or hydrogen peroxide on the skin glue as it can break down the adhesive     Restrict yourself to light activity for the first 3 days after getting the port out, after that, resume normal activity slowly. You may resume your normal diet and medications. If you have pain, you can take whatever you have at home including acetaminophen (Tylenol) or Ibuprofren (Advil) as needed. Ice packs are also recommended. Please do not drive, make important legal decisions, or drink alcohol after receiving sedation or anesthesia for the next 24 hours. Call if: You should call your Physician and/or the Radiology Nurse if you notice:      Signs of infection such as swelling and redness that is warm to the touch or streaking, or pus     Fever (greater than 100.4). Severe arm / chest pain coming from your port site     Active bleeding     We can be reached at [phone], between 8 am and 5 pm Monday through Friday.  After hours call the answering service at [phone] ask for the radiologist to be paged. If you have a medical emergency, go to the nearest emergency room, or call 911. Follow-Up Instructions: Please follow up with the ordering provider.

## 2022-12-01 ENCOUNTER — APPOINTMENT (RX ONLY)
Dept: URBAN - METROPOLITAN AREA CLINIC 23 | Facility: CLINIC | Age: 62
Setting detail: DERMATOLOGY
End: 2022-12-01

## 2022-12-01 DIAGNOSIS — Z71.89 OTHER SPECIFIED COUNSELING: ICD-10-CM

## 2022-12-01 DIAGNOSIS — L57.8 OTHER SKIN CHANGES DUE TO CHRONIC EXPOSURE TO NONIONIZING RADIATION: ICD-10-CM

## 2022-12-01 DIAGNOSIS — D22 MELANOCYTIC NEVI: ICD-10-CM

## 2022-12-01 DIAGNOSIS — L82.0 INFLAMED SEBORRHEIC KERATOSIS: ICD-10-CM

## 2022-12-01 DIAGNOSIS — L82.1 OTHER SEBORRHEIC KERATOSIS: ICD-10-CM

## 2022-12-01 PROBLEM — D22.5 MELANOCYTIC NEVI OF TRUNK: Status: ACTIVE | Noted: 2022-12-01

## 2022-12-01 PROCEDURE — ? LIQUID NITROGEN

## 2022-12-01 PROCEDURE — 17110 DESTRUCTION B9 LES UP TO 14: CPT

## 2022-12-01 PROCEDURE — 11300 SHAVE SKIN LESION 0.5 CM/<: CPT | Mod: 59

## 2022-12-01 PROCEDURE — 99203 OFFICE O/P NEW LOW 30 MIN: CPT | Mod: 25

## 2022-12-01 PROCEDURE — ? SHAVE REMOVAL

## 2022-12-01 PROCEDURE — ? COUNSELING

## 2022-12-01 ASSESSMENT — LOCATION SIMPLE DESCRIPTION DERM
LOCATION SIMPLE: RIGHT UPPER BACK
LOCATION SIMPLE: LEFT CLAVICULAR SKIN
LOCATION SIMPLE: LEFT ANTERIOR NECK
LOCATION SIMPLE: LEFT SHOULDER
LOCATION SIMPLE: POSTERIOR NECK
LOCATION SIMPLE: CHEST
LOCATION SIMPLE: SCALP

## 2022-12-01 ASSESSMENT — LOCATION DETAILED DESCRIPTION DERM
LOCATION DETAILED: RIGHT LATERAL TRAPEZIAL NECK
LOCATION DETAILED: LEFT CLAVICULAR NECK
LOCATION DETAILED: LEFT POSTERIOR SHOULDER
LOCATION DETAILED: RIGHT INFERIOR POSTERIOR NECK
LOCATION DETAILED: RIGHT LATERAL INFERIOR CHEST
LOCATION DETAILED: LEFT CLAVICULAR SKIN
LOCATION DETAILED: RIGHT SUPERIOR UPPER BACK
LOCATION DETAILED: RIGHT CENTRAL FRONTAL SCALP

## 2022-12-01 ASSESSMENT — LOCATION ZONE DERM
LOCATION ZONE: NECK
LOCATION ZONE: SCALP
LOCATION ZONE: ARM
LOCATION ZONE: TRUNK

## 2022-12-01 NOTE — PROCEDURE: LIQUID NITROGEN
Show Applicator Variable?: Yes
Include Z78.9 (Other Specified Conditions Influencing Health Status) As An Associated Diagnosis?: No
Spray Paint Text: The liquid nitrogen was applied to the skin utilizing a spray paint frosting technique.
Medical Necessity Clause: This procedure was medically necessary because the lesions that were treated were:
Consent: The patient's consent was obtained including but not limited to risks of crusting, scabbing, blistering, scarring, darker or lighter pigmentary change, recurrence, incomplete removal and infection.
Medical Necessity Information: It is in your best interest to select a reason for this procedure from the list below. All of these items fulfill various CMS LCD requirements except the new and changing color options.
Post-Care Instructions: I reviewed with the patient in detail post-care instructions. Patient is to wear sunprotection, and avoid picking at any of the treated lesions. Pt may apply Vaseline to crusted or scabbing areas.
Detail Level: Detailed

## 2023-01-06 ENCOUNTER — FOLLOWUP TELEPHONE ENCOUNTER (OUTPATIENT)
Dept: DIABETES SERVICES | Age: 63
End: 2023-01-06

## 2023-01-26 ENCOUNTER — OFFICE VISIT (OUTPATIENT)
Dept: FAMILY MEDICINE CLINIC | Facility: CLINIC | Age: 63
End: 2023-01-26
Payer: COMMERCIAL

## 2023-01-26 ENCOUNTER — NURSE ONLY (OUTPATIENT)
Dept: FAMILY MEDICINE CLINIC | Facility: CLINIC | Age: 63
End: 2023-01-26

## 2023-01-26 VITALS
BODY MASS INDEX: 28.19 KG/M2 | HEART RATE: 67 BPM | OXYGEN SATURATION: 97 % | TEMPERATURE: 97 F | SYSTOLIC BLOOD PRESSURE: 132 MMHG | DIASTOLIC BLOOD PRESSURE: 80 MMHG | HEIGHT: 68 IN | WEIGHT: 186 LBS

## 2023-01-26 DIAGNOSIS — R19.7 DIARRHEA, UNSPECIFIED TYPE: Primary | ICD-10-CM

## 2023-01-26 DIAGNOSIS — Z79.4 TYPE 2 DIABETES MELLITUS WITH DIABETIC NEUROPATHY, WITH LONG-TERM CURRENT USE OF INSULIN (HCC): ICD-10-CM

## 2023-01-26 DIAGNOSIS — I10 ESSENTIAL HYPERTENSION: ICD-10-CM

## 2023-01-26 DIAGNOSIS — Z79.4 TYPE 2 DIABETES MELLITUS WITH HYPERGLYCEMIA, WITH LONG-TERM CURRENT USE OF INSULIN (HCC): ICD-10-CM

## 2023-01-26 DIAGNOSIS — E11.65 TYPE 2 DIABETES MELLITUS WITH HYPERGLYCEMIA, WITH LONG-TERM CURRENT USE OF INSULIN (HCC): ICD-10-CM

## 2023-01-26 DIAGNOSIS — E78.5 HYPERLIPIDEMIA, UNSPECIFIED HYPERLIPIDEMIA TYPE: ICD-10-CM

## 2023-01-26 DIAGNOSIS — C83.35 DIFFUSE LARGE B-CELL LYMPHOMA OF LYMPH NODES OF INGUINAL REGION (HCC): ICD-10-CM

## 2023-01-26 DIAGNOSIS — Z79.4 TYPE 2 DIABETES MELLITUS WITH DIABETIC NEUROPATHY, WITH LONG-TERM CURRENT USE OF INSULIN (HCC): Primary | ICD-10-CM

## 2023-01-26 DIAGNOSIS — E11.40 TYPE 2 DIABETES MELLITUS WITH DIABETIC NEUROPATHY, WITH LONG-TERM CURRENT USE OF INSULIN (HCC): Primary | ICD-10-CM

## 2023-01-26 DIAGNOSIS — R10.11 INTERMITTENT RIGHT UPPER QUADRANT ABDOMINAL PAIN: ICD-10-CM

## 2023-01-26 DIAGNOSIS — E11.40 TYPE 2 DIABETES MELLITUS WITH DIABETIC NEUROPATHY, WITH LONG-TERM CURRENT USE OF INSULIN (HCC): ICD-10-CM

## 2023-01-26 DIAGNOSIS — K80.20 GALLSTONES: ICD-10-CM

## 2023-01-26 LAB
ALBUMIN SERPL-MCNC: 4 G/DL (ref 3.2–4.6)
ALBUMIN/GLOB SERPL: 1.6 (ref 0.4–1.6)
ALP SERPL-CCNC: 75 U/L (ref 50–136)
ALT SERPL-CCNC: 29 U/L (ref 12–65)
ANION GAP SERPL CALC-SCNC: 8 MMOL/L (ref 2–11)
AST SERPL-CCNC: 13 U/L (ref 15–37)
BASOPHILS # BLD: 0.1 K/UL (ref 0–0.2)
BASOPHILS NFR BLD: 2 % (ref 0–2)
BILIRUB SERPL-MCNC: 0.8 MG/DL (ref 0.2–1.1)
BUN SERPL-MCNC: 11 MG/DL (ref 8–23)
CALCIUM SERPL-MCNC: 9.3 MG/DL (ref 8.3–10.4)
CHLORIDE SERPL-SCNC: 105 MMOL/L (ref 101–110)
CHOLEST SERPL-MCNC: 131 MG/DL
CO2 SERPL-SCNC: 29 MMOL/L (ref 21–32)
CREAT SERPL-MCNC: 0.8 MG/DL (ref 0.8–1.5)
DIFFERENTIAL METHOD BLD: ABNORMAL
EOSINOPHIL # BLD: 0.4 K/UL (ref 0–0.8)
EOSINOPHIL NFR BLD: 9 % (ref 0.5–7.8)
ERYTHROCYTE [DISTWIDTH] IN BLOOD BY AUTOMATED COUNT: 13.8 % (ref 11.9–14.6)
EST. AVERAGE GLUCOSE BLD GHB EST-MCNC: 140 MG/DL
GLOBULIN SER CALC-MCNC: 2.5 G/DL (ref 2.8–4.5)
GLUCOSE SERPL-MCNC: 138 MG/DL (ref 65–100)
HBA1C MFR BLD: 6.5 % (ref 4.8–5.6)
HCT VFR BLD AUTO: 40.8 % (ref 41.1–50.3)
HDLC SERPL-MCNC: 44 MG/DL (ref 40–60)
HDLC SERPL: 3
HGB BLD-MCNC: 14.2 G/DL (ref 13.6–17.2)
IMM GRANULOCYTES # BLD AUTO: 0 K/UL (ref 0–0.5)
IMM GRANULOCYTES NFR BLD AUTO: 0 % (ref 0–5)
LDLC SERPL CALC-MCNC: 64.2 MG/DL
LYMPHOCYTES # BLD: 0.6 K/UL (ref 0.5–4.6)
LYMPHOCYTES NFR BLD: 16 % (ref 13–44)
MCH RBC QN AUTO: 29.5 PG (ref 26.1–32.9)
MCHC RBC AUTO-ENTMCNC: 34.8 G/DL (ref 31.4–35)
MCV RBC AUTO: 84.6 FL (ref 82–102)
MONOCYTES # BLD: 0.6 K/UL (ref 0.1–1.3)
MONOCYTES NFR BLD: 16 % (ref 4–12)
NEUTS SEG # BLD: 2.2 K/UL (ref 1.7–8.2)
NEUTS SEG NFR BLD: 57 % (ref 43–78)
NRBC # BLD: 0 K/UL (ref 0–0.2)
PLATELET # BLD AUTO: 233 K/UL (ref 150–450)
PMV BLD AUTO: 10.3 FL (ref 9.4–12.3)
POTASSIUM SERPL-SCNC: 4.4 MMOL/L (ref 3.5–5.1)
PROT SERPL-MCNC: 6.5 G/DL (ref 6.3–8.2)
RBC # BLD AUTO: 4.82 M/UL (ref 4.23–5.6)
SODIUM SERPL-SCNC: 142 MMOL/L (ref 133–143)
TRIGL SERPL-MCNC: 114 MG/DL (ref 35–150)
VLDLC SERPL CALC-MCNC: 22.8 MG/DL (ref 6–23)
WBC # BLD AUTO: 3.8 K/UL (ref 4.3–11.1)

## 2023-01-26 PROCEDURE — 3078F DIAST BP <80 MM HG: CPT | Performed by: STUDENT IN AN ORGANIZED HEALTH CARE EDUCATION/TRAINING PROGRAM

## 2023-01-26 PROCEDURE — 3044F HG A1C LEVEL LT 7.0%: CPT | Performed by: STUDENT IN AN ORGANIZED HEALTH CARE EDUCATION/TRAINING PROGRAM

## 2023-01-26 PROCEDURE — 99214 OFFICE O/P EST MOD 30 MIN: CPT | Performed by: STUDENT IN AN ORGANIZED HEALTH CARE EDUCATION/TRAINING PROGRAM

## 2023-01-26 PROCEDURE — 3074F SYST BP LT 130 MM HG: CPT | Performed by: STUDENT IN AN ORGANIZED HEALTH CARE EDUCATION/TRAINING PROGRAM

## 2023-01-26 RX ORDER — OMEPRAZOLE 40 MG/1
CAPSULE, DELAYED RELEASE ORAL
COMMUNITY
Start: 2022-12-26

## 2023-01-26 RX ORDER — INSULIN DEGLUDEC 200 U/ML
40 INJECTION, SOLUTION SUBCUTANEOUS DAILY
Qty: 6 ML | Refills: 5 | Status: SHIPPED | OUTPATIENT
Start: 2023-01-26 | End: 2023-01-26 | Stop reason: SDUPTHER

## 2023-01-26 RX ORDER — INSULIN DEGLUDEC 200 U/ML
40 INJECTION, SOLUTION SUBCUTANEOUS DAILY
Qty: 6 ML | Refills: 5 | Status: SHIPPED | OUTPATIENT
Start: 2023-01-26

## 2023-01-26 ASSESSMENT — ENCOUNTER SYMPTOMS
ABDOMINAL PAIN: 0
CONSTIPATION: 0
DIARRHEA: 1
VOMITING: 0
BLOOD IN STOOL: 0

## 2023-01-26 NOTE — PROGRESS NOTES
[facility]  Daniella Nolasco  Phone [phone]  Fax:  [phone]    Philippe Feng. (:  1960) is a 58 y.o. male here for evaluation of the following chief complaint(s):  Diarrhea (Has been going on since colo in November/)       ASSESSMENT/PLAN:  1. Diarrhea, unspecified type  2. Type 2 diabetes mellitus with hyperglycemia, with long-term current use of insulin (HCC)  -     TRESIBA FLEXTOUCH 200 UNIT/ML SOPN; Inject 40 Units into the skin daily, Disp-6 mL, R-5, DAWNormal  3. Diffuse large B-cell lymphoma of lymph nodes of inguinal region ([address]  4. Gallstones  -     US GALLBLADDER RUQ; Future  5. Intermittent right upper quadrant abdominal pain  -     US GALLBLADDER RUQ; Future  6. Essential hypertension    Reports diarrhea since he had his colonoscopy back in November. Imodium helps. Benign abdominal exam.  Try decreasing metformin dose in half to see if this helps with his diarrhea. If diarrhea persists, will plan to check stool studies. Does report intermittent RUQ pain, had gallstones noted on previous CT scan. Will check RUQ ultrasound. Recent hemoglobin A1c improved to 6.5, diabetes at goal.  Continue Tresiba 40 units daily. Recent CMP showed normal kidney function and liver enzymes. Lipid panel normal, continue Lipitor. Hemoglobin now normal on CBC. Blood pressure well controlled, continue lisinopril 10 mg daily. Return in about 4 months (around 2023) for routine f/u. Subjective   SUBJECTIVE/OBJECTIVE:  HPI  20-year-old male with PMH of CAD, HTN, DM2, HLD, and diffuse large B-cell lymphoma who presents for diarrhea.  -Underwent colonoscopy 2022 which showed 2 polyps and external hemorrhoid.   EGD showed acute gastric ulcer with bleeding.  -Taking Omeprazole daily  -Reports watery diarrhea since he had colonoscopy, around 4 episodes per day, taking imodium every other day which helps  -Occasional RUQ pain  -Has been on Metformin for years  -Finished chemo in October  -No recent abx use  -On Tresiba 40u daily, uses Humalog occasionally if glucose >200  -Fasting glucose normally 120-130s      Review of Systems   Constitutional:  Negative for fever. Gastrointestinal:  Positive for diarrhea. Negative for abdominal pain, blood in stool, constipation and vomiting. Objective     Vitals:    01/26/23 1623   BP: 132/80   Pulse: 67   Temp: 97 °F (36.1 °C)   SpO2: 97%       Physical Exam  Vitals reviewed. Constitutional:       General: He is not in acute distress. HENT:      Head: Normocephalic and atraumatic. Cardiovascular:      Rate and Rhythm: Normal rate and regular rhythm. Pulmonary:      Effort: Pulmonary effort is normal.      Breath sounds: Normal breath sounds. Abdominal:      General: There is no distension. Palpations: Abdomen is soft. Tenderness: There is no abdominal tenderness. There is no guarding. Musculoskeletal:      Right lower leg: No edema. Left lower leg: No edema. Skin:     General: Skin is warm and dry. Neurological:      General: No focal deficit present. Mental Status: He is alert and oriented to person, place, and time. An electronic signature was used to authenticate this note.     --Tal Pope MD

## 2023-01-30 ENCOUNTER — TELEPHONE (OUTPATIENT)
Dept: FAMILY MEDICINE CLINIC | Facility: CLINIC | Age: 63
End: 2023-01-30

## 2023-01-30 DIAGNOSIS — E11.65 TYPE 2 DIABETES MELLITUS WITH HYPERGLYCEMIA, WITH LONG-TERM CURRENT USE OF INSULIN (HCC): ICD-10-CM

## 2023-01-30 DIAGNOSIS — C83.35 DIFFUSE LARGE B-CELL LYMPHOMA OF LYMPH NODES OF INGUINAL REGION (HCC): ICD-10-CM

## 2023-01-30 DIAGNOSIS — Z79.4 TYPE 2 DIABETES MELLITUS WITH HYPERGLYCEMIA, WITH LONG-TERM CURRENT USE OF INSULIN (HCC): ICD-10-CM

## 2023-01-30 RX ORDER — LISINOPRIL 10 MG/1
10 TABLET ORAL DAILY
Qty: 90 TABLET | Refills: 1 | Status: SHIPPED | OUTPATIENT
Start: 2023-01-30

## 2023-01-30 RX ORDER — TRAZODONE HYDROCHLORIDE 100 MG/1
100 TABLET ORAL NIGHTLY
Qty: 90 TABLET | Refills: 1 | Status: SHIPPED | OUTPATIENT
Start: 2023-01-30

## 2023-01-30 RX ORDER — ATORVASTATIN CALCIUM 40 MG/1
20 TABLET, FILM COATED ORAL DAILY
Qty: 90 TABLET | Refills: 1 | Status: SHIPPED | OUTPATIENT
Start: 2023-01-30

## 2023-01-30 NOTE — TELEPHONE ENCOUNTER
----- Message from Ailyn Soriano. sent at 1/29/2023  6:21 PM EST -----  Regarding: Prescriptions   [facility],    Please ask Stephen Avila to call in all prescriptions for Ed to the [address] [place] [address] his Metformin. Please ask Milli call that one into [facility] on [facility] for us. Thank you.

## 2023-02-20 ENCOUNTER — HOSPITAL ENCOUNTER (OUTPATIENT)
Dept: ULTRASOUND IMAGING | Age: 63
Discharge: HOME | End: 2023-02-23
Payer: OTHER GOVERNMENT

## 2023-02-20 DIAGNOSIS — R10.11 INTERMITTENT RIGHT UPPER QUADRANT ABDOMINAL PAIN: ICD-10-CM

## 2023-02-20 DIAGNOSIS — K80.20 GALLSTONES: ICD-10-CM

## 2023-02-20 PROCEDURE — 76705 ECHO EXAM OF ABDOMEN: CPT

## 2023-02-21 ENCOUNTER — HOSPITAL ENCOUNTER (OUTPATIENT)
Dept: LAB | Age: 63
Discharge: HOME | End: 2023-02-24
Payer: COMMERCIAL

## 2023-02-21 ENCOUNTER — OFFICE VISIT (OUTPATIENT)
Dept: ONCOLOGY | Age: 63
End: 2023-02-21
Payer: COMMERCIAL

## 2023-02-21 ENCOUNTER — PATIENT MESSAGE (OUTPATIENT)
Dept: FAMILY MEDICINE CLINIC | Facility: CLINIC | Age: 63
End: 2023-02-21

## 2023-02-21 VITALS
HEART RATE: 76 BPM | HEIGHT: 68 IN | SYSTOLIC BLOOD PRESSURE: 139 MMHG | RESPIRATION RATE: 16 BRPM | WEIGHT: 190.9 LBS | OXYGEN SATURATION: 98 % | DIASTOLIC BLOOD PRESSURE: 79 MMHG | BODY MASS INDEX: 28.93 KG/M2 | TEMPERATURE: 97.9 F

## 2023-02-21 DIAGNOSIS — E83.42 HYPOMAGNESEMIA: ICD-10-CM

## 2023-02-21 DIAGNOSIS — C83.35 DIFFUSE LARGE B-CELL LYMPHOMA OF LYMPH NODES OF INGUINAL REGION (HCC): ICD-10-CM

## 2023-02-21 DIAGNOSIS — C83.35 DIFFUSE LARGE B-CELL LYMPHOMA OF LYMPH NODES OF INGUINAL REGION (HCC): Primary | ICD-10-CM

## 2023-02-21 LAB
ALBUMIN SERPL-MCNC: 3.6 G/DL (ref 3.2–4.6)
ALBUMIN/GLOB SERPL: 1.4 (ref 0.4–1.6)
ALP SERPL-CCNC: 79 U/L (ref 50–136)
ALT SERPL-CCNC: 30 U/L (ref 12–65)
ANION GAP SERPL CALC-SCNC: 5 MMOL/L (ref 2–11)
AST SERPL-CCNC: 16 U/L (ref 15–37)
BASOPHILS # BLD: 0 K/UL (ref 0–0.2)
BASOPHILS NFR BLD: 1 % (ref 0–2)
BILIRUB SERPL-MCNC: 0.5 MG/DL (ref 0.2–1.1)
BUN SERPL-MCNC: 11 MG/DL (ref 8–23)
CALCIUM SERPL-MCNC: 8.8 MG/DL (ref 8.3–10.4)
CHLORIDE SERPL-SCNC: 102 MMOL/L (ref 101–110)
CO2 SERPL-SCNC: 29 MMOL/L (ref 21–32)
CREAT SERPL-MCNC: 0.9 MG/DL (ref 0.8–1.5)
DIFFERENTIAL METHOD BLD: ABNORMAL
EOSINOPHIL # BLD: 0.4 K/UL (ref 0–0.8)
EOSINOPHIL NFR BLD: 6 % (ref 0.5–7.8)
ERYTHROCYTE [DISTWIDTH] IN BLOOD BY AUTOMATED COUNT: 13.8 % (ref 11.9–14.6)
GLOBULIN SER CALC-MCNC: 2.6 G/DL (ref 2.8–4.5)
GLUCOSE SERPL-MCNC: 271 MG/DL (ref 65–100)
HCT VFR BLD AUTO: 38.8 % (ref 41.1–50.3)
HGB BLD-MCNC: 13.6 G/DL (ref 13.6–17.2)
IMM GRANULOCYTES # BLD AUTO: 0 K/UL (ref 0–0.5)
IMM GRANULOCYTES NFR BLD AUTO: 0 % (ref 0–5)
LDH SERPL L TO P-CCNC: 144 U/L (ref 110–210)
LYMPHOCYTES # BLD: 0.6 K/UL (ref 0.5–4.6)
LYMPHOCYTES NFR BLD: 10 % (ref 13–44)
MAGNESIUM SERPL-MCNC: 1.2 MG/DL (ref 1.8–2.4)
MCH RBC QN AUTO: 28.9 PG (ref 26.1–32.9)
MCHC RBC AUTO-ENTMCNC: 35.1 G/DL (ref 31.4–35)
MCV RBC AUTO: 82.6 FL (ref 82–102)
MONOCYTES # BLD: 0.6 K/UL (ref 0.1–1.3)
MONOCYTES NFR BLD: 9 % (ref 4–12)
NEUTS SEG # BLD: 4.8 K/UL (ref 1.7–8.2)
NEUTS SEG NFR BLD: 74 % (ref 43–78)
NRBC # BLD: 0 K/UL (ref 0–0.2)
PLATELET # BLD AUTO: 180 K/UL (ref 150–450)
PMV BLD AUTO: 9.5 FL (ref 9.4–12.3)
POTASSIUM SERPL-SCNC: 4.1 MMOL/L (ref 3.5–5.1)
PROT SERPL-MCNC: 6.2 G/DL (ref 6.3–8.2)
RBC # BLD AUTO: 4.7 M/UL (ref 4.23–5.6)
SODIUM SERPL-SCNC: 136 MMOL/L (ref 133–143)
WBC # BLD AUTO: 6.4 K/UL (ref 4.3–11.1)

## 2023-02-21 PROCEDURE — 3078F DIAST BP <80 MM HG: CPT | Performed by: NURSE PRACTITIONER

## 2023-02-21 PROCEDURE — 3075F SYST BP GE 130 - 139MM HG: CPT | Performed by: NURSE PRACTITIONER

## 2023-02-21 PROCEDURE — 83615 LACTATE (LD) (LDH) ENZYME: CPT

## 2023-02-21 PROCEDURE — 99214 OFFICE O/P EST MOD 30 MIN: CPT | Performed by: NURSE PRACTITIONER

## 2023-02-21 PROCEDURE — 36415 COLL VENOUS BLD VENIPUNCTURE: CPT

## 2023-02-21 PROCEDURE — 85025 COMPLETE CBC W/AUTO DIFF WBC: CPT

## 2023-02-21 PROCEDURE — 80053 COMPREHEN METABOLIC PANEL: CPT

## 2023-02-21 PROCEDURE — 83735 ASSAY OF MAGNESIUM: CPT

## 2023-02-21 RX ORDER — BLOOD-GLUCOSE SENSOR
EACH MISCELLANEOUS
Qty: 3 EACH | Refills: 5 | Status: SHIPPED | OUTPATIENT
Start: 2023-02-21

## 2023-02-21 ASSESSMENT — PATIENT HEALTH QUESTIONNAIRE - PHQ9
SUM OF ALL RESPONSES TO PHQ QUESTIONS 1-9: 1
2. FEELING DOWN, DEPRESSED OR HOPELESS: 1
SUM OF ALL RESPONSES TO PHQ QUESTIONS 1-9: 1
1. LITTLE INTEREST OR PLEASURE IN DOING THINGS: 0
SUM OF ALL RESPONSES TO PHQ QUESTIONS 1-9: 1
SUM OF ALL RESPONSES TO PHQ QUESTIONS 1-9: 1
SUM OF ALL RESPONSES TO PHQ9 QUESTIONS 1 & 2: 1

## 2023-02-21 NOTE — PROGRESS NOTES
[facility] On[facility]: Office Visit Established Patient    Chief Complaint:    Chief Complaint   Patient presents with    Follow-up           History of Present Illness:  Mr. Amil Litten is a 58 y.o. male who returns today for management of diffuse large B cell lymphoma. He presented to his PCP on 4/29/22 reporting an enlarging lump in his left groin for the past 2 months. Physical exam confirmed a mildly tender lump in the patients left inguinal region. The right inguinal region was normal, and no inguinal hernia was noted. Ultrasound of the left groin was performed on 5/13/22 revealing enlarged, abnormal appearing left inguinal lymph nodes measuring up to 3.0 x 3.2 x 1.9 cm. Ultrasound directed biopsy was suggested for further assessment. We saw him for consultation and recommended excisional biopsy, which showed diffuse large B cell lymphoma. PET/CT shows the most prominent areas of disease in the inguinal and iliac nodes, but there are also nodes in the right axilla and retroperitoneum that are consistent with DLBCL, making him at least [place] stage III. Bone marrow biopsy showed no morphologic findings but did show a small B cell clone worrisome for lymphoma involvement in the bone marrow, this would make him AA stage IV. His FISH shows no evidence of more aggressive genotypic findings, so he would be most appropriate for R-CHOP for 6 cycles for definitive therapy. PET/CT following 2 cycles revealed CR. He returns today for routine 3 month follow up for DLBCL. Overall, he has been well since last seen. There have been many travels both for pleasure and for work of which he has enjoyed. Since his last cycle of treatment 3 months ago he has continued to have diarrhea, some days 3-4 episodes, using imodium to control. He stopped taking oral Mg+ due to fear that it was the cause and his metformin was decreased  without any improvement. Appetite is good and weight is stable.   His energy level has improved, no longer taking naps. There is no cough, shortness of breath, or edema. He denies any residual neuropathy, however states issues with dexterity of fingers and ongoing memory/fogginess that is improving. Wife reports that he seems anxious and mad at times, he disagrees for the most part. He is frustrated that he hasn't made a full recovery yet and has several large home projects hanging over him. He denies any lymphadenopathy, fevers, infectious symptoms, or night sweats. Review of Systems:  Constitutional: Negative. HENT: Negative. Eyes: Negative. Respiratory: Negative. Cardiovascular: Negative. Gastrointestinal: Negative. Genitourinary: Negative. Musculoskeletal: Negative. Skin: Negative. Neurological: Negative. Endo/Heme/Allergies: Negative. Psychiatric/Behavioral: Negative. All other systems reviewed and are negative. No Known Allergies  Past Medical History:   Diagnosis Date    Arthritis     osteo    Bilateral carpal tunnel syndrome 10/28/2019    CAD (coronary artery disease) 1999    3 stents--last one placed 2019, pt on daily 81 mg ASA.  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    GERD (gastroesophageal reflux disease)     diet controlled    Hx of colonic polyp 2016    adenoma    Hypercholesterolemia     Hypertension     managed with medication     Past Surgical History:   Procedure Laterality Date    COLONOSCOPY  last 4/25/16/ 2022    Leandra--trans TA--3 year recall due to prep quality    CORONARY ANGIOPLASTY WITH STENT PLACEMENT  [id number], 2019    heart cath times 3 - 3 total stents    IR PORT PLACEMENT EQUAL OR GREATER THAN 5 YEARS  06/27/2022    IR PORT PLACEMENT EQUAL OR GREATER THAN 5 YEARS 6/27/2022 [facility] SPE[facility]    KNEE ARTHROSCOPY Left 1985    LYMPH NODE BIOPSY Left 06/10/2022    LEFT INGUINAL BIOPSY performed by Lisbeth Palencia MD at [facility]  2019    3 rd stent -- cardiac placed    SKIN BIOPSY N/A 06/10/2022    EXCISION BACK NEVUS WL PER ERICK/REQUEST TO FOLLOW performed by Lisbeth Palencia MD at [id number] Left 11/15/2018    UVULOPALATOPHARYGOPLASTY       Family History   Problem Relation Age of Onset    Lung Disease Mother     Asthma Mother     Colon Cancer Father     Cancer Father     Cancer Maternal Uncle         colon    Cancer Maternal Uncle         colon    Cancer Maternal Uncle         colon    Cancer Maternal Uncle         colon    Cancer Maternal Uncle         colon    Cancer Maternal Uncle         colon    Cancer Maternal Uncle         colon    Coronary Art Dis Neg Hx      Social History     Socioeconomic History    Marital status:      Spouse name: Not on file    Number of children: Not on file    Years of education: Not on file    Highest education level: Not on file   Occupational History    Not on file   Tobacco Use    Smoking status: Never    Smokeless tobacco: Never   Vaping Use    Vaping Use: Never used   Substance and Sexual Activity    Alcohol use:  Yes     Alcohol/week: 2.0 standard drinks    Drug use: No    Sexual activity: Not Currently   Other Topics Concern    Not on file   Social History Narrative    Not on file     Social Determinants of Health     Financial Resource Strain: Not on file   Food Insecurity: Not on file   Transportation Needs: Not on file   Physical Activity: Not on file   Stress: Not on file   Social Connections: Not on file   Intimate Partner Violence: Not on file   Housing Stability: Not on file     Current Outpatient Medications   Medication Sig Dispense Refill    Loperamide HCl (IMODIUM A-D PO) Take by mouth as needed      metFORMIN (GLUCOPHAGE) 1000 MG tablet Take 1 tablet by mouth 2 times daily (with meals) 180 tablet 1    traZODone (DESYREL) 100 MG tablet Take 1 tablet by mouth nightly 90 tablet 1    lisinopril (PRINIVIL;ZESTRIL) 10 MG tablet Take 1 tablet by mouth daily 90 tablet 1    atorvastatin (LIPITOR) 40 MG tablet Take 0.5 tablets by mouth daily 90 tablet 1    TRESIBA FLEXTOUCH 200 UNIT/ML SOPN Inject 40 Units into the skin daily 6 mL 5    omeprazole (PRILOSEC) 40 MG delayed release capsule       escitalopram (LEXAPRO) 10 MG tablet Take 0.5 tablets by mouth daily 30 tablet 3    HUMALOG KWIKPEN 100 UNIT/ML SOPN Inject 16 Units into the skin 3 times daily (before meals) Add Correction 5 for 50 above 150 mg/dL. Max Daily Dose; 150 units 45 mL 11    Continuous Blood Gluc Sensor (DEXCOM G6 SENSOR) MISC Replaced every 10 days 9 each 1    Multiple Vitamins-Minerals (MENS 50+ ADVANCED PO) Take by mouth      aspirin 81 MG EC tablet Take 81 mg by mouth daily       Continuous Blood Gluc Sensor (DEXCOM G7 SENSOR) MISC Apply new sensor to arm every 10 days 3 each 5    GVOKE HYPOPEN 2-PACK 1 MG/0.2ML SOAJ Inject 1 mg into the skin as needed (as needed for BG less than 50 mg/dL) (Patient not taking: Reported on 11/15/2022) 0.4 mL 11    ONETOUCH ULTRA strip 1 each by In Vitro route daily Check bG 4 times daily E11.65. 100 each 3    Insulin Pen Needle 32G X 4 MM MISC 1 each by Does not apply route daily 400 each 11    Continuous Blood Gluc Transmit (DEXCOM G6 TRANSMITTER) MISC Change every 90 days as directed. (Patient not taking: Reported on 10/25/2022) 1 each 3    ascorbic acid (VITAMIN C) 500 MG tablet Take 500 mg by mouth daily (Patient not taking: No sig reported)      melatonin 5 MG TABS tablet Take 10 mg by mouth  (Patient not taking: Reported on 2/21/2023)       Current Facility-Administered Medications   Medication Dose Route Frequency Provider Last Rate Last Admin    magnesium oxide  (MAG-OX) tablet 400 mg  400 mg Oral TID Nicolás Kumari, NP-C           OBJECTIVE:  /79 (Site: Left Upper Arm, Position: Standing, Cuff Size: Medium Adult)   Pulse 76   Temp 97.9 °F (36.6 °C) (Oral)   Resp 16   Ht 5' 8\" (1.727 m)   Wt 190 lb 14.4 oz (86.6 kg)   SpO2 98%   BMI 29.03 kg/m²   Pain Score:   0 - No pain (fatigue-0)    ECO    Physical Exam:  Constitutional: Well developed, well nourished male in no acute distress, sitting comfortably in the exam room chair. HEENT: Normocephalic and atraumatic. Sclerae anicteric. Neck supple without JVD. No thyromegaly present. Lymph node   No palpable cervical, submental, supraclavicular, or axillary nodes. Skin Warm and dry. No bruising and no rash noted. No erythema. No pallor. Respiratory Lungs are clear to auscultation bilaterally without wheezes, rales or rhonchi, normal air exchange without accessory muscle use. CVS Normal rate, regular rhythm and normal S1 and S2. No murmurs, gallops, or rubs. Abdomen Soft, nontender and nondistended, normoactive bowel sounds. Neuro Grossly nonfocal with no obvious sensory or motor deficits. MSK Normal range of motion in general.  No edema and no tenderness. Psych Appropriate mood and affect.           Labs:  Recent Results (from the past 96 hour(s))   CBC with Auto Differential    Collection Time: 23  8:59 AM   Result Value Ref Range    WBC 6.4 4.3 - 11.1 K/uL    RBC 4.70 4.23 - 5.6 M/uL    Hemoglobin 13.6 13.6 - 17.2 g/dL    Hematocrit 38.8 (L) 41.1 - 50.3 %    MCV 82.6 82.0 - 102.0 FL    MCH 28.9 26.1 - 32.9 PG    MCHC 35.1 (H) 31.4 - 35.0 g/dL    RDW 13.8 11.9 - 14.6 %    Platelets 133 429 - 956 K/uL    MPV 9.5 9.4 - 12.3 FL    nRBC 0.00 0.0 - 0.2 K/uL    Seg Neutrophils 74 43 - 78 %    Lymphocytes 10 (L) 13 - 44 %    Monocytes 9 4.0 - 12.0 %    Eosinophils % 6 0.5 - 7.8 %    Basophils 1 0.0 - 2.0 %    Immature Granulocytes 0 0.0 - 5.0 %    Segs Absolute 4.8 1.7 - 8.2 K/UL    Absolute Lymph # 0.6 0.5 - 4.6 K/UL    Absolute Mono # 0.6 0.1 - 1.3 K/UL    Absolute Eos # 0.4 0.0 - 0.8 K/UL    Basophils Absolute 0.0 0.0 - 0.2 K/UL    Absolute Immature Granulocyte 0.0 0.0 - 0.5 K/UL    Differential Type AUTOMATED     Comprehensive Metabolic Panel    Collection Time: 02/21/23  8:59 AM   Result Value Ref Range    Sodium 136 133 - 143 mmol/L    Potassium 4.1 3.5 - 5.1 mmol/L    Chloride 102 101 - 110 mmol/L    CO2 29 21 - 32 mmol/L    Anion Gap 5 2 - 11 mmol/L    Glucose 271 (H) 65 - 100 mg/dL    BUN 11 8 - 23 MG/DL    Creatinine 0.90 0.8 - 1.5 MG/DL    Est, Glom Filt Rate >60 >60 ml/min/1.73m2    Calcium 8.8 8.3 - 10.4 MG/DL    Total Bilirubin 0.5 0.2 - 1.1 MG/DL    ALT 30 12 - 65 U/L    AST 16 15 - 37 U/L    Alk Phosphatase 79 50 - 136 U/L    Total Protein 6.2 (L) 6.3 - 8.2 g/dL    Albumin 3.6 3.2 - 4.6 g/dL    Globulin 2.6 (L) 2.8 - 4.5 g/dL    Albumin/Globulin Ratio 1.4 0.4 - 1.6     Magnesium    Collection Time: 02/21/23  8:59 AM   Result Value Ref Range    Magnesium 1.2 (L) 1.8 - 2.4 mg/dL   Lactate Dehydrogenase    Collection Time: 02/21/23  8:59 AM   Result Value Ref Range     110 - 210 U/L           Imaging:  PET CT SKULL BASE TO MID THIGH    Result Date: 6/29/2022  EXAMINATION: PET CT SKULL BASE TO MID THIGH 6/29/2022 1:42 PM ACCESSION NUMBER: [id number] COMPARISON: None available INDICATION: Diffuse large b-cell lymphoma, lymph nodes of inguinal region and lower limb, initial staging TECHNIQUE:   Radiopharmaceutical: 12.83 mCi F18-FDG IV. Positron emission tomography (PET) with concurrently acquired computed tomography (CT) for attenuation correction and anatomical localization imaging; skull base to mid-thigh. Blood glucose at the time of tracer administration is 158 mg/dl, which is adequate for imaging. Approximately 60 minutes after administration of the radiopharmaceutical imaging was initiated. SUV max calculated from patient body wt.  Noncaloric dilute oral contrast is given. For this CT scanner at least one of the following techniques is utilized to decrease patient radiation exposure: Automatic exposure control, modulation of MA and KVP based on patient weight, and iterative reconstruction. FINDINGS:  Head/Neck: No hypermetabolic cervical lymphadenopathy. No abnormal radiotracer uptake within the brain, however the normal intense uptake limits evaluation. Chest: No hypermetabolic pulmonary nodule. A 1.7 x 1.2 cm posterior mediastinal retroaortic nodule/lymph node demonstrates mild FDG uptake (max SUV 3.5). Moderately metabolic nonenlarged right axillary lymph node (max SUV 5.9). Partial visualization of a moderately metabolic right upper extremity lymph node adjacent to the distal humerus (max SUV 5.6). 6 mm mildly metabolic prevascular lymph node adjacent to the main pulmonary artery (max SUV 2.8). 8 mm subcutaneous nodule along the right inferior chest wall (max SUV 1.4). Port within the right anterior chest wall. The port catheter tip terminates at the cavoatrial junction. Other changes in her coronary artery calcifications. Abdomen/Pelvis: Hypermetabolic left inguinal lymph node conglomerate measuring 3.9 x 3.5 cm (max SUV 11.9). Enlarged and hypermetabolic right inguinal lymph nodes (max SUV 8.1). Postsurgical changes within the left inguinal region. Hypermetabolic left iliac and pelvic lymph nodes. For example, a left obturator lymph node measures 3.9 x 2.1 cm (max SUV 10.9). Moderately metabolic left periaortic lymph node (max SUV 4.2). Enlarged and hypermetabolic upper abdominal lymph nodes with an aortocaval lymph node conglomerate demonstrating a max SUV of 5.5. The spleen does not appear enlarged. Physiologic uptake within the gastrointestinal and genitourinary tracts. Scattered vascular calcifications. The prostate gland is not enlarged. Gallstones without evidence of acute cholecystitis.  Bones: No hypermetabolic osseous focus to suggest osseous metastatic disease. 1.  Hypermetabolic lymphadenopathy involving the bilateral inguinal, left iliac, retroperitoneal and upper abdomen, compatible with biopsy-proven B-cell lymphoma. 2.  There are additional mild to moderately metabolic mediastinal and right axillary lymph nodes, favored to represent additional sites of tia disease. 3.  Nonspecific 8 mm subcutaneous nodule within the right lower chest wall demonstrating mild FDG uptake, equivocal for additional site of disease. 4.  No splenomegaly. Pathology:  DIAGNOSIS        A:  \"LEFT INGUINAL LYMPH NODE\":             DIFFUSE LARGE B-CELL LYMPHOMA. SEE COMMENT. B:  \"NEVUS OF BACK\":             PENDING DERMATOPATHOLOGY CONSULT. Comment        A:  Histologic sections show a diffuse serpiginous infiltrate of   large atypical lymphocytes with indented nuclei, inconspicuous nucleoli   and moderate amounts of cytoplasm. Immunohistochemical stains show that   the lymphoma cells are positive for CD20, [place]-5, CD10 and Bcl-6 and   negative for Bcl-2, MUM-1, CD5, Cyclin D1, CD30 (0% positivity) and c-MYC   (less than 40% positivity). CD3 stain highlights background small   T-lymphocytes. Ki-67 shows an increased proliferation index in the   serpiginous areas of more than 90%. The morphologic and immunophenotypic   appearance supports a diagnosis of germinal center subtype of diffuse   large B-cell lymphoma. FISH studies have been requested and will be   reported separately. ASSESSMENT:   Diagnosis Orders   1. Diffuse large B-cell lymphoma of lymph nodes of inguinal region (HCC)  Lactate Dehydrogenase      2. Hypomagnesemia              PLAN:  Lab studies and imaging studies were personally reviewed. DLBCL: high grade with Ki-67 90%, GC subtype, diagnosed with excisional biopsy of lymphadenopathy in left groin, 3.2 cm on ultrasound, enlarging over 3 months time.   PET/CT reviewed and shows the most prominent areas of disease in the inguinal and iliac nodes, but there are also nodes in the right axilla and retroperitoneum that are consistent with DLBCL, making him at least [employer] stage III. Bone marrow biopsy showed no morphologic findings but did show a small B cell clone worrisome for lymphoma involvement in the bone marrow. His FISH shows no evidence of more aggressive genotypic findings, so he would be most appropriate for R-CHOP for 6 cycles for definitive therapy. PET/CT after 2 cycles shows CR, continue to 6 cycles as planned. He returns today for routine 3 month follow up for DLBCL. Overall, he has been well since last seen. There have been many travels both for pleasure and for work of which he has enjoyed. Since his last cycle of treatment 3 months ago he has continued to have diarrhea, some days 3-4 episodes, using imodium to control. He stopped taking oral Mg+ due to fear that it was the cause and his metformin was decreased  without any improvement. Appetite is good and weight is stable. His energy level has improved, no longer taking naps. There is no cough, shortness of breath, or edema. He denies any residual neuropathy, however states issues with dexterity of fingers and ongoing memory/fogginess that is improving. Wife reports that he seems anxious and mad at times, he disagrees for the most part. He is frustrated that he hasn't made a full recovery yet and has several large home projects hanging over him. He denies any lymphadenopathy, fevers, infectious symptoms, or night sweats. Labs reviewed and are unremarkable outside of his Mg+ of 1.2. He was instructed to start magnesium glycinate 1-2 daily. He will return in 3 months for follow up with restaging prior. All questions were asked and answered to the best of my ability.                   Zari Sanchez, HEATHER  [facility]  [id number] [place] [address]  Office : [phone]  Fax : [phone]

## 2023-02-27 DIAGNOSIS — E11.65 TYPE 2 DIABETES MELLITUS WITH HYPERGLYCEMIA, WITH LONG-TERM CURRENT USE OF INSULIN (HCC): ICD-10-CM

## 2023-02-27 DIAGNOSIS — Z79.4 TYPE 2 DIABETES MELLITUS WITH HYPERGLYCEMIA, WITH LONG-TERM CURRENT USE OF INSULIN (HCC): ICD-10-CM

## 2023-02-27 RX ORDER — BLOOD-GLUCOSE TRANSMITTER
EACH MISCELLANEOUS
Qty: 1 EACH | Refills: 3 | Status: SHIPPED | OUTPATIENT
Start: 2023-02-27

## 2023-04-03 PROBLEM — E11.42 DIABETIC POLYNEUROPATHY ASSOCIATED WITH TYPE 2 DIABETES MELLITUS (HCC): Status: RESOLVED | Noted: 2020-01-24 | Resolved: 2021-08-30

## 2023-04-04 ENCOUNTER — APPOINTMENT (RX ONLY)
Dept: URBAN - METROPOLITAN AREA CLINIC 23 | Facility: CLINIC | Age: 63
Setting detail: DERMATOLOGY
End: 2023-04-04

## 2023-04-04 DIAGNOSIS — D22 MELANOCYTIC NEVI: ICD-10-CM

## 2023-04-04 DIAGNOSIS — L80 VITILIGO: ICD-10-CM

## 2023-04-04 PROBLEM — D22.5 MELANOCYTIC NEVI OF TRUNK: Status: ACTIVE | Noted: 2023-04-04

## 2023-04-04 PROCEDURE — A4550 SURGICAL TRAYS: HCPCS

## 2023-04-04 PROCEDURE — ? PRESCRIPTION

## 2023-04-04 PROCEDURE — ? COUNSELING

## 2023-04-04 PROCEDURE — ? OTHER

## 2023-04-04 PROCEDURE — ? PUNCH EXCISION

## 2023-04-04 PROCEDURE — 11400 EXC TR-EXT B9+MARG 0.5 CM<: CPT

## 2023-04-04 PROCEDURE — 99214 OFFICE O/P EST MOD 30 MIN: CPT | Mod: 25

## 2023-04-04 RX ORDER — RUXOLITINIB 15 MG/G
CREAM TOPICAL
Qty: 60 | Refills: 6 | Status: ERX | COMMUNITY
Start: 2023-04-04

## 2023-04-04 RX ADMIN — RUXOLITINIB: 15 CREAM TOPICAL at 00:00

## 2023-04-04 ASSESSMENT — LOCATION SIMPLE DESCRIPTION DERM
LOCATION SIMPLE: CHEST
LOCATION SIMPLE: RIGHT UPPER BACK
LOCATION SIMPLE: LEFT UPPER BACK

## 2023-04-04 ASSESSMENT — LOCATION DETAILED DESCRIPTION DERM
LOCATION DETAILED: STERNUM
LOCATION DETAILED: RIGHT SUPERIOR UPPER BACK
LOCATION DETAILED: LEFT SUPERIOR MEDIAL UPPER BACK

## 2023-04-04 ASSESSMENT — LOCATION ZONE DERM: LOCATION ZONE: TRUNK

## 2023-04-04 NOTE — PROCEDURE: OTHER
Other (Free Text): Has had vitiligo since March if 1998 and is now continuing to spread.
Render Risk Assessment In Note?: no
Note Text (......Xxx Chief Complaint.): This diagnosis correlates with the
Detail Level: Zone

## 2023-04-04 NOTE — HPI: DISCOLORATION (VITILIGO)
How Severe Is It?: mild
Is This A New Presentation, Or A Follow-Up?: Follow Up Vitiligo
Additional History: Wants Opzelura

## 2023-04-04 NOTE — PROCEDURE: PUNCH EXCISION
Medical Necessity Clause: This procedure was medically necessary because the lesion that was treated was:
Detail Level: Detailed
Size Of Lesion (*Required): 0.3
X Size Of Lesion Width In Cm (Optional): 0
Size Of Margin In Cm: 0.05
Punch Size In Mm: 4
Repair Type: None (Simple)
Complex Requirements: Extensive Undermining Performed?: No
Undermining Type: Entire Wound
Debridement Text: The wound edges were debrided prior to proceeding with the closure to facilitate wound healing.
Helical Rim Text: The closure involved the helical rim.
Vermilion Border Text: The closure involved the vermilion border.
Nostril Rim Text: The closure involved the nostril rim.
Retention Suture Text: Retention sutures were placed to support the closure and prevent dehiscence.
Include Undermining Statement In The Repair Note?: Yes
Anesthesia Type: 1% lidocaine without epinephrine and 0.9% sodium chloride in a 1:1 solution
Anesthesia Volume In Cc: 2
Hemostasis: Electrocautery
Epidermal Sutures: 4-0 Prolene
Epidermal Closure: simple interrupted
Wound Care: Petrolatum
Wound Dressings: a bandage
Suture Removal: 14 days
Lab: [id number]
Lab Facility: [id number]
Path Notes (To The Dermatopathologist): Please check margins.
1.5 Mm Punch Excision Text: A 1.5 mm punch biopsy was used to excise the lesion to the level of the subcutaneous fat.  Blunt dissection was used to free the lesion from the surrounding tissues and the lesion was removed.
2 Mm Punch Excision Text: A 2 mm punch biopsy was used to excise the lesion to the level of the subcutaneous fat.  Blunt dissection was used to free the lesion from the surrounding tissues and the lesion was removed.
2.5 Mm Punch Excision Text: A 2.5 mm punch biopsy was used to excise the lesion to the level of the subcutaneous fat.  Blunt dissection was used to free the lesion from the surrounding tissues and the lesion was removed.
3 Mm Punch Excision Text: A 3 mm punch biopsy was used to excise the lesion to the level of the subcutaneous fat.  Blunt dissection was used to free the lesion from the surrounding tissues and the lesion was removed.
3.5 Mm Punch Excision Text: A 3.5 mm punch biopsy was used to excise the lesion to the level of the subcutaneous fat.  Blunt dissection was used to free the lesion from the surrounding tissues and the lesion was removed.
4 Mm Punch Excision Text: A 4 mm punch biopsy was used to excise the lesion to the level of the subcutaneous fat.  Blunt dissection was used to free the lesion from the surrounding tissues and the lesion was removed.
4.5 Mm Punch Excision Text: A 4.5 mm punch biopsy was used to excise the lesion to the level of the subcutaneous fat.  Blunt dissection was used to free the lesion from the surrounding tissues and the lesion was removed.
5 Mm Punch Excision Text: A 5 mm punch biopsy was used to excise the lesion to the level of the subcutaneous fat.  Blunt dissection was used to free the lesion from the surrounding tissues and the lesion was removed.
6 Mm Punch Excision Text: A 6 mm punch biopsy was used to excise the lesion to the level of the subcutaneous fat.  Blunt dissection was used to free the lesion from the surrounding tissues and the lesion was removed.
7 Mm Punch Excision Text: A 7 mm punch biopsy was used to excise the lesion to the level of the subcutaneous fat.  Blunt dissection was used to free the lesion from the surrounding tissues and the lesion was removed.
8 Mm Punch Excision Text: A 8 mm punch biopsy was used to excise the lesion to the level of the subcutaneous fat.  Blunt dissection was used to free the lesion from the surrounding tissues and the lesion was removed.
10 Mm Punch Excision Text: A 10 mm punch biopsy was used to excise the lesion to the level of the subcutaneous fat.  Blunt dissection was used to free the lesion from the surrounding tissues and the lesion was removed.
12 Mm Punch Excision Text: A 12 mm punch biopsy was used to excise the lesion to the level of the subcutaneous fat.  Blunt dissection was used to free the lesion from the surrounding tissues and the lesion was removed.
Consent was obtained from the patient. The risks and benefits to therapy were discussed in detail. Specifically, the risks of infection, scarring, bleeding, prolonged wound healing, incomplete removal, allergy to anesthesia, nerve injury and recurrence were addressed. Prior to the procedure, the treatment site was clearly identified and confirmed by the patient. All components of Universal Protocol/PAUSE Rule completed.
Post-Care Instructions: I reviewed with the patient in detail post-care instructions. Patient is to keep the biopsy site dry overnight, and then apply bacitracin twice daily until healed. Patient may apply hydrogen peroxide soaks to remove any crusting.
Notification Instructions: Patient will be notified of biopsy results. However, patient instructed to call the office if not contacted within 2 weeks.
Billing Type: Third-Party Bill

## 2023-04-24 ENCOUNTER — RX ONLY (OUTPATIENT)
Age: 63
Setting detail: RX ONLY
End: 2023-04-24

## 2023-04-24 RX ORDER — RUXOLITINIB 15 MG/G
CREAM TOPICAL
Qty: 60 | Refills: 6 | Status: ERX

## 2023-05-02 ENCOUNTER — TELEPHONE (OUTPATIENT)
Dept: FAMILY MEDICINE CLINIC | Facility: CLINIC | Age: 63
End: 2023-05-02

## 2023-05-02 NOTE — TELEPHONE ENCOUNTER
Shauna Clark ([facility]) - [phone]  Dexcom G6 Transmitter       Status: PA Response - Approved    Created: April 24th, 2023    Sent: May 2nd, 2023

## 2023-05-15 ENCOUNTER — HOSPITAL ENCOUNTER (OUTPATIENT)
Dept: PET IMAGING | Age: 63
Discharge: HOME | End: 2023-05-18
Payer: OTHER GOVERNMENT

## 2023-05-15 DIAGNOSIS — C83.35 DIFFUSE LARGE B-CELL LYMPHOMA OF LYMPH NODES OF INGUINAL REGION (HCC): ICD-10-CM

## 2023-05-15 DIAGNOSIS — C83.35 DIFFUSE LARGE B-CELL LYMPHOMA OF LYMPH NODES OF INGUINAL REGION (HCC): Primary | ICD-10-CM

## 2023-05-15 DIAGNOSIS — E83.42 HYPOMAGNESEMIA: ICD-10-CM

## 2023-05-15 LAB
GLUCOSE BLD STRIP.AUTO-MCNC: 156 MG/DL (ref 65–100)
SERVICE CMNT-IMP: ABNORMAL

## 2023-05-15 PROCEDURE — 78815 PET IMAGE W/CT SKULL-THIGH: CPT

## 2023-05-15 PROCEDURE — 82962 GLUCOSE BLOOD TEST: CPT

## 2023-05-15 PROCEDURE — 2580000003 HC RX 258: Performed by: INTERNAL MEDICINE

## 2023-05-15 PROCEDURE — 3430000000 HC RX DIAGNOSTIC RADIOPHARMACEUTICAL: Performed by: INTERNAL MEDICINE

## 2023-05-15 PROCEDURE — 6360000004 HC RX CONTRAST MEDICATION: Performed by: INTERNAL MEDICINE

## 2023-05-15 PROCEDURE — A9552 F18 FDG: HCPCS | Performed by: INTERNAL MEDICINE

## 2023-05-15 RX ORDER — SODIUM CHLORIDE 0.9 % (FLUSH) 0.9 %
10 SYRINGE (ML) INJECTION ONCE AS NEEDED
Status: COMPLETED | OUTPATIENT
Start: 2023-05-15 | End: 2023-05-15

## 2023-05-15 RX ORDER — FLUDEOXYGLUCOSE F 18 200 MCI/ML
13.1 INJECTION, SOLUTION INTRAVENOUS
Status: COMPLETED | OUTPATIENT
Start: 2023-05-15 | End: 2023-05-15

## 2023-05-15 RX ADMIN — SODIUM CHLORIDE, PRESERVATIVE FREE 10 ML: 5 INJECTION INTRAVENOUS at 07:45

## 2023-05-15 RX ADMIN — DIATRIZOATE MEGLUMINE AND DIATRIZOATE SODIUM 10 ML: 660; 100 LIQUID ORAL; RECTAL at 07:45

## 2023-05-15 RX ADMIN — FLUDEOXYGLUCOSE F 18 13.1 MILLICURIE: 200 INJECTION, SOLUTION INTRAVENOUS at 07:45

## 2023-05-16 ENCOUNTER — HOSPITAL ENCOUNTER (OUTPATIENT)
Dept: LAB | Age: 63
Discharge: HOME | End: 2023-05-19
Payer: OTHER GOVERNMENT

## 2023-05-16 ENCOUNTER — OFFICE VISIT (OUTPATIENT)
Dept: ONCOLOGY | Age: 63
End: 2023-05-16
Payer: COMMERCIAL

## 2023-05-16 VITALS
SYSTOLIC BLOOD PRESSURE: 135 MMHG | RESPIRATION RATE: 16 BRPM | OXYGEN SATURATION: 96 % | BODY MASS INDEX: 29.4 KG/M2 | DIASTOLIC BLOOD PRESSURE: 80 MMHG | HEART RATE: 98 BPM | HEIGHT: 68 IN | TEMPERATURE: 98.1 F | WEIGHT: 194 LBS

## 2023-05-16 DIAGNOSIS — G62.0 CHEMOTHERAPY-INDUCED NEUROPATHY (HCC): ICD-10-CM

## 2023-05-16 DIAGNOSIS — T45.1X5A CHEMOTHERAPY-INDUCED NEUROPATHY (HCC): ICD-10-CM

## 2023-05-16 DIAGNOSIS — C83.35 DIFFUSE LARGE B-CELL LYMPHOMA OF LYMPH NODES OF INGUINAL REGION (HCC): ICD-10-CM

## 2023-05-16 DIAGNOSIS — C83.35 DIFFUSE LARGE B-CELL LYMPHOMA OF LYMPH NODES OF INGUINAL REGION (HCC): Primary | ICD-10-CM

## 2023-05-16 DIAGNOSIS — F39 MOOD DISORDER (HCC): ICD-10-CM

## 2023-05-16 DIAGNOSIS — E83.42 HYPOMAGNESEMIA: ICD-10-CM

## 2023-05-16 LAB
ALBUMIN SERPL-MCNC: 3.9 G/DL (ref 3.2–4.6)
ALBUMIN/GLOB SERPL: 1.3 (ref 0.4–1.6)
ALP SERPL-CCNC: 74 U/L (ref 50–136)
ALT SERPL-CCNC: 33 U/L (ref 12–65)
ANION GAP SERPL CALC-SCNC: 6 MMOL/L (ref 2–11)
AST SERPL-CCNC: 17 U/L (ref 15–37)
BASOPHILS # BLD: 0.1 K/UL (ref 0–0.2)
BASOPHILS NFR BLD: 1 % (ref 0–2)
BILIRUB SERPL-MCNC: 0.7 MG/DL (ref 0.2–1.1)
BUN SERPL-MCNC: 13 MG/DL (ref 8–23)
CALCIUM SERPL-MCNC: 9.4 MG/DL (ref 8.3–10.4)
CHLORIDE SERPL-SCNC: 104 MMOL/L (ref 101–110)
CO2 SERPL-SCNC: 29 MMOL/L (ref 21–32)
CREAT SERPL-MCNC: 1 MG/DL (ref 0.8–1.5)
DIFFERENTIAL METHOD BLD: ABNORMAL
EOSINOPHIL # BLD: 0.4 K/UL (ref 0–0.8)
EOSINOPHIL NFR BLD: 7 % (ref 0.5–7.8)
ERYTHROCYTE [DISTWIDTH] IN BLOOD BY AUTOMATED COUNT: 13.6 % (ref 11.9–14.6)
GLOBULIN SER CALC-MCNC: 3 G/DL (ref 2.8–4.5)
GLUCOSE SERPL-MCNC: 200 MG/DL (ref 65–100)
HCT VFR BLD AUTO: 39.7 % (ref 41.1–50.3)
HGB BLD-MCNC: 13.7 G/DL (ref 13.6–17.2)
IMM GRANULOCYTES # BLD AUTO: 0 K/UL (ref 0–0.5)
IMM GRANULOCYTES NFR BLD AUTO: 0 % (ref 0–5)
LYMPHOCYTES # BLD: 0.6 K/UL (ref 0.5–4.6)
LYMPHOCYTES NFR BLD: 12 % (ref 13–44)
MAGNESIUM SERPL-MCNC: 1.2 MG/DL (ref 1.8–2.4)
MCH RBC QN AUTO: 30.9 PG (ref 26.1–32.9)
MCHC RBC AUTO-ENTMCNC: 34.5 G/DL (ref 31.4–35)
MCV RBC AUTO: 89.4 FL (ref 82–102)
MONOCYTES # BLD: 0.4 K/UL (ref 0.1–1.3)
MONOCYTES NFR BLD: 8 % (ref 4–12)
NEUTS SEG # BLD: 3.6 K/UL (ref 1.7–8.2)
NEUTS SEG NFR BLD: 71 % (ref 43–78)
NRBC # BLD: 0 K/UL (ref 0–0.2)
PLATELET # BLD AUTO: 192 K/UL (ref 150–450)
PMV BLD AUTO: 10 FL (ref 9.4–12.3)
POTASSIUM SERPL-SCNC: 4.6 MMOL/L (ref 3.5–5.1)
PROT SERPL-MCNC: 6.9 G/DL (ref 6.3–8.2)
RBC # BLD AUTO: 4.44 M/UL (ref 4.23–5.6)
SODIUM SERPL-SCNC: 139 MMOL/L (ref 133–143)
WBC # BLD AUTO: 5.1 K/UL (ref 4.3–11.1)

## 2023-05-16 PROCEDURE — 85025 COMPLETE CBC W/AUTO DIFF WBC: CPT

## 2023-05-16 PROCEDURE — 36415 COLL VENOUS BLD VENIPUNCTURE: CPT

## 2023-05-16 PROCEDURE — 80053 COMPREHEN METABOLIC PANEL: CPT

## 2023-05-16 PROCEDURE — 83735 ASSAY OF MAGNESIUM: CPT

## 2023-05-16 PROCEDURE — 3078F DIAST BP <80 MM HG: CPT | Performed by: INTERNAL MEDICINE

## 2023-05-16 PROCEDURE — 99214 OFFICE O/P EST MOD 30 MIN: CPT | Performed by: INTERNAL MEDICINE

## 2023-05-16 PROCEDURE — 3075F SYST BP GE 130 - 139MM HG: CPT | Performed by: INTERNAL MEDICINE

## 2023-05-16 ASSESSMENT — PATIENT HEALTH QUESTIONNAIRE - PHQ9
SUM OF ALL RESPONSES TO PHQ QUESTIONS 1-9: 1
SUM OF ALL RESPONSES TO PHQ9 QUESTIONS 1 & 2: 1
SUM OF ALL RESPONSES TO PHQ QUESTIONS 1-9: 1
1. LITTLE INTEREST OR PLEASURE IN DOING THINGS: 0
SUM OF ALL RESPONSES TO PHQ QUESTIONS 1-9: 1
SUM OF ALL RESPONSES TO PHQ QUESTIONS 1-9: 1
2. FEELING DOWN, DEPRESSED OR HOPELESS: 1

## 2023-05-16 NOTE — PROGRESS NOTES
[facility] Hematology and Oncology: Office Visit Established Patient    Chief Complaint:    Chief Complaint   Patient presents with    Follow-up           History of Present Illness:  Mr. Fabio Rocha is a 58 y.o. male who returns today for management of diffuse large B cell lymphoma. He presented to his PCP on 4/29/22 reporting an enlarging lump in his left groin for the past 2 months. Physical exam confirmed a mildly tender lump in the patients left inguinal region. The right inguinal region was normal, and no inguinal hernia was noted. Ultrasound of the left groin was performed on 5/13/22 revealing enlarged, abnormal appearing left inguinal lymph nodes measuring up to 3.0 x 3.2 x 1.9 cm. Ultrasound directed biopsy was suggested for further assessment. We saw him for consultation and recommended excisional biopsy, which showed diffuse large B cell lymphoma. PET/CT shows the most prominent areas of disease in the inguinal and iliac nodes, but there are also nodes in the right axilla and retroperitoneum that are consistent with DLBCL, making him at least [employer] stage III. Bone marrow biopsy showed no morphologic findings but did show a small B cell clone worrisome for lymphoma involvement in the bone marrow, this would make him AA stage IV. His FISH shows no evidence of more aggressive genotypic findings, so he would be most appropriate for R-CHOP for 6 cycles for definitive therapy. PET/CT following 2 cycles revealed CR. PET/CT after 6 months shows continued complete response. He will now enter observation with repeat labs and OV in 3 months, CT in 6 months, this will continue for the first two years. He returns today for routine 3 month follow up for DLBCL. He is now 6 months out from his treatment. Overall, he has been well since last seen. He does have some lingering side effects, specifically he notes increased irritability and anxiety, as well as neuropathy and balance difficulty.   He was

## 2023-05-19 ENCOUNTER — TELEPHONE (OUTPATIENT)
Dept: DIABETES SERVICES | Age: 63
End: 2023-05-19

## 2023-05-19 NOTE — TELEPHONE ENCOUNTER
Call to patient to let him know DME is now free at no cost. Wife answered and says her  travels a lot and wants to know about virtual.  Took information about zoom. She wants information about education and what we discussed today, along with contact information e mail  E mail is [email or web address]. Will send e mail.

## 2023-05-23 DIAGNOSIS — G62.9 POLYNEUROPATHY: Primary | ICD-10-CM

## 2023-06-05 RX ORDER — TRAZODONE HYDROCHLORIDE 100 MG/1
TABLET ORAL
Qty: 90 TABLET | Refills: 3 | Status: SHIPPED | OUTPATIENT
Start: 2023-06-05

## 2023-06-06 ENCOUNTER — TELEPHONE (OUTPATIENT)
Dept: FAMILY MEDICINE CLINIC | Facility: CLINIC | Age: 63
End: 2023-06-06

## 2023-06-06 DIAGNOSIS — Z79.4 TYPE 2 DIABETES MELLITUS WITH HYPERGLYCEMIA, WITH LONG-TERM CURRENT USE OF INSULIN (HCC): ICD-10-CM

## 2023-06-06 DIAGNOSIS — E11.65 TYPE 2 DIABETES MELLITUS WITH HYPERGLYCEMIA, WITH LONG-TERM CURRENT USE OF INSULIN (HCC): ICD-10-CM

## 2023-06-06 RX ORDER — INSULIN DEGLUDEC 200 U/ML
40 INJECTION, SOLUTION SUBCUTANEOUS DAILY
Qty: 9 ML | Refills: 5 | Status: SHIPPED | OUTPATIENT
Start: 2023-06-06

## 2023-08-15 DIAGNOSIS — C83.35 DIFFUSE LARGE B-CELL LYMPHOMA OF LYMPH NODES OF INGUINAL REGION (HCC): Primary | ICD-10-CM

## 2023-08-16 ENCOUNTER — HOSPITAL ENCOUNTER (OUTPATIENT)
Dept: LAB | Age: 63
Discharge: HOME | End: 2023-08-19
Payer: OTHER GOVERNMENT

## 2023-08-16 ENCOUNTER — OFFICE VISIT (OUTPATIENT)
Dept: ONCOLOGY | Age: 63
End: 2023-08-16

## 2023-08-16 VITALS
WEIGHT: 195.3 LBS | TEMPERATURE: 97.5 F | BODY MASS INDEX: 29.6 KG/M2 | SYSTOLIC BLOOD PRESSURE: 137 MMHG | OXYGEN SATURATION: 96 % | HEIGHT: 68 IN | RESPIRATION RATE: 16 BRPM | DIASTOLIC BLOOD PRESSURE: 83 MMHG | HEART RATE: 84 BPM

## 2023-08-16 DIAGNOSIS — C83.35 DIFFUSE LARGE B-CELL LYMPHOMA OF LYMPH NODES OF INGUINAL REGION (HCC): Primary | ICD-10-CM

## 2023-08-16 DIAGNOSIS — C83.35 DIFFUSE LARGE B-CELL LYMPHOMA OF LYMPH NODES OF INGUINAL REGION (HCC): ICD-10-CM

## 2023-08-16 LAB
ALBUMIN SERPL-MCNC: 3.9 G/DL (ref 3.2–4.6)
ALBUMIN/GLOB SERPL: 1.4 (ref 0.4–1.6)
ALP SERPL-CCNC: 69 U/L (ref 50–136)
ALT SERPL-CCNC: 29 U/L (ref 12–65)
ANION GAP SERPL CALC-SCNC: 4 MMOL/L (ref 2–11)
AST SERPL-CCNC: 11 U/L (ref 15–37)
BASOPHILS # BLD: 0.1 K/UL (ref 0–0.2)
BASOPHILS NFR BLD: 1 % (ref 0–2)
BILIRUB SERPL-MCNC: 0.7 MG/DL (ref 0.2–1.1)
BUN SERPL-MCNC: 13 MG/DL (ref 8–23)
CALCIUM SERPL-MCNC: 9.5 MG/DL (ref 8.3–10.4)
CHLORIDE SERPL-SCNC: 104 MMOL/L (ref 101–110)
CO2 SERPL-SCNC: 29 MMOL/L (ref 21–32)
CREAT SERPL-MCNC: 0.9 MG/DL (ref 0.8–1.5)
DIFFERENTIAL METHOD BLD: ABNORMAL
EOSINOPHIL # BLD: 0.3 K/UL (ref 0–0.8)
EOSINOPHIL NFR BLD: 4 % (ref 0.5–7.8)
ERYTHROCYTE [DISTWIDTH] IN BLOOD BY AUTOMATED COUNT: 12.8 % (ref 11.9–14.6)
GLOBULIN SER CALC-MCNC: 2.8 G/DL (ref 2.8–4.5)
GLUCOSE SERPL-MCNC: 267 MG/DL (ref 65–100)
HCT VFR BLD AUTO: 39.6 % (ref 41.1–50.3)
HGB BLD-MCNC: 14.3 G/DL (ref 13.6–17.2)
IMM GRANULOCYTES # BLD AUTO: 0 K/UL (ref 0–0.5)
IMM GRANULOCYTES NFR BLD AUTO: 1 % (ref 0–5)
LYMPHOCYTES # BLD: 0.8 K/UL (ref 0.5–4.6)
LYMPHOCYTES NFR BLD: 13 % (ref 13–44)
MAGNESIUM SERPL-MCNC: 1.3 MG/DL (ref 1.8–2.4)
MCH RBC QN AUTO: 31.8 PG (ref 26.1–32.9)
MCHC RBC AUTO-ENTMCNC: 36.1 G/DL (ref 31.4–35)
MCV RBC AUTO: 88.2 FL (ref 82–102)
MONOCYTES # BLD: 0.5 K/UL (ref 0.1–1.3)
MONOCYTES NFR BLD: 8 % (ref 4–12)
NEUTS SEG # BLD: 4.6 K/UL (ref 1.7–8.2)
NEUTS SEG NFR BLD: 73 % (ref 43–78)
NRBC # BLD: 0 K/UL (ref 0–0.2)
PLATELET # BLD AUTO: 167 K/UL (ref 150–450)
PMV BLD AUTO: 9.7 FL (ref 9.4–12.3)
POTASSIUM SERPL-SCNC: 4.6 MMOL/L (ref 3.5–5.1)
PROT SERPL-MCNC: 6.7 G/DL (ref 6.3–8.2)
RBC # BLD AUTO: 4.49 M/UL (ref 4.23–5.6)
SODIUM SERPL-SCNC: 137 MMOL/L (ref 133–143)
WBC # BLD AUTO: 6.3 K/UL (ref 4.3–11.1)

## 2023-08-16 PROCEDURE — 85025 COMPLETE CBC W/AUTO DIFF WBC: CPT

## 2023-08-16 PROCEDURE — 36415 COLL VENOUS BLD VENIPUNCTURE: CPT

## 2023-08-16 PROCEDURE — 80053 COMPREHEN METABOLIC PANEL: CPT

## 2023-08-16 PROCEDURE — 83735 ASSAY OF MAGNESIUM: CPT

## 2023-08-16 ASSESSMENT — PATIENT HEALTH QUESTIONNAIRE - PHQ9
SUM OF ALL RESPONSES TO PHQ QUESTIONS 1-9: 1
2. FEELING DOWN, DEPRESSED OR HOPELESS: 1
1. LITTLE INTEREST OR PLEASURE IN DOING THINGS: 0
SUM OF ALL RESPONSES TO PHQ9 QUESTIONS 1 & 2: 1
SUM OF ALL RESPONSES TO PHQ QUESTIONS 1-9: 1

## 2023-08-17 NOTE — PROGRESS NOTES
Patient left prior to being seen. He arrived early for his appointment and less than 10 minutes after his appointment time he had already decided to leave.

## 2023-08-22 ENCOUNTER — OFFICE VISIT (OUTPATIENT)
Dept: FAMILY MEDICINE CLINIC | Facility: CLINIC | Age: 63
End: 2023-08-22
Payer: COMMERCIAL

## 2023-08-22 VITALS
SYSTOLIC BLOOD PRESSURE: 118 MMHG | DIASTOLIC BLOOD PRESSURE: 70 MMHG | BODY MASS INDEX: 29.55 KG/M2 | TEMPERATURE: 97 F | WEIGHT: 195 LBS | OXYGEN SATURATION: 94 % | HEART RATE: 91 BPM | HEIGHT: 68 IN

## 2023-08-22 DIAGNOSIS — I10 ESSENTIAL HYPERTENSION: ICD-10-CM

## 2023-08-22 DIAGNOSIS — F41.9 ANXIETY AND DEPRESSION: ICD-10-CM

## 2023-08-22 DIAGNOSIS — E83.42 HYPOMAGNESEMIA: ICD-10-CM

## 2023-08-22 DIAGNOSIS — E11.65 TYPE 2 DIABETES MELLITUS WITH HYPERGLYCEMIA, WITH LONG-TERM CURRENT USE OF INSULIN (HCC): Primary | ICD-10-CM

## 2023-08-22 DIAGNOSIS — C83.35 DIFFUSE LARGE B-CELL LYMPHOMA OF LYMPH NODES OF INGUINAL REGION (HCC): ICD-10-CM

## 2023-08-22 DIAGNOSIS — F32.A ANXIETY AND DEPRESSION: ICD-10-CM

## 2023-08-22 DIAGNOSIS — Z79.4 TYPE 2 DIABETES MELLITUS WITH HYPERGLYCEMIA, WITH LONG-TERM CURRENT USE OF INSULIN (HCC): Primary | ICD-10-CM

## 2023-08-22 LAB
CREAT UR-MCNC: 225 MG/DL
HBA1C MFR BLD: 8.4 %
MICROALBUMIN UR-MCNC: 1.73 MG/DL
MICROALBUMIN/CREAT UR-RTO: 8 MG/G (ref 0–30)

## 2023-08-22 PROCEDURE — 3074F SYST BP LT 130 MM HG: CPT | Performed by: STUDENT IN AN ORGANIZED HEALTH CARE EDUCATION/TRAINING PROGRAM

## 2023-08-22 PROCEDURE — 99215 OFFICE O/P EST HI 40 MIN: CPT | Performed by: STUDENT IN AN ORGANIZED HEALTH CARE EDUCATION/TRAINING PROGRAM

## 2023-08-22 PROCEDURE — 3078F DIAST BP <80 MM HG: CPT | Performed by: STUDENT IN AN ORGANIZED HEALTH CARE EDUCATION/TRAINING PROGRAM

## 2023-08-22 PROCEDURE — 83036 HEMOGLOBIN GLYCOSYLATED A1C: CPT | Performed by: STUDENT IN AN ORGANIZED HEALTH CARE EDUCATION/TRAINING PROGRAM

## 2023-08-22 PROCEDURE — 95251 CONT GLUC MNTR ANALYSIS I&R: CPT | Performed by: STUDENT IN AN ORGANIZED HEALTH CARE EDUCATION/TRAINING PROGRAM

## 2023-08-22 PROCEDURE — 3044F HG A1C LEVEL LT 7.0%: CPT | Performed by: STUDENT IN AN ORGANIZED HEALTH CARE EDUCATION/TRAINING PROGRAM

## 2023-08-22 RX ORDER — INSULIN LISPRO 100 [IU]/ML
15 INJECTION, SOLUTION INTRAVENOUS; SUBCUTANEOUS
Qty: 45 ML | Refills: 3 | Status: SHIPPED | OUTPATIENT
Start: 2023-08-22

## 2023-08-22 RX ORDER — LISINOPRIL 10 MG/1
10 TABLET ORAL DAILY
Qty: 90 TABLET | Refills: 3 | Status: SHIPPED | OUTPATIENT
Start: 2023-08-22

## 2023-08-22 RX ORDER — ATORVASTATIN CALCIUM 40 MG/1
20 TABLET, FILM COATED ORAL DAILY
Qty: 45 TABLET | Refills: 3 | Status: SHIPPED | OUTPATIENT
Start: 2023-08-22

## 2023-08-22 RX ORDER — ESCITALOPRAM OXALATE 10 MG/1
10 TABLET ORAL DAILY
Qty: 30 TABLET | Refills: 5 | Status: SHIPPED | OUTPATIENT
Start: 2023-08-22

## 2023-09-18 RX ORDER — ACYCLOVIR 400 MG/1
TABLET ORAL
Qty: 3 EACH | Refills: 5 | Status: SHIPPED | OUTPATIENT
Start: 2023-09-18

## 2023-09-18 NOTE — TELEPHONE ENCOUNTER
Wife walked in office asking that this be expedited and wanted to know why it wasn't done at his August office visit, I let her know Dr. Vel Holbrook is not here yet today.

## 2023-10-12 ENCOUNTER — TELEPHONE (OUTPATIENT)
Dept: ONCOLOGY | Age: 63
End: 2023-10-12

## 2023-10-12 NOTE — TELEPHONE ENCOUNTER
Physician provider: Remington Rubio MD  Reason for today's call: Referral  Last office visit:n/a    Spouse called asking to have a referral sent for Dermatology w/[facility] to have [employer] cover costs for appts. [facility] for [facility]: [phone].

## 2023-10-12 NOTE — TELEPHONE ENCOUNTER
Unsuccessful attempt to contact patient to assess need for referral.  LVM to return call. Dr. Darren King team informed.

## 2023-10-13 DIAGNOSIS — D22.9 CHANGE IN MOLE: Primary | ICD-10-CM

## 2023-11-16 ENCOUNTER — TELEPHONE (OUTPATIENT)
Dept: FAMILY MEDICINE CLINIC | Facility: CLINIC | Age: 63
End: 2023-11-16

## 2023-11-16 DIAGNOSIS — F41.9 ANXIETY AND DEPRESSION: ICD-10-CM

## 2023-11-16 DIAGNOSIS — Z79.4 TYPE 2 DIABETES MELLITUS WITH HYPERGLYCEMIA, WITH LONG-TERM CURRENT USE OF INSULIN (HCC): ICD-10-CM

## 2023-11-16 DIAGNOSIS — E11.65 TYPE 2 DIABETES MELLITUS WITH HYPERGLYCEMIA, WITH LONG-TERM CURRENT USE OF INSULIN (HCC): ICD-10-CM

## 2023-11-16 DIAGNOSIS — F32.A ANXIETY AND DEPRESSION: ICD-10-CM

## 2023-11-16 RX ORDER — LISINOPRIL 10 MG/1
10 TABLET ORAL DAILY
Qty: 90 TABLET | Refills: 3 | Status: SHIPPED | OUTPATIENT
Start: 2023-11-16

## 2023-11-16 RX ORDER — INSULIN DEGLUDEC 200 U/ML
44 INJECTION, SOLUTION SUBCUTANEOUS DAILY
Qty: 9 ML | Refills: 5 | Status: SHIPPED | OUTPATIENT
Start: 2023-11-16 | End: 2023-11-16 | Stop reason: SDUPTHER

## 2023-11-16 RX ORDER — INSULIN DEGLUDEC 200 U/ML
44 INJECTION, SOLUTION SUBCUTANEOUS DAILY
Qty: 9 ML | Refills: 5 | Status: SHIPPED | OUTPATIENT
Start: 2023-11-16

## 2023-11-16 RX ORDER — ATORVASTATIN CALCIUM 40 MG/1
20 TABLET, FILM COATED ORAL DAILY
Qty: 45 TABLET | Refills: 3 | Status: SHIPPED | OUTPATIENT
Start: 2023-11-16

## 2023-11-16 RX ORDER — TRAZODONE HYDROCHLORIDE 100 MG/1
100 TABLET ORAL NIGHTLY
Qty: 90 TABLET | Refills: 3 | Status: SHIPPED | OUTPATIENT
Start: 2023-11-16

## 2023-11-16 RX ORDER — ACYCLOVIR 400 MG/1
TABLET ORAL
Qty: 3 EACH | Refills: 5 | Status: SHIPPED | OUTPATIENT
Start: 2023-11-16

## 2023-11-16 RX ORDER — ESCITALOPRAM OXALATE 10 MG/1
10 TABLET ORAL DAILY
Qty: 90 TABLET | Refills: 3 | Status: SHIPPED | OUTPATIENT
Start: 2023-11-16

## 2023-11-20 DIAGNOSIS — C83.35 DIFFUSE LARGE B-CELL LYMPHOMA OF LYMPH NODES OF INGUINAL REGION (HCC): Primary | ICD-10-CM

## 2023-11-21 ENCOUNTER — OFFICE VISIT (OUTPATIENT)
Dept: ONCOLOGY | Age: 63
End: 2023-11-21
Payer: OTHER GOVERNMENT

## 2023-11-21 ENCOUNTER — HOSPITAL ENCOUNTER (OUTPATIENT)
Dept: LAB | Age: 63
Discharge: HOME | End: 2023-11-24
Payer: OTHER GOVERNMENT

## 2023-11-21 VITALS
HEART RATE: 88 BPM | HEIGHT: 68 IN | WEIGHT: 195.2 LBS | SYSTOLIC BLOOD PRESSURE: 135 MMHG | RESPIRATION RATE: 16 BRPM | DIASTOLIC BLOOD PRESSURE: 82 MMHG | BODY MASS INDEX: 29.58 KG/M2 | OXYGEN SATURATION: 96 % | TEMPERATURE: 97.9 F

## 2023-11-21 DIAGNOSIS — C83.35 DIFFUSE LARGE B-CELL LYMPHOMA OF LYMPH NODES OF INGUINAL REGION (HCC): ICD-10-CM

## 2023-11-21 DIAGNOSIS — E83.42 HYPOMAGNESEMIA: ICD-10-CM

## 2023-11-21 DIAGNOSIS — C83.35 DIFFUSE LARGE B-CELL LYMPHOMA OF LYMPH NODES OF INGUINAL REGION (HCC): Primary | ICD-10-CM

## 2023-11-21 LAB
ALBUMIN SERPL-MCNC: 4.1 G/DL (ref 3.2–4.6)
ALBUMIN/GLOB SERPL: 1.5 (ref 0.4–1.6)
ALP SERPL-CCNC: 75 U/L (ref 50–136)
ALT SERPL-CCNC: 38 U/L (ref 12–65)
ANION GAP SERPL CALC-SCNC: 7 MMOL/L (ref 2–11)
AST SERPL-CCNC: 19 U/L (ref 15–37)
BASOPHILS # BLD: 0.1 K/UL (ref 0–0.2)
BASOPHILS NFR BLD: 1 % (ref 0–2)
BILIRUB SERPL-MCNC: 0.6 MG/DL (ref 0.2–1.1)
BUN SERPL-MCNC: 16 MG/DL (ref 8–23)
CALCIUM SERPL-MCNC: 9 MG/DL (ref 8.3–10.4)
CHLORIDE SERPL-SCNC: 102 MMOL/L (ref 101–110)
CO2 SERPL-SCNC: 26 MMOL/L (ref 21–32)
CREAT SERPL-MCNC: 1.1 MG/DL (ref 0.8–1.5)
DIFFERENTIAL METHOD BLD: NORMAL
EOSINOPHIL # BLD: 0.3 K/UL (ref 0–0.8)
EOSINOPHIL NFR BLD: 4 % (ref 0.5–7.8)
ERYTHROCYTE [DISTWIDTH] IN BLOOD BY AUTOMATED COUNT: 12.5 % (ref 11.9–14.6)
GLOBULIN SER CALC-MCNC: 2.8 G/DL (ref 2.8–4.5)
GLUCOSE SERPL-MCNC: 230 MG/DL (ref 65–100)
HCT VFR BLD AUTO: 42.2 % (ref 41.1–50.3)
HGB BLD-MCNC: 14.7 G/DL (ref 13.6–17.2)
IMM GRANULOCYTES # BLD AUTO: 0 K/UL (ref 0–0.5)
IMM GRANULOCYTES NFR BLD AUTO: 0 % (ref 0–5)
LDH SERPL L TO P-CCNC: 163 U/L (ref 110–210)
LYMPHOCYTES # BLD: 1 K/UL (ref 0.5–4.6)
LYMPHOCYTES NFR BLD: 13 % (ref 13–44)
MAGNESIUM SERPL-MCNC: 1.3 MG/DL (ref 1.8–2.4)
MCH RBC QN AUTO: 31 PG (ref 26.1–32.9)
MCHC RBC AUTO-ENTMCNC: 34.8 G/DL (ref 31.4–35)
MCV RBC AUTO: 89 FL (ref 82–102)
MONOCYTES # BLD: 0.5 K/UL (ref 0.1–1.3)
MONOCYTES NFR BLD: 7 % (ref 4–12)
NEUTS SEG # BLD: 5.6 K/UL (ref 1.7–8.2)
NEUTS SEG NFR BLD: 75 % (ref 43–78)
NRBC # BLD: 0 K/UL (ref 0–0.2)
PLATELET # BLD AUTO: 219 K/UL (ref 150–450)
PMV BLD AUTO: 10.1 FL (ref 9.4–12.3)
POTASSIUM SERPL-SCNC: 4.3 MMOL/L (ref 3.5–5.1)
PROT SERPL-MCNC: 6.9 G/DL (ref 6.3–8.2)
RBC # BLD AUTO: 4.74 M/UL (ref 4.23–5.6)
SODIUM SERPL-SCNC: 135 MMOL/L (ref 133–143)
WBC # BLD AUTO: 7.5 K/UL (ref 4.3–11.1)

## 2023-11-21 PROCEDURE — 3075F SYST BP GE 130 - 139MM HG: CPT | Performed by: INTERNAL MEDICINE

## 2023-11-21 PROCEDURE — 83615 LACTATE (LD) (LDH) ENZYME: CPT

## 2023-11-21 PROCEDURE — 80053 COMPREHEN METABOLIC PANEL: CPT

## 2023-11-21 PROCEDURE — 36415 COLL VENOUS BLD VENIPUNCTURE: CPT

## 2023-11-21 PROCEDURE — 83735 ASSAY OF MAGNESIUM: CPT

## 2023-11-21 PROCEDURE — 99214 OFFICE O/P EST MOD 30 MIN: CPT | Performed by: INTERNAL MEDICINE

## 2023-11-21 PROCEDURE — 3079F DIAST BP 80-89 MM HG: CPT | Performed by: INTERNAL MEDICINE

## 2023-11-21 PROCEDURE — 85025 COMPLETE CBC W/AUTO DIFF WBC: CPT

## 2023-11-21 ASSESSMENT — PATIENT HEALTH QUESTIONNAIRE - PHQ9
SUM OF ALL RESPONSES TO PHQ QUESTIONS 1-9: 0
1. LITTLE INTEREST OR PLEASURE IN DOING THINGS: 0
2. FEELING DOWN, DEPRESSED OR HOPELESS: 0
SUM OF ALL RESPONSES TO PHQ QUESTIONS 1-9: 0
SUM OF ALL RESPONSES TO PHQ QUESTIONS 1-9: 0
SUM OF ALL RESPONSES TO PHQ9 QUESTIONS 1 & 2: 0
SUM OF ALL RESPONSES TO PHQ QUESTIONS 1-9: 0

## 2023-11-21 NOTE — PROGRESS NOTES
nondrenching. No GI symptoms, weight is stable. Review of Systems:  Constitutional: Negative. HENT: Negative. Eyes: Negative. Respiratory: Negative. Cardiovascular: Negative. Gastrointestinal: Negative. Genitourinary: Negative. Musculoskeletal: Negative. Skin: Negative. Neurological: Negative. Endo/Heme/Allergies: Negative. Psychiatric/Behavioral: Negative. All other systems reviewed and are negative. No Known Allergies  Past Medical History:   Diagnosis Date    Arthritis     osteo    Bilateral carpal tunnel syndrome 10/28/2019    CAD (coronary artery disease) 1999    3 stents--last one placed 2019, pt on daily 81 mg ASA.  Pt states he has been released from cardiologist, [facility]    Diabetes 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 10/28/2019    GERD (gastroesophageal reflux disease)     diet controlled    Hx of colonic polyp 2016    adenoma    Hypercholesterolemia     Hypertension     managed with medication     Past Surgical History:   Procedure Laterality Date    COLONOSCOPY  last 4/25/16/ 2022    Leandra--trans TA--3 year recall due to prep quality    CORONARY ANGIOPLASTY WITH STENT PLACEMENT  9312,4412, 2019    heart cath times 3 - 3 total stents    IR PORT PLACEMENT EQUAL OR GREATER THAN 5 YEARS  06/27/2022    IR PORT PLACEMENT EQUAL OR GREATER THAN 5 YEARS 6/27/2022 [facility] SPECIALS    KNEE ARTHROSCOPY Left 1985    LYMPH NODE BIOPSY Left 06/10/2022    LEFT INGUINAL BIOPSY performed by Shyla Saldivar MD at [address]  2019    3 rd stent -- cardiac placed    SKIN BIOPSY N/A 06/10/2022    EXCISION BACK NEVUS WL PER ERICK/REQUEST TO FOLLOW performed by Shyla Saldivar MD at [address]

## 2023-11-21 NOTE — PATIENT INSTRUCTIONS
Fever of 100.5 or greater  Chills  Shortness of breath  Swelling or pain in one leg    After office hours an answering service is available and will contact a provider for emergencies or if you are experiencing any of the above symptoms. Patient does express an interest in My Chart. My Chart log in information explained on the after visit summary printout at the [address] desk.     Griffin Morse RN

## 2023-12-07 ENCOUNTER — HOSPITAL ENCOUNTER (OUTPATIENT)
Dept: CT IMAGING | Age: 63
Discharge: HOME | End: 2023-12-07
Attending: INTERNAL MEDICINE
Payer: OTHER GOVERNMENT

## 2023-12-07 DIAGNOSIS — C83.35 DIFFUSE LARGE B-CELL LYMPHOMA OF LYMPH NODES OF INGUINAL REGION (HCC): ICD-10-CM

## 2023-12-07 PROCEDURE — 71260 CT THORAX DX C+: CPT

## 2023-12-07 PROCEDURE — 6360000004 HC RX CONTRAST MEDICATION: Performed by: INTERNAL MEDICINE

## 2023-12-07 RX ADMIN — DIATRIZOATE MEGLUMINE AND DIATRIZOATE SODIUM 15 ML: 660; 100 LIQUID ORAL; RECTAL at 10:54

## 2023-12-07 RX ADMIN — IOPAMIDOL 100 ML: 755 INJECTION, SOLUTION INTRAVENOUS at 10:44

## 2023-12-15 ENCOUNTER — TELEPHONE (OUTPATIENT)
Dept: FAMILY MEDICINE CLINIC | Facility: CLINIC | Age: 63
End: 2023-12-15

## 2023-12-15 NOTE — TELEPHONE ENCOUNTER
Wife requesting refill on test needles but wants the 3mm instead of the 4, says they're easier for him.  Shoshana Downing

## 2023-12-15 NOTE — TELEPHONE ENCOUNTER
Unable to find in SHADOW. Called pharmacy and this size is not valid. Unable to order .  The 4 mm is smallest.

## 2023-12-19 ENCOUNTER — OFFICE VISIT (OUTPATIENT)
Dept: FAMILY MEDICINE CLINIC | Facility: CLINIC | Age: 63
End: 2023-12-19
Payer: COMMERCIAL

## 2023-12-19 DIAGNOSIS — F41.9 ANXIETY AND DEPRESSION: ICD-10-CM

## 2023-12-19 DIAGNOSIS — E11.65 TYPE 2 DIABETES MELLITUS WITH HYPERGLYCEMIA, WITH LONG-TERM CURRENT USE OF INSULIN (HCC): Primary | ICD-10-CM

## 2023-12-19 DIAGNOSIS — L98.9 SKIN LESION: ICD-10-CM

## 2023-12-19 DIAGNOSIS — E83.42 HYPOMAGNESEMIA: ICD-10-CM

## 2023-12-19 DIAGNOSIS — K21.9 GASTROESOPHAGEAL REFLUX DISEASE, UNSPECIFIED WHETHER ESOPHAGITIS PRESENT: ICD-10-CM

## 2023-12-19 DIAGNOSIS — Z79.4 TYPE 2 DIABETES MELLITUS WITH HYPERGLYCEMIA, WITH LONG-TERM CURRENT USE OF INSULIN (HCC): Primary | ICD-10-CM

## 2023-12-19 DIAGNOSIS — Z12.5 PROSTATE CANCER SCREENING: ICD-10-CM

## 2023-12-19 DIAGNOSIS — I10 ESSENTIAL HYPERTENSION: ICD-10-CM

## 2023-12-19 DIAGNOSIS — F32.A ANXIETY AND DEPRESSION: ICD-10-CM

## 2023-12-19 LAB — HBA1C MFR BLD: 8.4 %

## 2023-12-19 PROCEDURE — 3075F SYST BP GE 130 - 139MM HG: CPT | Performed by: STUDENT IN AN ORGANIZED HEALTH CARE EDUCATION/TRAINING PROGRAM

## 2023-12-19 PROCEDURE — 99215 OFFICE O/P EST HI 40 MIN: CPT | Performed by: STUDENT IN AN ORGANIZED HEALTH CARE EDUCATION/TRAINING PROGRAM

## 2023-12-19 PROCEDURE — 3044F HG A1C LEVEL LT 7.0%: CPT | Performed by: STUDENT IN AN ORGANIZED HEALTH CARE EDUCATION/TRAINING PROGRAM

## 2023-12-19 PROCEDURE — 95251 CONT GLUC MNTR ANALYSIS I&R: CPT | Performed by: STUDENT IN AN ORGANIZED HEALTH CARE EDUCATION/TRAINING PROGRAM

## 2023-12-19 PROCEDURE — 3079F DIAST BP 80-89 MM HG: CPT | Performed by: STUDENT IN AN ORGANIZED HEALTH CARE EDUCATION/TRAINING PROGRAM

## 2023-12-19 PROCEDURE — 83036 HEMOGLOBIN GLYCOSYLATED A1C: CPT | Performed by: STUDENT IN AN ORGANIZED HEALTH CARE EDUCATION/TRAINING PROGRAM

## 2023-12-19 RX ORDER — BLOOD SUGAR DIAGNOSTIC
1 STRIP MISCELLANEOUS DAILY
Qty: 100 EACH | Refills: 3 | Status: SHIPPED | OUTPATIENT
Start: 2023-12-19

## 2023-12-19 RX ORDER — LISINOPRIL 10 MG/1
10 TABLET ORAL DAILY
Qty: 90 TABLET | Refills: 3 | Status: SHIPPED | OUTPATIENT
Start: 2023-12-19

## 2023-12-19 RX ORDER — ATORVASTATIN CALCIUM 40 MG/1
20 TABLET, FILM COATED ORAL DAILY
Qty: 45 TABLET | Refills: 3 | Status: SHIPPED | OUTPATIENT
Start: 2023-12-19

## 2023-12-19 RX ORDER — ESCITALOPRAM OXALATE 10 MG/1
10 TABLET ORAL DAILY
Qty: 90 TABLET | Refills: 3 | Status: SHIPPED | OUTPATIENT
Start: 2023-12-19

## 2023-12-19 RX ORDER — INSULIN LISPRO 100 [IU]/ML
15 INJECTION, SOLUTION INTRAVENOUS; SUBCUTANEOUS
Qty: 45 ML | Refills: 3 | Status: SHIPPED | OUTPATIENT
Start: 2023-12-19

## 2023-12-19 RX ORDER — INSULIN DEGLUDEC 200 U/ML
54 INJECTION, SOLUTION SUBCUTANEOUS DAILY
Qty: 4 ADJUSTABLE DOSE PRE-FILLED PEN SYRINGE | Refills: 5 | Status: SHIPPED | OUTPATIENT
Start: 2023-12-19

## 2023-12-19 RX ORDER — TRAZODONE HYDROCHLORIDE 100 MG/1
50 TABLET ORAL NIGHTLY
Qty: 45 TABLET | Refills: 3 | Status: SHIPPED | OUTPATIENT
Start: 2023-12-19

## 2023-12-19 NOTE — PROGRESS NOTES
face with the patient discussing the diagnosis and importance of compliance with the treatment plan as well as documenting on the day of the visit. An electronic signature was used to authenticate this note.     --Kevin Khanna MD

## 2023-12-27 VITALS
TEMPERATURE: 97 F | SYSTOLIC BLOOD PRESSURE: 132 MMHG | HEIGHT: 68 IN | DIASTOLIC BLOOD PRESSURE: 86 MMHG | HEART RATE: 94 BPM | OXYGEN SATURATION: 97 % | BODY MASS INDEX: 29.86 KG/M2 | WEIGHT: 197 LBS

## 2024-01-03 ENCOUNTER — PATIENT MESSAGE (OUTPATIENT)
Dept: FAMILY MEDICINE CLINIC | Facility: CLINIC | Age: 64
End: 2024-01-03

## 2024-01-03 DIAGNOSIS — Z79.4 TYPE 2 DIABETES MELLITUS WITH HYPERGLYCEMIA, WITH LONG-TERM CURRENT USE OF INSULIN (HCC): ICD-10-CM

## 2024-01-03 DIAGNOSIS — E11.65 TYPE 2 DIABETES MELLITUS WITH HYPERGLYCEMIA, WITH LONG-TERM CURRENT USE OF INSULIN (HCC): ICD-10-CM

## 2024-01-03 RX ORDER — INSULIN DEGLUDEC 200 U/ML
INJECTION, SOLUTION SUBCUTANEOUS
Qty: 4 ADJUSTABLE DOSE PRE-FILLED PEN SYRINGE | Refills: 5 | Status: SHIPPED | OUTPATIENT
Start: 2024-01-03

## 2024-01-31 ENCOUNTER — CLINICAL DOCUMENTATION (OUTPATIENT)
Dept: ORTHOPEDIC SURGERY | Age: 64
End: 2024-01-31

## 2024-01-31 ENCOUNTER — TELEPHONE (OUTPATIENT)
Dept: ORTHOPEDIC SURGERY | Age: 64
End: 2024-01-31

## 2024-01-31 NOTE — TELEPHONE ENCOUNTER
She says the [facility] has requested [facility] records. She says we are one of ten providers that has not responded to the [facility]. She says she has left 7-8 messages with medical records and has gotten no return call. Can you please give her a call.

## 2024-01-31 NOTE — PROGRESS NOTES
Previously sent [employer] msg about their questions regarding a [facility] record request and I have just left a vmail with the same information.

## 2024-02-08 ENCOUNTER — APPOINTMENT (RX ONLY)
Dept: URBAN - METROPOLITAN AREA CLINIC 23 | Facility: CLINIC | Age: 64
Setting detail: DERMATOLOGY
End: 2024-02-08

## 2024-02-08 DIAGNOSIS — L57.8 OTHER SKIN CHANGES DUE TO CHRONIC EXPOSURE TO NONIONIZING RADIATION: ICD-10-CM

## 2024-02-08 DIAGNOSIS — D22 MELANOCYTIC NEVI: ICD-10-CM

## 2024-02-08 DIAGNOSIS — L82.0 INFLAMED SEBORRHEIC KERATOSIS: ICD-10-CM

## 2024-02-08 DIAGNOSIS — Z87.2 PERSONAL HISTORY OF DISEASES OF THE SKIN AND SUBCUTANEOUS TISSUE: ICD-10-CM

## 2024-02-08 DIAGNOSIS — L80 VITILIGO: ICD-10-CM

## 2024-02-08 DIAGNOSIS — L82.1 OTHER SEBORRHEIC KERATOSIS: ICD-10-CM

## 2024-02-08 DIAGNOSIS — Z71.89 OTHER SPECIFIED COUNSELING: ICD-10-CM

## 2024-02-08 PROBLEM — D22.5 MELANOCYTIC NEVI OF TRUNK: Status: ACTIVE | Noted: 2024-02-08

## 2024-02-08 PROCEDURE — 99214 OFFICE O/P EST MOD 30 MIN: CPT | Mod: 25

## 2024-02-08 PROCEDURE — ? PRESCRIPTION

## 2024-02-08 PROCEDURE — 17110 DESTRUCTION B9 LES UP TO 14: CPT

## 2024-02-08 PROCEDURE — ? COUNSELING

## 2024-02-08 PROCEDURE — ? LIQUID NITROGEN

## 2024-02-08 PROCEDURE — ? OTHER

## 2024-02-08 RX ORDER — RUXOLITINIB 15 MG/G
CREAM TOPICAL
Qty: 60 | Refills: 6 | Status: ERX

## 2024-02-08 ASSESSMENT — LOCATION DETAILED DESCRIPTION DERM
LOCATION DETAILED: LEFT CLAVICULAR NECK
LOCATION DETAILED: RIGHT SUPERIOR UPPER BACK
LOCATION DETAILED: RIGHT LATERAL ABDOMEN
LOCATION DETAILED: STERNUM
LOCATION DETAILED: LEFT SUPERIOR MEDIAL UPPER BACK
LOCATION DETAILED: RIGHT INFERIOR POSTERIOR NECK
LOCATION DETAILED: RIGHT CENTRAL PARIETAL SCALP
LOCATION DETAILED: RIGHT LATERAL TRAPEZIAL NECK
LOCATION DETAILED: LEFT CLAVICULAR SKIN
LOCATION DETAILED: RIGHT LATERAL ABDOMEN

## 2024-02-08 ASSESSMENT — LOCATION SIMPLE DESCRIPTION DERM
LOCATION SIMPLE: SCALP
LOCATION SIMPLE: ABDOMEN
LOCATION SIMPLE: LEFT CLAVICULAR SKIN
LOCATION SIMPLE: CHEST
LOCATION SIMPLE: POSTERIOR NECK
LOCATION SIMPLE: RIGHT UPPER BACK
LOCATION SIMPLE: ABDOMEN
LOCATION SIMPLE: LEFT UPPER BACK
LOCATION SIMPLE: LEFT ANTERIOR NECK

## 2024-02-08 ASSESSMENT — LOCATION ZONE DERM
LOCATION ZONE: TRUNK
LOCATION ZONE: SCALP
LOCATION ZONE: TRUNK
LOCATION ZONE: NECK

## 2024-02-08 ASSESSMENT — BSA VITILIGO: % BODY COVERED IN VITILIGO: 5

## 2024-02-08 NOTE — PROCEDURE: OTHER
Other (Free Text): Has had vitiligo since March if 1998 and is now continuing to spread. Non segmented Vitiligo
Render Risk Assessment In Note?: no
Note Text (......Xxx Chief Complaint.): This diagnosis correlates with the
Detail Level: Zone

## 2024-02-08 NOTE — PROCEDURE: LIQUID NITROGEN
Show Spray Paint Technique Variable?: Yes
Detail Level: Detailed
Consent: The patient's consent was obtained including but not limited to risks of crusting, scabbing, blistering, scarring, darker or lighter pigmentary change, recurrence, incomplete removal and infection.
Render Post-Care Instructions In Note?: no
Medical Necessity Information: It is in your best interest to select a reason for this procedure from the list below. All of these items fulfill various CMS LCD requirements except the new and changing color options.
Post-Care Instructions: I reviewed with the patient in detail post-care instructions. Patient is to wear sunprotection, and avoid picking at any of the treated lesions. Pt may apply Vaseline to crusted or scabbing areas.
Spray Paint Text: The liquid nitrogen was applied to the skin utilizing a spray paint frosting technique.
Medical Necessity Clause: This procedure was medically necessary because the lesions that were treated were:

## 2024-02-20 DIAGNOSIS — C83.35 DIFFUSE LARGE B-CELL LYMPHOMA OF LYMPH NODES OF INGUINAL REGION (HCC): Primary | ICD-10-CM

## 2024-02-20 DIAGNOSIS — E83.42 HYPOMAGNESEMIA: ICD-10-CM

## 2024-02-21 ENCOUNTER — CLINICAL DOCUMENTATION (OUTPATIENT)
Dept: ONCOLOGY | Age: 64
End: 2024-02-21

## 2024-02-21 ENCOUNTER — OFFICE VISIT (OUTPATIENT)
Dept: ONCOLOGY | Age: 64
End: 2024-02-21
Payer: OTHER GOVERNMENT

## 2024-02-21 ENCOUNTER — HOSPITAL ENCOUNTER (OUTPATIENT)
Dept: LAB | Age: 64
Discharge: HOME | End: 2024-02-24
Payer: OTHER GOVERNMENT

## 2024-02-21 VITALS
WEIGHT: 202 LBS | HEART RATE: 80 BPM | TEMPERATURE: 97.5 F | DIASTOLIC BLOOD PRESSURE: 95 MMHG | HEIGHT: 68 IN | OXYGEN SATURATION: 96 % | BODY MASS INDEX: 30.62 KG/M2 | SYSTOLIC BLOOD PRESSURE: 153 MMHG | RESPIRATION RATE: 16 BRPM

## 2024-02-21 DIAGNOSIS — E83.42 HYPOMAGNESEMIA: ICD-10-CM

## 2024-02-21 DIAGNOSIS — C83.35 DIFFUSE LARGE B-CELL LYMPHOMA OF LYMPH NODES OF INGUINAL REGION (HCC): Primary | ICD-10-CM

## 2024-02-21 DIAGNOSIS — C83.35 DIFFUSE LARGE B-CELL LYMPHOMA OF LYMPH NODES OF INGUINAL REGION (HCC): ICD-10-CM

## 2024-02-21 DIAGNOSIS — R53.82 CHRONIC FATIGUE: ICD-10-CM

## 2024-02-21 LAB
ALBUMIN SERPL-MCNC: 3.9 G/DL (ref 3.2–4.6)
ALBUMIN/GLOB SERPL: 1.4 (ref 0.4–1.6)
ALP SERPL-CCNC: 61 U/L (ref 50–136)
ALT SERPL-CCNC: 39 U/L (ref 12–65)
ANION GAP SERPL CALC-SCNC: 3 MMOL/L (ref 2–11)
AST SERPL-CCNC: 25 U/L (ref 15–37)
BASOPHILS # BLD: 0.1 K/UL (ref 0–0.2)
BASOPHILS NFR BLD: 1 % (ref 0–2)
BILIRUB SERPL-MCNC: 0.7 MG/DL (ref 0.2–1.1)
BUN SERPL-MCNC: 15 MG/DL (ref 8–23)
CALCIUM SERPL-MCNC: 9.3 MG/DL (ref 8.3–10.4)
CHLORIDE SERPL-SCNC: 105 MMOL/L (ref 103–113)
CO2 SERPL-SCNC: 31 MMOL/L (ref 21–32)
CREAT SERPL-MCNC: 1 MG/DL (ref 0.8–1.5)
DIFFERENTIAL METHOD BLD: ABNORMAL
EOSINOPHIL # BLD: 0.4 K/UL (ref 0–0.8)
EOSINOPHIL NFR BLD: 6 % (ref 0.5–7.8)
ERYTHROCYTE [DISTWIDTH] IN BLOOD BY AUTOMATED COUNT: 13.5 % (ref 11.9–14.6)
GLOBULIN SER CALC-MCNC: 2.8 G/DL (ref 2.8–4.5)
GLUCOSE SERPL-MCNC: 232 MG/DL (ref 65–100)
HCT VFR BLD AUTO: 39.9 % (ref 41.1–50.3)
HGB BLD-MCNC: 14 G/DL (ref 13.6–17.2)
IMM GRANULOCYTES # BLD AUTO: 0 K/UL (ref 0–0.5)
IMM GRANULOCYTES NFR BLD AUTO: 1 % (ref 0–5)
LYMPHOCYTES # BLD: 0.9 K/UL (ref 0.5–4.6)
LYMPHOCYTES NFR BLD: 14 % (ref 13–44)
MAGNESIUM SERPL-MCNC: 1.2 MG/DL (ref 1.8–2.4)
MCH RBC QN AUTO: 31.5 PG (ref 26.1–32.9)
MCHC RBC AUTO-ENTMCNC: 35.1 G/DL (ref 31.4–35)
MCV RBC AUTO: 89.7 FL (ref 82–102)
MONOCYTES # BLD: 0.5 K/UL (ref 0.1–1.3)
MONOCYTES NFR BLD: 9 % (ref 4–12)
NEUTS SEG # BLD: 4.4 K/UL (ref 1.7–8.2)
NEUTS SEG NFR BLD: 69 % (ref 43–78)
NRBC # BLD: 0 K/UL (ref 0–0.2)
PLATELET # BLD AUTO: 163 K/UL (ref 150–450)
PMV BLD AUTO: 10.1 FL (ref 9.4–12.3)
POTASSIUM SERPL-SCNC: 4.4 MMOL/L (ref 3.5–5.1)
PROT SERPL-MCNC: 6.7 G/DL (ref 6.3–8.2)
RBC # BLD AUTO: 4.45 M/UL (ref 4.23–5.6)
SODIUM SERPL-SCNC: 139 MMOL/L (ref 136–146)
WBC # BLD AUTO: 6.3 K/UL (ref 4.3–11.1)

## 2024-02-21 PROCEDURE — 80053 COMPREHEN METABOLIC PANEL: CPT

## 2024-02-21 PROCEDURE — 36415 COLL VENOUS BLD VENIPUNCTURE: CPT

## 2024-02-21 PROCEDURE — 85025 COMPLETE CBC W/AUTO DIFF WBC: CPT

## 2024-02-21 PROCEDURE — 99214 OFFICE O/P EST MOD 30 MIN: CPT | Performed by: NURSE PRACTITIONER

## 2024-02-21 PROCEDURE — 83735 ASSAY OF MAGNESIUM: CPT

## 2024-02-21 PROCEDURE — 3080F DIAST BP >= 90 MM HG: CPT | Performed by: NURSE PRACTITIONER

## 2024-02-21 PROCEDURE — 3077F SYST BP >= 140 MM HG: CPT | Performed by: NURSE PRACTITIONER

## 2024-02-21 ASSESSMENT — PATIENT HEALTH QUESTIONNAIRE - PHQ9
SUM OF ALL RESPONSES TO PHQ9 QUESTIONS 1 & 2: 2
1. LITTLE INTEREST OR PLEASURE IN DOING THINGS: 1
2. FEELING DOWN, DEPRESSED OR HOPELESS: 1
SUM OF ALL RESPONSES TO PHQ QUESTIONS 1-9: 2

## 2024-02-21 NOTE — PROGRESS NOTES
Critical lab of MG-1.2 received from laboratory, read back and verified. Reported to LIZETTE Wesley.

## 2024-02-21 NOTE — PROGRESS NOTES
[facility] Hematology and Oncology: Office Visit Established Patient    Chief Complaint:    Chief Complaint   Patient presents with    Follow-up           History of Present Illness:  Mr. Hobbs is a 63 y.o. male who returns today for management of diffuse large B cell lymphoma.  He presented to his PCP on 4/29/22 reporting an enlarging lump in his left groin for the past 2 months.  Physical exam confirmed a mildly tender lump in the patient’s left inguinal region. The right inguinal region was normal, and no inguinal hernia was noted. Ultrasound of the left groin was performed on 5/13/22 revealing enlarged, abnormal appearing left inguinal lymph nodes measuring up to 3.0 x 3.2 x 1.9 cm. Ultrasound directed biopsy was suggested for further assessment.  We saw him for consultation and recommended excisional biopsy, which showed diffuse large B cell lymphoma.  PET/CT shows the most prominent areas of disease in the inguinal and iliac nodes, but there are also nodes in the right axilla and retroperitoneum that are consistent with DLBCL, making him at least [place] stage III.  Bone marrow biopsy showed no morphologic findings but did show a small B cell clone worrisome for lymphoma involvement in the bone marrow, this would make him AA stage IV.  His FISH shows no evidence of more aggressive genotypic findings, so he would be most appropriate for R-CHOP for 6 cycles for definitive therapy.  PET/CT following 2 cycles revealed CR. PET/CT after 6 months shows continued complete response.  He will now enter observation with repeat labs and OV in 3 months, CT in 6 months, this will continue for the first two years.    He returns today for routine 3 month follow up for DLBCL.  He is now about 15 months out from his treatment. He continues to do well overall. He denies having any weight loss, no fevers, no lymphadenopathy. He does have some night sweats but he does not have to change his sheets. He has chronic fatigue and

## 2024-05-07 DIAGNOSIS — Z12.5 PROSTATE CANCER SCREENING: ICD-10-CM

## 2024-05-07 DIAGNOSIS — E11.65 TYPE 2 DIABETES MELLITUS WITH HYPERGLYCEMIA, WITH LONG-TERM CURRENT USE OF INSULIN (HCC): ICD-10-CM

## 2024-05-07 DIAGNOSIS — Z79.4 TYPE 2 DIABETES MELLITUS WITH HYPERGLYCEMIA, WITH LONG-TERM CURRENT USE OF INSULIN (HCC): ICD-10-CM

## 2024-05-07 LAB
CHOLEST SERPL-MCNC: 133 MG/DL (ref 0–200)
EST. AVERAGE GLUCOSE BLD GHB EST-MCNC: 196 MG/DL
HBA1C MFR BLD: 8.5 % (ref 0–5.6)
HDLC SERPL-MCNC: 38 MG/DL (ref 40–60)
HDLC SERPL: 3.5 (ref 0–5)
LDLC SERPL CALC-MCNC: 60 MG/DL (ref 0–100)
PSA SERPL-MCNC: 0.6 NG/ML (ref 0–4)
TRIGL SERPL-MCNC: 176 MG/DL (ref 0–150)
VLDLC SERPL CALC-MCNC: 35 MG/DL (ref 6–23)

## 2024-05-09 LAB — C PEPTIDE SERPL-MCNC: 3.1 NG/ML (ref 1.1–4.4)

## 2024-05-14 ENCOUNTER — HOSPITAL ENCOUNTER (OUTPATIENT)
Dept: CT IMAGING | Age: 64
Discharge: HOME | End: 2024-05-17
Attending: INTERNAL MEDICINE
Payer: OTHER GOVERNMENT

## 2024-05-14 DIAGNOSIS — C83.35 DIFFUSE LARGE B-CELL LYMPHOMA OF LYMPH NODES OF INGUINAL REGION (HCC): ICD-10-CM

## 2024-05-14 LAB — CREAT BLD-MCNC: 0.76 MG/DL (ref 0.8–1.5)

## 2024-05-14 PROCEDURE — 82565 ASSAY OF CREATININE: CPT

## 2024-05-14 PROCEDURE — 71260 CT THORAX DX C+: CPT

## 2024-05-14 PROCEDURE — 6360000004 HC RX CONTRAST MEDICATION: Performed by: INTERNAL MEDICINE

## 2024-05-14 RX ADMIN — IOPAMIDOL 100 ML: 755 INJECTION, SOLUTION INTRAVENOUS at 09:33

## 2024-05-14 RX ADMIN — DIATRIZOATE MEGLUMINE AND DIATRIZOATE SODIUM 15 ML: 660; 100 LIQUID ORAL; RECTAL at 09:33

## 2024-05-17 ENCOUNTER — OFFICE VISIT (OUTPATIENT)
Dept: FAMILY MEDICINE CLINIC | Facility: CLINIC | Age: 64
End: 2024-05-17
Payer: COMMERCIAL

## 2024-05-17 VITALS
TEMPERATURE: 97 F | HEIGHT: 68 IN | HEART RATE: 84 BPM | SYSTOLIC BLOOD PRESSURE: 144 MMHG | OXYGEN SATURATION: 95 % | DIASTOLIC BLOOD PRESSURE: 82 MMHG | WEIGHT: 201.2 LBS | BODY MASS INDEX: 30.49 KG/M2

## 2024-05-17 DIAGNOSIS — I10 ESSENTIAL HYPERTENSION: ICD-10-CM

## 2024-05-17 DIAGNOSIS — F41.9 ANXIETY AND DEPRESSION: ICD-10-CM

## 2024-05-17 DIAGNOSIS — F32.A ANXIETY AND DEPRESSION: ICD-10-CM

## 2024-05-17 DIAGNOSIS — E11.65 TYPE 2 DIABETES MELLITUS WITH HYPERGLYCEMIA, WITH LONG-TERM CURRENT USE OF INSULIN (HCC): Primary | ICD-10-CM

## 2024-05-17 DIAGNOSIS — C83.35 DIFFUSE LARGE B-CELL LYMPHOMA OF LYMPH NODES OF INGUINAL REGION (HCC): Primary | ICD-10-CM

## 2024-05-17 DIAGNOSIS — Z79.4 TYPE 2 DIABETES MELLITUS WITH HYPERGLYCEMIA, WITH LONG-TERM CURRENT USE OF INSULIN (HCC): Primary | ICD-10-CM

## 2024-05-17 PROCEDURE — 3077F SYST BP >= 140 MM HG: CPT | Performed by: STUDENT IN AN ORGANIZED HEALTH CARE EDUCATION/TRAINING PROGRAM

## 2024-05-17 PROCEDURE — 99214 OFFICE O/P EST MOD 30 MIN: CPT | Performed by: STUDENT IN AN ORGANIZED HEALTH CARE EDUCATION/TRAINING PROGRAM

## 2024-05-17 PROCEDURE — 95251 CONT GLUC MNTR ANALYSIS I&R: CPT | Performed by: STUDENT IN AN ORGANIZED HEALTH CARE EDUCATION/TRAINING PROGRAM

## 2024-05-17 PROCEDURE — 3052F HG A1C>EQUAL 8.0%<EQUAL 9.0%: CPT | Performed by: STUDENT IN AN ORGANIZED HEALTH CARE EDUCATION/TRAINING PROGRAM

## 2024-05-17 PROCEDURE — 3079F DIAST BP 80-89 MM HG: CPT | Performed by: STUDENT IN AN ORGANIZED HEALTH CARE EDUCATION/TRAINING PROGRAM

## 2024-05-21 ENCOUNTER — OFFICE VISIT (OUTPATIENT)
Dept: ONCOLOGY | Age: 64
End: 2024-05-21
Payer: COMMERCIAL

## 2024-05-21 ENCOUNTER — HOSPITAL ENCOUNTER (OUTPATIENT)
Dept: LAB | Age: 64
Discharge: HOME | End: 2024-05-24

## 2024-05-21 VITALS
HEIGHT: 68 IN | SYSTOLIC BLOOD PRESSURE: 129 MMHG | HEART RATE: 78 BPM | TEMPERATURE: 97.6 F | RESPIRATION RATE: 16 BRPM | BODY MASS INDEX: 30.92 KG/M2 | OXYGEN SATURATION: 96 % | WEIGHT: 204 LBS | DIASTOLIC BLOOD PRESSURE: 78 MMHG

## 2024-05-21 DIAGNOSIS — E83.42 HYPOMAGNESEMIA: ICD-10-CM

## 2024-05-21 DIAGNOSIS — C83.35 DIFFUSE LARGE B-CELL LYMPHOMA OF LYMPH NODES OF INGUINAL REGION (HCC): Primary | ICD-10-CM

## 2024-05-21 DIAGNOSIS — R53.82 CHRONIC FATIGUE: ICD-10-CM

## 2024-05-21 DIAGNOSIS — C83.35 DIFFUSE LARGE B-CELL LYMPHOMA OF LYMPH NODES OF INGUINAL REGION (HCC): ICD-10-CM

## 2024-05-21 LAB
ALBUMIN SERPL-MCNC: 3.9 G/DL (ref 3.2–4.6)
ALBUMIN/GLOB SERPL: 1.6 (ref 1–1.9)
ALP SERPL-CCNC: 68 U/L (ref 40–129)
ALT SERPL-CCNC: 33 U/L (ref 12–65)
ANION GAP SERPL CALC-SCNC: 8 MMOL/L (ref 9–18)
AST SERPL-CCNC: 26 U/L (ref 15–37)
BASOPHILS # BLD: 0.1 K/UL (ref 0–0.2)
BASOPHILS NFR BLD: 1 % (ref 0–2)
BILIRUB SERPL-MCNC: 0.6 MG/DL (ref 0–1.2)
BUN SERPL-MCNC: 15 MG/DL (ref 8–23)
CALCIUM SERPL-MCNC: 9.3 MG/DL (ref 8.8–10.2)
CHLORIDE SERPL-SCNC: 101 MMOL/L (ref 98–107)
CO2 SERPL-SCNC: 29 MMOL/L (ref 20–28)
CREAT SERPL-MCNC: 0.83 MG/DL (ref 0.8–1.3)
DIFFERENTIAL METHOD BLD: ABNORMAL
EOSINOPHIL # BLD: 0.3 K/UL (ref 0–0.8)
EOSINOPHIL NFR BLD: 5 % (ref 0.5–7.8)
ERYTHROCYTE [DISTWIDTH] IN BLOOD BY AUTOMATED COUNT: 12.7 % (ref 11.9–14.6)
FOLATE SERPL-MCNC: 24 NG/ML (ref 3.1–17.5)
GLOBULIN SER CALC-MCNC: 2.4 G/DL (ref 2.3–3.5)
GLUCOSE SERPL-MCNC: 273 MG/DL (ref 70–99)
HCT VFR BLD AUTO: 39.8 % (ref 41.1–50.3)
HGB BLD-MCNC: 13.8 G/DL (ref 13.6–17.2)
IMM GRANULOCYTES # BLD AUTO: 0.1 K/UL (ref 0–0.5)
IMM GRANULOCYTES NFR BLD AUTO: 2 % (ref 0–5)
LDH SERPL L TO P-CCNC: 157 U/L (ref 127–281)
LYMPHOCYTES # BLD: 1.1 K/UL (ref 0.5–4.6)
LYMPHOCYTES NFR BLD: 17 % (ref 13–44)
MAGNESIUM SERPL-MCNC: 1.4 MG/DL (ref 1.8–2.4)
MCH RBC QN AUTO: 30.9 PG (ref 26.1–32.9)
MCHC RBC AUTO-ENTMCNC: 34.7 G/DL (ref 31.4–35)
MCV RBC AUTO: 89.2 FL (ref 82–102)
MONOCYTES # BLD: 0.6 K/UL (ref 0.1–1.3)
MONOCYTES NFR BLD: 10 % (ref 4–12)
NEUTS SEG # BLD: 4.4 K/UL (ref 1.7–8.2)
NEUTS SEG NFR BLD: 65 % (ref 43–78)
NRBC # BLD: 0 K/UL (ref 0–0.2)
PLATELET # BLD AUTO: 177 K/UL (ref 150–450)
PMV BLD AUTO: 10.5 FL (ref 9.4–12.3)
POTASSIUM SERPL-SCNC: 5 MMOL/L (ref 3.5–5.1)
PROT SERPL-MCNC: 6.3 G/DL (ref 6.3–8.2)
RBC # BLD AUTO: 4.46 M/UL (ref 4.23–5.6)
SODIUM SERPL-SCNC: 138 MMOL/L (ref 136–145)
TSH, 3RD GENERATION: 2.08 UIU/ML (ref 0.27–4.2)
VIT B12 SERPL-MCNC: 767 PG/ML (ref 193–986)
WBC # BLD AUTO: 6.6 K/UL (ref 4.3–11.1)

## 2024-05-21 PROCEDURE — 3074F SYST BP LT 130 MM HG: CPT | Performed by: INTERNAL MEDICINE

## 2024-05-21 PROCEDURE — 84443 ASSAY THYROID STIM HORMONE: CPT

## 2024-05-21 PROCEDURE — 83615 LACTATE (LD) (LDH) ENZYME: CPT

## 2024-05-21 PROCEDURE — 36415 COLL VENOUS BLD VENIPUNCTURE: CPT

## 2024-05-21 PROCEDURE — 3078F DIAST BP <80 MM HG: CPT | Performed by: INTERNAL MEDICINE

## 2024-05-21 PROCEDURE — 83735 ASSAY OF MAGNESIUM: CPT

## 2024-05-21 PROCEDURE — 85025 COMPLETE CBC W/AUTO DIFF WBC: CPT

## 2024-05-21 PROCEDURE — 80053 COMPREHEN METABOLIC PANEL: CPT

## 2024-05-21 PROCEDURE — 99214 OFFICE O/P EST MOD 30 MIN: CPT | Performed by: INTERNAL MEDICINE

## 2024-05-21 PROCEDURE — 82607 VITAMIN B-12: CPT

## 2024-05-21 PROCEDURE — 82746 ASSAY OF FOLIC ACID SERUM: CPT

## 2024-05-21 ASSESSMENT — PATIENT HEALTH QUESTIONNAIRE - PHQ9
SUM OF ALL RESPONSES TO PHQ QUESTIONS 1-9: 0
SUM OF ALL RESPONSES TO PHQ QUESTIONS 1-9: 0
1. LITTLE INTEREST OR PLEASURE IN DOING THINGS: NOT AT ALL
SUM OF ALL RESPONSES TO PHQ QUESTIONS 1-9: 0
SUM OF ALL RESPONSES TO PHQ QUESTIONS 1-9: 0
2. FEELING DOWN, DEPRESSED OR HOPELESS: NOT AT ALL
SUM OF ALL RESPONSES TO PHQ9 QUESTIONS 1 & 2: 0

## 2024-05-21 NOTE — PATIENT INSTRUCTIONS
Patient Information from Today's Visit    The members of your Oncology Medical [facility] are listed below:    Physician Provider: Placido Cotto, Medical Oncologist  Advanced Practice Clinician: Martha Renee NP  Registered Nurse: Michael HALEY RN  Nurse Navigator: N/A  Medical Assistant: Donna WALLIS CMA  :Rosalva GUTIÉRREZ  Supportive Care Services: Sekou MONTEMAYOR LMSW    Follow Up Instructions: 3 months      Treatment Summary has been discussed and given to patient:No      Current Labs:   Hospital Outpatient Visit on 05/21/2024   Component Date Value Ref Range Status    Magnesium 05/21/2024 1.4 (L)  1.8 - 2.4 mg/dL Final    LD 05/21/2024 157  127 - 281 U/L Final    WBC 05/21/2024 6.6  4.3 - 11.1 K/uL Final    RBC 05/21/2024 4.46  4.23 - 5.6 M/uL Final    Hemoglobin 05/21/2024 13.8  13.6 - 17.2 g/dL Final    Hematocrit 05/21/2024 39.8 (L)  41.1 - 50.3 % Final    MCV 05/21/2024 89.2  82.0 - 102.0 FL Final    MCH 05/21/2024 30.9  26.1 - 32.9 PG Final    MCHC 05/21/2024 34.7  31.4 - 35.0 g/dL Final    RDW 05/21/2024 12.7  11.9 - 14.6 % Final    Platelets 05/21/2024 177  150 - 450 K/uL Final    MPV 05/21/2024 10.5  9.4 - 12.3 FL Final    nRBC 05/21/2024 0.00  0.0 - 0.2 K/uL Final    **Note: Absolute NRBC parameter is now reported with Hemogram**    Neutrophils % 05/21/2024 65  43 - 78 % Final    Lymphocytes % 05/21/2024 17  13 - 44 % Final    Monocytes % 05/21/2024 10  4.0 - 12.0 % Final    Eosinophils % 05/21/2024 5  0.5 - 7.8 % Final    Basophils % 05/21/2024 1  0.0 - 2.0 % Final    Immature Granulocytes % 05/21/2024 2  0.0 - 5.0 % Final    Neutrophils Absolute 05/21/2024 4.4  1.7 - 8.2 K/UL Final    Lymphocytes Absolute 05/21/2024 1.1  0.5 - 4.6 K/UL Final    Monocytes Absolute 05/21/2024 0.6  0.1 - 1.3 K/UL Final    Eosinophils Absolute 05/21/2024 0.3  0.0 - 0.8 K/UL Final    Basophils Absolute 05/21/2024 0.1  0.0 - 0.2 K/UL Final    Immature Granulocytes Absolute 05/21/2024 0.1  0.0 - 0.5 K/UL Final    Differential Type

## 2024-05-21 NOTE — PROGRESS NOTES
recurrence.  We will plan to continue clinic follow up every 3 months, imaging every 6 months (CT OK) through November 2024, then change to every 6 month OV and 12 month CT through 5 years.  I suspect his fatigue is related to his DM or other comorbid conditions, but we will add TSH/B12/folate today.  All questions were asked and answered to the best of my ability.  They will call with any new issues.                Placido Cotto MD  [facility] Hematology and Oncology  [address] [place] [address]  Office : [phone]  Fax : [phone]

## 2024-06-11 PROBLEM — E11.40 TYPE 2 DIABETES MELLITUS WITH DIABETIC NEUROPATHY (HCC): Status: RESOLVED | Noted: 2022-02-14 | Resolved: 2024-06-11

## 2024-07-10 ENCOUNTER — TELEPHONE (OUTPATIENT)
Dept: ONCOLOGY | Age: 64
End: 2024-07-10

## 2024-07-25 ENCOUNTER — OFFICE VISIT (OUTPATIENT)
Dept: ENDOCRINOLOGY | Age: 64
End: 2024-07-25
Payer: OTHER GOVERNMENT

## 2024-07-25 VITALS
DIASTOLIC BLOOD PRESSURE: 68 MMHG | HEART RATE: 74 BPM | SYSTOLIC BLOOD PRESSURE: 122 MMHG | BODY MASS INDEX: 32.08 KG/M2 | WEIGHT: 211 LBS

## 2024-07-25 DIAGNOSIS — Z79.4 TYPE 2 DIABETES MELLITUS WITH HYPERGLYCEMIA, WITH LONG-TERM CURRENT USE OF INSULIN (HCC): Primary | ICD-10-CM

## 2024-07-25 DIAGNOSIS — E11.65 TYPE 2 DIABETES MELLITUS WITH HYPERGLYCEMIA, WITH LONG-TERM CURRENT USE OF INSULIN (HCC): Primary | ICD-10-CM

## 2024-07-25 DIAGNOSIS — Z79.4 TYPE 2 DIABETES MELLITUS WITH HYPERGLYCEMIA, WITH LONG-TERM CURRENT USE OF INSULIN (HCC): ICD-10-CM

## 2024-07-25 DIAGNOSIS — E11.65 TYPE 2 DIABETES MELLITUS WITH HYPERGLYCEMIA, WITH LONG-TERM CURRENT USE OF INSULIN (HCC): ICD-10-CM

## 2024-07-25 PROCEDURE — 3052F HG A1C>EQUAL 8.0%<EQUAL 9.0%: CPT | Performed by: NURSE PRACTITIONER

## 2024-07-25 PROCEDURE — 3078F DIAST BP <80 MM HG: CPT | Performed by: NURSE PRACTITIONER

## 2024-07-25 PROCEDURE — 99417 PROLNG OP E/M EACH 15 MIN: CPT | Performed by: NURSE PRACTITIONER

## 2024-07-25 PROCEDURE — 95251 CONT GLUC MNTR ANALYSIS I&R: CPT | Performed by: NURSE PRACTITIONER

## 2024-07-25 PROCEDURE — 3074F SYST BP LT 130 MM HG: CPT | Performed by: NURSE PRACTITIONER

## 2024-07-25 PROCEDURE — 99215 OFFICE O/P EST HI 40 MIN: CPT | Performed by: NURSE PRACTITIONER

## 2024-07-25 RX ORDER — INSULIN HUMAN 500 [IU]/ML
INJECTION, SOLUTION SUBCUTANEOUS
Qty: 36 ML | Refills: 3
Start: 2024-07-25 | End: 2024-07-25 | Stop reason: SDUPTHER

## 2024-07-25 RX ORDER — INSULIN HUMAN 500 [IU]/ML
INJECTION, SOLUTION SUBCUTANEOUS
Qty: 36 ML | Refills: 3 | Status: SHIPPED | OUTPATIENT
Start: 2024-07-25 | End: 2024-07-25 | Stop reason: SDUPTHER

## 2024-07-25 RX ORDER — INSULIN HUMAN 500 [IU]/ML
INJECTION, SOLUTION SUBCUTANEOUS
Qty: 36 ML | Refills: 3 | Status: SHIPPED | OUTPATIENT
Start: 2024-07-25

## 2024-07-25 ASSESSMENT — ENCOUNTER SYMPTOMS
DIARRHEA: 0
CONSTIPATION: 0

## 2024-07-25 NOTE — TELEPHONE ENCOUNTER
Pt seen today, RX didn't go through, pt's wife requesting it be sent again. Verified pharmacy. Pended for Dr. Valente.  Please advise.

## 2024-07-25 NOTE — PROGRESS NOTES
[facility], APRN-[facility] Endocrinology  [address], [address]  [place] [address]            Shaq Sortojonas Parker. is seen today for follow up of type 2 diabetes mellitus.      ASSESSMENT AND PLAN:    Interpretation of 72 hour glucose monitor:  At least 72 hours of data were reviewed.  The patient utilizes a Dexcom G7 continuous glucose monitoring system.  The average glucose during the reviewed timeframe was 222 with a coefficient of variation of 22.9% (time in range 22%, time above range 78%, time below range 0%).     1. Type 2 diabetes mellitus with hyperglycemia, with long-term current use of insulin ([facility])  A1c drawn 05/07/2024 was 8.5%, GMI: 8.6%. Glycemic control sub-optimal with A1c above goal of less than 7%.  Patient report frustration with lack of progress getting his glucose controlled. We discussed AID, but his insulin demands are really too high for this to be feasible at this time. In lieu, I recommended switching from basal/bolus to twice daily U-500. Patient is agreeable and verbalized excitement about this plan. Will increase his TDD from 140 units to 150 units and split 60%/40% to 90 units before breakfast and 60 units before dinner. Advised to hold Tresiba for 48 hours before starting U-500. He is also very interested in discontinuing metformin. Reports some GI upset that seems to be worsening over time. Metformin likely isn't clinically effective at this point, so we will just discontinue it at this time.   Declines Gvoke at this time. Declines diabetic education.   Complains of significant fatigue. Suspect this is related to uncontrolled DM, but will check testosterone levels if his fatigue persists once we have his glucose under better control. I also recommended he speak with his primary care about the potential of HUMBERTO.   Patient is current with his annual diabetic eye exam. Counseled on optimizing glycemic control, blood pressure and cholesterol to reduce risk or

## 2024-08-08 DIAGNOSIS — E78.5 HYPERLIPIDEMIA, UNSPECIFIED HYPERLIPIDEMIA TYPE: ICD-10-CM

## 2024-08-08 DIAGNOSIS — E83.42 HYPOMAGNESEMIA: ICD-10-CM

## 2024-08-08 DIAGNOSIS — I10 ESSENTIAL HYPERTENSION: ICD-10-CM

## 2024-08-08 DIAGNOSIS — E11.65 TYPE 2 DIABETES MELLITUS WITH HYPERGLYCEMIA, WITH LONG-TERM CURRENT USE OF INSULIN (HCC): Primary | ICD-10-CM

## 2024-08-08 DIAGNOSIS — Z79.4 TYPE 2 DIABETES MELLITUS WITH HYPERGLYCEMIA, WITH LONG-TERM CURRENT USE OF INSULIN (HCC): Primary | ICD-10-CM

## 2024-08-09 DIAGNOSIS — E83.42 HYPOMAGNESEMIA: ICD-10-CM

## 2024-08-09 DIAGNOSIS — E11.65 TYPE 2 DIABETES MELLITUS WITH HYPERGLYCEMIA, WITH LONG-TERM CURRENT USE OF INSULIN (HCC): ICD-10-CM

## 2024-08-09 DIAGNOSIS — E78.5 HYPERLIPIDEMIA, UNSPECIFIED HYPERLIPIDEMIA TYPE: ICD-10-CM

## 2024-08-09 DIAGNOSIS — I10 ESSENTIAL HYPERTENSION: ICD-10-CM

## 2024-08-09 DIAGNOSIS — Z79.4 TYPE 2 DIABETES MELLITUS WITH HYPERGLYCEMIA, WITH LONG-TERM CURRENT USE OF INSULIN (HCC): ICD-10-CM

## 2024-08-09 LAB
ALBUMIN SERPL-MCNC: 3.8 G/DL (ref 3.2–4.6)
ALBUMIN/GLOB SERPL: 1.6 (ref 1–1.9)
ALP SERPL-CCNC: 70 U/L (ref 40–129)
ALT SERPL-CCNC: 47 U/L (ref 12–65)
ANION GAP SERPL CALC-SCNC: 12 MMOL/L (ref 9–18)
AST SERPL-CCNC: 28 U/L (ref 15–37)
BASOPHILS # BLD: 0.1 K/UL (ref 0–0.2)
BASOPHILS NFR BLD: 1 % (ref 0–2)
BILIRUB SERPL-MCNC: 0.5 MG/DL (ref 0–1.2)
BUN SERPL-MCNC: 10 MG/DL (ref 8–23)
CALCIUM SERPL-MCNC: 9.3 MG/DL (ref 8.8–10.2)
CHLORIDE SERPL-SCNC: 99 MMOL/L (ref 98–107)
CHOLEST SERPL-MCNC: 169 MG/DL (ref 0–200)
CO2 SERPL-SCNC: 26 MMOL/L (ref 20–28)
CREAT SERPL-MCNC: 0.9 MG/DL (ref 0.8–1.3)
DIFFERENTIAL METHOD BLD: ABNORMAL
EOSINOPHIL # BLD: 0.4 K/UL (ref 0–0.8)
EOSINOPHIL NFR BLD: 5 % (ref 0.5–7.8)
ERYTHROCYTE [DISTWIDTH] IN BLOOD BY AUTOMATED COUNT: 12.6 % (ref 11.9–14.6)
EST. AVERAGE GLUCOSE BLD GHB EST-MCNC: 205 MG/DL
GLOBULIN SER CALC-MCNC: 2.3 G/DL (ref 2.3–3.5)
GLUCOSE SERPL-MCNC: 276 MG/DL (ref 70–99)
HBA1C MFR BLD: 8.8 % (ref 0–5.6)
HCT VFR BLD AUTO: 40.3 % (ref 41.1–50.3)
HDLC SERPL-MCNC: 37 MG/DL (ref 40–60)
HDLC SERPL: 4.5 (ref 0–5)
HGB BLD-MCNC: 14.5 G/DL (ref 13.6–17.2)
IMM GRANULOCYTES # BLD AUTO: 0.1 K/UL (ref 0–0.5)
IMM GRANULOCYTES NFR BLD AUTO: 1 % (ref 0–5)
LDLC SERPL CALC-MCNC: 75 MG/DL (ref 0–100)
LYMPHOCYTES # BLD: 1 K/UL (ref 0.5–4.6)
LYMPHOCYTES NFR BLD: 15 % (ref 13–44)
MCH RBC QN AUTO: 32.1 PG (ref 26.1–32.9)
MCHC RBC AUTO-ENTMCNC: 36 G/DL (ref 31.4–35)
MCV RBC AUTO: 89.2 FL (ref 82–102)
MONOCYTES # BLD: 0.6 K/UL (ref 0.1–1.3)
MONOCYTES NFR BLD: 10 % (ref 4–12)
NEUTS SEG # BLD: 4.5 K/UL (ref 1.7–8.2)
NEUTS SEG NFR BLD: 68 % (ref 43–78)
NRBC # BLD: 0 K/UL (ref 0–0.2)
PLATELET # BLD AUTO: 180 K/UL (ref 150–450)
PMV BLD AUTO: 10.4 FL (ref 9.4–12.3)
POTASSIUM SERPL-SCNC: 4.3 MMOL/L (ref 3.5–5.1)
PROT SERPL-MCNC: 6.1 G/DL (ref 6.3–8.2)
RBC # BLD AUTO: 4.52 M/UL (ref 4.23–5.6)
SODIUM SERPL-SCNC: 138 MMOL/L (ref 136–145)
TRIGL SERPL-MCNC: 286 MG/DL (ref 0–150)
TSH, 3RD GENERATION: 2.48 UIU/ML (ref 0.27–4.2)
VLDLC SERPL CALC-MCNC: 57 MG/DL (ref 6–23)
WBC # BLD AUTO: 6.7 K/UL (ref 4.3–11.1)

## 2024-08-15 ENCOUNTER — TELEPHONE (OUTPATIENT)
Dept: FAMILY MEDICINE CLINIC | Facility: CLINIC | Age: 64
End: 2024-08-15

## 2024-08-15 DIAGNOSIS — F32.A ANXIETY AND DEPRESSION: ICD-10-CM

## 2024-08-15 DIAGNOSIS — E11.65 TYPE 2 DIABETES MELLITUS WITH HYPERGLYCEMIA, WITH LONG-TERM CURRENT USE OF INSULIN (HCC): ICD-10-CM

## 2024-08-15 DIAGNOSIS — F41.9 ANXIETY AND DEPRESSION: ICD-10-CM

## 2024-08-15 DIAGNOSIS — Z79.4 TYPE 2 DIABETES MELLITUS WITH HYPERGLYCEMIA, WITH LONG-TERM CURRENT USE OF INSULIN (HCC): ICD-10-CM

## 2024-08-15 RX ORDER — ACYCLOVIR 400 MG/1
TABLET ORAL
Qty: 3 EACH | Refills: 5 | Status: CANCELLED | OUTPATIENT
Start: 2024-08-15

## 2024-08-15 RX ORDER — ATORVASTATIN CALCIUM 40 MG/1
20 TABLET, FILM COATED ORAL DAILY
Qty: 45 TABLET | Refills: 3 | Status: CANCELLED | OUTPATIENT
Start: 2024-08-15

## 2024-08-15 RX ORDER — LISINOPRIL 10 MG/1
10 TABLET ORAL DAILY
Qty: 90 TABLET | Refills: 3 | Status: CANCELLED | OUTPATIENT
Start: 2024-08-15

## 2024-08-15 RX ORDER — ESCITALOPRAM OXALATE 10 MG/1
10 TABLET ORAL DAILY
Qty: 90 TABLET | Refills: 3 | Status: CANCELLED | OUTPATIENT
Start: 2024-08-15

## 2024-08-15 RX ORDER — TRAZODONE HYDROCHLORIDE 100 MG/1
50 TABLET ORAL NIGHTLY
Qty: 45 TABLET | Refills: 3 | Status: CANCELLED | OUTPATIENT
Start: 2024-08-15

## 2024-08-15 RX ORDER — BLOOD SUGAR DIAGNOSTIC
1 STRIP MISCELLANEOUS DAILY
Qty: 100 EACH | Refills: 3 | Status: CANCELLED | OUTPATIENT
Start: 2024-08-15

## 2024-08-28 ENCOUNTER — OFFICE VISIT (OUTPATIENT)
Dept: ONCOLOGY | Age: 64
End: 2024-08-28
Payer: OTHER GOVERNMENT

## 2024-08-28 ENCOUNTER — HOSPITAL ENCOUNTER (OUTPATIENT)
Dept: LAB | Age: 64
Discharge: HOME | End: 2024-08-31

## 2024-08-28 VITALS
RESPIRATION RATE: 17 BRPM | DIASTOLIC BLOOD PRESSURE: 81 MMHG | OXYGEN SATURATION: 94 % | BODY MASS INDEX: 32.13 KG/M2 | TEMPERATURE: 97.9 F | WEIGHT: 212 LBS | HEART RATE: 82 BPM | SYSTOLIC BLOOD PRESSURE: 125 MMHG | HEIGHT: 68 IN

## 2024-08-28 DIAGNOSIS — C83.35 DIFFUSE LARGE B-CELL LYMPHOMA OF LYMPH NODES OF INGUINAL REGION (HCC): ICD-10-CM

## 2024-08-28 DIAGNOSIS — E83.42 HYPOMAGNESEMIA: ICD-10-CM

## 2024-08-28 DIAGNOSIS — R53.82 CHRONIC FATIGUE: ICD-10-CM

## 2024-08-28 DIAGNOSIS — C83.35 DIFFUSE LARGE B-CELL LYMPHOMA OF LYMPH NODES OF INGUINAL REGION (HCC): Primary | ICD-10-CM

## 2024-08-28 DIAGNOSIS — G62.9 POLYNEUROPATHY: ICD-10-CM

## 2024-08-28 LAB
ALBUMIN SERPL-MCNC: 3.8 G/DL (ref 3.2–4.6)
ALBUMIN/GLOB SERPL: 1.4 (ref 1–1.9)
ALP SERPL-CCNC: 68 U/L (ref 40–129)
ALT SERPL-CCNC: 47 U/L (ref 12–65)
ANION GAP SERPL CALC-SCNC: 10 MMOL/L (ref 9–18)
AST SERPL-CCNC: 30 U/L (ref 15–37)
BASOPHILS # BLD: 0.1 K/UL (ref 0–0.2)
BASOPHILS NFR BLD: 1 % (ref 0–2)
BILIRUB SERPL-MCNC: 0.6 MG/DL (ref 0–1.2)
BUN SERPL-MCNC: 15 MG/DL (ref 8–23)
CALCIUM SERPL-MCNC: 10 MG/DL (ref 8.8–10.2)
CHLORIDE SERPL-SCNC: 101 MMOL/L (ref 98–107)
CO2 SERPL-SCNC: 28 MMOL/L (ref 20–28)
CREAT SERPL-MCNC: 0.93 MG/DL (ref 0.8–1.3)
DIFFERENTIAL METHOD BLD: ABNORMAL
EOSINOPHIL # BLD: 0.3 K/UL (ref 0–0.8)
EOSINOPHIL NFR BLD: 5 % (ref 0.5–7.8)
ERYTHROCYTE [DISTWIDTH] IN BLOOD BY AUTOMATED COUNT: 12.8 % (ref 11.9–14.6)
GLOBULIN SER CALC-MCNC: 2.7 G/DL (ref 2.3–3.5)
GLUCOSE SERPL-MCNC: 180 MG/DL (ref 70–99)
HCT VFR BLD AUTO: 41.2 % (ref 41.1–50.3)
HGB BLD-MCNC: 14.5 G/DL (ref 13.6–17.2)
IMM GRANULOCYTES # BLD AUTO: 0 K/UL (ref 0–0.5)
IMM GRANULOCYTES NFR BLD AUTO: 1 % (ref 0–5)
LDH SERPL L TO P-CCNC: 174 U/L (ref 127–281)
LYMPHOCYTES # BLD: 0.9 K/UL (ref 0.5–4.6)
LYMPHOCYTES NFR BLD: 16 % (ref 13–44)
MAGNESIUM SERPL-MCNC: 1.3 MG/DL (ref 1.8–2.4)
MCH RBC QN AUTO: 31.2 PG (ref 26.1–32.9)
MCHC RBC AUTO-ENTMCNC: 35.2 G/DL (ref 31.4–35)
MCV RBC AUTO: 88.6 FL (ref 82–102)
MONOCYTES # BLD: 0.5 K/UL (ref 0.1–1.3)
MONOCYTES NFR BLD: 9 % (ref 4–12)
NEUTS SEG # BLD: 4 K/UL (ref 1.7–8.2)
NEUTS SEG NFR BLD: 68 % (ref 43–78)
NRBC # BLD: 0 K/UL (ref 0–0.2)
PLATELET # BLD AUTO: 182 K/UL (ref 150–450)
PMV BLD AUTO: 9.6 FL (ref 9.4–12.3)
POTASSIUM SERPL-SCNC: 4.5 MMOL/L (ref 3.5–5.1)
PROT SERPL-MCNC: 6.5 G/DL (ref 6.3–8.2)
RBC # BLD AUTO: 4.65 M/UL (ref 4.23–5.6)
SODIUM SERPL-SCNC: 139 MMOL/L (ref 136–145)
WBC # BLD AUTO: 5.9 K/UL (ref 4.3–11.1)

## 2024-08-28 PROCEDURE — 99214 OFFICE O/P EST MOD 30 MIN: CPT | Performed by: NURSE PRACTITIONER

## 2024-08-28 PROCEDURE — 85025 COMPLETE CBC W/AUTO DIFF WBC: CPT

## 2024-08-28 PROCEDURE — 83735 ASSAY OF MAGNESIUM: CPT

## 2024-08-28 PROCEDURE — 3074F SYST BP LT 130 MM HG: CPT | Performed by: NURSE PRACTITIONER

## 2024-08-28 PROCEDURE — 3079F DIAST BP 80-89 MM HG: CPT | Performed by: NURSE PRACTITIONER

## 2024-08-28 PROCEDURE — 83615 LACTATE (LD) (LDH) ENZYME: CPT

## 2024-08-28 PROCEDURE — 36415 COLL VENOUS BLD VENIPUNCTURE: CPT

## 2024-08-28 PROCEDURE — 80053 COMPREHEN METABOLIC PANEL: CPT

## 2024-08-28 ASSESSMENT — PATIENT HEALTH QUESTIONNAIRE - PHQ9
SUM OF ALL RESPONSES TO PHQ QUESTIONS 1-9: 0
SUM OF ALL RESPONSES TO PHQ QUESTIONS 1-9: 0
1. LITTLE INTEREST OR PLEASURE IN DOING THINGS: NOT AT ALL
SUM OF ALL RESPONSES TO PHQ QUESTIONS 1-9: 0
2. FEELING DOWN, DEPRESSED OR HOPELESS: NOT AT ALL
SUM OF ALL RESPONSES TO PHQ9 QUESTIONS 1 & 2: 0
SUM OF ALL RESPONSES TO PHQ QUESTIONS 1-9: 0

## 2024-08-28 NOTE — PROGRESS NOTES
[facility] Hematology and Oncology: Office Visit Established Patient    Chief Complaint:    Chief Complaint   Patient presents with    Follow-up           History of Present Illness:  Mr. Hobbs is a 63 y.o. male who returns today for management of diffuse large B cell lymphoma.  He presented to his PCP on 4/29/22 reporting an enlarging lump in his left groin for the past 2 months.  Physical exam confirmed a mildly tender lump in the patient’s left inguinal region. The right inguinal region was normal, and no inguinal hernia was noted. Ultrasound of the left groin was performed on 5/13/22 revealing enlarged, abnormal appearing left inguinal lymph nodes measuring up to 3.0 x 3.2 x 1.9 cm. Ultrasound directed biopsy was suggested for further assessment.  We saw him for consultation and recommended excisional biopsy, which showed diffuse large B cell lymphoma.  PET/CT shows the most prominent areas of disease in the inguinal and iliac nodes, but there are also nodes in the right axilla and retroperitoneum that are consistent with DLBCL, making him at least [place] stage III.  Bone marrow biopsy showed no morphologic findings but did show a small B cell clone worrisome for lymphoma involvement in the bone marrow, this would make him AA stage IV.  His FISH shows no evidence of more aggressive genotypic findings, so he would be most appropriate for R-CHOP for 6 cycles for definitive therapy.  PET/CT following 2 cycles revealed CR. PET/CT after 6 months shows continued complete response.  He will now enter observation with repeat labs and OV in 3 months, CT in 6 months, this will continue for the first two years.    He returns today for routine 3 month follow up for DLBCL.  Since last seen he has been well overall.  There are no GI or bowel complaints.  Appetite is good and weight is stable.  Fatigue is unchanged, attributes some to the heat and humidity.  He is recovering from a cold, no shortness of breath or edema.

## 2024-09-11 ASSESSMENT — ENCOUNTER SYMPTOMS
CONSTIPATION: 0
DIARRHEA: 0

## 2024-09-12 ENCOUNTER — OFFICE VISIT (OUTPATIENT)
Dept: ENDOCRINOLOGY | Age: 64
End: 2024-09-12
Payer: OTHER GOVERNMENT

## 2024-09-12 VITALS
DIASTOLIC BLOOD PRESSURE: 82 MMHG | HEART RATE: 71 BPM | SYSTOLIC BLOOD PRESSURE: 130 MMHG | BODY MASS INDEX: 32.99 KG/M2 | WEIGHT: 217 LBS | OXYGEN SATURATION: 99 %

## 2024-09-12 DIAGNOSIS — E11.65 TYPE 2 DIABETES MELLITUS WITH HYPERGLYCEMIA, WITH LONG-TERM CURRENT USE OF INSULIN (HCC): Primary | ICD-10-CM

## 2024-09-12 DIAGNOSIS — Z79.4 TYPE 2 DIABETES MELLITUS WITH HYPERGLYCEMIA, WITH LONG-TERM CURRENT USE OF INSULIN (HCC): Primary | ICD-10-CM

## 2024-09-12 PROCEDURE — 3075F SYST BP GE 130 - 139MM HG: CPT | Performed by: NURSE PRACTITIONER

## 2024-09-12 PROCEDURE — 3079F DIAST BP 80-89 MM HG: CPT | Performed by: NURSE PRACTITIONER

## 2024-09-12 PROCEDURE — 3052F HG A1C>EQUAL 8.0%<EQUAL 9.0%: CPT | Performed by: NURSE PRACTITIONER

## 2024-09-12 PROCEDURE — 95251 CONT GLUC MNTR ANALYSIS I&R: CPT | Performed by: NURSE PRACTITIONER

## 2024-09-12 PROCEDURE — 99214 OFFICE O/P EST MOD 30 MIN: CPT | Performed by: NURSE PRACTITIONER

## 2024-09-12 RX ORDER — INSULIN HUMAN 500 [IU]/ML
INJECTION, SOLUTION SUBCUTANEOUS
Qty: 45 ML | Refills: 3 | Status: SHIPPED | OUTPATIENT
Start: 2024-09-12

## 2024-09-12 RX ORDER — GLUCAGON INJECTION, SOLUTION 1 MG/.2ML
1 INJECTION, SOLUTION SUBCUTANEOUS PRN
Qty: 0.4 ML | Refills: 3 | Status: SHIPPED | OUTPATIENT
Start: 2024-09-12

## 2024-10-30 DIAGNOSIS — F32.A ANXIETY AND DEPRESSION: ICD-10-CM

## 2024-10-30 DIAGNOSIS — F41.9 ANXIETY AND DEPRESSION: ICD-10-CM

## 2024-10-30 DIAGNOSIS — Z79.4 TYPE 2 DIABETES MELLITUS WITH HYPERGLYCEMIA, WITH LONG-TERM CURRENT USE OF INSULIN (HCC): ICD-10-CM

## 2024-10-30 DIAGNOSIS — E11.65 TYPE 2 DIABETES MELLITUS WITH HYPERGLYCEMIA, WITH LONG-TERM CURRENT USE OF INSULIN (HCC): ICD-10-CM

## 2024-10-30 RX ORDER — ATORVASTATIN CALCIUM 40 MG/1
20 TABLET, FILM COATED ORAL DAILY
Qty: 45 TABLET | Refills: 3 | Status: SHIPPED | OUTPATIENT
Start: 2024-10-30

## 2024-10-30 RX ORDER — TRAZODONE HYDROCHLORIDE 100 MG/1
50 TABLET ORAL NIGHTLY
Qty: 45 TABLET | Refills: 3 | Status: SHIPPED | OUTPATIENT
Start: 2024-10-30

## 2024-10-30 RX ORDER — ESCITALOPRAM OXALATE 10 MG/1
10 TABLET ORAL DAILY
Qty: 90 TABLET | Refills: 3 | Status: SHIPPED | OUTPATIENT
Start: 2024-10-30

## 2024-10-30 RX ORDER — LISINOPRIL 10 MG/1
10 TABLET ORAL DAILY
Qty: 90 TABLET | Refills: 3 | Status: SHIPPED | OUTPATIENT
Start: 2024-10-30

## 2024-10-31 ENCOUNTER — TELEPHONE (OUTPATIENT)
Dept: ENDOCRINOLOGY | Age: 64
End: 2024-10-31

## 2024-10-31 NOTE — TELEPHONE ENCOUNTER
Farnaz from [facility] called stating she received the RFS (request for services) for visits with Octavia and she just wanted to let us know who referred patient to us and that she will try to get July and Sept visits covered but can not guarantee it and upcoming visit in Nov should be fine.

## 2024-11-06 RX ORDER — IBUPROFEN 800 MG/1
800 TABLET, FILM COATED ORAL EVERY 8 HOURS PRN
Qty: 120 TABLET | Refills: 1 | Status: SHIPPED | OUTPATIENT
Start: 2024-11-06

## 2024-11-11 ASSESSMENT — ENCOUNTER SYMPTOMS
DIARRHEA: 0
CONSTIPATION: 0

## 2024-11-11 NOTE — PROGRESS NOTES
[facility], [facility] Endocrinology  [address], [address]  [place] [address]            Sahq Hobbs Jr. is seen today for follow up of type 2 diabetes mellitus.      ASSESSMENT AND PLAN:    Interpretation of 72 hour glucose monitor:  At least 72 hours of data were reviewed.  The patient utilizes a Dexcom G7 continuous glucose monitoring system.  The average glucose during the reviewed timeframe was 228 with a coefficient of variation of 32.6% (time in range 28%, time above range 72%, time below range 0%).     1. Type 2 diabetes mellitus with hyperglycemia, with long-term current use of insulin ([facility])  Assessment & Plan:  A1c 7.6%, GMI: 8.8%. Glycemic control sub-optimal with A1c above goal of less than 7%.  We calibrated his Dexcom sensor in office today. POC glucose 266 with CGM reading 301. Suspect discordance between CGM data and A1c related to both alteration in hemoglobin molecules as well as inaccurate sensor readings. Patient denies high dose tylenol. Recommended regular calibration. Rx for new glucometer sent.   Increase U-500 doses to 90 @ B, 70 @ L and D. I was a little more conservative than I would have been if his sensor readings were accurate.   We discussed getting a fructosamine level. Patient declines today.   Foot exam reveals no concerns. Counseled on appropriate foot care including examining their feet daily, avoiding activities that can injure feet, walking barefoot or wearing open toed shoes, and using care when trimming nails.     Orders:  -     AMB POC HEMOGLOBIN A1C  -     FREESTYLE LITE strip; Use to monitor glucose 3 times daily E11.65, Disp-300 each, R-3, DAWNormal  -     Blood Glucose Monitoring Suppl (FREESTYLE LITE) w/Device KIT; Use to monitor glucose 3 times daily E11.65, Disp-1 kit, R-0, DAWNormal  -     HUMULIN R U-500 KWIKPEN 500 UNIT/ML SOPN concentrated injection pen; Inject 90 Units into the skin every morning (before breakfast) AND 70

## 2024-11-12 ENCOUNTER — TELEPHONE (OUTPATIENT)
Dept: ENDOCRINOLOGY | Age: 64
End: 2024-11-12

## 2024-11-12 ENCOUNTER — OFFICE VISIT (OUTPATIENT)
Dept: ENDOCRINOLOGY | Age: 64
End: 2024-11-12
Payer: OTHER GOVERNMENT

## 2024-11-12 VITALS
WEIGHT: 213 LBS | RESPIRATION RATE: 16 BRPM | HEART RATE: 82 BPM | TEMPERATURE: 98.6 F | BODY MASS INDEX: 32.28 KG/M2 | SYSTOLIC BLOOD PRESSURE: 129 MMHG | DIASTOLIC BLOOD PRESSURE: 75 MMHG | OXYGEN SATURATION: 98 % | HEIGHT: 68 IN

## 2024-11-12 DIAGNOSIS — E11.65 TYPE 2 DIABETES MELLITUS WITH HYPERGLYCEMIA, WITH LONG-TERM CURRENT USE OF INSULIN (HCC): Primary | ICD-10-CM

## 2024-11-12 DIAGNOSIS — Z79.4 TYPE 2 DIABETES MELLITUS WITH HYPERGLYCEMIA, WITH LONG-TERM CURRENT USE OF INSULIN (HCC): Primary | ICD-10-CM

## 2024-11-12 LAB — HBA1C MFR BLD: 7.6 %

## 2024-11-12 PROCEDURE — 83036 HEMOGLOBIN GLYCOSYLATED A1C: CPT | Performed by: NURSE PRACTITIONER

## 2024-11-12 PROCEDURE — 99214 OFFICE O/P EST MOD 30 MIN: CPT | Performed by: NURSE PRACTITIONER

## 2024-11-12 PROCEDURE — 3074F SYST BP LT 130 MM HG: CPT | Performed by: NURSE PRACTITIONER

## 2024-11-12 PROCEDURE — 95251 CONT GLUC MNTR ANALYSIS I&R: CPT | Performed by: NURSE PRACTITIONER

## 2024-11-12 PROCEDURE — 3052F HG A1C>EQUAL 8.0%<EQUAL 9.0%: CPT | Performed by: NURSE PRACTITIONER

## 2024-11-12 PROCEDURE — 3078F DIAST BP <80 MM HG: CPT | Performed by: NURSE PRACTITIONER

## 2024-11-12 RX ORDER — BLOOD-GLUCOSE METER
KIT MISCELLANEOUS
Qty: 1 KIT | Refills: 0 | Status: SHIPPED | OUTPATIENT
Start: 2024-11-12

## 2024-11-12 RX ORDER — BLOOD-GLUCOSE METER
KIT MISCELLANEOUS
Qty: 300 EACH | Refills: 3 | Status: SHIPPED | OUTPATIENT
Start: 2024-11-12

## 2024-11-12 RX ORDER — INSULIN HUMAN 500 [IU]/ML
INJECTION, SOLUTION SUBCUTANEOUS
Qty: 48 ML | Refills: 3 | Status: SHIPPED | OUTPATIENT
Start: 2024-11-12

## 2024-11-12 NOTE — ASSESSMENT & PLAN NOTE
A1c 7.6%, GMI: 8.8%. Glycemic control sub-optimal with A1c above goal of less than 7%.  We calibrated his Dexcom sensor in office today. POC glucose 266 with CGM reading 301. Suspect discordance between CGM data and A1c related to both alteration in hemoglobin molecules as well as inaccurate sensor readings. Patient denies high dose tylenol. Recommended regular calibration.   Increase U-500 doses to 90 @ B, 70 @ L and D. I was a little more conservative than I would have been if his sensor readings were accurate.   We discussed getting a fructosamine level. Patient declines today.   Foot exam reveals no concerns. Counseled on appropriate foot care including examining their feet daily, avoiding activities that can injure feet, walking barefoot or wearing open toed shoes, and using care when trimming nails.

## 2024-11-13 ENCOUNTER — HOSPITAL ENCOUNTER (OUTPATIENT)
Dept: CT IMAGING | Age: 64
Discharge: HOME | End: 2024-11-16
Attending: INTERNAL MEDICINE
Payer: OTHER GOVERNMENT

## 2024-11-13 DIAGNOSIS — E83.42 HYPOMAGNESEMIA: ICD-10-CM

## 2024-11-13 DIAGNOSIS — C83.35 DIFFUSE LARGE B-CELL LYMPHOMA OF LYMPH NODES OF INGUINAL REGION (HCC): ICD-10-CM

## 2024-11-13 DIAGNOSIS — R53.82 CHRONIC FATIGUE: ICD-10-CM

## 2024-11-13 LAB — CREAT BLD-MCNC: 0.86 MG/DL (ref 0.8–1.5)

## 2024-11-13 PROCEDURE — 6360000004 HC RX CONTRAST MEDICATION: Performed by: INTERNAL MEDICINE

## 2024-11-13 PROCEDURE — 82565 ASSAY OF CREATININE: CPT

## 2024-11-13 PROCEDURE — 74177 CT ABD & PELVIS W/CONTRAST: CPT

## 2024-11-13 RX ORDER — IOPAMIDOL 755 MG/ML
100 INJECTION, SOLUTION INTRAVASCULAR
Status: COMPLETED | OUTPATIENT
Start: 2024-11-13 | End: 2024-11-13

## 2024-11-13 RX ORDER — DIATRIZOATE MEGLUMINE AND DIATRIZOATE SODIUM 660; 100 MG/ML; MG/ML
15 SOLUTION ORAL; RECTAL
Status: DISCONTINUED | OUTPATIENT
Start: 2024-11-13 | End: 2024-11-17 | Stop reason: HOSPADM

## 2024-11-13 RX ADMIN — IOPAMIDOL 100 ML: 755 INJECTION, SOLUTION INTRAVENOUS at 08:29

## 2024-11-13 RX ADMIN — DIATRIZOATE MEGLUMINE AND DIATRIZOATE SODIUM 15 ML: 660; 100 LIQUID ORAL; RECTAL at 08:29

## 2024-11-15 ENCOUNTER — CLINICAL DOCUMENTATION (OUTPATIENT)
Dept: ONCOLOGY | Age: 64
End: 2024-11-15

## 2024-11-15 NOTE — PROGRESS NOTES
11/14/24 [facility] Form [id number] Request for Services Form completed and faxed to Wm. Tabatha Sargent [facility] at #[phone] along with the following medical records:    Oncology/Hematology office visit progress note dated 8/28/24  CT CAP Scan results dated 5/14/24     Proficient Transaction ID [id number]

## 2024-11-22 ENCOUNTER — OFFICE VISIT (OUTPATIENT)
Dept: ONCOLOGY | Age: 64
End: 2024-11-22
Payer: OTHER GOVERNMENT

## 2024-11-22 ENCOUNTER — HOSPITAL ENCOUNTER (OUTPATIENT)
Dept: LAB | Age: 64
Discharge: HOME | End: 2024-11-22
Payer: OTHER GOVERNMENT

## 2024-11-22 VITALS
WEIGHT: 211.2 LBS | SYSTOLIC BLOOD PRESSURE: 152 MMHG | OXYGEN SATURATION: 99 % | DIASTOLIC BLOOD PRESSURE: 76 MMHG | RESPIRATION RATE: 16 BRPM | BODY MASS INDEX: 32.01 KG/M2 | HEART RATE: 84 BPM | HEIGHT: 68 IN | TEMPERATURE: 97.3 F

## 2024-11-22 DIAGNOSIS — E83.42 HYPOMAGNESEMIA: ICD-10-CM

## 2024-11-22 DIAGNOSIS — C83.35 DIFFUSE LARGE B-CELL LYMPHOMA OF LYMPH NODES OF INGUINAL REGION (HCC): ICD-10-CM

## 2024-11-22 DIAGNOSIS — R53.82 CHRONIC FATIGUE: ICD-10-CM

## 2024-11-22 DIAGNOSIS — G62.9 POLYNEUROPATHY: ICD-10-CM

## 2024-11-22 DIAGNOSIS — C83.35 DIFFUSE LARGE B-CELL LYMPHOMA OF LYMPH NODES OF INGUINAL REGION (HCC): Primary | ICD-10-CM

## 2024-11-22 LAB
ALBUMIN SERPL-MCNC: 3.8 G/DL (ref 3.2–4.6)
ALBUMIN/GLOB SERPL: 1.4 (ref 1–1.9)
ALP SERPL-CCNC: 74 U/L (ref 40–129)
ALT SERPL-CCNC: 34 U/L (ref 8–55)
ANION GAP SERPL CALC-SCNC: 9 MMOL/L (ref 7–16)
AST SERPL-CCNC: 26 U/L (ref 15–37)
BASOPHILS # BLD: 0.1 K/UL (ref 0–0.2)
BASOPHILS NFR BLD: 1 % (ref 0–2)
BILIRUB SERPL-MCNC: 0.7 MG/DL (ref 0–1.2)
BUN SERPL-MCNC: 14 MG/DL (ref 8–23)
CALCIUM SERPL-MCNC: 9.1 MG/DL (ref 8.8–10.2)
CHLORIDE SERPL-SCNC: 101 MMOL/L (ref 98–107)
CO2 SERPL-SCNC: 28 MMOL/L (ref 20–29)
CREAT SERPL-MCNC: 1.01 MG/DL (ref 0.8–1.3)
DIFFERENTIAL METHOD BLD: NORMAL
EOSINOPHIL # BLD: 0.4 K/UL (ref 0–0.8)
EOSINOPHIL NFR BLD: 6 % (ref 0.5–7.8)
ERYTHROCYTE [DISTWIDTH] IN BLOOD BY AUTOMATED COUNT: 12.8 % (ref 11.9–14.6)
GLOBULIN SER CALC-MCNC: 2.8 G/DL (ref 2.3–3.5)
GLUCOSE SERPL-MCNC: 283 MG/DL (ref 70–99)
HCT VFR BLD AUTO: 41.4 % (ref 41.1–50.3)
HGB BLD-MCNC: 14.5 G/DL (ref 13.6–17.2)
IMM GRANULOCYTES # BLD AUTO: 0 K/UL (ref 0–0.5)
IMM GRANULOCYTES NFR BLD AUTO: 0 % (ref 0–5)
LDH SERPL L TO P-CCNC: 170 U/L (ref 127–281)
LYMPHOCYTES # BLD: 1.1 K/UL (ref 0.5–4.6)
LYMPHOCYTES NFR BLD: 16 % (ref 13–44)
MAGNESIUM SERPL-MCNC: 1.5 MG/DL (ref 1.8–2.4)
MCH RBC QN AUTO: 31.1 PG (ref 26.1–32.9)
MCHC RBC AUTO-ENTMCNC: 35 G/DL (ref 31.4–35)
MCV RBC AUTO: 88.8 FL (ref 82–102)
MONOCYTES # BLD: 0.6 K/UL (ref 0.1–1.3)
MONOCYTES NFR BLD: 9 % (ref 4–12)
NEUTS SEG # BLD: 4.6 K/UL (ref 1.7–8.2)
NEUTS SEG NFR BLD: 68 % (ref 43–78)
NRBC # BLD: 0 K/UL (ref 0–0.2)
PLATELET # BLD AUTO: 204 K/UL (ref 150–450)
PMV BLD AUTO: 10.1 FL (ref 9.4–12.3)
POTASSIUM SERPL-SCNC: 4.6 MMOL/L (ref 3.5–5.1)
PROT SERPL-MCNC: 6.6 G/DL (ref 6.3–8.2)
RBC # BLD AUTO: 4.66 M/UL (ref 4.23–5.6)
SODIUM SERPL-SCNC: 138 MMOL/L (ref 136–145)
WBC # BLD AUTO: 6.7 K/UL (ref 4.3–11.1)

## 2024-11-22 PROCEDURE — 36415 COLL VENOUS BLD VENIPUNCTURE: CPT

## 2024-11-22 PROCEDURE — 80053 COMPREHEN METABOLIC PANEL: CPT

## 2024-11-22 PROCEDURE — 3078F DIAST BP <80 MM HG: CPT | Performed by: INTERNAL MEDICINE

## 2024-11-22 PROCEDURE — 83735 ASSAY OF MAGNESIUM: CPT

## 2024-11-22 PROCEDURE — 85025 COMPLETE CBC W/AUTO DIFF WBC: CPT

## 2024-11-22 PROCEDURE — 3075F SYST BP GE 130 - 139MM HG: CPT | Performed by: INTERNAL MEDICINE

## 2024-11-22 PROCEDURE — 99214 OFFICE O/P EST MOD 30 MIN: CPT | Performed by: INTERNAL MEDICINE

## 2024-11-22 PROCEDURE — 83615 LACTATE (LD) (LDH) ENZYME: CPT

## 2024-11-22 NOTE — PROGRESS NOTES
[facility] Hematology and Oncology: Office Visit Established Patient    Chief Complaint:    Chief Complaint   Patient presents with    Follow-up           History of Present Illness:  Mr. Hobbs is a 64 y.o. male who returns today for management of diffuse large B cell lymphoma.  He presented to his PCP on 4/29/22 reporting an enlarging lump in his left groin for the past 2 months.  Physical exam confirmed a mildly tender lump in the patient’s left inguinal region. The right inguinal region was normal, and no inguinal hernia was noted. Ultrasound of the left groin was performed on 5/13/22 revealing enlarged, abnormal appearing left inguinal lymph nodes measuring up to 3.0 x 3.2 x 1.9 cm. Ultrasound directed biopsy was suggested for further assessment.  We saw him for consultation and recommended excisional biopsy, which showed diffuse large B cell lymphoma.  PET/CT shows the most prominent areas of disease in the inguinal and iliac nodes, but there are also nodes in the right axilla and retroperitoneum that are consistent with DLBCL, making him at least [place] stage III.  Bone marrow biopsy showed no morphologic findings but did show a small B cell clone worrisome for lymphoma involvement in the bone marrow, this would make him AA stage IV.  His FISH shows no evidence of more aggressive genotypic findings, so he would be most appropriate for R-CHOP for 6 cycles for definitive therapy.  PET/CT following 2 cycles revealed CR. PET/CT after 6 months shows continued complete response.  He will now enter observation with repeat labs and OV in 3 months, CT in 6 months, this will continue for the first two years.      History of Present Illness  The patient is a 64-year-old male who presents for routine follow-up of diffuse large B-cell lymphoma. He is accompanied by an adult female.    He reports feeling well overall, with no new symptoms since his last visit. However, he continues to experience a lack of balance,

## 2024-11-22 NOTE — PATIENT INSTRUCTIONS
Patient Information from Today's Visit    The members of your [facility] are listed below:    Physician Provider: Placido Cotto, Medical Oncologist  Advanced Practice Clinician: Martha Renee NP  Registered Nurse: Michael HALEY RN  Nurse Navigator: N/A  Medical Assistant: Donna WALLIS CMA  :Rosalva GUTIÉRREZ  Supportive Care Services: Sekou MONTEMAYOR LMSW    Diagnosis: DLBCL    Follow Up Instructions: 6 months      Treatment Summary has been discussed and given to patient:No      Current Labs:   Hospital Outpatient Visit on 11/22/2024   Component Date Value Ref Range Status    WBC 11/22/2024 6.7  4.3 - 11.1 K/uL Final    RBC 11/22/2024 4.66  4.23 - 5.6 M/uL Final    Hemoglobin 11/22/2024 14.5  13.6 - 17.2 g/dL Final    Hematocrit 11/22/2024 41.4  41.1 - 50.3 % Final    MCV 11/22/2024 88.8  82.0 - 102.0 FL Final    MCH 11/22/2024 31.1  26.1 - 32.9 PG Final    MCHC 11/22/2024 35.0  31.4 - 35.0 g/dL Final    RDW 11/22/2024 12.8  11.9 - 14.6 % Final    Platelets 11/22/2024 204  150 - 450 K/uL Final    MPV 11/22/2024 10.1  9.4 - 12.3 FL Final    nRBC 11/22/2024 0.00  0.0 - 0.2 K/uL Final    **Note: Absolute NRBC parameter is now reported with Hemogram**    Neutrophils % 11/22/2024 68  43 - 78 % Final    Lymphocytes % 11/22/2024 16  13 - 44 % Final    Monocytes % 11/22/2024 9  4.0 - 12.0 % Final    Eosinophils % 11/22/2024 6  0.5 - 7.8 % Final    Basophils % 11/22/2024 1  0.0 - 2.0 % Final    Immature Granulocytes % 11/22/2024 0  0.0 - 5.0 % Final    Neutrophils Absolute 11/22/2024 4.6  1.7 - 8.2 K/UL Final    Lymphocytes Absolute 11/22/2024 1.1  0.5 - 4.6 K/UL Final    Monocytes Absolute 11/22/2024 0.6  0.1 - 1.3 K/UL Final    Eosinophils Absolute 11/22/2024 0.4  0.0 - 0.8 K/UL Final    Basophils Absolute 11/22/2024 0.1  0.0 - 0.2 K/UL Final    Immature Granulocytes Absolute 11/22/2024 0.0  0.0 - 0.5 K/UL Final    Differential Type 11/22/2024 AUTOMATED    Final    Sodium 11/22/2024 138  136 - 145 mmol/L Final

## 2024-12-04 DIAGNOSIS — Z79.4 TYPE 2 DIABETES MELLITUS WITH HYPERGLYCEMIA, WITH LONG-TERM CURRENT USE OF INSULIN (HCC): Primary | ICD-10-CM

## 2024-12-04 DIAGNOSIS — E11.65 TYPE 2 DIABETES MELLITUS WITH HYPERGLYCEMIA, WITH LONG-TERM CURRENT USE OF INSULIN (HCC): Primary | ICD-10-CM

## 2024-12-04 DIAGNOSIS — E78.5 HYPERLIPIDEMIA, UNSPECIFIED HYPERLIPIDEMIA TYPE: ICD-10-CM

## 2024-12-04 DIAGNOSIS — I10 ESSENTIAL HYPERTENSION: ICD-10-CM

## 2024-12-06 DIAGNOSIS — I10 ESSENTIAL HYPERTENSION: ICD-10-CM

## 2024-12-06 DIAGNOSIS — E11.65 TYPE 2 DIABETES MELLITUS WITH HYPERGLYCEMIA, WITH LONG-TERM CURRENT USE OF INSULIN (HCC): ICD-10-CM

## 2024-12-06 DIAGNOSIS — E78.5 HYPERLIPIDEMIA, UNSPECIFIED HYPERLIPIDEMIA TYPE: ICD-10-CM

## 2024-12-06 DIAGNOSIS — Z79.4 TYPE 2 DIABETES MELLITUS WITH HYPERGLYCEMIA, WITH LONG-TERM CURRENT USE OF INSULIN (HCC): ICD-10-CM

## 2024-12-06 LAB
ALBUMIN SERPL-MCNC: 3.9 G/DL (ref 3.2–4.6)
ALBUMIN/GLOB SERPL: 1.5 (ref 1–1.9)
ALP SERPL-CCNC: 85 U/L (ref 40–129)
ALT SERPL-CCNC: 34 U/L (ref 8–55)
ANION GAP SERPL CALC-SCNC: 10 MMOL/L (ref 7–16)
AST SERPL-CCNC: 22 U/L (ref 15–37)
BASOPHILS # BLD: 0.1 K/UL (ref 0–0.2)
BASOPHILS NFR BLD: 1 % (ref 0–2)
BILIRUB SERPL-MCNC: 0.4 MG/DL (ref 0–1.2)
BUN SERPL-MCNC: 14 MG/DL (ref 8–23)
CALCIUM SERPL-MCNC: 9.4 MG/DL (ref 8.8–10.2)
CHLORIDE SERPL-SCNC: 101 MMOL/L (ref 98–107)
CHOLEST SERPL-MCNC: 141 MG/DL (ref 0–200)
CO2 SERPL-SCNC: 28 MMOL/L (ref 20–29)
CREAT SERPL-MCNC: 0.98 MG/DL (ref 0.8–1.3)
DIFFERENTIAL METHOD BLD: NORMAL
EOSINOPHIL # BLD: 0.3 K/UL (ref 0–0.8)
EOSINOPHIL NFR BLD: 6 % (ref 0.5–7.8)
ERYTHROCYTE [DISTWIDTH] IN BLOOD BY AUTOMATED COUNT: 12.8 % (ref 11.9–14.6)
EST. AVERAGE GLUCOSE BLD GHB EST-MCNC: 198 MG/DL
GLOBULIN SER CALC-MCNC: 2.5 G/DL (ref 2.3–3.5)
GLUCOSE SERPL-MCNC: 280 MG/DL (ref 70–99)
HBA1C MFR BLD: 8.5 % (ref 0–5.6)
HCT VFR BLD AUTO: 42.6 % (ref 41.1–50.3)
HDLC SERPL-MCNC: 34 MG/DL (ref 40–60)
HDLC SERPL: 4.1 (ref 0–5)
HGB BLD-MCNC: 14.5 G/DL (ref 13.6–17.2)
IMM GRANULOCYTES # BLD AUTO: 0 K/UL (ref 0–0.5)
IMM GRANULOCYTES NFR BLD AUTO: 0 % (ref 0–5)
LDLC SERPL CALC-MCNC: 69 MG/DL (ref 0–100)
LYMPHOCYTES # BLD: 0.9 K/UL (ref 0.5–4.6)
LYMPHOCYTES NFR BLD: 15 % (ref 13–44)
MCH RBC QN AUTO: 30.4 PG (ref 26.1–32.9)
MCHC RBC AUTO-ENTMCNC: 34 G/DL (ref 31.4–35)
MCV RBC AUTO: 89.3 FL (ref 82–102)
MONOCYTES # BLD: 0.6 K/UL (ref 0.1–1.3)
MONOCYTES NFR BLD: 10 % (ref 4–12)
NEUTS SEG # BLD: 4.2 K/UL (ref 1.7–8.2)
NEUTS SEG NFR BLD: 68 % (ref 43–78)
NRBC # BLD: 0 K/UL (ref 0–0.2)
PLATELET # BLD AUTO: 195 K/UL (ref 150–450)
PMV BLD AUTO: 10.5 FL (ref 9.4–12.3)
POTASSIUM SERPL-SCNC: 4.6 MMOL/L (ref 3.5–5.1)
PROT SERPL-MCNC: 6.4 G/DL (ref 6.3–8.2)
RBC # BLD AUTO: 4.77 M/UL (ref 4.23–5.6)
SODIUM SERPL-SCNC: 139 MMOL/L (ref 136–145)
TRIGL SERPL-MCNC: 189 MG/DL (ref 0–150)
TSH, 3RD GENERATION: 2.56 UIU/ML (ref 0.27–4.2)
VLDLC SERPL CALC-MCNC: 38 MG/DL (ref 6–23)
WBC # BLD AUTO: 6.2 K/UL (ref 4.3–11.1)

## 2024-12-11 ENCOUNTER — OFFICE VISIT (OUTPATIENT)
Dept: FAMILY MEDICINE CLINIC | Facility: CLINIC | Age: 64
End: 2024-12-11
Payer: COMMERCIAL

## 2024-12-11 VITALS
DIASTOLIC BLOOD PRESSURE: 70 MMHG | HEIGHT: 68 IN | SYSTOLIC BLOOD PRESSURE: 110 MMHG | BODY MASS INDEX: 31.98 KG/M2 | HEART RATE: 97 BPM | TEMPERATURE: 97 F | OXYGEN SATURATION: 96 % | WEIGHT: 211 LBS

## 2024-12-11 DIAGNOSIS — I10 ESSENTIAL HYPERTENSION: ICD-10-CM

## 2024-12-11 DIAGNOSIS — K21.9 GASTROESOPHAGEAL REFLUX DISEASE, UNSPECIFIED WHETHER ESOPHAGITIS PRESENT: ICD-10-CM

## 2024-12-11 DIAGNOSIS — F32.A ANXIETY AND DEPRESSION: ICD-10-CM

## 2024-12-11 DIAGNOSIS — E11.65 TYPE 2 DIABETES MELLITUS WITH HYPERGLYCEMIA, WITH LONG-TERM CURRENT USE OF INSULIN (HCC): Primary | ICD-10-CM

## 2024-12-11 DIAGNOSIS — F41.9 ANXIETY AND DEPRESSION: ICD-10-CM

## 2024-12-11 DIAGNOSIS — Z79.4 TYPE 2 DIABETES MELLITUS WITH HYPERGLYCEMIA, WITH LONG-TERM CURRENT USE OF INSULIN (HCC): Primary | ICD-10-CM

## 2024-12-11 DIAGNOSIS — R06.81 APNEIC EPISODE: ICD-10-CM

## 2024-12-11 DIAGNOSIS — R40.0 DAYTIME SOMNOLENCE: ICD-10-CM

## 2024-12-11 DIAGNOSIS — Z12.5 PROSTATE CANCER SCREENING: ICD-10-CM

## 2024-12-11 PROCEDURE — 3078F DIAST BP <80 MM HG: CPT | Performed by: STUDENT IN AN ORGANIZED HEALTH CARE EDUCATION/TRAINING PROGRAM

## 2024-12-11 PROCEDURE — 99214 OFFICE O/P EST MOD 30 MIN: CPT | Performed by: STUDENT IN AN ORGANIZED HEALTH CARE EDUCATION/TRAINING PROGRAM

## 2024-12-11 PROCEDURE — 3074F SYST BP LT 130 MM HG: CPT | Performed by: STUDENT IN AN ORGANIZED HEALTH CARE EDUCATION/TRAINING PROGRAM

## 2024-12-11 PROCEDURE — 3052F HG A1C>EQUAL 8.0%<EQUAL 9.0%: CPT | Performed by: STUDENT IN AN ORGANIZED HEALTH CARE EDUCATION/TRAINING PROGRAM

## 2024-12-11 RX ORDER — LISINOPRIL 10 MG/1
10 TABLET ORAL DAILY
Qty: 90 TABLET | Refills: 3 | Status: SHIPPED | OUTPATIENT
Start: 2024-12-11

## 2024-12-11 RX ORDER — ESCITALOPRAM OXALATE 10 MG/1
10 TABLET ORAL DAILY
Qty: 90 TABLET | Refills: 3 | Status: CANCELLED | OUTPATIENT
Start: 2024-12-11

## 2024-12-11 RX ORDER — IBUPROFEN 800 MG/1
800 TABLET, FILM COATED ORAL EVERY 8 HOURS PRN
Qty: 120 TABLET | Refills: 1 | Status: CANCELLED | OUTPATIENT
Start: 2024-12-11

## 2024-12-11 RX ORDER — FAMOTIDINE 40 MG/1
40 TABLET, FILM COATED ORAL EVERY EVENING
Qty: 90 TABLET | Refills: 3 | Status: SHIPPED | OUTPATIENT
Start: 2024-12-11

## 2024-12-11 RX ORDER — ATORVASTATIN CALCIUM 40 MG/1
20 TABLET, FILM COATED ORAL DAILY
Qty: 45 TABLET | Refills: 3 | Status: SHIPPED | OUTPATIENT
Start: 2024-12-11

## 2024-12-11 RX ORDER — TRAZODONE HYDROCHLORIDE 100 MG/1
50 TABLET ORAL NIGHTLY
Qty: 45 TABLET | Refills: 3 | Status: SHIPPED | OUTPATIENT
Start: 2024-12-11

## 2024-12-11 NOTE — PROGRESS NOTES
[facility]  [address] [place] [address]  Phone [phone]  Fax:  [phone]    Shaq Hobbs Jr. (:  1960) is a 64 y.o. male here for evaluation of the following chief complaint(s):  Hypertension (Review labs refills), Insomnia, and Diabetes (Sees [facility])    Assessment & Plan   ASSESSMENT/PLAN:  1. Type 2 diabetes mellitus with hyperglycemia, with long-term current use of insulin (HCC)  -     Comprehensive Metabolic Panel; Future  -     Lipid Panel; Future  2. Anxiety and depression  3. Essential hypertension  -     Comprehensive Metabolic Panel; Future  4. Gastroesophageal reflux disease, unspecified whether esophagitis present  5. Daytime somnolence  -     [facility]  6. Apneic episode  -     [facility]  7. Prostate cancer screening  -     PSA Screening; Future    Blood pressure controlled, continue lisinopril 10 mg daily for hypertension.  Lipid panel stable except for mildly elevated triglycerides, continue Lipitor 20 mg daily.     Reports side effects from Lexapro.  Stop Lexapro and start Prozac for anxiety/depression.  Continue trazodone for insomnia.    Symptoms concerning for sleep apnea, will refer to sleep medicine for sleep study.    EGD 2022 showed acute gastric ulcer with bleeding.  Has been on PPI for many years.  Patient is concerned about potential side effects of long-term PPI use.  Will have him switch to Pepcid and see if this controls his GERD.  Advised him to avoid NSAIDs.    Followed by endo for DM2, on Humulin R.     Followed by oncology for diffuse large B-cell lymphoma, s/p chemo.     Up-to-date on diabetic eye exam, gets them yearly.  Colonoscopy 2022 showed 2 polyps and external hemorrhoid.  Normal PSA 2024.     Return in about 6 months (around 2025) for routine f/u, labs prior.         Subjective   SUBJECTIVE/OBJECTIVE:  HPI  64-year-old male with

## 2025-01-13 ENCOUNTER — TELEPHONE (OUTPATIENT)
Age: 65
End: 2025-01-13

## 2025-01-13 NOTE — TELEPHONE ENCOUNTER
I received forms from Disability Determination Services. This is a request for Medical Records Only. They are requesting Medical records from 10/15/2023 to present.    Forms given to our Medical Records Department.

## 2025-01-22 DIAGNOSIS — R06.83 SNORING: Primary | ICD-10-CM

## 2025-02-12 ENCOUNTER — HOSPITAL ENCOUNTER (OUTPATIENT)
Dept: SLEEP MEDICINE | Age: 65
Discharge: HOME | End: 2025-02-15

## 2025-02-13 ASSESSMENT — ENCOUNTER SYMPTOMS
CONSTIPATION: 0
DIARRHEA: 0

## 2025-02-13 NOTE — PROGRESS NOTES
7.6%   2021: 8.6%   2022: 10.1%   2023: 6.5%   2023: 8.4%   2023: 8.4%   2024: 8.5%   2024: 8.8%   2024: 7.6%   2024: 8.5%   [facility]    Lipids:    2023: TC: 131; HDL: 44; LDL: 64.2; T; VLDL: 22.8   2024: TC: 133; HDL: 38; LDL: 60; T; VLDL: 35   2024: TC: 169; HDL: 37; LDL: 75; T; VLDL: 57   2024: TC: 141; HDL: 34; LDL: 69; T; VLDL: 38    Thyroid:    2022: TSH: 1.34   2024: TSH: 2.080   2024: TSH: 2.480   2024: TSH: 2.560    Renal:    2023: Creatinine: 0.90; eGFR: >60  2023: uACR: 8   2024: Creatinine: 0.83; eGFR: >90   2024: Creatinine: 0.93; eGFR: >90   2024: Creatinine: 0.98; eGFR: 87    Liver Function:   2024: ALT: 33; AST: 26; Platelets: 177   2024: ALT: 47; AST: 30, Platelets: 182   2024: ALT: 34; AST: 22, Platelets: 195    Miscellaneous Labs:   C-Peptide:   2022: 5.1 (fasting glucose: 381)    2024: 3.1 (fasting glucose not drawn)      Review of Systems   Constitutional:  Positive for fatigue. Negative for appetite change and unexpected weight change.   Eyes:  Negative for visual disturbance.   Cardiovascular:  Negative for palpitations.   Gastrointestinal:  Negative for constipation and diarrhea.   Endocrine: Negative for polydipsia, polyphagia and polyuria.   Skin:  Negative for rash and wound.   Neurological:  Negative for dizziness and light-headedness.   Psychiatric/Behavioral:  Negative for dysphoric mood and sleep disturbance. The patient is not nervous/anxious.        /69 (Site: Left Upper Arm, Position: Sitting, Cuff Size: Medium Adult)   Pulse 91   Resp 12   Ht 1.727 m (5' 8\")   Wt 97.5 kg (215 lb)   SpO2 96%   BMI 32.69 kg/m²     Wt Readings from Last 3 Encounters:   25 97.5 kg (215 lb)   24 95.7 kg (211 lb)   24 95.8 kg (211 lb 3.2 oz)     Body weight trend:

## 2025-02-14 ENCOUNTER — OFFICE VISIT (OUTPATIENT)
Dept: ENDOCRINOLOGY | Age: 65
End: 2025-02-14

## 2025-02-14 VITALS
SYSTOLIC BLOOD PRESSURE: 125 MMHG | HEART RATE: 91 BPM | BODY MASS INDEX: 32.58 KG/M2 | WEIGHT: 215 LBS | DIASTOLIC BLOOD PRESSURE: 69 MMHG | HEIGHT: 68 IN | RESPIRATION RATE: 12 BRPM | OXYGEN SATURATION: 96 %

## 2025-02-14 DIAGNOSIS — E11.65 TYPE 2 DIABETES MELLITUS WITH HYPERGLYCEMIA, WITH LONG-TERM CURRENT USE OF INSULIN (HCC): Primary | ICD-10-CM

## 2025-02-14 DIAGNOSIS — Z79.4 TYPE 2 DIABETES MELLITUS WITH HYPERGLYCEMIA, WITH LONG-TERM CURRENT USE OF INSULIN (HCC): Primary | ICD-10-CM

## 2025-02-14 RX ORDER — PRAZOSIN HYDROCHLORIDE 1 MG/1
CAPSULE ORAL
COMMUNITY
Start: 2025-01-20

## 2025-02-14 RX ORDER — HYDROCHLOROTHIAZIDE 12.5 MG/1
CAPSULE ORAL
Qty: 6 EACH | Refills: 3 | Status: SHIPPED | OUTPATIENT
Start: 2025-02-14

## 2025-02-14 NOTE — ASSESSMENT & PLAN NOTE
[facility] 7.6%, GMI today: 8.8%. Glycemic control sub-optimal with A1c above goal of less than 7%.  Patient believes his Dexcom is inaccurate. We calibrated in clinic and it was 44 point higher than his blood sugar. This is consistent with what we found at his last visit. Denies high dose tylenol. Will provide with Dianna 3+ sample sensor which he will wear in conjunction with the Dexcom to see if Dianna is more accurate for him. May also consider Eversense 365 if covered. We elected not to make any dose changes today.     
none

## 2025-02-17 ENCOUNTER — TELEPHONE (OUTPATIENT)
Dept: SLEEP MEDICINE | Age: 65
End: 2025-02-17

## 2025-03-03 ENCOUNTER — TELEPHONE (OUTPATIENT)
Dept: ORTHOPEDIC SURGERY | Age: 65
End: 2025-03-03

## 2025-03-03 NOTE — TELEPHONE ENCOUNTER
Pain in left knee/ sx done Kavolus, 5 years ago/ [facility]     As per pt., he wants to see different provider    Let me know.    Thanks.

## 2025-03-07 ENCOUNTER — TELEPHONE (OUTPATIENT)
Dept: ORTHOPEDIC SURGERY | Age: 65
End: 2025-03-07

## 2025-03-07 NOTE — TELEPHONE ENCOUNTER
[facility] for [facility]. Referral scanned in media    Let me know    Thanks.

## 2025-03-19 ENCOUNTER — OFFICE VISIT (OUTPATIENT)
Dept: ORTHOPEDIC SURGERY | Age: 65
End: 2025-03-19
Payer: OTHER GOVERNMENT

## 2025-03-19 VITALS — BODY MASS INDEX: 32.71 KG/M2 | WEIGHT: 215.8 LBS | HEIGHT: 68 IN

## 2025-03-19 DIAGNOSIS — M25.562 ACUTE PAIN OF LEFT KNEE: Primary | ICD-10-CM

## 2025-03-19 DIAGNOSIS — M76.892 PES ANSERINUS TENDINITIS OF LEFT LOWER EXTREMITY: ICD-10-CM

## 2025-03-19 DIAGNOSIS — Z96.652 HISTORY OF TOTAL LEFT KNEE REPLACEMENT: ICD-10-CM

## 2025-03-19 PROCEDURE — 99215 OFFICE O/P EST HI 40 MIN: CPT | Performed by: ORTHOPAEDIC SURGERY

## 2025-03-19 RX ORDER — FLUOXETINE HYDROCHLORIDE 40 MG/1
40 CAPSULE ORAL DAILY
COMMUNITY
Start: 2025-02-18

## 2025-03-19 NOTE — PROGRESS NOTES
comparing it to his contralateral side is not appropriate.  With respect to his pain formal therapy for hamstring strengthening will help improve the subjective symptoms of instability that he feels when he bends his knee as well as improve his pain.  We also discussed dry needling which can be helpful for these tendinopathy's.  We discussed surgical management of this issue.  I do not think that surgery is warranted currently as a revision will not only give him the mechanical symptoms that he feels with the box but he is not exhausted nonoperative management with therapy which can improve the subjective symptoms mid flexion instability.  We also reviewed that he can consider an injection into his pes tendons he can follow-up with us in 6 to 8 weeks for repeat evaluation.  With respect to revision surgery a total knee arthroplasty revision is an option for this only after nonoperative modalities are exhausted.  I do not think a polyethylene exchange will address this problem as he does not hyperextend and increasing his poly thickness we will address his flexion instability but it also caused him to lose extension and this will be problematic for him.  My offering to him would be a complete revision of his total knee arthroplasty if he fails all nonoperative modalities        Signed By: Wade Sanches MD  March 19, 2025

## 2025-04-07 ENCOUNTER — HOSPITAL ENCOUNTER (OUTPATIENT)
Dept: PHYSICAL THERAPY | Age: 65
Setting detail: RECURRING SERIES
Discharge: HOME | End: 2025-04-10
Payer: OTHER GOVERNMENT

## 2025-04-07 DIAGNOSIS — R26.89 DECREASED MOBILITY: ICD-10-CM

## 2025-04-07 DIAGNOSIS — M25.662 STIFFNESS OF LEFT KNEE, NOT ELSEWHERE CLASSIFIED: ICD-10-CM

## 2025-04-07 DIAGNOSIS — M25.562 LEFT KNEE PAIN, UNSPECIFIED CHRONICITY: Primary | ICD-10-CM

## 2025-04-07 DIAGNOSIS — R68.89 DIFFICULTY MAINTAINING SQUATTING POSITION: ICD-10-CM

## 2025-04-07 DIAGNOSIS — R26.2 DIFFICULTY IN WALKING, NOT ELSEWHERE CLASSIFIED: ICD-10-CM

## 2025-04-07 PROCEDURE — 97110 THERAPEUTIC EXERCISES: CPT

## 2025-04-07 PROCEDURE — 97161 PT EVAL LOW COMPLEX 20 MIN: CPT

## 2025-04-07 ASSESSMENT — PAIN SCALES - GENERAL: PAINLEVEL_OUTOF10: 5

## 2025-04-07 NOTE — THERAPY EVALUATION
Shaq Hobbs Jr.  : 1960  Primary: Vaccn [employer] (Other)  Secondary:  [facility]  [address]  [facility] [address]  Phone: [phone]  Fax: [phone] Plan Frequency: 2  sessions per week for 90 days (With the potential to reduce to 1 session per week)    Plan of Care/Certification Expiration Date: 25        Plan of Care/Certification Expiration Date:  Plan of Care/Certification Expiration Date: 25    Frequency/Duration: Plan Frequency: 2  sessions per week for 90 days (With the potential to reduce to 1 session per week)      Time In/Out:   Time In: 1425  Time Out: 1528      PT Visit Info:         Visit Count:  1                OUTPATIENT PHYSICAL THERAPY:             Initial Assessment 2025               Episode (L Knee Pain)         Treatment Diagnosis:     Left knee pain, unspecified chronicity  Stiffness of left knee, not elsewhere classified  Difficulty in walking, not elsewhere classified  Difficulty maintaining squatting position  Decreased mobility  Medical/Referring Diagnosis:    Acute pain of left knee [M25.562]    Referring Physician:  Wade Sanches MD MD Orders:  PT Eval and Treat   Return MD Appt:  TBD  Date of Onset:  Onset Date: 18 (DATE of TKA)     Allergies:  Patient has no known allergies.  Restrictions/Precautions:    None      Medications Last Reviewed: 2025     SUBJECTIVE   History of Injury/Illness (Reason for Referral):  Mr. Shaq Hobbs Jr. has attended 1 physical therapy session including initial evaluation as of 2025. Shaq Hobbs Jr. is a 64 y.o. male with complaints of L knee pain at the inside of his knee. He reports his knee swells frequently. He cannot squat up from a squatted position because it gives out. Patient takes 800 mg of ibuprofen daily for the pain. Patient is now retired, and enjoys working in his house and yard. Patient used to exercise but

## 2025-04-07 NOTE — PROGRESS NOTES
Shaq Wade Anjel Yang  : 1960  Primary: Vaccn [employer] (Other)  Secondary:  [facility] [place]  [address]  [facility] [address]  Phone: [phone]  Fax: [phone] Plan Frequency: 2  sessions per week for 90 days (With the potential to reduce to 1 session per week)  Plan of Care/Certification Expiration Date: 25        Plan of Care/Certification Expiration Date:  Plan of Care/Certification Expiration Date: 25    Frequency/Duration: Plan Frequency: 2  sessions per week for 90 days (With the potential to reduce to 1 session per week)      Time In/Out:   Time In: 1425  Time Out: 1528      PT Visit Info:         Visit Count:  1    OUTPATIENT PHYSICAL THERAPY:   Treatment Note 2025       Episode  (L Knee Pain)               Treatment Diagnosis:    Left knee pain, unspecified chronicity  Stiffness of left knee, not elsewhere classified  Difficulty in walking, not elsewhere classified  Difficulty maintaining squatting position  Decreased mobility  Medical/Referring Diagnosis:    Acute pain of left knee [M25.562]    Referring Physician:  Wade Sanches MD MD Orders:  PT Eval and Treat  Return MD Appt:  TBD   Date of Onset:  Onset Date: 18 (DATE of TKA)     Allergies:   Patient has no known allergies.  Restrictions/Precautions:   None      Interventions Planned (Treatment may consist of any combination of the following):     See Assessment Note    Subjective Comments:   Patient reports L knee pain from a TKA in his past  Initial Pain Level::     5/10  Post Session Pain Level:       5/10  Medications Last Reviewed:  2025  Updated Objective Findings:  See Evaluation Note from today  Treatment   THERAPEUTIC EXERCISE: (15 minutes):    Exercises per grid below to improve mobility, strength, and balance.  Required moderate visual, verbal, manual, and tactile cues to promote proper body alignment, promote proper body posture, promote

## 2025-04-08 ENCOUNTER — APPOINTMENT (OUTPATIENT)
Dept: URBAN - METROPOLITAN AREA CLINIC 23 | Facility: CLINIC | Age: 65
Setting detail: DERMATOLOGY
End: 2025-04-08

## 2025-04-08 DIAGNOSIS — L57.8 OTHER SKIN CHANGES DUE TO CHRONIC EXPOSURE TO NONIONIZING RADIATION: ICD-10-CM

## 2025-04-08 DIAGNOSIS — L55.9 SUNBURN, UNSPECIFIED: ICD-10-CM | Status: INADEQUATELY CONTROLLED

## 2025-04-08 DIAGNOSIS — Z71.89 OTHER SPECIFIED COUNSELING: ICD-10-CM

## 2025-04-08 DIAGNOSIS — L82.0 INFLAMED SEBORRHEIC KERATOSIS: ICD-10-CM

## 2025-04-08 DIAGNOSIS — L80 VITILIGO: ICD-10-CM

## 2025-04-08 DIAGNOSIS — D22 MELANOCYTIC NEVI: ICD-10-CM

## 2025-04-08 DIAGNOSIS — L82.1 OTHER SEBORRHEIC KERATOSIS: ICD-10-CM

## 2025-04-08 PROBLEM — D22.5 MELANOCYTIC NEVI OF TRUNK: Status: ACTIVE | Noted: 2025-04-08

## 2025-04-08 PROCEDURE — 99214 OFFICE O/P EST MOD 30 MIN: CPT | Mod: 25

## 2025-04-08 PROCEDURE — ? PRESCRIPTION MEDICATION MANAGEMENT

## 2025-04-08 PROCEDURE — ? PRESCRIPTION

## 2025-04-08 PROCEDURE — ? LIQUID NITROGEN

## 2025-04-08 PROCEDURE — 17110 DESTRUCTION B9 LES UP TO 14: CPT

## 2025-04-08 PROCEDURE — ? COUNSELING

## 2025-04-08 RX ORDER — PIMECROLIMUS 10 MG/G
CREAM TOPICAL BID
Qty: 1 | Refills: 3 | Status: ERX | COMMUNITY
Start: 2025-04-08

## 2025-04-08 RX ADMIN — PIMECROLIMUS: 10 CREAM TOPICAL at 00:00

## 2025-04-08 ASSESSMENT — LOCATION ZONE DERM
LOCATION ZONE: TRUNK
LOCATION ZONE: NECK
LOCATION ZONE: SCALP
LOCATION ZONE: TRUNK

## 2025-04-08 ASSESSMENT — LOCATION SIMPLE DESCRIPTION DERM
LOCATION SIMPLE: LEFT ANTERIOR NECK
LOCATION SIMPLE: POSTERIOR NECK
LOCATION SIMPLE: SCALP
LOCATION SIMPLE: LEFT UPPER BACK
LOCATION SIMPLE: CHEST
LOCATION SIMPLE: ABDOMEN
LOCATION SIMPLE: ABDOMEN
LOCATION SIMPLE: LEFT CLAVICULAR SKIN

## 2025-04-08 ASSESSMENT — LOCATION DETAILED DESCRIPTION DERM
LOCATION DETAILED: LEFT MID-UPPER BACK
LOCATION DETAILED: LEFT CLAVICULAR NECK
LOCATION DETAILED: RIGHT LATERAL TRAPEZIAL NECK
LOCATION DETAILED: RIGHT CENTRAL FRONTAL SCALP
LOCATION DETAILED: LEFT SUPERIOR MEDIAL UPPER BACK
LOCATION DETAILED: RIGHT LATERAL ABDOMEN
LOCATION DETAILED: STERNUM
LOCATION DETAILED: RIGHT LATERAL ABDOMEN
LOCATION DETAILED: RIGHT CENTRAL PARIETAL SCALP
LOCATION DETAILED: RIGHT SUPERIOR PARIETAL SCALP
LOCATION DETAILED: LEFT CLAVICULAR SKIN
LOCATION DETAILED: EPIGASTRIC SKIN
LOCATION DETAILED: RIGHT RIB CAGE
LOCATION DETAILED: RIGHT INFERIOR POSTERIOR NECK

## 2025-04-08 NOTE — HPI: SKIN LESION
What Type Of Note Output Would You Prefer (Optional)?: Standard Output
How Severe Is Your Skin Lesion?: mild
Is This A New Presentation, Or A Follow-Up?: Skin Lesions
Additional History: Gets irritated from wearing CPAP.

## 2025-04-08 NOTE — PROCEDURE: PRESCRIPTION MEDICATION MANAGEMENT
Plan: Has had vitiligo since March if 1998 and is now continuing to spread. Non segmented Vitiligo
Initiate Treatment: Elidel 1 % topical cream BID
Discontinue Regimen: Opzelura, noticed no new changes
Detail Level: Zone
Render In Strict Bullet Format?: No

## 2025-04-15 ENCOUNTER — HOSPITAL ENCOUNTER (OUTPATIENT)
Dept: PHYSICAL THERAPY | Age: 65
Setting detail: RECURRING SERIES
Discharge: HOME | End: 2025-04-18
Payer: OTHER GOVERNMENT

## 2025-04-15 PROCEDURE — 97110 THERAPEUTIC EXERCISES: CPT

## 2025-04-15 PROCEDURE — 97140 MANUAL THERAPY 1/> REGIONS: CPT

## 2025-04-15 ASSESSMENT — PAIN SCALES - GENERAL: PAINLEVEL_OUTOF10: 3

## 2025-04-15 NOTE — PROGRESS NOTES
promote proper body alignment, promote proper body posture, promote proper body mechanics, and promote proper body breathing techniques.  Progressed resistance, range, repetitions, and complexity of movement as indicated.   Date:  4/7/2025  Date:  4/15/2025  Date:     Activity/Exercise Parameters Parameters Parameters   Quad Set 3x10x3\" hold x10    SAQ  3x10, bolster under knee, 5 lbs    Bridge  3x10, legs on PB    Heel Slide X30 no strap Reviewed     SLR 3x10 3x10, slow pace    TKE  3x10x3\" orange band                Education Treatment, home exercise plan, plan of care, prognosis, pain modulation      [employer] Access Code: [email or web address]    MANUAL THERAPY: (15 minutes):   Joint mobilization and Soft tissue mobilization was utilized and necessary because of the patient's restricted joint motion, loss of articular motion, and restricted motion of soft tissue.    Soft tissue mobilization of awais-knee musculature  Posterior glide of tibia on femur, for flexion   Squeeze and tap knee mobilization for flexion      Treatment/Session Summary:    Treatment Assessment:   Patient demonstrates good tolerance to exercises and progressions. He denies exacerbation of symptoms throughout and at end of session. We continued to progress quad strength exercises. Patient will continue to benefit from skill physical therapy treatment to further improve his current deficits.     Communication/Consultation:  Therapy Evaluation sent to referring provider  Equipment provided today:  HEP  Recommendations/Intent for next treatment session: Next visit will focus on advancements to more challenging activities to include progressing strength, functional mobility, pain tolerance, and ROM as tolerated.    >Total Treatment Billable Duration:  55 minutes  Time In: 1230  Time Out: 1330    Huber Torres PT         Charge Capture  Events  [employer] Portal  Appt Desk  Attendance Report     Future Appointments   Date Time Provider Department Center

## 2025-04-17 ENCOUNTER — HOSPITAL ENCOUNTER (OUTPATIENT)
Dept: PHYSICAL THERAPY | Age: 65
Setting detail: RECURRING SERIES
End: 2025-04-17
Payer: OTHER GOVERNMENT

## 2025-04-22 ENCOUNTER — OFFICE VISIT (OUTPATIENT)
Dept: ENDOCRINOLOGY | Age: 65
End: 2025-04-22
Payer: OTHER GOVERNMENT

## 2025-04-22 VITALS
BODY MASS INDEX: 32.58 KG/M2 | HEIGHT: 68 IN | HEART RATE: 89 BPM | OXYGEN SATURATION: 94 % | SYSTOLIC BLOOD PRESSURE: 124 MMHG | WEIGHT: 215 LBS | DIASTOLIC BLOOD PRESSURE: 82 MMHG

## 2025-04-22 DIAGNOSIS — Z79.4 TYPE 2 DIABETES MELLITUS WITH HYPERGLYCEMIA, WITH LONG-TERM CURRENT USE OF INSULIN (HCC): Primary | ICD-10-CM

## 2025-04-22 DIAGNOSIS — E11.65 TYPE 2 DIABETES MELLITUS WITH HYPERGLYCEMIA, WITH LONG-TERM CURRENT USE OF INSULIN (HCC): Primary | ICD-10-CM

## 2025-04-22 DIAGNOSIS — C83.35 DIFFUSE LARGE B-CELL LYMPHOMA OF LYMPH NODES OF INGUINAL REGION (HCC): ICD-10-CM

## 2025-04-22 LAB
ALBUMIN SERPL-MCNC: 3.9 G/DL (ref 3.2–4.6)
ALBUMIN/GLOB SERPL: 1.5 (ref 1–1.9)
ALP SERPL-CCNC: 87 U/L (ref 40–129)
ALT SERPL-CCNC: 41 U/L (ref 8–55)
ANION GAP SERPL CALC-SCNC: 11 MMOL/L (ref 7–16)
AST SERPL-CCNC: 24 U/L (ref 15–37)
BASOPHILS # BLD: 0.06 K/UL (ref 0–0.2)
BASOPHILS NFR BLD: 0.9 % (ref 0–2)
BILIRUB SERPL-MCNC: 0.4 MG/DL (ref 0–1.2)
BUN SERPL-MCNC: 15 MG/DL (ref 8–23)
CALCIUM SERPL-MCNC: 9.7 MG/DL (ref 8.8–10.2)
CHLORIDE SERPL-SCNC: 99 MMOL/L (ref 98–107)
CO2 SERPL-SCNC: 26 MMOL/L (ref 20–29)
CREAT SERPL-MCNC: 0.89 MG/DL (ref 0.8–1.3)
DIFFERENTIAL METHOD BLD: NORMAL
EOSINOPHIL # BLD: 0.2 K/UL (ref 0–0.8)
EOSINOPHIL NFR BLD: 3.1 % (ref 0.5–7.8)
ERYTHROCYTE [DISTWIDTH] IN BLOOD BY AUTOMATED COUNT: 12.8 % (ref 11.9–14.6)
GLOBULIN SER CALC-MCNC: 2.6 G/DL (ref 2.3–3.5)
GLUCOSE SERPL-MCNC: 263 MG/DL (ref 70–99)
HBA1C MFR BLD: 8.5 %
HCT VFR BLD AUTO: 41.7 % (ref 41.1–50.3)
HGB BLD-MCNC: 14.6 G/DL (ref 13.6–17.2)
IMM GRANULOCYTES # BLD AUTO: 0.04 K/UL (ref 0–0.5)
IMM GRANULOCYTES NFR BLD AUTO: 0.6 % (ref 0–5)
LDH SERPL L TO P-CCNC: 169 U/L (ref 127–281)
LYMPHOCYTES # BLD: 1.03 K/UL (ref 0.5–4.6)
LYMPHOCYTES NFR BLD: 16.2 % (ref 13–44)
MAGNESIUM SERPL-MCNC: 1.4 MG/DL (ref 1.8–2.4)
MCH RBC QN AUTO: 30.4 PG (ref 26.1–32.9)
MCHC RBC AUTO-ENTMCNC: 35 G/DL (ref 31.4–35)
MCV RBC AUTO: 86.9 FL (ref 82–102)
MONOCYTES # BLD: 0.62 K/UL (ref 0.1–1.3)
MONOCYTES NFR BLD: 9.8 % (ref 4–12)
NEUTS SEG # BLD: 4.4 K/UL (ref 1.7–8.2)
NEUTS SEG NFR BLD: 69.4 % (ref 43–78)
NRBC # BLD: 0 K/UL (ref 0–0.2)
PLATELET # BLD AUTO: 204 K/UL (ref 150–450)
PMV BLD AUTO: 10.6 FL (ref 9.4–12.3)
POTASSIUM SERPL-SCNC: 4.3 MMOL/L (ref 3.5–5.1)
PROT SERPL-MCNC: 6.5 G/DL (ref 6.3–8.2)
RBC # BLD AUTO: 4.8 M/UL (ref 4.23–5.6)
SODIUM SERPL-SCNC: 137 MMOL/L (ref 136–145)
WBC # BLD AUTO: 6.4 K/UL (ref 4.3–11.1)

## 2025-04-22 PROCEDURE — 3079F DIAST BP 80-89 MM HG: CPT | Performed by: NURSE PRACTITIONER

## 2025-04-22 PROCEDURE — 99214 OFFICE O/P EST MOD 30 MIN: CPT | Performed by: NURSE PRACTITIONER

## 2025-04-22 PROCEDURE — 3052F HG A1C>EQUAL 8.0%<EQUAL 9.0%: CPT | Performed by: NURSE PRACTITIONER

## 2025-04-22 PROCEDURE — 3074F SYST BP LT 130 MM HG: CPT | Performed by: NURSE PRACTITIONER

## 2025-04-22 PROCEDURE — 83036 HEMOGLOBIN GLYCOSYLATED A1C: CPT | Performed by: NURSE PRACTITIONER

## 2025-04-22 RX ORDER — EMPAGLIFLOZIN 10 MG/1
10 TABLET, FILM COATED ORAL DAILY
Qty: 90 TABLET | Refills: 3 | Status: SHIPPED | OUTPATIENT
Start: 2025-04-22

## 2025-04-22 RX ORDER — TRAZODONE HYDROCHLORIDE 100 MG/1
100 TABLET ORAL NIGHTLY
Qty: 90 TABLET | Refills: 3 | Status: SHIPPED | OUTPATIENT
Start: 2025-04-22

## 2025-04-22 ASSESSMENT — ENCOUNTER SYMPTOMS
DIARRHEA: 0
CONSTIPATION: 0

## 2025-04-22 NOTE — ASSESSMENT & PLAN NOTE
Type 2 Diabetes Mellitus.  - Blood glucose levels have shown improvement compared to previous readings on the Dexcom device.  - A1c remains elevated at 8.5.  - Advised to administer a quarter of the usual insulin dose when blood glucose levels are below 150.  - Prescription for Jardiance issued to potentially reduce insulin requirements.

## 2025-04-23 ENCOUNTER — HOSPITAL ENCOUNTER (OUTPATIENT)
Dept: PHYSICAL THERAPY | Age: 65
Setting detail: RECURRING SERIES
Discharge: HOME | End: 2025-04-26
Payer: OTHER GOVERNMENT

## 2025-04-23 PROCEDURE — 97140 MANUAL THERAPY 1/> REGIONS: CPT

## 2025-04-23 PROCEDURE — 97110 THERAPEUTIC EXERCISES: CPT

## 2025-04-23 NOTE — PROGRESS NOTES
Shaq Sortojonas Parker.  : 1960  Primary: Vaccn [employer] (Other)  Secondary:  [facility]  [address]  [facility] [address]  Phone: [phone]  Fax: [phone] Plan Frequency: 2  sessions per week for 90 days (With the potential to reduce to 1 session per week)  Plan of Care/Certification Expiration Date: 25        Plan of Care/Certification Expiration Date:  Plan of Care/Certification Expiration Date: 25    Frequency/Duration: Plan Frequency: 2  sessions per week for 90 days (With the potential to reduce to 1 session per week)      Time In/Out:          PT Visit Info:         Visit Count:  3    OUTPATIENT PHYSICAL THERAPY:   Treatment Note 2025       Episode  (L Knee Pain)               Treatment Diagnosis:    Left knee pain, unspecified chronicity  Stiffness of left knee, not elsewhere classified  Difficulty in walking, not elsewhere classified  Difficulty maintaining squatting position  Decreased mobility  Medical/Referring Diagnosis:    Acute pain of left knee [M25.562]    Referring Physician:  Wade Sanches MD MD Orders:  PT Eval and Treat  Return MD Appt:  TBD   Date of Onset:  Onset Date: 18 (DATE of TKA)     Allergies:   Patient has no known allergies.  Restrictions/Precautions:   None      Interventions Planned (Treatment may consist of any combination of the following):     See Assessment Note    Subjective Comments:   Patient reports he has been diligent with his HEP. The SLR were challenging but he found the quad sets and heel slides to be easy.   Initial Pain Level::      /10  Post Session Pain Level:        /10  Medications Last Reviewed:  2025  Updated Objective Findings:   ROM: +5 from 127°  Treatment   THERAPEUTIC EXERCISE: (40 minutes):    Exercises per grid below to improve mobility, strength, and balance.  Required moderate visual, verbal, manual, and tactile cues to promote proper body alignment,

## 2025-04-25 ENCOUNTER — APPOINTMENT (OUTPATIENT)
Dept: PHYSICAL THERAPY | Age: 65
End: 2025-04-25
Payer: OTHER GOVERNMENT

## 2025-04-30 ENCOUNTER — APPOINTMENT (OUTPATIENT)
Dept: PHYSICAL THERAPY | Age: 65
End: 2025-04-30
Payer: OTHER GOVERNMENT

## 2025-05-02 ENCOUNTER — APPOINTMENT (OUTPATIENT)
Dept: PHYSICAL THERAPY | Age: 65
End: 2025-05-02

## 2025-05-06 ENCOUNTER — APPOINTMENT (OUTPATIENT)
Dept: PHYSICAL THERAPY | Age: 65
End: 2025-05-06

## 2025-05-08 ENCOUNTER — HOSPITAL ENCOUNTER (OUTPATIENT)
Dept: PHYSICAL THERAPY | Age: 65
Setting detail: RECURRING SERIES
Discharge: HOME | End: 2025-05-11
Payer: OTHER GOVERNMENT

## 2025-05-08 PROCEDURE — 97110 THERAPEUTIC EXERCISES: CPT

## 2025-05-08 NOTE — PROGRESS NOTES
Shaq Anguianojeff Parker.  : 1960  Primary: Vaccn [employer] (Other)  Secondary:  [facility] [place]  [address]  [facility] [address]  Phone: [phone]  Fax: [phone] Plan Frequency: 2  sessions per week for 90 days (With the potential to reduce to 1 session per week)  Plan of Care/Certification Expiration Date: 25        Plan of Care/Certification Expiration Date:  Plan of Care/Certification Expiration Date: 25    Frequency/Duration: Plan Frequency: 2  sessions per week for 90 days (With the potential to reduce to 1 session per week)      Time In/Out:   Time In: 1530  Time Out: 1630      PT Visit Info:         Visit Count:  4    OUTPATIENT PHYSICAL THERAPY:   Treatment Note 2025       Episode  (L Knee Pain)               Treatment Diagnosis:    Left knee pain, unspecified chronicity  Stiffness of left knee, not elsewhere classified  Difficulty in walking, not elsewhere classified  Difficulty maintaining squatting position  Decreased mobility  Medical/Referring Diagnosis:    Acute pain of left knee [M25.562]    Referring Physician:  Wade Sanches MD MD Orders:  PT Eval and Treat  Return MD Appt:  TBD   Date of Onset:  Onset Date: 18 (DATE of TKA)     Allergies:   Patient has no known allergies.  Restrictions/Precautions:   None      Interventions Planned (Treatment may consist of any combination of the following):     See Assessment Note    Subjective Comments:   Patient reports he feels like his knee is doing well but is concerned about the clicking noise it makes and how \"loose\" it feels.   Initial Pain Level::     0/10  Post Session Pain Level:       0/10  Medications Last Reviewed:  2025  Updated Objective Findings:   ROM: +5 from 127°  Treatment   THERAPEUTIC EXERCISE: (53 minutes):    Exercises per grid below to improve mobility, strength, and balance.  Required moderate visual, verbal, manual, and tactile cues to

## 2025-05-13 ENCOUNTER — APPOINTMENT (OUTPATIENT)
Dept: PHYSICAL THERAPY | Age: 65
End: 2025-05-13

## 2025-05-13 ASSESSMENT — PAIN SCALES - GENERAL: PAINLEVEL_OUTOF10: 0

## 2025-05-15 ENCOUNTER — HOSPITAL ENCOUNTER (OUTPATIENT)
Dept: PHYSICAL THERAPY | Age: 65
Setting detail: RECURRING SERIES
End: 2025-05-15

## 2025-05-20 ENCOUNTER — APPOINTMENT (OUTPATIENT)
Dept: PHYSICAL THERAPY | Age: 65
End: 2025-05-20

## 2025-05-22 ENCOUNTER — APPOINTMENT (OUTPATIENT)
Dept: PHYSICAL THERAPY | Age: 65
End: 2025-05-22

## 2025-05-22 ENCOUNTER — OFFICE VISIT (OUTPATIENT)
Dept: ONCOLOGY | Age: 65
End: 2025-05-22
Payer: OTHER GOVERNMENT

## 2025-05-22 VITALS
TEMPERATURE: 97.3 F | RESPIRATION RATE: 22 BRPM | SYSTOLIC BLOOD PRESSURE: 128 MMHG | WEIGHT: 213.6 LBS | BODY MASS INDEX: 32.37 KG/M2 | HEART RATE: 84 BPM | OXYGEN SATURATION: 96 % | DIASTOLIC BLOOD PRESSURE: 74 MMHG | HEIGHT: 68 IN

## 2025-05-22 DIAGNOSIS — R29.6 FALLS: ICD-10-CM

## 2025-05-22 DIAGNOSIS — R26.89 BALANCE PROBLEM: ICD-10-CM

## 2025-05-22 DIAGNOSIS — C83.35 DIFFUSE LARGE B-CELL LYMPHOMA OF LYMPH NODES OF INGUINAL REGION (HCC): Primary | ICD-10-CM

## 2025-05-22 PROCEDURE — 3074F SYST BP LT 130 MM HG: CPT | Performed by: NURSE PRACTITIONER

## 2025-05-22 PROCEDURE — 3078F DIAST BP <80 MM HG: CPT | Performed by: NURSE PRACTITIONER

## 2025-05-22 PROCEDURE — 99214 OFFICE O/P EST MOD 30 MIN: CPT | Performed by: NURSE PRACTITIONER

## 2025-05-22 RX ORDER — MAGNESIUM GLUCONATE 27 MG(500)
1500 TABLET ORAL DAILY
COMMUNITY

## 2025-05-22 ASSESSMENT — PATIENT HEALTH QUESTIONNAIRE - PHQ9
1. LITTLE INTEREST OR PLEASURE IN DOING THINGS: NOT AT ALL
2. FEELING DOWN, DEPRESSED OR HOPELESS: NOT AT ALL
SUM OF ALL RESPONSES TO PHQ QUESTIONS 1-9: 0

## 2025-05-22 NOTE — PROGRESS NOTES
[facility] Hematology and Oncology: Office Visit Established Patient    Chief Complaint:    Chief Complaint   Patient presents with    Follow-up           History of Present Illness:  Mr. Hobbs is a 64 y.o. male who returns today for management of diffuse large B cell lymphoma.  He presented to his PCP on 4/29/22 reporting an enlarging lump in his left groin for the past 2 months.  Physical exam confirmed a mildly tender lump in the patient’s left inguinal region. The right inguinal region was normal, and no inguinal hernia was noted. Ultrasound of the left groin was performed on 5/13/22 revealing enlarged, abnormal appearing left inguinal lymph nodes measuring up to 3.0 x 3.2 x 1.9 cm. Ultrasound directed biopsy was suggested for further assessment.  We saw him for consultation and recommended excisional biopsy, which showed diffuse large B cell lymphoma.  PET/CT shows the most prominent areas of disease in the inguinal and iliac nodes, but there are also nodes in the right axilla and retroperitoneum that are consistent with DLBCL, making him at least [place] stage III.  Bone marrow biopsy showed no morphologic findings but did show a small B cell clone worrisome for lymphoma involvement in the bone marrow, this would make him AA stage IV.  His FISH shows no evidence of more aggressive genotypic findings, so he would be most appropriate for R-CHOP for 6 cycles for definitive therapy.  PET/CT following 2 cycles revealed CR. PET/CT after 6 months shows continued complete response.  He will now enter observation with repeat labs and OV in 3 months, CT in 6 months, this will continue for the first two years.     He returns today with wife for routine 6 month follow up.  Since last seen his biggest concern has been the increase in his balance issues.  His activity level has decreased due to fear of falling and states at times he looks intoxicated when walking.  Wife has also noticed a fine tremor in hands at times.

## 2025-07-02 DIAGNOSIS — Z12.5 PROSTATE CANCER SCREENING: ICD-10-CM

## 2025-07-02 DIAGNOSIS — I10 ESSENTIAL HYPERTENSION: ICD-10-CM

## 2025-07-02 DIAGNOSIS — E11.65 TYPE 2 DIABETES MELLITUS WITH HYPERGLYCEMIA, WITH LONG-TERM CURRENT USE OF INSULIN (HCC): ICD-10-CM

## 2025-07-02 DIAGNOSIS — Z79.4 TYPE 2 DIABETES MELLITUS WITH HYPERGLYCEMIA, WITH LONG-TERM CURRENT USE OF INSULIN (HCC): ICD-10-CM

## 2025-07-02 LAB
ALBUMIN SERPL-MCNC: 3.9 G/DL (ref 3.2–4.6)
ALBUMIN/GLOB SERPL: 1.4 (ref 1–1.9)
ALP SERPL-CCNC: 78 U/L (ref 40–129)
ALT SERPL-CCNC: 53 U/L (ref 8–55)
ANION GAP SERPL CALC-SCNC: 14 MMOL/L (ref 7–16)
AST SERPL-CCNC: 30 U/L (ref 15–37)
BILIRUB SERPL-MCNC: 0.5 MG/DL (ref 0–1.2)
BUN SERPL-MCNC: 14 MG/DL (ref 8–23)
CALCIUM SERPL-MCNC: 9.8 MG/DL (ref 8.8–10.2)
CHLORIDE SERPL-SCNC: 101 MMOL/L (ref 98–107)
CHOLEST SERPL-MCNC: 157 MG/DL (ref 0–200)
CO2 SERPL-SCNC: 24 MMOL/L (ref 20–29)
CREAT SERPL-MCNC: 1.13 MG/DL (ref 0.8–1.3)
GLOBULIN SER CALC-MCNC: 2.8 G/DL (ref 2.3–3.5)
GLUCOSE SERPL-MCNC: 191 MG/DL (ref 70–99)
HDLC SERPL-MCNC: 38 MG/DL (ref 40–60)
HDLC SERPL: 4.1 (ref 0–5)
LDLC SERPL CALC-MCNC: 78 MG/DL (ref 0–100)
POTASSIUM SERPL-SCNC: 4.6 MMOL/L (ref 3.5–5.1)
PROT SERPL-MCNC: 6.7 G/DL (ref 6.3–8.2)
PSA SERPL-MCNC: 0.8 NG/ML (ref 0–4)
SODIUM SERPL-SCNC: 139 MMOL/L (ref 136–145)
TRIGL SERPL-MCNC: 203 MG/DL (ref 0–150)
VLDLC SERPL CALC-MCNC: 41 MG/DL (ref 6–23)

## 2025-07-03 ENCOUNTER — RESULTS FOLLOW-UP (OUTPATIENT)
Dept: FAMILY MEDICINE CLINIC | Facility: CLINIC | Age: 65
End: 2025-07-03

## 2025-07-18 ENCOUNTER — HOSPITAL ENCOUNTER (OUTPATIENT)
Dept: MRI IMAGING | Age: 65
Discharge: HOME | End: 2025-07-20
Payer: OTHER GOVERNMENT

## 2025-07-18 DIAGNOSIS — R26.89 BALANCE PROBLEM: ICD-10-CM

## 2025-07-18 DIAGNOSIS — C83.35 DIFFUSE LARGE B-CELL LYMPHOMA OF LYMPH NODES OF INGUINAL REGION (HCC): ICD-10-CM

## 2025-07-18 DIAGNOSIS — R29.6 FALLS: ICD-10-CM

## 2025-07-18 PROCEDURE — A9579 GAD-BASE MR CONTRAST NOS,1ML: HCPCS | Performed by: NURSE PRACTITIONER

## 2025-07-18 PROCEDURE — 6360000004 HC RX CONTRAST MEDICATION: Performed by: NURSE PRACTITIONER

## 2025-07-18 PROCEDURE — 70553 MRI BRAIN STEM W/O & W/DYE: CPT

## 2025-07-18 RX ORDER — GADOTERIDOL 279.3 MG/ML
19 INJECTION INTRAVENOUS
Status: COMPLETED | OUTPATIENT
Start: 2025-07-18 | End: 2025-07-18

## 2025-07-18 RX ADMIN — GADOTERIDOL 19 ML: 279.3 INJECTION, SOLUTION INTRAVENOUS at 08:55

## 2025-08-12 ENCOUNTER — APPOINTMENT (OUTPATIENT)
Dept: CT IMAGING | Age: 65
End: 2025-08-12
Payer: MEDICARE

## 2025-08-12 ENCOUNTER — HOSPITAL ENCOUNTER (EMERGENCY)
Age: 65
Discharge: HOME | End: 2025-08-13
Attending: STUDENT IN AN ORGANIZED HEALTH CARE EDUCATION/TRAINING PROGRAM
Payer: MEDICARE

## 2025-08-12 DIAGNOSIS — K80.20 GALLSTONES: ICD-10-CM

## 2025-08-12 DIAGNOSIS — R07.9 CHEST PAIN, UNSPECIFIED TYPE: Primary | ICD-10-CM

## 2025-08-12 DIAGNOSIS — K80.50 BILIARY COLIC: ICD-10-CM

## 2025-08-12 LAB
ALBUMIN SERPL-MCNC: 4.2 G/DL (ref 3.2–4.6)
ALBUMIN/GLOB SERPL: 1.2 (ref 1–1.9)
ALP SERPL-CCNC: 82 U/L (ref 40–129)
ALT SERPL-CCNC: 42 U/L (ref 8–55)
ANION GAP SERPL CALC-SCNC: 15 MMOL/L (ref 7–16)
AST SERPL-CCNC: 32 U/L (ref 15–37)
BASOPHILS # BLD: 0.03 K/UL (ref 0–0.2)
BASOPHILS NFR BLD: 0.2 % (ref 0–2)
BILIRUB SERPL-MCNC: 0.8 MG/DL (ref 0–1.2)
BUN SERPL-MCNC: 15 MG/DL (ref 8–23)
CALCIUM SERPL-MCNC: 9.9 MG/DL (ref 8.8–10.2)
CHLORIDE SERPL-SCNC: 99 MMOL/L (ref 98–107)
CO2 SERPL-SCNC: 23 MMOL/L (ref 20–29)
CREAT SERPL-MCNC: 0.99 MG/DL (ref 0.8–1.3)
DIFFERENTIAL METHOD BLD: ABNORMAL
EOSINOPHIL # BLD: 0.02 K/UL (ref 0–0.8)
EOSINOPHIL NFR BLD: 0.1 % (ref 0.5–7.8)
ERYTHROCYTE [DISTWIDTH] IN BLOOD BY AUTOMATED COUNT: 13.1 % (ref 11.9–14.6)
GLOBULIN SER CALC-MCNC: 3.4 G/DL (ref 2.3–3.5)
GLUCOSE SERPL-MCNC: 321 MG/DL (ref 70–99)
HCT VFR BLD AUTO: 43.2 % (ref 41.1–50.3)
HGB BLD-MCNC: 15.1 G/DL (ref 13.6–17.2)
IMM GRANULOCYTES # BLD AUTO: 0.08 K/UL (ref 0–0.5)
IMM GRANULOCYTES NFR BLD AUTO: 0.6 % (ref 0–5)
LIPASE SERPL-CCNC: 28 U/L (ref 13–60)
LYMPHOCYTES # BLD: 0.76 K/UL (ref 0.5–4.6)
LYMPHOCYTES NFR BLD: 5.5 % (ref 13–44)
MCH RBC QN AUTO: 30.9 PG (ref 26.1–32.9)
MCHC RBC AUTO-ENTMCNC: 35 G/DL (ref 31.4–35)
MCV RBC AUTO: 88.3 FL (ref 82–102)
MONOCYTES # BLD: 0.45 K/UL (ref 0.1–1.3)
MONOCYTES NFR BLD: 3.3 % (ref 4–12)
NEUTS SEG # BLD: 12.42 K/UL (ref 1.7–8.2)
NEUTS SEG NFR BLD: 90.3 % (ref 43–78)
NRBC # BLD: 0 K/UL (ref 0–0.2)
PLATELET # BLD AUTO: 224 K/UL (ref 150–450)
PMV BLD AUTO: 9.8 FL (ref 9.4–12.3)
POTASSIUM SERPL-SCNC: 4.3 MMOL/L (ref 3.5–5.1)
PROT SERPL-MCNC: 7.6 G/DL (ref 6.3–8.2)
RBC # BLD AUTO: 4.89 M/UL (ref 4.23–5.6)
SODIUM SERPL-SCNC: 136 MMOL/L (ref 136–145)
TROPONIN T SERPL HS-MCNC: 22.5 NG/L (ref 0–22)
WBC # BLD AUTO: 13.8 K/UL (ref 4.3–11.1)

## 2025-08-12 PROCEDURE — 99285 EMERGENCY DEPT VISIT HI MDM: CPT

## 2025-08-12 PROCEDURE — 6370000000 HC RX 637 (ALT 250 FOR IP): Performed by: STUDENT IN AN ORGANIZED HEALTH CARE EDUCATION/TRAINING PROGRAM

## 2025-08-12 PROCEDURE — 74174 CTA ABD&PLVS W/CONTRAST: CPT

## 2025-08-12 PROCEDURE — 93005 ELECTROCARDIOGRAM TRACING: CPT | Performed by: STUDENT IN AN ORGANIZED HEALTH CARE EDUCATION/TRAINING PROGRAM

## 2025-08-12 PROCEDURE — 6360000004 HC RX CONTRAST MEDICATION: Performed by: STUDENT IN AN ORGANIZED HEALTH CARE EDUCATION/TRAINING PROGRAM

## 2025-08-12 PROCEDURE — 96374 THER/PROPH/DIAG INJ IV PUSH: CPT

## 2025-08-12 PROCEDURE — 80053 COMPREHEN METABOLIC PANEL: CPT

## 2025-08-12 PROCEDURE — 83690 ASSAY OF LIPASE: CPT

## 2025-08-12 PROCEDURE — 84484 ASSAY OF TROPONIN QUANT: CPT

## 2025-08-12 PROCEDURE — 6360000002 HC RX W HCPCS: Performed by: STUDENT IN AN ORGANIZED HEALTH CARE EDUCATION/TRAINING PROGRAM

## 2025-08-12 PROCEDURE — 85025 COMPLETE CBC W/AUTO DIFF WBC: CPT

## 2025-08-12 PROCEDURE — 96375 TX/PRO/DX INJ NEW DRUG ADDON: CPT

## 2025-08-12 RX ORDER — IOPAMIDOL 755 MG/ML
100 INJECTION, SOLUTION INTRAVASCULAR
Status: COMPLETED | OUTPATIENT
Start: 2025-08-12 | End: 2025-08-12

## 2025-08-12 RX ORDER — MORPHINE SULFATE 4 MG/ML
4 INJECTION, SOLUTION INTRAMUSCULAR; INTRAVENOUS
Refills: 0 | Status: COMPLETED | OUTPATIENT
Start: 2025-08-12 | End: 2025-08-12

## 2025-08-12 RX ORDER — ASPIRIN 325 MG
325 TABLET ORAL
Status: COMPLETED | OUTPATIENT
Start: 2025-08-12 | End: 2025-08-12

## 2025-08-12 RX ORDER — ONDANSETRON 2 MG/ML
4 INJECTION INTRAMUSCULAR; INTRAVENOUS ONCE
Status: COMPLETED | OUTPATIENT
Start: 2025-08-12 | End: 2025-08-12

## 2025-08-12 RX ORDER — MAGNESIUM HYDROXIDE/ALUMINUM HYDROXICE/SIMETHICONE 120; 1200; 1200 MG/30ML; MG/30ML; MG/30ML
30 SUSPENSION ORAL
Status: COMPLETED | OUTPATIENT
Start: 2025-08-12 | End: 2025-08-12

## 2025-08-12 RX ADMIN — IOPAMIDOL 100 ML: 755 INJECTION, SOLUTION INTRAVENOUS at 23:22

## 2025-08-12 RX ADMIN — ONDANSETRON 4 MG: 2 INJECTION, SOLUTION INTRAMUSCULAR; INTRAVENOUS at 23:40

## 2025-08-12 RX ADMIN — ASPIRIN 325 MG: 325 TABLET, FILM COATED ORAL at 23:44

## 2025-08-12 RX ADMIN — MORPHINE SULFATE 4 MG: 4 INJECTION INTRAVENOUS at 23:41

## 2025-08-12 RX ADMIN — ALUMINUM HYDROXIDE, MAGNESIUM HYDROXIDE, AND SIMETHICONE 30 ML: 200; 200; 20 SUSPENSION ORAL at 23:44

## 2025-08-12 ASSESSMENT — PAIN - FUNCTIONAL ASSESSMENT
PAIN_FUNCTIONAL_ASSESSMENT: 0-10

## 2025-08-12 ASSESSMENT — PAIN SCALES - GENERAL
PAINLEVEL_OUTOF10: 10

## 2025-08-12 ASSESSMENT — PAIN DESCRIPTION - LOCATION
LOCATION: CHEST
LOCATION: CHEST

## 2025-08-13 ENCOUNTER — APPOINTMENT (OUTPATIENT)
Dept: ULTRASOUND IMAGING | Age: 65
End: 2025-08-13
Payer: MEDICARE

## 2025-08-13 ENCOUNTER — CARE COORDINATION (OUTPATIENT)
Dept: CARE COORDINATION | Facility: CLINIC | Age: 65
End: 2025-08-13

## 2025-08-13 VITALS
WEIGHT: 205 LBS | RESPIRATION RATE: 16 BRPM | TEMPERATURE: 98.1 F | HEART RATE: 80 BPM | HEIGHT: 68 IN | DIASTOLIC BLOOD PRESSURE: 83 MMHG | SYSTOLIC BLOOD PRESSURE: 166 MMHG | BODY MASS INDEX: 31.07 KG/M2 | OXYGEN SATURATION: 95 %

## 2025-08-13 LAB
EKG ATRIAL RATE: 68 BPM
EKG DIAGNOSIS: NORMAL
EKG P AXIS: 66 DEGREES
EKG P-R INTERVAL: 172 MS
EKG Q-T INTERVAL: 426 MS
EKG QRS DURATION: 88 MS
EKG QTC CALCULATION (BAZETT): 452 MS
EKG R AXIS: -19 DEGREES
EKG T AXIS: 39 DEGREES
EKG VENTRICULAR RATE: 68 BPM
TROPONIN T SERPL HS-MCNC: 19.4 NG/L (ref 0–22)

## 2025-08-13 PROCEDURE — 76705 ECHO EXAM OF ABDOMEN: CPT

## 2025-08-13 PROCEDURE — 96376 TX/PRO/DX INJ SAME DRUG ADON: CPT

## 2025-08-13 PROCEDURE — 84484 ASSAY OF TROPONIN QUANT: CPT

## 2025-08-13 PROCEDURE — 6360000002 HC RX W HCPCS: Performed by: STUDENT IN AN ORGANIZED HEALTH CARE EDUCATION/TRAINING PROGRAM

## 2025-08-13 PROCEDURE — 6370000000 HC RX 637 (ALT 250 FOR IP): Performed by: STUDENT IN AN ORGANIZED HEALTH CARE EDUCATION/TRAINING PROGRAM

## 2025-08-13 PROCEDURE — 93010 ELECTROCARDIOGRAM REPORT: CPT | Performed by: INTERNAL MEDICINE

## 2025-08-13 RX ORDER — MORPHINE SULFATE 4 MG/ML
4 INJECTION, SOLUTION INTRAMUSCULAR; INTRAVENOUS
Refills: 0 | Status: COMPLETED | OUTPATIENT
Start: 2025-08-13 | End: 2025-08-13

## 2025-08-13 RX ORDER — ONDANSETRON 4 MG/1
4 TABLET, FILM COATED ORAL 3 TIMES DAILY PRN
Qty: 15 TABLET | Refills: 0 | Status: SHIPPED | OUTPATIENT
Start: 2025-08-13

## 2025-08-13 RX ORDER — OXYCODONE HYDROCHLORIDE 5 MG/1
5 TABLET ORAL
Refills: 0 | Status: COMPLETED | OUTPATIENT
Start: 2025-08-13 | End: 2025-08-13

## 2025-08-13 RX ORDER — OXYCODONE HYDROCHLORIDE 5 MG/1
5 TABLET ORAL EVERY 6 HOURS PRN
Qty: 12 TABLET | Refills: 0 | Status: SHIPPED | OUTPATIENT
Start: 2025-08-13 | End: 2025-08-16

## 2025-08-13 RX ADMIN — OXYCODONE 5 MG: 5 TABLET ORAL at 03:12

## 2025-08-13 RX ADMIN — MORPHINE SULFATE 4 MG: 4 INJECTION INTRAVENOUS at 01:41

## 2025-08-13 ASSESSMENT — PAIN DESCRIPTION - ORIENTATION: ORIENTATION: RIGHT

## 2025-08-13 ASSESSMENT — PAIN - FUNCTIONAL ASSESSMENT
PAIN_FUNCTIONAL_ASSESSMENT: 0-10
PAIN_FUNCTIONAL_ASSESSMENT: 0-10

## 2025-08-13 ASSESSMENT — PAIN SCALES - GENERAL
PAINLEVEL_OUTOF10: 10
PAINLEVEL_OUTOF10: 10
PAINLEVEL_OUTOF10: 0

## 2025-08-13 ASSESSMENT — PAIN DESCRIPTION - LOCATION: LOCATION: ABDOMEN

## 2025-08-14 DIAGNOSIS — K80.20 GALLSTONES: Primary | ICD-10-CM

## 2025-08-14 RX ORDER — DICYCLOMINE HYDROCHLORIDE 10 MG/1
10 CAPSULE ORAL 4 TIMES DAILY PRN
Qty: 60 CAPSULE | Refills: 0 | Status: SHIPPED | OUTPATIENT
Start: 2025-08-14

## 2025-08-18 ENCOUNTER — OFFICE VISIT (OUTPATIENT)
Dept: SURGERY | Age: 65
End: 2025-08-18
Payer: MEDICARE

## 2025-08-18 ENCOUNTER — ANESTHESIA (OUTPATIENT)
Dept: SURGERY | Age: 65
DRG: 418 | End: 2025-08-18
Payer: MEDICARE

## 2025-08-18 ENCOUNTER — ANESTHESIA EVENT (OUTPATIENT)
Dept: SURGERY | Age: 65
DRG: 418 | End: 2025-08-18
Payer: MEDICARE

## 2025-08-18 ENCOUNTER — APPOINTMENT (OUTPATIENT)
Dept: GENERAL RADIOLOGY | Age: 65
DRG: 418 | End: 2025-08-18
Attending: SURGERY
Payer: MEDICARE

## 2025-08-18 ENCOUNTER — HOSPITAL ENCOUNTER (INPATIENT)
Age: 65
LOS: 2 days | Discharge: HOME | DRG: 418 | End: 2025-08-20
Attending: SURGERY | Admitting: SURGERY
Payer: MEDICARE

## 2025-08-18 VITALS
WEIGHT: 205 LBS | HEART RATE: 97 BPM | BODY MASS INDEX: 31.07 KG/M2 | OXYGEN SATURATION: 98 % | DIASTOLIC BLOOD PRESSURE: 84 MMHG | SYSTOLIC BLOOD PRESSURE: 128 MMHG | HEIGHT: 68 IN

## 2025-08-18 DIAGNOSIS — K81.0 ACUTE CHOLECYSTITIS: Primary | ICD-10-CM

## 2025-08-18 PROBLEM — E87.1 HYPONATREMIA: Status: ACTIVE | Noted: 2025-08-18

## 2025-08-18 LAB
ALBUMIN SERPL-MCNC: 2.7 G/DL (ref 3.2–4.6)
ALBUMIN/GLOB SERPL: 0.7 (ref 1–1.9)
ALP SERPL-CCNC: 658 U/L (ref 40–129)
ALT SERPL-CCNC: 335 U/L (ref 8–55)
ANION GAP SERPL CALC-SCNC: 14 MMOL/L (ref 7–16)
AST SERPL-CCNC: 216 U/L (ref 15–37)
BASOPHILS # BLD: 0.02 K/UL (ref 0–0.2)
BASOPHILS # BLD: 0.08 K/UL (ref 0–0.2)
BASOPHILS NFR BLD: 0.2 % (ref 0–2)
BASOPHILS NFR BLD: 0.9 % (ref 0–2)
BILIRUB SERPL-MCNC: 6.3 MG/DL (ref 0–1.2)
BUN SERPL-MCNC: 17 MG/DL (ref 8–23)
CALCIUM SERPL-MCNC: 9.7 MG/DL (ref 8.8–10.2)
CHLORIDE SERPL-SCNC: 95 MMOL/L (ref 98–107)
CO2 SERPL-SCNC: 21 MMOL/L (ref 20–29)
CREAT SERPL-MCNC: 0.91 MG/DL (ref 0.8–1.3)
DIFFERENTIAL METHOD BLD: ABNORMAL
DIFFERENTIAL METHOD BLD: ABNORMAL
EOSINOPHIL # BLD: 0.12 K/UL (ref 0–0.8)
EOSINOPHIL # BLD: 0.17 K/UL (ref 0–0.8)
EOSINOPHIL NFR BLD: 1.3 % (ref 0.5–7.8)
EOSINOPHIL NFR BLD: 2 % (ref 0.5–7.8)
ERYTHROCYTE [DISTWIDTH] IN BLOOD BY AUTOMATED COUNT: 13.9 % (ref 11.9–14.6)
ERYTHROCYTE [DISTWIDTH] IN BLOOD BY AUTOMATED COUNT: 14.1 % (ref 11.9–14.6)
EST. AVERAGE GLUCOSE BLD GHB EST-MCNC: 208 MG/DL
GLOBULIN SER CALC-MCNC: 4 G/DL (ref 2.3–3.5)
GLUCOSE BLD STRIP.AUTO-MCNC: 241 MG/DL (ref 65–100)
GLUCOSE BLD STRIP.AUTO-MCNC: 271 MG/DL (ref 65–100)
GLUCOSE BLD STRIP.AUTO-MCNC: 361 MG/DL (ref 65–100)
GLUCOSE SERPL-MCNC: 351 MG/DL (ref 70–99)
HBA1C MFR BLD: 8.9 % (ref 0–5.6)
HCT VFR BLD AUTO: 40.4 % (ref 41.1–50.3)
HCT VFR BLD AUTO: 41.4 % (ref 41.1–50.3)
HGB BLD-MCNC: 13.8 G/DL (ref 13.6–17.2)
HGB BLD-MCNC: 14.5 G/DL (ref 13.6–17.2)
IMM GRANULOCYTES # BLD AUTO: 0.31 K/UL (ref 0–0.5)
IMM GRANULOCYTES # BLD AUTO: 0.51 K/UL (ref 0–0.5)
IMM GRANULOCYTES NFR BLD AUTO: 3.6 % (ref 0–5)
IMM GRANULOCYTES NFR BLD AUTO: 5.4 % (ref 0–5)
LIPASE SERPL-CCNC: 41 U/L (ref 13–60)
LYMPHOCYTES # BLD: 1.16 K/UL (ref 0.5–4.6)
LYMPHOCYTES # BLD: 1.29 K/UL (ref 0.5–4.6)
LYMPHOCYTES NFR BLD: 12.3 % (ref 13–44)
LYMPHOCYTES NFR BLD: 14.8 % (ref 13–44)
MCH RBC QN AUTO: 30.6 PG (ref 26.1–32.9)
MCH RBC QN AUTO: 30.7 PG (ref 26.1–32.9)
MCHC RBC AUTO-ENTMCNC: 34.2 G/DL (ref 31.4–35)
MCHC RBC AUTO-ENTMCNC: 35 G/DL (ref 31.4–35)
MCV RBC AUTO: 87.3 FL (ref 82–102)
MCV RBC AUTO: 89.8 FL (ref 82–102)
MONOCYTES # BLD: 0.71 K/UL (ref 0.1–1.3)
MONOCYTES # BLD: 1.13 K/UL (ref 0.1–1.3)
MONOCYTES NFR BLD: 13 % (ref 4–12)
MONOCYTES NFR BLD: 7.5 % (ref 4–12)
NEUTS SEG # BLD: 5.73 K/UL (ref 1.7–8.2)
NEUTS SEG # BLD: 6.89 K/UL (ref 1.7–8.2)
NEUTS SEG NFR BLD: 65.7 % (ref 43–78)
NEUTS SEG NFR BLD: 73.3 % (ref 43–78)
NRBC # BLD: 0 K/UL (ref 0–0.2)
NRBC # BLD: 0 K/UL (ref 0–0.2)
OSMOLALITY SERPL: 294 MOSM/KG H2O (ref 275–295)
PLATELET # BLD AUTO: 146 K/UL (ref 150–450)
PLATELET # BLD AUTO: 188 K/UL (ref 150–450)
PLATELET COMMENT: SLIGHT
PMV BLD AUTO: 10.6 FL (ref 9.4–12.3)
PMV BLD AUTO: 11.6 FL (ref 9.4–12.3)
POTASSIUM SERPL-SCNC: 4.7 MMOL/L (ref 3.5–5.1)
PROT SERPL-MCNC: 6.8 G/DL (ref 6.3–8.2)
RBC # BLD AUTO: 4.5 M/UL (ref 4.23–5.6)
RBC # BLD AUTO: 4.74 M/UL (ref 4.23–5.6)
RBC MORPH BLD: ABNORMAL
SERVICE CMNT-IMP: ABNORMAL
SODIUM SERPL-SCNC: 129 MMOL/L (ref 136–145)
WBC # BLD AUTO: 8.7 K/UL (ref 4.3–11.1)
WBC # BLD AUTO: 9.4 K/UL (ref 4.3–11.1)
WBC MORPH BLD: ABNORMAL

## 2025-08-18 PROCEDURE — 2500000003 HC RX 250 WO HCPCS: Performed by: SURGERY

## 2025-08-18 PROCEDURE — 6370000000 HC RX 637 (ALT 250 FOR IP): Performed by: ANESTHESIOLOGY

## 2025-08-18 PROCEDURE — 2720000010 HC SURG SUPPLY STERILE: Performed by: SURGERY

## 2025-08-18 PROCEDURE — 80053 COMPREHEN METABOLIC PANEL: CPT

## 2025-08-18 PROCEDURE — 2500000003 HC RX 250 WO HCPCS: Performed by: REGISTERED NURSE

## 2025-08-18 PROCEDURE — 83935 ASSAY OF URINE OSMOLALITY: CPT

## 2025-08-18 PROCEDURE — 99204 OFFICE O/P NEW MOD 45 MIN: CPT | Performed by: SURGERY

## 2025-08-18 PROCEDURE — 1100000003 HC PRIVATE W/ TELEMETRY

## 2025-08-18 PROCEDURE — 6370000000 HC RX 637 (ALT 250 FOR IP): Performed by: SURGERY

## 2025-08-18 PROCEDURE — G8417 CALC BMI ABV UP PARAM F/U: HCPCS | Performed by: SURGERY

## 2025-08-18 PROCEDURE — 0FT44ZZ RESECTION OF GALLBLADDER, PERCUTANEOUS ENDOSCOPIC APPROACH: ICD-10-PCS | Performed by: SURGERY

## 2025-08-18 PROCEDURE — 88304 TISSUE EXAM BY PATHOLOGIST: CPT

## 2025-08-18 PROCEDURE — 3074F SYST BP LT 130 MM HG: CPT | Performed by: SURGERY

## 2025-08-18 PROCEDURE — 83036 HEMOGLOBIN GLYCOSYLATED A1C: CPT

## 2025-08-18 PROCEDURE — S5553 INSULIN LONG ACTING 5 U: HCPCS | Performed by: INTERNAL MEDICINE

## 2025-08-18 PROCEDURE — 47563 LAPARO CHOLECYSTECTOMY/GRAPH: CPT | Performed by: SURGERY

## 2025-08-18 PROCEDURE — 36415 COLL VENOUS BLD VENIPUNCTURE: CPT

## 2025-08-18 PROCEDURE — 83930 ASSAY OF BLOOD OSMOLALITY: CPT

## 2025-08-18 PROCEDURE — 3600000009 HC SURGERY ROBOT BASE: Performed by: SURGERY

## 2025-08-18 PROCEDURE — 7100000000 HC PACU RECOVERY - FIRST 15 MIN: Performed by: SURGERY

## 2025-08-18 PROCEDURE — 2709999900 HC NON-CHARGEABLE SUPPLY: Performed by: SURGERY

## 2025-08-18 PROCEDURE — 84300 ASSAY OF URINE SODIUM: CPT

## 2025-08-18 PROCEDURE — 82962 GLUCOSE BLOOD TEST: CPT

## 2025-08-18 PROCEDURE — 6360000002 HC RX W HCPCS: Performed by: SURGERY

## 2025-08-18 PROCEDURE — 6360000002 HC RX W HCPCS: Performed by: ANESTHESIOLOGY

## 2025-08-18 PROCEDURE — 85025 COMPLETE CBC W/AUTO DIFF WBC: CPT

## 2025-08-18 PROCEDURE — 7100000001 HC PACU RECOVERY - ADDTL 15 MIN: Performed by: SURGERY

## 2025-08-18 PROCEDURE — 2580000003 HC RX 258: Performed by: SURGERY

## 2025-08-18 PROCEDURE — C1889 IMPLANT/INSERT DEVICE, NOC: HCPCS | Performed by: SURGERY

## 2025-08-18 PROCEDURE — 3600000019 HC SURGERY ROBOT ADDTL 15MIN: Performed by: SURGERY

## 2025-08-18 PROCEDURE — 3017F COLORECTAL CA SCREEN DOC REV: CPT | Performed by: SURGERY

## 2025-08-18 PROCEDURE — 3700000001 HC ADD 15 MINUTES (ANESTHESIA): Performed by: SURGERY

## 2025-08-18 PROCEDURE — 1100000000 HC RM PRIVATE

## 2025-08-18 PROCEDURE — S2900 ROBOTIC SURGICAL SYSTEM: HCPCS | Performed by: SURGERY

## 2025-08-18 PROCEDURE — 8E0W4CZ ROBOTIC ASSISTED PROCEDURE OF TRUNK REGION, PERCUTANEOUS ENDOSCOPIC APPROACH: ICD-10-PCS | Performed by: SURGERY

## 2025-08-18 PROCEDURE — 1036F TOBACCO NON-USER: CPT | Performed by: SURGERY

## 2025-08-18 PROCEDURE — 83690 ASSAY OF LIPASE: CPT

## 2025-08-18 PROCEDURE — 6360000004 HC RX CONTRAST MEDICATION: Performed by: SURGERY

## 2025-08-18 PROCEDURE — G8427 DOCREV CUR MEDS BY ELIG CLIN: HCPCS | Performed by: SURGERY

## 2025-08-18 PROCEDURE — 6360000002 HC RX W HCPCS: Performed by: STUDENT IN AN ORGANIZED HEALTH CARE EDUCATION/TRAINING PROGRAM

## 2025-08-18 PROCEDURE — 2500000003 HC RX 250 WO HCPCS: Performed by: STUDENT IN AN ORGANIZED HEALTH CARE EDUCATION/TRAINING PROGRAM

## 2025-08-18 PROCEDURE — 2700000000 HC OXYGEN THERAPY PER DAY

## 2025-08-18 PROCEDURE — 94760 N-INVAS EAR/PLS OXIMETRY 1: CPT

## 2025-08-18 PROCEDURE — 6370000000 HC RX 637 (ALT 250 FOR IP): Performed by: INTERNAL MEDICINE

## 2025-08-18 PROCEDURE — 6360000002 HC RX W HCPCS: Performed by: REGISTERED NURSE

## 2025-08-18 PROCEDURE — 3079F DIAST BP 80-89 MM HG: CPT | Performed by: SURGERY

## 2025-08-18 PROCEDURE — 2580000003 HC RX 258: Performed by: REGISTERED NURSE

## 2025-08-18 PROCEDURE — 3700000000 HC ANESTHESIA ATTENDED CARE: Performed by: SURGERY

## 2025-08-18 PROCEDURE — 74300 X-RAY BILE DUCTS/PANCREAS: CPT

## 2025-08-18 DEVICE — CLIP INT L POLYMER LOK LIG HEM O LOK (6EA/PK): Type: IMPLANTABLE DEVICE | Status: FUNCTIONAL

## 2025-08-18 RX ORDER — BUPIVACAINE HYDROCHLORIDE 5 MG/ML
INJECTION, SOLUTION EPIDURAL; INTRACAUDAL; PERINEURAL PRN
Status: DISCONTINUED | OUTPATIENT
Start: 2025-08-18 | End: 2025-08-18 | Stop reason: ALTCHOICE

## 2025-08-18 RX ORDER — ONDANSETRON 2 MG/ML
4 INJECTION INTRAMUSCULAR; INTRAVENOUS EVERY 6 HOURS PRN
Status: DISCONTINUED | OUTPATIENT
Start: 2025-08-18 | End: 2025-08-18 | Stop reason: SDUPTHER

## 2025-08-18 RX ORDER — INSULIN GLARGINE-YFGN 100 [IU]/ML
20 INJECTION, SOLUTION SUBCUTANEOUS 2 TIMES DAILY
Status: DISCONTINUED | OUTPATIENT
Start: 2025-08-18 | End: 2025-08-18

## 2025-08-18 RX ORDER — NEOSTIGMINE METHYLSULFATE 1 MG/ML
INJECTION INTRAVENOUS
Status: DISCONTINUED | OUTPATIENT
Start: 2025-08-18 | End: 2025-08-18 | Stop reason: SDUPTHER

## 2025-08-18 RX ORDER — SODIUM CHLORIDE 0.9 % (FLUSH) 0.9 %
5-40 SYRINGE (ML) INJECTION PRN
Status: DISCONTINUED | OUTPATIENT
Start: 2025-08-18 | End: 2025-08-20 | Stop reason: HOSPADM

## 2025-08-18 RX ORDER — LISINOPRIL 5 MG/1
10 TABLET ORAL DAILY
Status: DISCONTINUED | OUTPATIENT
Start: 2025-08-18 | End: 2025-08-19

## 2025-08-18 RX ORDER — SODIUM CHLORIDE 0.9 % (FLUSH) 0.9 %
5-40 SYRINGE (ML) INJECTION EVERY 12 HOURS SCHEDULED
Status: DISCONTINUED | OUTPATIENT
Start: 2025-08-18 | End: 2025-08-20 | Stop reason: HOSPADM

## 2025-08-18 RX ORDER — SODIUM CHLORIDE 0.9 % (FLUSH) 0.9 %
5-40 SYRINGE (ML) INJECTION PRN
Status: CANCELLED | OUTPATIENT
Start: 2025-08-18

## 2025-08-18 RX ORDER — INDOCYANINE GREEN AND WATER 25 MG
1.25 KIT INJECTION ONCE
Status: COMPLETED | OUTPATIENT
Start: 2025-08-18 | End: 2025-08-18

## 2025-08-18 RX ORDER — DEXTROSE MONOHYDRATE 100 MG/ML
INJECTION, SOLUTION INTRAVENOUS CONTINUOUS PRN
Status: DISCONTINUED | OUTPATIENT
Start: 2025-08-18 | End: 2025-08-20 | Stop reason: HOSPADM

## 2025-08-18 RX ORDER — ONDANSETRON 4 MG/1
4 TABLET, ORALLY DISINTEGRATING ORAL EVERY 8 HOURS PRN
Status: CANCELLED | OUTPATIENT
Start: 2025-08-18

## 2025-08-18 RX ORDER — PROCHLORPERAZINE EDISYLATE 5 MG/ML
5 INJECTION INTRAMUSCULAR; INTRAVENOUS
Status: DISCONTINUED | OUTPATIENT
Start: 2025-08-18 | End: 2025-08-18 | Stop reason: HOSPADM

## 2025-08-18 RX ORDER — OXYCODONE HYDROCHLORIDE 5 MG/1
5 TABLET ORAL EVERY 4 HOURS PRN
Status: DISCONTINUED | OUTPATIENT
Start: 2025-08-18 | End: 2025-08-18 | Stop reason: SDUPTHER

## 2025-08-18 RX ORDER — PANTOPRAZOLE SODIUM 40 MG/1
40 TABLET, DELAYED RELEASE ORAL
Status: DISCONTINUED | OUTPATIENT
Start: 2025-08-19 | End: 2025-08-20 | Stop reason: HOSPADM

## 2025-08-18 RX ORDER — SODIUM CHLORIDE 9 MG/ML
INJECTION, SOLUTION INTRAVENOUS PRN
Status: CANCELLED | OUTPATIENT
Start: 2025-08-18

## 2025-08-18 RX ORDER — PROPOFOL 10 MG/ML
INJECTION, EMULSION INTRAVENOUS
Status: DISCONTINUED | OUTPATIENT
Start: 2025-08-18 | End: 2025-08-18 | Stop reason: SDUPTHER

## 2025-08-18 RX ORDER — IOPAMIDOL 612 MG/ML
INJECTION, SOLUTION INTRAVASCULAR PRN
Status: DISCONTINUED | OUTPATIENT
Start: 2025-08-18 | End: 2025-08-18 | Stop reason: ALTCHOICE

## 2025-08-18 RX ORDER — OXYCODONE HYDROCHLORIDE 5 MG/1
5 TABLET ORAL
Status: DISCONTINUED | OUTPATIENT
Start: 2025-08-18 | End: 2025-08-18 | Stop reason: HOSPADM

## 2025-08-18 RX ORDER — LIDOCAINE HYDROCHLORIDE 20 MG/ML
10 SOLUTION OROPHARYNGEAL EVERY 4 HOURS PRN
Status: DISCONTINUED | OUTPATIENT
Start: 2025-08-18 | End: 2025-08-20 | Stop reason: HOSPADM

## 2025-08-18 RX ORDER — IBUPROFEN 600 MG/1
1 TABLET ORAL PRN
Status: DISCONTINUED | OUTPATIENT
Start: 2025-08-18 | End: 2025-08-20 | Stop reason: HOSPADM

## 2025-08-18 RX ORDER — SODIUM CHLORIDE, SODIUM LACTATE, POTASSIUM CHLORIDE, CALCIUM CHLORIDE 600; 310; 30; 20 MG/100ML; MG/100ML; MG/100ML; MG/100ML
INJECTION, SOLUTION INTRAVENOUS CONTINUOUS
Status: CANCELLED | OUTPATIENT
Start: 2025-08-18

## 2025-08-18 RX ORDER — SODIUM CHLORIDE, SODIUM LACTATE, POTASSIUM CHLORIDE, CALCIUM CHLORIDE 600; 310; 30; 20 MG/100ML; MG/100ML; MG/100ML; MG/100ML
INJECTION, SOLUTION INTRAVENOUS CONTINUOUS
Status: DISCONTINUED | OUTPATIENT
Start: 2025-08-18 | End: 2025-08-19

## 2025-08-18 RX ORDER — ONDANSETRON 4 MG/1
4 TABLET, ORALLY DISINTEGRATING ORAL EVERY 8 HOURS PRN
Status: DISCONTINUED | OUTPATIENT
Start: 2025-08-18 | End: 2025-08-20 | Stop reason: HOSPADM

## 2025-08-18 RX ORDER — HYDROMORPHONE HYDROCHLORIDE 1 MG/ML
0.5 INJECTION, SOLUTION INTRAMUSCULAR; INTRAVENOUS; SUBCUTANEOUS
Status: DISCONTINUED | OUTPATIENT
Start: 2025-08-18 | End: 2025-08-20 | Stop reason: HOSPADM

## 2025-08-18 RX ORDER — ONDANSETRON 4 MG/1
4 TABLET, ORALLY DISINTEGRATING ORAL EVERY 8 HOURS PRN
Status: DISCONTINUED | OUTPATIENT
Start: 2025-08-18 | End: 2025-08-18 | Stop reason: SDUPTHER

## 2025-08-18 RX ORDER — DEXTROSE MONOHYDRATE 100 MG/ML
INJECTION, SOLUTION INTRAVENOUS CONTINUOUS PRN
Status: DISCONTINUED | OUTPATIENT
Start: 2025-08-18 | End: 2025-08-18 | Stop reason: SDUPTHER

## 2025-08-18 RX ORDER — ROCURONIUM BROMIDE 10 MG/ML
INJECTION, SOLUTION INTRAVENOUS
Status: DISCONTINUED | OUTPATIENT
Start: 2025-08-18 | End: 2025-08-18 | Stop reason: SDUPTHER

## 2025-08-18 RX ORDER — EPHEDRINE SULFATE 5 MG/ML
INJECTION INTRAVENOUS
Status: DISCONTINUED | OUTPATIENT
Start: 2025-08-18 | End: 2025-08-18 | Stop reason: SDUPTHER

## 2025-08-18 RX ORDER — SODIUM CHLORIDE 9 MG/ML
INJECTION, SOLUTION INTRAVENOUS PRN
Status: DISCONTINUED | OUTPATIENT
Start: 2025-08-18 | End: 2025-08-20 | Stop reason: HOSPADM

## 2025-08-18 RX ORDER — LISINOPRIL 5 MG/1
10 TABLET ORAL DAILY
Status: DISCONTINUED | OUTPATIENT
Start: 2025-08-19 | End: 2025-08-18

## 2025-08-18 RX ORDER — INSULIN LISPRO 100 [IU]/ML
0-8 INJECTION, SOLUTION INTRAVENOUS; SUBCUTANEOUS
Status: DISCONTINUED | OUTPATIENT
Start: 2025-08-18 | End: 2025-08-19

## 2025-08-18 RX ORDER — OXYCODONE HYDROCHLORIDE 5 MG/1
5 TABLET ORAL EVERY 4 HOURS PRN
Refills: 0 | Status: CANCELLED | OUTPATIENT
Start: 2025-08-18

## 2025-08-18 RX ORDER — OXYCODONE HYDROCHLORIDE 5 MG/1
5 TABLET ORAL EVERY 4 HOURS PRN
Status: DISCONTINUED | OUTPATIENT
Start: 2025-08-18 | End: 2025-08-20 | Stop reason: HOSPADM

## 2025-08-18 RX ORDER — ONDANSETRON 2 MG/ML
4 INJECTION INTRAMUSCULAR; INTRAVENOUS EVERY 6 HOURS PRN
Status: DISCONTINUED | OUTPATIENT
Start: 2025-08-18 | End: 2025-08-20 | Stop reason: HOSPADM

## 2025-08-18 RX ORDER — INSULIN GLARGINE-YFGN 100 [IU]/ML
20 INJECTION, SOLUTION SUBCUTANEOUS ONCE
Status: COMPLETED | OUTPATIENT
Start: 2025-08-18 | End: 2025-08-18

## 2025-08-18 RX ORDER — KETAMINE HCL IN NACL, ISO-OSM 20 MG/2 ML
SYRINGE (ML) INJECTION
Status: DISCONTINUED | OUTPATIENT
Start: 2025-08-18 | End: 2025-08-18 | Stop reason: SDUPTHER

## 2025-08-18 RX ORDER — DEXAMETHASONE SODIUM PHOSPHATE 4 MG/ML
INJECTION, SOLUTION INTRA-ARTICULAR; INTRALESIONAL; INTRAMUSCULAR; INTRAVENOUS; SOFT TISSUE
Status: DISCONTINUED | OUTPATIENT
Start: 2025-08-18 | End: 2025-08-18 | Stop reason: SDUPTHER

## 2025-08-18 RX ORDER — HYDRALAZINE HYDROCHLORIDE 25 MG/1
25 TABLET, FILM COATED ORAL EVERY 8 HOURS PRN
Status: DISCONTINUED | OUTPATIENT
Start: 2025-08-18 | End: 2025-08-19

## 2025-08-18 RX ORDER — SODIUM CHLORIDE 0.9 % (FLUSH) 0.9 %
5-40 SYRINGE (ML) INJECTION EVERY 12 HOURS SCHEDULED
Status: CANCELLED | OUTPATIENT
Start: 2025-08-18

## 2025-08-18 RX ORDER — DEXMEDETOMIDINE HYDROCHLORIDE 100 UG/ML
INJECTION, SOLUTION INTRAVENOUS
Status: DISCONTINUED | OUTPATIENT
Start: 2025-08-18 | End: 2025-08-18 | Stop reason: SDUPTHER

## 2025-08-18 RX ORDER — PHENYLEPHRINE HYDROCHLORIDE 10 MG/ML
INJECTION INTRAVENOUS
Status: DISCONTINUED | OUTPATIENT
Start: 2025-08-18 | End: 2025-08-18 | Stop reason: SDUPTHER

## 2025-08-18 RX ORDER — FENTANYL CITRATE 50 UG/ML
INJECTION, SOLUTION INTRAMUSCULAR; INTRAVENOUS
Status: DISCONTINUED | OUTPATIENT
Start: 2025-08-18 | End: 2025-08-18 | Stop reason: SDUPTHER

## 2025-08-18 RX ORDER — IBUPROFEN 600 MG/1
1 TABLET ORAL PRN
Status: DISCONTINUED | OUTPATIENT
Start: 2025-08-18 | End: 2025-08-18 | Stop reason: SDUPTHER

## 2025-08-18 RX ORDER — ONDANSETRON 2 MG/ML
4 INJECTION INTRAMUSCULAR; INTRAVENOUS EVERY 6 HOURS PRN
Status: CANCELLED | OUTPATIENT
Start: 2025-08-18

## 2025-08-18 RX ORDER — GLYCOPYRROLATE 0.2 MG/ML
INJECTION INTRAMUSCULAR; INTRAVENOUS
Status: DISCONTINUED | OUTPATIENT
Start: 2025-08-18 | End: 2025-08-18 | Stop reason: SDUPTHER

## 2025-08-18 RX ORDER — LIDOCAINE HYDROCHLORIDE 20 MG/ML
INJECTION, SOLUTION EPIDURAL; INFILTRATION; INTRACAUDAL; PERINEURAL
Status: DISCONTINUED | OUTPATIENT
Start: 2025-08-18 | End: 2025-08-18 | Stop reason: SDUPTHER

## 2025-08-18 RX ORDER — ONDANSETRON 2 MG/ML
INJECTION INTRAMUSCULAR; INTRAVENOUS
Status: DISCONTINUED | OUTPATIENT
Start: 2025-08-18 | End: 2025-08-18 | Stop reason: SDUPTHER

## 2025-08-18 RX ORDER — SODIUM CHLORIDE, SODIUM LACTATE, POTASSIUM CHLORIDE, CALCIUM CHLORIDE 600; 310; 30; 20 MG/100ML; MG/100ML; MG/100ML; MG/100ML
INJECTION, SOLUTION INTRAVENOUS
Status: DISCONTINUED | OUTPATIENT
Start: 2025-08-18 | End: 2025-08-18 | Stop reason: SDUPTHER

## 2025-08-18 RX ADMIN — FENTANYL CITRATE 50 MCG: 50 INJECTION, SOLUTION INTRAMUSCULAR; INTRAVENOUS at 16:25

## 2025-08-18 RX ADMIN — SODIUM CHLORIDE, SODIUM LACTATE, POTASSIUM CHLORIDE, AND CALCIUM CHLORIDE: 600; 310; 30; 20 INJECTION, SOLUTION INTRAVENOUS at 16:13

## 2025-08-18 RX ADMIN — HYDRALAZINE HYDROCHLORIDE 25 MG: 25 TABLET ORAL at 21:37

## 2025-08-18 RX ADMIN — HYDROMORPHONE HYDROCHLORIDE 0.5 MG: 1 INJECTION, SOLUTION INTRAMUSCULAR; INTRAVENOUS; SUBCUTANEOUS at 18:46

## 2025-08-18 RX ADMIN — EPHEDRINE SULFATE 10 MG: 5 INJECTION INTRAVENOUS at 17:07

## 2025-08-18 RX ADMIN — PHENYLEPHRINE HYDROCHLORIDE 150 MCG: 10 INJECTION INTRAVENOUS at 17:28

## 2025-08-18 RX ADMIN — DEXMEDETOMIDINE 12 MCG: 100 INJECTION, SOLUTION, CONCENTRATE INTRAVENOUS at 17:15

## 2025-08-18 RX ADMIN — FENTANYL CITRATE 50 MCG: 50 INJECTION, SOLUTION INTRAMUSCULAR; INTRAVENOUS at 16:21

## 2025-08-18 RX ADMIN — SODIUM CHLORIDE, SODIUM LACTATE, POTASSIUM CHLORIDE, AND CALCIUM CHLORIDE: 600; 310; 30; 20 INJECTION, SOLUTION INTRAVENOUS at 17:30

## 2025-08-18 RX ADMIN — ONDANSETRON 4 MG: 2 INJECTION, SOLUTION INTRAMUSCULAR; INTRAVENOUS at 16:36

## 2025-08-18 RX ADMIN — FLUOXETINE HYDROCHLORIDE 40 MG: 20 CAPSULE ORAL at 20:32

## 2025-08-18 RX ADMIN — SODIUM CHLORIDE, SODIUM LACTATE, POTASSIUM CHLORIDE, AND CALCIUM CHLORIDE: .6; .31; .03; .02 INJECTION, SOLUTION INTRAVENOUS at 20:13

## 2025-08-18 RX ADMIN — HYDROMORPHONE HYDROCHLORIDE 0.5 MG: 1 INJECTION, SOLUTION INTRAMUSCULAR; INTRAVENOUS; SUBCUTANEOUS at 21:46

## 2025-08-18 RX ADMIN — INSULIN LISPRO 4 UNITS: 100 INJECTION, SOLUTION INTRAVENOUS; SUBCUTANEOUS at 20:32

## 2025-08-18 RX ADMIN — GLYCOPYRROLATE 0.6 MG: 0.2 INJECTION INTRAMUSCULAR; INTRAVENOUS at 17:52

## 2025-08-18 RX ADMIN — INSULIN GLARGINE-YFGN 20 UNITS: 100 INJECTION, SOLUTION SUBCUTANEOUS at 20:31

## 2025-08-18 RX ADMIN — HYDROMORPHONE HYDROCHLORIDE 0.5 MG: 1 INJECTION, SOLUTION INTRAMUSCULAR; INTRAVENOUS; SUBCUTANEOUS at 18:32

## 2025-08-18 RX ADMIN — INDOCYANINE GREEN AND WATER 1.25 MG: KIT at 14:47

## 2025-08-18 RX ADMIN — PROPOFOL 200 MG: 10 INJECTION, EMULSION INTRAVENOUS at 16:25

## 2025-08-18 RX ADMIN — LISINOPRIL 10 MG: 5 TABLET ORAL at 20:32

## 2025-08-18 RX ADMIN — INSULIN HUMAN 4 UNITS: 100 INJECTION, SOLUTION PARENTERAL at 15:54

## 2025-08-18 RX ADMIN — Medication 20 MG: at 17:09

## 2025-08-18 RX ADMIN — LIDOCAINE HYDROCHLORIDE 10 ML: 20 SOLUTION ORAL at 22:25

## 2025-08-18 RX ADMIN — DEXAMETHASONE SODIUM PHOSPHATE 4 MG: 4 INJECTION INTRA-ARTICULAR; INTRALESIONAL; INTRAMUSCULAR; INTRAVENOUS; SOFT TISSUE at 16:36

## 2025-08-18 RX ADMIN — EPHEDRINE SULFATE 10 MG: 5 INJECTION INTRAVENOUS at 17:16

## 2025-08-18 RX ADMIN — Medication 2000 MG: at 16:15

## 2025-08-18 RX ADMIN — SODIUM CHLORIDE, PRESERVATIVE FREE 10 ML: 5 INJECTION INTRAVENOUS at 20:13

## 2025-08-18 RX ADMIN — LIDOCAINE HYDROCHLORIDE 100 MG: 20 INJECTION, SOLUTION EPIDURAL; INFILTRATION; INTRACAUDAL; PERINEURAL at 16:25

## 2025-08-18 RX ADMIN — HYDROMORPHONE HYDROCHLORIDE 0.5 MG: 1 INJECTION, SOLUTION INTRAMUSCULAR; INTRAVENOUS; SUBCUTANEOUS at 18:41

## 2025-08-18 RX ADMIN — ONDANSETRON 4 MG: 2 INJECTION, SOLUTION INTRAMUSCULAR; INTRAVENOUS at 22:28

## 2025-08-18 RX ADMIN — DEXMEDETOMIDINE 8 MCG: 100 INJECTION, SOLUTION, CONCENTRATE INTRAVENOUS at 17:39

## 2025-08-18 RX ADMIN — ROCURONIUM BROMIDE 50 MG: 10 INJECTION, SOLUTION INTRAVENOUS at 16:26

## 2025-08-18 RX ADMIN — NEOSTIGMINE METHYLSULFATE 4 MG: 1 INJECTION INTRAVENOUS at 17:52

## 2025-08-18 ASSESSMENT — PAIN DESCRIPTION - FREQUENCY
FREQUENCY: CONTINUOUS
FREQUENCY: CONTINUOUS

## 2025-08-18 ASSESSMENT — PAIN - FUNCTIONAL ASSESSMENT
PAIN_FUNCTIONAL_ASSESSMENT: 0-10
PAIN_FUNCTIONAL_ASSESSMENT: ACTIVITIES ARE NOT PREVENTED
PAIN_FUNCTIONAL_ASSESSMENT: 0-10
PAIN_FUNCTIONAL_ASSESSMENT: CRITICAL CARE PAIN OBSERVATION TOOL (CPOT)
PAIN_FUNCTIONAL_ASSESSMENT: ACTIVITIES ARE NOT PREVENTED
PAIN_FUNCTIONAL_ASSESSMENT: 0-10
PAIN_FUNCTIONAL_ASSESSMENT: 0-10
PAIN_FUNCTIONAL_ASSESSMENT: ACTIVITIES ARE NOT PREVENTED
PAIN_FUNCTIONAL_ASSESSMENT: 0-10
PAIN_FUNCTIONAL_ASSESSMENT: CRITICAL CARE PAIN OBSERVATION TOOL (CPOT)

## 2025-08-18 ASSESSMENT — PAIN DESCRIPTION - LOCATION
LOCATION: ABDOMEN
LOCATION: ABDOMEN;INCISION
LOCATION: THROAT

## 2025-08-18 ASSESSMENT — PAIN SCALES - GENERAL
PAINLEVEL_OUTOF10: 9
PAINLEVEL_OUTOF10: 6
PAINLEVEL_OUTOF10: 2
PAINLEVEL_OUTOF10: 9
PAINLEVEL_OUTOF10: 8
PAINLEVEL_OUTOF10: 6
PAINLEVEL_OUTOF10: 8

## 2025-08-18 ASSESSMENT — PAIN DESCRIPTION - PAIN TYPE
TYPE: SURGICAL PAIN
TYPE: ACUTE PAIN

## 2025-08-18 ASSESSMENT — PAIN DESCRIPTION - DESCRIPTORS
DESCRIPTORS: SORE;ACHING
DESCRIPTORS: ACHING
DESCRIPTORS: ACHING
DESCRIPTORS: DISCOMFORT;TENDER;SORE

## 2025-08-18 ASSESSMENT — PAIN DESCRIPTION - ONSET
ONSET: ON-GOING
ONSET: ON-GOING

## 2025-08-19 LAB
ALBUMIN SERPL-MCNC: 2.4 G/DL (ref 3.2–4.6)
ALBUMIN SERPL-MCNC: 2.5 G/DL (ref 3.2–4.6)
ALBUMIN/GLOB SERPL: 0.7 (ref 1–1.9)
ALBUMIN/GLOB SERPL: 0.7 (ref 1–1.9)
ALP SERPL-CCNC: 528 U/L (ref 40–129)
ALP SERPL-CCNC: 576 U/L (ref 40–129)
ALT SERPL-CCNC: 272 U/L (ref 8–55)
ALT SERPL-CCNC: 302 U/L (ref 8–55)
ANION GAP SERPL CALC-SCNC: 11 MMOL/L (ref 7–16)
ANION GAP SERPL CALC-SCNC: 12 MMOL/L (ref 7–16)
AST SERPL-CCNC: 127 U/L (ref 15–37)
AST SERPL-CCNC: 155 U/L (ref 15–37)
BASOPHILS # BLD: 0.08 K/UL (ref 0–0.2)
BASOPHILS NFR BLD: 0.5 % (ref 0–2)
BILIRUB SERPL-MCNC: 4.4 MG/DL (ref 0–1.2)
BILIRUB SERPL-MCNC: 4.6 MG/DL (ref 0–1.2)
BUN SERPL-MCNC: 14 MG/DL (ref 8–23)
BUN SERPL-MCNC: 15 MG/DL (ref 8–23)
CALCIUM SERPL-MCNC: 9.1 MG/DL (ref 8.8–10.2)
CALCIUM SERPL-MCNC: 9.3 MG/DL (ref 8.8–10.2)
CHLORIDE SERPL-SCNC: 93 MMOL/L (ref 98–107)
CHLORIDE SERPL-SCNC: 95 MMOL/L (ref 98–107)
CO2 SERPL-SCNC: 24 MMOL/L (ref 20–29)
CO2 SERPL-SCNC: 25 MMOL/L (ref 20–29)
CREAT SERPL-MCNC: 0.89 MG/DL (ref 0.8–1.3)
CREAT SERPL-MCNC: 1.02 MG/DL (ref 0.8–1.3)
DIFFERENTIAL METHOD BLD: ABNORMAL
EOSINOPHIL # BLD: 0.02 K/UL (ref 0–0.8)
EOSINOPHIL NFR BLD: 0.1 % (ref 0.5–7.8)
ERYTHROCYTE [DISTWIDTH] IN BLOOD BY AUTOMATED COUNT: 14 % (ref 11.9–14.6)
GLOBULIN SER CALC-MCNC: 3.6 G/DL (ref 2.3–3.5)
GLOBULIN SER CALC-MCNC: 3.8 G/DL (ref 2.3–3.5)
GLUCOSE BLD STRIP.AUTO-MCNC: 252 MG/DL (ref 65–100)
GLUCOSE BLD STRIP.AUTO-MCNC: 263 MG/DL (ref 65–100)
GLUCOSE BLD STRIP.AUTO-MCNC: 275 MG/DL (ref 65–100)
GLUCOSE BLD STRIP.AUTO-MCNC: 286 MG/DL (ref 65–100)
GLUCOSE SERPL-MCNC: 282 MG/DL (ref 70–99)
GLUCOSE SERPL-MCNC: 286 MG/DL (ref 70–99)
HCT VFR BLD AUTO: 41.4 % (ref 41.1–50.3)
HGB BLD-MCNC: 13.7 G/DL (ref 13.6–17.2)
IMM GRANULOCYTES # BLD AUTO: 0.34 K/UL (ref 0–0.5)
IMM GRANULOCYTES NFR BLD AUTO: 2.3 % (ref 0–5)
LIPASE SERPL-CCNC: 15 U/L (ref 13–60)
LYMPHOCYTES # BLD: 0.99 K/UL (ref 0.5–4.6)
LYMPHOCYTES NFR BLD: 6.7 % (ref 13–44)
MCH RBC QN AUTO: 30.2 PG (ref 26.1–32.9)
MCHC RBC AUTO-ENTMCNC: 33.1 G/DL (ref 31.4–35)
MCV RBC AUTO: 91.2 FL (ref 82–102)
MONOCYTES # BLD: 1.24 K/UL (ref 0.1–1.3)
MONOCYTES NFR BLD: 8.4 % (ref 4–12)
NEUTS SEG # BLD: 12.08 K/UL (ref 1.7–8.2)
NEUTS SEG NFR BLD: 82 % (ref 43–78)
NRBC # BLD: 0 K/UL (ref 0–0.2)
OSMOLALITY UR: 729 MOSM/KG H2O (ref 50–1400)
PLATELET # BLD AUTO: 161 K/UL (ref 150–450)
PMV BLD AUTO: 10.8 FL (ref 9.4–12.3)
POTASSIUM SERPL-SCNC: 4.7 MMOL/L (ref 3.5–5.1)
POTASSIUM SERPL-SCNC: 5.2 MMOL/L (ref 3.5–5.1)
PROT SERPL-MCNC: 6 G/DL (ref 6.3–8.2)
PROT SERPL-MCNC: 6.3 G/DL (ref 6.3–8.2)
RBC # BLD AUTO: 4.54 M/UL (ref 4.23–5.6)
SERVICE CMNT-IMP: ABNORMAL
SODIUM SERPL-SCNC: 129 MMOL/L (ref 136–145)
SODIUM SERPL-SCNC: 131 MMOL/L (ref 136–145)
SODIUM UR-SCNC: 63 MMOL/L
WBC # BLD AUTO: 14.8 K/UL (ref 4.3–11.1)

## 2025-08-19 PROCEDURE — 85025 COMPLETE CBC W/AUTO DIFF WBC: CPT

## 2025-08-19 PROCEDURE — 6370000000 HC RX 637 (ALT 250 FOR IP): Performed by: INTERNAL MEDICINE

## 2025-08-19 PROCEDURE — 94760 N-INVAS EAR/PLS OXIMETRY 1: CPT

## 2025-08-19 PROCEDURE — 2580000003 HC RX 258: Performed by: SURGERY

## 2025-08-19 PROCEDURE — 83690 ASSAY OF LIPASE: CPT

## 2025-08-19 PROCEDURE — 80053 COMPREHEN METABOLIC PANEL: CPT

## 2025-08-19 PROCEDURE — S5553 INSULIN LONG ACTING 5 U: HCPCS | Performed by: INTERNAL MEDICINE

## 2025-08-19 PROCEDURE — 2580000003 HC RX 258: Performed by: INTERNAL MEDICINE

## 2025-08-19 PROCEDURE — 6360000002 HC RX W HCPCS: Performed by: INTERNAL MEDICINE

## 2025-08-19 PROCEDURE — 36415 COLL VENOUS BLD VENIPUNCTURE: CPT

## 2025-08-19 PROCEDURE — 82962 GLUCOSE BLOOD TEST: CPT

## 2025-08-19 PROCEDURE — 1100000003 HC PRIVATE W/ TELEMETRY

## 2025-08-19 PROCEDURE — 2500000003 HC RX 250 WO HCPCS: Performed by: SURGERY

## 2025-08-19 PROCEDURE — 6370000000 HC RX 637 (ALT 250 FOR IP): Performed by: NURSE PRACTITIONER

## 2025-08-19 PROCEDURE — 6360000002 HC RX W HCPCS: Performed by: SURGERY

## 2025-08-19 PROCEDURE — 6370000000 HC RX 637 (ALT 250 FOR IP): Performed by: SURGERY

## 2025-08-19 PROCEDURE — 2700000000 HC OXYGEN THERAPY PER DAY

## 2025-08-19 RX ORDER — SODIUM CHLORIDE 9 MG/ML
INJECTION, SOLUTION INTRAVENOUS CONTINUOUS
Status: ACTIVE | OUTPATIENT
Start: 2025-08-19 | End: 2025-08-20

## 2025-08-19 RX ORDER — INSULIN LISPRO 100 [IU]/ML
0-10 INJECTION, SOLUTION INTRAVENOUS; SUBCUTANEOUS
Status: DISCONTINUED | OUTPATIENT
Start: 2025-08-19 | End: 2025-08-20 | Stop reason: HOSPADM

## 2025-08-19 RX ORDER — HYDRALAZINE HYDROCHLORIDE 20 MG/ML
20 INJECTION INTRAMUSCULAR; INTRAVENOUS EVERY 4 HOURS PRN
Status: DISCONTINUED | OUTPATIENT
Start: 2025-08-19 | End: 2025-08-20 | Stop reason: HOSPADM

## 2025-08-19 RX ORDER — INSULIN GLARGINE-YFGN 100 [IU]/ML
35 INJECTION, SOLUTION SUBCUTANEOUS NIGHTLY
Status: DISCONTINUED | OUTPATIENT
Start: 2025-08-19 | End: 2025-08-20 | Stop reason: HOSPADM

## 2025-08-19 RX ORDER — HYDRALAZINE HYDROCHLORIDE 20 MG/ML
10 INJECTION INTRAMUSCULAR; INTRAVENOUS EVERY 4 HOURS PRN
Status: DISCONTINUED | OUTPATIENT
Start: 2025-08-19 | End: 2025-08-20 | Stop reason: HOSPADM

## 2025-08-19 RX ORDER — SIMETHICONE 80 MG
80 TABLET,CHEWABLE ORAL EVERY 6 HOURS PRN
Status: DISCONTINUED | OUTPATIENT
Start: 2025-08-19 | End: 2025-08-20 | Stop reason: HOSPADM

## 2025-08-19 RX ORDER — GUAIFENESIN 600 MG/1
600 TABLET, EXTENDED RELEASE ORAL 2 TIMES DAILY
Status: DISCONTINUED | OUTPATIENT
Start: 2025-08-19 | End: 2025-08-20 | Stop reason: HOSPADM

## 2025-08-19 RX ORDER — LISINOPRIL 20 MG/1
20 TABLET ORAL DAILY
Status: DISCONTINUED | OUTPATIENT
Start: 2025-08-20 | End: 2025-08-20 | Stop reason: HOSPADM

## 2025-08-19 RX ADMIN — FLUOXETINE HYDROCHLORIDE 40 MG: 20 CAPSULE ORAL at 10:33

## 2025-08-19 RX ADMIN — PANTOPRAZOLE SODIUM 40 MG: 40 TABLET, DELAYED RELEASE ORAL at 05:48

## 2025-08-19 RX ADMIN — SODIUM CHLORIDE, PRESERVATIVE FREE 10 ML: 5 INJECTION INTRAVENOUS at 10:43

## 2025-08-19 RX ADMIN — INSULIN LISPRO 7 UNITS: 100 INJECTION, SOLUTION INTRAVENOUS; SUBCUTANEOUS at 17:39

## 2025-08-19 RX ADMIN — HYDRALAZINE HYDROCHLORIDE 10 MG: 20 INJECTION INTRAMUSCULAR; INTRAVENOUS at 15:49

## 2025-08-19 RX ADMIN — INSULIN GLARGINE-YFGN 35 UNITS: 100 INJECTION, SOLUTION SUBCUTANEOUS at 20:30

## 2025-08-19 RX ADMIN — HYDROMORPHONE HYDROCHLORIDE 0.5 MG: 1 INJECTION, SOLUTION INTRAMUSCULAR; INTRAVENOUS; SUBCUTANEOUS at 15:14

## 2025-08-19 RX ADMIN — INSULIN HUMAN 15 UNITS: 100 INJECTION, SUSPENSION SUBCUTANEOUS at 15:15

## 2025-08-19 RX ADMIN — SODIUM CHLORIDE: 0.9 INJECTION, SOLUTION INTRAVENOUS at 11:38

## 2025-08-19 RX ADMIN — GUAIFENESIN 600 MG: 600 TABLET ORAL at 21:14

## 2025-08-19 RX ADMIN — INSULIN LISPRO 7 UNITS: 100 INJECTION, SOLUTION INTRAVENOUS; SUBCUTANEOUS at 20:27

## 2025-08-19 RX ADMIN — HYDROMORPHONE HYDROCHLORIDE 0.5 MG: 1 INJECTION, SOLUTION INTRAMUSCULAR; INTRAVENOUS; SUBCUTANEOUS at 20:25

## 2025-08-19 RX ADMIN — SODIUM CHLORIDE, PRESERVATIVE FREE 10 ML: 5 INJECTION INTRAVENOUS at 21:17

## 2025-08-19 RX ADMIN — SODIUM CHLORIDE, PRESERVATIVE FREE 10 ML: 5 INJECTION INTRAVENOUS at 20:30

## 2025-08-19 RX ADMIN — INSULIN LISPRO 7 UNITS: 100 INJECTION, SOLUTION INTRAVENOUS; SUBCUTANEOUS at 12:22

## 2025-08-19 RX ADMIN — SODIUM CHLORIDE, SODIUM LACTATE, POTASSIUM CHLORIDE, AND CALCIUM CHLORIDE: .6; .31; .03; .02 INJECTION, SOLUTION INTRAVENOUS at 04:44

## 2025-08-19 RX ADMIN — SIMETHICONE 80 MG: 80 TABLET, CHEWABLE ORAL at 21:14

## 2025-08-19 RX ADMIN — OXYCODONE 5 MG: 5 TABLET ORAL at 04:34

## 2025-08-19 ASSESSMENT — PAIN - FUNCTIONAL ASSESSMENT
PAIN_FUNCTIONAL_ASSESSMENT: ACTIVITIES ARE NOT PREVENTED
PAIN_FUNCTIONAL_ASSESSMENT: 0-10
PAIN_FUNCTIONAL_ASSESSMENT: 0-10
PAIN_FUNCTIONAL_ASSESSMENT: ACTIVITIES ARE NOT PREVENTED
PAIN_FUNCTIONAL_ASSESSMENT: 0-10
PAIN_FUNCTIONAL_ASSESSMENT: 0-10

## 2025-08-19 ASSESSMENT — PAIN DESCRIPTION - ORIENTATION
ORIENTATION: RIGHT;LEFT;MID
ORIENTATION: MID
ORIENTATION: LEFT;UPPER

## 2025-08-19 ASSESSMENT — PAIN DESCRIPTION - DESCRIPTORS
DESCRIPTORS: SORE;HEAVINESS
DESCRIPTORS: ACHING
DESCRIPTORS: SHARP

## 2025-08-19 ASSESSMENT — PAIN SCALES - GENERAL
PAINLEVEL_OUTOF10: 0
PAINLEVEL_OUTOF10: 7
PAINLEVEL_OUTOF10: 7
PAINLEVEL_OUTOF10: 8
PAINLEVEL_OUTOF10: 7
PAINLEVEL_OUTOF10: 7

## 2025-08-19 ASSESSMENT — PAIN DESCRIPTION - ONSET
ONSET: ON-GOING
ONSET: ON-GOING

## 2025-08-19 ASSESSMENT — PAIN DESCRIPTION - FREQUENCY
FREQUENCY: CONTINUOUS
FREQUENCY: CONTINUOUS

## 2025-08-19 ASSESSMENT — PAIN DESCRIPTION - PAIN TYPE
TYPE: SURGICAL PAIN
TYPE: SURGICAL PAIN

## 2025-08-19 ASSESSMENT — PAIN DESCRIPTION - LOCATION
LOCATION: ABDOMEN
LOCATION: ABDOMEN
LOCATION: ABDOMEN;CHEST

## 2025-08-20 VITALS
TEMPERATURE: 98.1 F | HEIGHT: 68 IN | HEART RATE: 81 BPM | RESPIRATION RATE: 18 BRPM | DIASTOLIC BLOOD PRESSURE: 76 MMHG | SYSTOLIC BLOOD PRESSURE: 154 MMHG | WEIGHT: 200.2 LBS | BODY MASS INDEX: 30.34 KG/M2 | OXYGEN SATURATION: 96 %

## 2025-08-20 DIAGNOSIS — K81.0 ACUTE CHOLECYSTITIS: Primary | ICD-10-CM

## 2025-08-20 LAB
ALBUMIN SERPL-MCNC: 2.3 G/DL (ref 3.2–4.6)
ALBUMIN/GLOB SERPL: 0.7 (ref 1–1.9)
ALP SERPL-CCNC: 423 U/L (ref 40–129)
ALT SERPL-CCNC: 193 U/L (ref 8–55)
ANION GAP SERPL CALC-SCNC: 10 MMOL/L (ref 7–16)
AST SERPL-CCNC: 82 U/L (ref 15–37)
BILIRUB SERPL-MCNC: 3.2 MG/DL (ref 0–1.2)
BUN SERPL-MCNC: 13 MG/DL (ref 8–23)
CALCIUM SERPL-MCNC: 8.6 MG/DL (ref 8.8–10.2)
CHLORIDE SERPL-SCNC: 96 MMOL/L (ref 98–107)
CO2 SERPL-SCNC: 26 MMOL/L (ref 20–29)
CREAT SERPL-MCNC: 0.87 MG/DL (ref 0.8–1.3)
GLOBULIN SER CALC-MCNC: 3.3 G/DL (ref 2.3–3.5)
GLUCOSE SERPL-MCNC: 152 MG/DL (ref 70–99)
POTASSIUM SERPL-SCNC: 4.2 MMOL/L (ref 3.5–5.1)
PROT SERPL-MCNC: 5.6 G/DL (ref 6.3–8.2)
SODIUM SERPL-SCNC: 131 MMOL/L (ref 136–145)

## 2025-08-20 PROCEDURE — 6360000002 HC RX W HCPCS: Performed by: SURGERY

## 2025-08-20 PROCEDURE — 6370000000 HC RX 637 (ALT 250 FOR IP): Performed by: NURSE PRACTITIONER

## 2025-08-20 PROCEDURE — 6370000000 HC RX 637 (ALT 250 FOR IP): Performed by: INTERNAL MEDICINE

## 2025-08-20 PROCEDURE — 36415 COLL VENOUS BLD VENIPUNCTURE: CPT

## 2025-08-20 PROCEDURE — 6370000000 HC RX 637 (ALT 250 FOR IP): Performed by: SURGERY

## 2025-08-20 PROCEDURE — 80053 COMPREHEN METABOLIC PANEL: CPT

## 2025-08-20 RX ORDER — ACETAMINOPHEN 500 MG
TABLET ORAL
COMMUNITY
Start: 2025-08-20

## 2025-08-20 RX ORDER — OXYCODONE AND ACETAMINOPHEN 5; 325 MG/1; MG/1
1 TABLET ORAL EVERY 6 HOURS PRN
Qty: 12 TABLET | Refills: 0 | Status: SHIPPED | OUTPATIENT
Start: 2025-08-20 | End: 2025-08-23

## 2025-08-20 RX ORDER — LISINOPRIL 40 MG/1
20 TABLET ORAL DAILY
Qty: 30 TABLET | Refills: 1 | Status: SHIPPED | OUTPATIENT
Start: 2025-08-20 | End: 2025-08-21 | Stop reason: SDUPTHER

## 2025-08-20 RX ORDER — SENNOSIDES 8.6 MG/1
TABLET ORAL
COMMUNITY
Start: 2025-08-20

## 2025-08-20 RX ADMIN — FLUOXETINE HYDROCHLORIDE 40 MG: 20 CAPSULE ORAL at 08:31

## 2025-08-20 RX ADMIN — PANTOPRAZOLE SODIUM 40 MG: 40 TABLET, DELAYED RELEASE ORAL at 05:29

## 2025-08-20 RX ADMIN — GUAIFENESIN 600 MG: 600 TABLET ORAL at 08:32

## 2025-08-20 RX ADMIN — LISINOPRIL 20 MG: 20 TABLET ORAL at 08:32

## 2025-08-20 RX ADMIN — HYDROMORPHONE HYDROCHLORIDE 0.5 MG: 1 INJECTION, SOLUTION INTRAMUSCULAR; INTRAVENOUS; SUBCUTANEOUS at 01:07

## 2025-08-20 ASSESSMENT — PAIN DESCRIPTION - ORIENTATION: ORIENTATION: OTHER (COMMENT)

## 2025-08-20 ASSESSMENT — PAIN - FUNCTIONAL ASSESSMENT
PAIN_FUNCTIONAL_ASSESSMENT: ACTIVITIES ARE NOT PREVENTED
PAIN_FUNCTIONAL_ASSESSMENT: FACE, LEGS, ACTIVITY, CRY, AND CONSOLABILITY (FLACC)
PAIN_FUNCTIONAL_ASSESSMENT: 0-10

## 2025-08-20 ASSESSMENT — PAIN SCALES - GENERAL
PAINLEVEL_OUTOF10: 6
PAINLEVEL_OUTOF10: 0
PAINLEVEL_OUTOF10: 0

## 2025-08-20 ASSESSMENT — PAIN DESCRIPTION - DESCRIPTORS: DESCRIPTORS: SORE;TENDER;ACHING

## 2025-08-20 ASSESSMENT — PAIN DESCRIPTION - LOCATION: LOCATION: ABDOMEN

## 2025-08-21 ENCOUNTER — CARE COORDINATION (OUTPATIENT)
Dept: CARE COORDINATION | Facility: CLINIC | Age: 65
End: 2025-08-21

## 2025-08-21 RX ORDER — LISINOPRIL 40 MG/1
20 TABLET ORAL DAILY
Qty: 30 TABLET | Refills: 0 | Status: SHIPPED | OUTPATIENT
Start: 2025-08-21

## 2025-08-21 RX ORDER — ATORVASTATIN CALCIUM 40 MG/1
20 TABLET, FILM COATED ORAL DAILY
Qty: 45 TABLET | Refills: 3 | Status: SHIPPED | OUTPATIENT
Start: 2025-08-21

## 2025-08-21 RX ORDER — FLUOXETINE HYDROCHLORIDE 40 MG/1
40 CAPSULE ORAL DAILY
Qty: 90 CAPSULE | Refills: 3 | Status: SHIPPED | OUTPATIENT
Start: 2025-08-21

## 2025-08-21 RX ORDER — TRAZODONE HYDROCHLORIDE 100 MG/1
100 TABLET ORAL NIGHTLY
Qty: 90 TABLET | Refills: 3 | Status: SHIPPED | OUTPATIENT
Start: 2025-08-21

## 2025-08-27 ENCOUNTER — OFFICE VISIT (OUTPATIENT)
Dept: ENDOCRINOLOGY | Age: 65
End: 2025-08-27
Payer: OTHER GOVERNMENT

## 2025-08-27 VITALS
OXYGEN SATURATION: 98 % | SYSTOLIC BLOOD PRESSURE: 122 MMHG | BODY MASS INDEX: 30.4 KG/M2 | HEIGHT: 68 IN | HEART RATE: 76 BPM | DIASTOLIC BLOOD PRESSURE: 72 MMHG | WEIGHT: 200.6 LBS

## 2025-08-27 DIAGNOSIS — E11.65 TYPE 2 DIABETES MELLITUS WITH HYPERGLYCEMIA, WITH LONG-TERM CURRENT USE OF INSULIN (HCC): Primary | ICD-10-CM

## 2025-08-27 DIAGNOSIS — Z79.4 TYPE 2 DIABETES MELLITUS WITH HYPERGLYCEMIA, WITH LONG-TERM CURRENT USE OF INSULIN (HCC): Primary | ICD-10-CM

## 2025-08-27 PROCEDURE — 3074F SYST BP LT 130 MM HG: CPT | Performed by: NURSE PRACTITIONER

## 2025-08-27 PROCEDURE — 3078F DIAST BP <80 MM HG: CPT | Performed by: NURSE PRACTITIONER

## 2025-08-27 PROCEDURE — 95251 CONT GLUC MNTR ANALYSIS I&R: CPT | Performed by: NURSE PRACTITIONER

## 2025-08-27 PROCEDURE — 3052F HG A1C>EQUAL 8.0%<EQUAL 9.0%: CPT | Performed by: NURSE PRACTITIONER

## 2025-08-27 PROCEDURE — 99214 OFFICE O/P EST MOD 30 MIN: CPT | Performed by: NURSE PRACTITIONER

## 2025-08-27 ASSESSMENT — ENCOUNTER SYMPTOMS
DIARRHEA: 0
CONSTIPATION: 0

## 2025-09-02 ENCOUNTER — OFFICE VISIT (OUTPATIENT)
Dept: SURGERY | Age: 65
End: 2025-09-02

## 2025-09-02 VITALS — BODY MASS INDEX: 30.46 KG/M2 | HEIGHT: 68 IN | WEIGHT: 201 LBS

## 2025-09-02 DIAGNOSIS — K81.0 ACUTE CHOLECYSTITIS: Primary | ICD-10-CM

## (undated) DEVICE — SYSTEM TISS RETRV 125ML MOUTH DIA2IN BG L5.5IN INTRO 8MM

## (undated) DEVICE — NEEDLE HYPO 21GA L1.5IN GRN POLYPR HUB S STL REG BVL STR

## (undated) DEVICE — NEEDLE HYPO 21GA L1.5IN INTRAMUSCULAR S STL LATCH BVL UP

## (undated) DEVICE — SHEET, T, LAPAROTOMY, STERILE: Brand: MEDLINE

## (undated) DEVICE — STRIP,CLOSURE,WOUND,MEDI-STRIP,1/2X4: Brand: MEDLINE

## (undated) DEVICE — FAN SPRAY KIT: Brand: PULSAVAC®

## (undated) DEVICE — DRAPE ARM W21XH19XL10.5IN DA VINCI XI INTUITIVE SURGICAL

## (undated) DEVICE — TRAY CATH 16F DRN BG LTX -- CONVERT TO ITEM 363158

## (undated) DEVICE — TUBING INSUFFLATION SMK EVAC HI FLO SET PNEUMOCLEAR

## (undated) DEVICE — Z DISCONTINUED USE 2744636  DRESSING AQUACEL 14 IN ALG W3.5XL14IN POLYUR FLM CVR W/ HYDRCOLL

## (undated) DEVICE — PAD,NON-ADHERENT,3X8,STERILE,LF,1/PK: Brand: MEDLINE

## (undated) DEVICE — COVER TIP DIA8MM DA VINCI SI XI X ENDOWRIST

## (undated) DEVICE — TRAY PREP DRY W/ PREM GLV 2 APPL 6 SPNG 2 UNDPD 1 OVERWRAP

## (undated) DEVICE — GLOVE SURG SZ 7 L11.33IN FNGR THK9.8MIL STRW LTX POLYMER

## (undated) DEVICE — CONTAINER,SPECIMEN,O.R.STRL,4.5OZ: Brand: MEDLINE

## (undated) DEVICE — SYRINGE 20ML LL S/C 50

## (undated) DEVICE — SOLUTION IV 1000ML 0.9% SOD CHL

## (undated) DEVICE — MASTISOL ADHESIVE LIQ 2/3ML

## (undated) DEVICE — SPONGE LAP 18X18IN STRL -- 5/PK

## (undated) DEVICE — BANDAGE COMPR SELF ADH 5 YDX4 IN TAN STRL PREMIERPRO LF

## (undated) DEVICE — BLADE SAW PAT RMR PILT H 46MM --

## (undated) DEVICE — SUTURE PDS II SZ 1 L96IN ABSRB VLT TP-1 L65MM 1/2 CIR Z880G

## (undated) DEVICE — SUTURE PDS II SZ 0 L27IN ABSRB VLT L26MM CT-2 1/2 CIR Z334H

## (undated) DEVICE — DRAPE,TOP,102X53,STERILE: Brand: MEDLINE

## (undated) DEVICE — REM POLYHESIVE ADULT PATIENT RETURN ELECTRODE: Brand: VALLEYLAB

## (undated) DEVICE — 3M™ TEGADERM™ TRANSPARENT FILM DRESSING FRAME STYLE, 1626W, 4 IN X 4-3/4 IN (10 CM X 12 CM), 50/CT 4CT/CASE: Brand: 3M™ TEGADERM™

## (undated) DEVICE — OBTURATOR ROBOTIC DIA8MM STD OPT BLDELSS

## (undated) DEVICE — BIPOLAR SEALER 23-112-1 AQM 6.0: Brand: AQUAMANTYS ®

## (undated) DEVICE — SUTURE VCRL SZ 1 L36IN ABSRB UD CTX L48MM 1/2 CIR J977H

## (undated) DEVICE — SUTURE MONOCRYL + SZ 4-0 L18IN ABSRB UD L19MM PS-2 3/8 CIR MCP496G

## (undated) DEVICE — Z DISCONTINUED PER MEDLINE USE 2741944 DRESSING AQUACEL 12 IN SURG W9XL30CM SIL CVR WTRPRF VIR BACT BARR ANTIMIC

## (undated) DEVICE — SYSTEM POS SZ 36 X 20 X 1 IN INTEGR ADJ ARM PROTECTOR LIFT

## (undated) DEVICE — TUBING SCTION CONN 1/4X10 RIB

## (undated) DEVICE — SYR LR LCK 1ML GRAD NSAF 30ML --

## (undated) DEVICE — SEAL UNIVERSAL 5-12MM

## (undated) DEVICE — IRRIGATOR SURG DIA8MM SUCT ENDOWRIST

## (undated) DEVICE — DRAPE EQUIP CLMN ROBOTIC DISP DA VINCI XI

## (undated) DEVICE — SOLUTION IRRIG 1000ML 09% SOD CHL USP PIC PLAS CONTAINER

## (undated) DEVICE — 3000CC GUARDIAN II: Brand: GUARDIAN

## (undated) DEVICE — T4 HOOD

## (undated) DEVICE — SUTURE VCRL SZ 3-0 L18IN ABSRB UD L26MM SH 1/2 CIR J864D

## (undated) DEVICE — GOWN,REINF,POLY,ECL,PP SLV,XL: Brand: MEDLINE

## (undated) DEVICE — APPLIER LIG CLP M L11IN TI STR RNG HNDL FOR 20 CLP DISP

## (undated) DEVICE — SUTURE VCRL + SZ 2-0 L27IN ABSRB UD CT-2 L26MM 1/2 CIR TAPR VCP269H

## (undated) DEVICE — ADHESIVE SKIN CLOSURE TOP 0.8 CC PREM PUR LIQUIBAND RAPID LF

## (undated) DEVICE — SOLUTION IRRIG 3000ML 0.9% SOD CHL FLX CONT 0797208] ICU MEDICAL INC]

## (undated) DEVICE — SUTURE COAT VCRL SZ 4-0 L18IN ABSRB UD L19MM PS-2 1/2 CIR J496G

## (undated) DEVICE — MEDI-VAC YANKAUER SUCTION HANDLE W/BULBOUS TIP: Brand: CARDINAL HEALTH

## (undated) DEVICE — SUTURE MONOCRYL SZ 4-0 L27IN ABSRB UD L19MM PS-2 1/2 CIR PRIM Y426H

## (undated) DEVICE — STRYKER PERFORMANCE SERIES SAGITTAL BLADE: Brand: STRYKER PERFORMANCE SERIES

## (undated) DEVICE — GLOVE SURG SZ 8 CRM LTX FREE POLYISOPRENE POLYMER BEAD ANTI

## (undated) DEVICE — DRAPE,T,LAPARO,TRANS,STERILE: Brand: MEDLINE

## (undated) DEVICE — UTILITY MARKER,BLACK WITH LABELS: Brand: DEVON

## (undated) DEVICE — Device

## (undated) DEVICE — APPLIER CLP L9.38IN M LIG TI DISP STR RNG HNDL LIGACLP

## (undated) DEVICE — SUTURE MCRYL SZ 2-0 L27IN ABSRB UD CP-1 1 L36MM 1/2 CIR REV Y266H

## (undated) DEVICE — SLIM BODY SKIN STAPLER: Brand: APPOSE ULC

## (undated) DEVICE — NEEDLE HYPO 25GA L1.5IN BLU POLYPR HUB S STL REG BVL STR

## (undated) DEVICE — SURGICAL PROCEDURE PACK BASIC ST FRANCIS

## (undated) DEVICE — BUTTON SWITCH PENCIL BLADE ELECTRODE, HOLSTER: Brand: EDGE

## (undated) DEVICE — ELECTRODE PT RET AD L9FT HI MOIST COND ADH HYDRGEL CORDED

## (undated) DEVICE — PACK PROCEDURE SURG TOT KNEE

## (undated) DEVICE — TROCAR LAP L100MM DIA5MM ABD Z-THREAD 1ST ENTRY KII FIOS

## (undated) DEVICE — SOLUTION ANTIFOG VIS SYS CLEARIFY LAPSCP

## (undated) DEVICE — STOCKINETTE TUBE 9X48 -- MEDICHOICE

## (undated) DEVICE — (D)PREP SKN CHLRAPRP APPL 26ML -- CONVERT TO ITEM 371833

## (undated) DEVICE — SKIN MARKER,REGULAR TIP WITH RULER AND LABELS: Brand: DEVON

## (undated) DEVICE — SUTURE VCRL + SZ 3-0 L27IN ABSRB UD L26MM SH 1/2 CIR VCP416H

## (undated) DEVICE — 2000CC GUARDIAN II: Brand: GUARDIAN

## (undated) DEVICE — SYRINGE MED 10ML LUERLOCK TIP W/O SFTY DISP

## (undated) DEVICE — TOWEL DRP W15XL26IN BLU NONABSORBENT W/ ADH STRP 2 PER PK

## (undated) DEVICE — NEEDLE HYPO 18GA L1.5IN PNK S STL HUB POLYPR SHLD REG BVL

## (undated) DEVICE — SYR 50ML LR LCK 1ML GRAD NSAF --

## (undated) DEVICE — SUTURE ETHLN SZ 3-0 L18IN NONABSORBABLE BLK FS-1 L24MM 3/8 663H

## (undated) DEVICE — SOLUTION IV 500ML 0.9% SOD CHL FLX CONT

## (undated) DEVICE — APPLICATOR MEDICATED 26 CC SOLUTION HI LT ORNG CHLORAPREP

## (undated) DEVICE — SOLUTION IRRIG 1000ML H2O PIC PLAS SHATTERPROOF CONTAINER